# Patient Record
Sex: MALE | Race: WHITE | NOT HISPANIC OR LATINO | Employment: OTHER | URBAN - METROPOLITAN AREA
[De-identification: names, ages, dates, MRNs, and addresses within clinical notes are randomized per-mention and may not be internally consistent; named-entity substitution may affect disease eponyms.]

---

## 2017-01-31 ENCOUNTER — TELEPHONE (OUTPATIENT)
Dept: FAMILY MEDICINE | Facility: CLINIC | Age: 67
End: 2017-01-31

## 2017-01-31 NOTE — TELEPHONE ENCOUNTER
Insulin arrived from [employer]  for pt.  Box includes Novolog Mix 70/30 FlexPen #11 and Novofine 32G tip #100 x 4.  LM to call clinic/RN to inform of this med and arrange pickup.  Rosa

## 2017-03-14 DIAGNOSIS — E11.9 TYPE 2 DIABETES MELLITUS WITHOUT COMPLICATION (H): ICD-10-CM

## 2017-03-14 RX ORDER — GLIPIZIDE 10 MG/1
10 TABLET ORAL 2 TIMES DAILY
Qty: 60 TABLET | Refills: 2 | Status: SHIPPED | OUTPATIENT
Start: 2017-03-14 | End: 2017-04-27

## 2017-04-13 ENCOUNTER — TELEPHONE (OUTPATIENT)
Dept: FAMILY MEDICINE | Facility: CLINIC | Age: 67
End: 2017-04-13

## 2017-04-13 DIAGNOSIS — Z12.11 SPECIAL SCREENING FOR MALIGNANT NEOPLASMS, COLON: Primary | ICD-10-CM

## 2017-04-13 NOTE — TELEPHONE ENCOUNTER
Panel Management Review      Patient has the following on his problem list:     Depression / Dysthymia review  PHQ-9 SCORE 3/16/2016 7/11/2016 12/5/2016   Total Score - - -   Total Score 3 0 5      Patient is due for:  PHQ9    Diabetes    ASA: Passed    Last A1C  Lab Results   Component Value Date    A1C 6.2 12/05/2016    A1C 6.2 06/15/2016    A1C 6.5 03/07/2016    A1C 6.8 06/01/2015    A1C 9.4 02/23/2015     A1C tested: Passed    Last LDL:    Lab Results   Component Value Date    CHOL 91 06/15/2016     Lab Results   Component Value Date    HDL 35 06/15/2016     Lab Results   Component Value Date    LDL 3 06/15/2016     Lab Results   Component Value Date    TRIG 265 06/15/2016     Lab Results   Component Value Date    CHOLHDLRATIO 2.6 06/01/2015     Lab Results   Component Value Date    NHDL 56 06/15/2016       Is the patient on a Statin? YES             Is the patient on Aspirin? YES    Medications     HMG CoA Reductase Inhibitors    atorvastatin (LIPITOR) 40 MG tablet    Salicylates    ASPIRIN 81 MG OR TABS          Last three blood pressure readings:  BP Readings from Last 3 Encounters:   12/21/16 138/60   12/05/16 154/83   07/11/16 139/80       Date of last diabetes office visit: 12/5/16     Tobacco History:     History   Smoking Status     Never Smoker   Smokeless Tobacco     Never Used           Composite cancer screening  Chart review shows that this patient is due/due soon for the following Fecal Colorectal (FIT)  Summary:    Patient is due/failing the following:   A1C and FIT    Action needed:   Patient needs referral/order: fit    Type of outreach:    mailed fit kit to patient    Questions for provider review:    None                                                                                                                                    Louann Diop MA       Chart routed to Care Team .

## 2017-04-19 PROCEDURE — 82274 ASSAY TEST FOR BLOOD FECAL: CPT | Performed by: FAMILY MEDICINE

## 2017-04-20 ENCOUNTER — TELEPHONE (OUTPATIENT)
Dept: FAMILY MEDICINE | Facility: CLINIC | Age: 67
End: 2017-04-20

## 2017-04-20 NOTE — TELEPHONE ENCOUNTER
Patient cancelled appt for lab today  Reports he needs A1C  He will call back to make future lab appt    Tawnya CRAWFORD Rn

## 2017-04-20 NOTE — TELEPHONE ENCOUNTER
Clinic Action Needed:Yes, Please call patient   Reason for Call:Patient calling reporting he has scheduled a lab appointment for 9 a.m. Today for A1C.  Requesting to have orders put in this morning.  Routed to:Dr Anil Malave RN  [facility]

## 2017-04-21 DIAGNOSIS — Z12.11 SPECIAL SCREENING FOR MALIGNANT NEOPLASMS, COLON: ICD-10-CM

## 2017-04-22 LAB — HEMOCCULT STL QL IA: NEGATIVE

## 2017-04-27 ENCOUNTER — OFFICE VISIT (OUTPATIENT)
Dept: FAMILY MEDICINE | Facility: CLINIC | Age: 67
End: 2017-04-27
Payer: MEDICARE

## 2017-04-27 ENCOUNTER — TELEPHONE (OUTPATIENT)
Dept: FAMILY MEDICINE | Facility: CLINIC | Age: 67
End: 2017-04-27

## 2017-04-27 ENCOUNTER — ALLIED HEALTH/NURSE VISIT (OUTPATIENT)
Dept: FAMILY MEDICINE | Facility: CLINIC | Age: 67
End: 2017-04-27
Payer: MEDICARE

## 2017-04-27 VITALS
SYSTOLIC BLOOD PRESSURE: 132 MMHG | OXYGEN SATURATION: 96 % | BODY MASS INDEX: 32.07 KG/M2 | RESPIRATION RATE: 18 BRPM | DIASTOLIC BLOOD PRESSURE: 79 MMHG | HEART RATE: 75 BPM | WEIGHT: 224 LBS | HEIGHT: 70 IN

## 2017-04-27 VITALS
DIASTOLIC BLOOD PRESSURE: 79 MMHG | RESPIRATION RATE: 17 BRPM | HEART RATE: 77 BPM | SYSTOLIC BLOOD PRESSURE: 132 MMHG | OXYGEN SATURATION: 96 %

## 2017-04-27 DIAGNOSIS — E11.9 TYPE 2 DIABETES MELLITUS WITHOUT COMPLICATION (H): ICD-10-CM

## 2017-04-27 DIAGNOSIS — E03.9 HYPOTHYROIDISM: ICD-10-CM

## 2017-04-27 DIAGNOSIS — E11.9 TYPE 2 DIABETES MELLITUS WITHOUT COMPLICATION (H): Primary | ICD-10-CM

## 2017-04-27 DIAGNOSIS — E11.9 TYPE 2 DIABETES MELLITUS WITHOUT COMPLICATION, WITH LONG-TERM CURRENT USE OF INSULIN (H): ICD-10-CM

## 2017-04-27 DIAGNOSIS — Z79.4 TYPE 2 DIABETES MELLITUS WITHOUT COMPLICATION, WITH LONG-TERM CURRENT USE OF INSULIN (H): ICD-10-CM

## 2017-04-27 DIAGNOSIS — E11.42 DM TYPE 2 WITH DIABETIC PERIPHERAL NEUROPATHY (H): ICD-10-CM

## 2017-04-27 DIAGNOSIS — L70.0 OPEN COMEDONE: Primary | ICD-10-CM

## 2017-04-27 DIAGNOSIS — E78.5 HYPERLIPIDEMIA LDL GOAL <100: Primary | ICD-10-CM

## 2017-04-27 DIAGNOSIS — I10 ESSENTIAL HYPERTENSION WITH GOAL BLOOD PRESSURE LESS THAN 140/90: Chronic | ICD-10-CM

## 2017-04-27 LAB — HBA1C MFR BLD: 5.1 % (ref 4.3–6)

## 2017-04-27 PROCEDURE — 83036 HEMOGLOBIN GLYCOSYLATED A1C: CPT | Performed by: FAMILY MEDICINE

## 2017-04-27 PROCEDURE — 99207 ZZC NO CHARGE NURSE ONLY: CPT

## 2017-04-27 PROCEDURE — 36415 COLL VENOUS BLD VENIPUNCTURE: CPT | Performed by: FAMILY MEDICINE

## 2017-04-27 PROCEDURE — 99214 OFFICE O/P EST MOD 30 MIN: CPT | Performed by: FAMILY MEDICINE

## 2017-04-27 RX ORDER — GLIPIZIDE 10 MG/1
10 TABLET ORAL DAILY
Qty: 30 TABLET | Refills: 2 | Status: SHIPPED | OUTPATIENT
Start: 2017-04-27 | End: 2018-04-26

## 2017-04-27 ASSESSMENT — ANXIETY QUESTIONNAIRES
3. WORRYING TOO MUCH ABOUT DIFFERENT THINGS: NOT AT ALL
5. BEING SO RESTLESS THAT IT IS HARD TO SIT STILL: NOT AT ALL
6. BECOMING EASILY ANNOYED OR IRRITABLE: NOT AT ALL
7. FEELING AFRAID AS IF SOMETHING AWFUL MIGHT HAPPEN: NOT AT ALL
GAD7 TOTAL SCORE: 0
2. NOT BEING ABLE TO STOP OR CONTROL WORRYING: NOT AT ALL
1. FEELING NERVOUS, ANXIOUS, OR ON EDGE: NOT AT ALL

## 2017-04-27 ASSESSMENT — PATIENT HEALTH QUESTIONNAIRE - PHQ9: 5. POOR APPETITE OR OVEREATING: NOT AT ALL

## 2017-04-27 NOTE — TELEPHONE ENCOUNTER
A1c lab entered.   Pt called and also asked to make a nurse only visit to update, BP, PHQ-09.   Pt verbalized understanding and had no further questions at this time.   Encounter closed.   Shoshana MERCEDES RN

## 2017-04-27 NOTE — NURSING NOTE
"Chief Complaint   Patient presents with     Derm Problem     Patient has a bump on right side of cheek that has been present for the pass couple of weeks. Patient has not bothered it. Patient does have a white head on the top of bump.        Initial /79  Pulse 75  Resp 18  Ht 5' 9.5\" (1.765 m)  Wt 224 lb (101.6 kg)  SpO2 96%  BMI 32.6 kg/m2 Estimated body mass index is 32.6 kg/(m^2) as calculated from the following:    Height as of this encounter: 5' 9.5\" (1.765 m).    Weight as of this encounter: 224 lb (101.6 kg).  Medication Reconciliation: complete    "

## 2017-04-27 NOTE — MR AVS SNAPSHOT
After Visit Summary   4/27/2017    Kvng Velasquez    MRN: [id number]           Patient Information     Date Of Birth          1950        Visit Information        Provider Department      4/27/2017 9:45 AM FL CL RN [facility]        Today's Diagnoses     Hyperlipidemia LDL goal <100    -  1    Type 2 diabetes mellitus without complication (H)        Hypothyroidism        Essential hypertension with goal blood pressure less than 140/90           Follow-ups after your visit        Your next 10 appointments already scheduled     Apr 27, 2017 11:00 AM CDT   SHORT with Marlen Ma MD   [facility] ([facility])    [address]  [facility] [address]   [phone]              Future tests that were ordered for you today     Open Future Orders        Priority Expected Expires Ordered    Lipid panel reflex to direct LDL Routine  4/27/2018 4/27/2017    Albumin Random Urine Quantitative Routine  4/27/2018 4/27/2017    TSH with free T4 reflex Routine  4/27/2018 4/27/2017    Basic metabolic panel  (Ca, Cl, CO2, Creat, Gluc, K, Na, BUN) Routine  4/27/2018 4/27/2017            Who to contact     If you have questions or need follow up information about today's clinic visit or your schedule please contact [facility] directly at [phone].  Normal or non-critical lab and imaging results will be communicated to you by MyChart, letter or phone within 4 business days after the clinic has received the results. If you do not hear from us within 7 days, please contact the clinic through MyChart or phone. If you have a critical or abnormal lab result, we will notify you by phone as soon as possible.  Submit refill requests through [employer] or call your pharmacy and they will forward the refill request to us. Please allow 3 business days for your refill to be completed.          Additional Information About Your Visit       "  MyChart Information     [employer] lets you send messages to your doctor, view your test results, renew your prescriptions, schedule appointments and more. To sign up, go to [email or web address] . Click on \"Log in\" on the left side of the screen, which will take you to the Welcome page. Then click on \"Sign up Now\" on the right side of the page.     You will be asked to enter the access code listed below, as well as some personal information. Please follow the directions to create your username and password.     Your access code is: [email or web address]  Expires: 2017  9:53 AM     Your access code will  in 90 days. If you need help or a new code, please call your [place] clinic or [phone].        Care EveryWhere ID     This is your Care EveryWhere ID. This could be used by other organizations to access your [place] medical records  [id number]        Your Vitals Were     Pulse Respirations Pulse Oximetry             77 17 96%          Blood Pressure from Last 3 Encounters:   17 132/79   16 138/60   16 154/83    Weight from Last 3 Encounters:   16 222 lb (100.7 kg)   16 221 lb (100.2 kg)   16 226 lb (102.5 kg)               Primary Care Provider Office Phone # Fax #    Marlen Ma [phone]       [facility] [address] [address] [facility] [address] [address]        Thank you!     Thank you for choosing [facility]  for your care. Our goal is always to provide you with excellent care. Hearing back from our patients is one way we can continue to improve our services. Please take a few minutes to complete the written survey that you may receive in the mail after your visit with us. Thank you!             Your Updated Medication List - Protect others around you: Learn how to safely use, store and throw away your medicines at www.disposemymeds.org.          This list is accurate as of: 17  9:53 AM.  Always use your most recent med " list.                   Brand Name Dispense Instructions for use    amLODIPine 10 MG tablet    NORVASC    30 tablet    Take 1 tablet (10 mg) by mouth daily       aspirin 81 MG tablet      1 TABLET DAILY       atenolol 50 MG tablet    TENORMIN    135 tablet    Take one and a half tablets daily.       atorvastatin 40 MG tablet    LIPITOR    90 tablet    Take 1 tablet (40 mg) by mouth daily       B-12 1000 MCG Tbcr     100 tablet    Take 1,000 mcg by mouth daily       blood glucose monitoring meter device kit    no brand specified    1 kit    Use to test blood sugar one times daily or as directed.       blood glucose monitoring test strip    no brand specified    3 Month    1 strip by In Vitro route daily       busPIRone 15 MG tablet    BUSPAR    30 tablet    Take 1 tablet (15 mg) by mouth daily       esomeprazole 40 MG CR capsule    nexIUM    90 capsule    Take 1 capsule (40 mg) by mouth every morning (before breakfast) Take 30-60 minutes before eating.       fenofibrate 145 MG tablet     90 tablet    Take 1 tablet (145 mg) by mouth daily Brand name       gabapentin 300 MG capsule    NEURONTIN    30 capsule    Take 1 tablet (300 mg) every night       glipiZIDE 10 MG tablet    GLUCOTROL    60 tablet    Take 1 tablet (10 mg) by mouth 2 times daily       indomethacin 50 MG capsule    INDOCIN    60 capsule    Take  by mouth. 1 CAPSULE three times DAILY, reduce to as necessary as gout gets better       insulin aspart protamine-insulin aspart injection    NovoLOG MIX 70/30    9 vial    71 units before breakfast, 53 units before dinner       * insulin pen needle 31G X 6 MM    NOVOFINE 31    100 each    1 Device 2 times daily.       * NOVOFINE 32G X 6 MM   Generic drug:  insulin pen needle     100 each    Use twcie daily or as directed.       levothyroxine 112 MCG tablet    SYNTHROID/LEVOTHROID    30 tablet    Take 1 tablet (112 mcg) by mouth daily       lisinopril-hydrochlorothiazide 20-25 MG per tablet     PRINZIDE/ZESTORETIC    30 tablet    Take 1 tablet by mouth daily       naproxen 375 MG tablet    NAPROSYN    40 tablet    Take 1 tablet by mouth 3 times daily as needed.       nystatin-triamcinolone cream    MYCOLOG II    60 g    Apply  topically 2 times daily.       order for DME     1 each    Dispense one pair of diabetic shoes.       order for DME     2 each    Equipment being ordered: Diabetic Shoes       thin lancets    NO BRAND SPECIFIED    1 Box    Use to test blood sugar one time daily or as directed.       triamcinolone 0.1 % cream    KENALOG    30 g    Apply sparingly to affected area three times daily for 14 days.       * Notice:  This list has 2 medication(s) that are the same as other medications prescribed for you. Read the directions carefully, and ask your doctor or other care provider to review them with you.

## 2017-04-27 NOTE — TELEPHONE ENCOUNTER
Reason for Call:  Other Labs    Detailed comments: He needs an A1C put in.  He has an appt at 9:30 today.    Phone Number Patient can be reached at: Home number on file [phone] (home)    Best Time: any    Can we leave a detailed message on this number? YES    Call taken on 4/27/2017 at 8:46 AM by Ana Maria Diego

## 2017-04-27 NOTE — NURSING NOTE
Pt was here today for a lab draw.   Pt had questions in regards to BP, additional labs and derm problem on face.   Primary provider had an appt time open and appt was scheduled for pt to see provider in the clinic today at 11:00am   Writer helped pt with a list of questions to ask provider during appt time.   BP was taken and labs were scheduled  Encounter closed.   Shoshana MERCEDES RN      BP Readings from Last 3 Encounters:   04/27/17 132/79   12/21/16 138/60   12/05/16 154/83

## 2017-04-27 NOTE — PATIENT INSTRUCTIONS
Decrease glipizide to just 10 mg once a day (morning)    Thank you for choosing [facility].  You may be receiving a survey in the mail from [employer] regarding your visit today.  Please take a few minutes to complete and return the survey to let us know how we are doing.      Our Clinic hours are:  Mondays    7:20 am - 7 pm  Tues -  Fri  7:20 am - 5 pm    Clinic Phone: [phone]    The clinic lab opens at 7:30 am Mon - Fri and appointments are required.    [facility]  Ph. [phone]  Monday-Thursday 8 am - 7pm  Tues/Wed/Fri 8 am - 5:30 pm

## 2017-04-27 NOTE — PROGRESS NOTES
"  SUBJECTIVE:                                                    Kvng Velasquez is a 67 year old male who presents to clinic today for the following health issues:      Chief Complaint   Patient presents with     Derm Problem     Patient has a bump on right side of cheek that has been present for the pass couple of weeks. Patient has not bothered it. Patient does have a white head on the top of bump.      Lesion on right cheek that people have encouraged him to have looked it - present the past few weeks.     Diabetes Follow-up      Patient is checking blood sugars: rarely.       Diabetic concerns: other - possible vision changes, shaky in the morning.  Not checking blood sugars.  HgbA1c 5.1% today     Symptoms of hypoglycemia (low blood sugar): shaky, blurred vision     Paresthesias (numbness or burning in feet) or sores: Yes       Date of last diabetic eye exam: unsure       Problem list and histories reviewed & adjusted, as indicated.  Additional history: as documented      /79  Pulse 75  Resp 18  Ht 5' 9.5\" (1.765 m)  Wt 224 lb (101.6 kg)  SpO2 96%  BMI 32.6 kg/m2  EXAM: GENERAL APPEARANCE ADULT: Alert, no acute distress  SKIN: large open comedone on right cheek.    Comedone extractor used to remove a large amount of sebaceous material      Results for orders placed or performed in visit on 04/27/17   Hemoglobin A1c   Result Value Ref Range    Hemoglobin A1C 5.1 4.3 - 6.0 %     ASSESSMENT/PLAN:      ICD-10-CM    1. Open comedone L70.0    2. Type 2 diabetes mellitus without complication, with long-term current use of insulin (H) E11.9 glipiZIDE (GLUCOTROL) 10 MG tablet    Z79.4    3. DM type 2 with diabetic peripheral neuropathy (H) E11.42      We may need to further decrease his medications or change his insulin dosing.  Would first drop the glipizide and monitor.  He will keep track of blood sugar twice a day and he will see me in a month.     Patient Instructions       Decrease glipizide to just " 10 mg once a day (morning)    Thank you for choosing [facility].  You may be receiving a survey in the mail from [employer] regarding your visit today.  Please take a few minutes to complete and return the survey to let us know how we are doing.      Our Clinic hours are:  Mondays    7:20 am - 7 pm  Tues -  Fri  7:20 am - 5 pm    Clinic Phone: [phone]    The clinic lab opens at 7:30 am Mon - Fri and appointments are required.    [facility]  Ph. [phone]  Monday-Thursday 8 am - 7pm  Tues/Wed/Fri 8 am - 5:30 pm

## 2017-04-27 NOTE — MR AVS SNAPSHOT
After Visit Summary   4/27/2017    Kvng Velasquez    MRN: [id number]           Patient Information     Date Of Birth          1950        Visit Information        Provider Department      4/27/2017 11:00 AM Marlen Ma MD [facility]        Today's Diagnoses     Type 2 diabetes mellitus without complication, with long-term current use of insulin (H)          Care Instructions        Decrease glipizide to just 10 mg once a day (morning)    Thank you for choosing [facility]  You may be receiving a survey in the mail from [employer] regarding your visit today.  Please take a few minutes to complete and return the survey to let us know how we are doing.      Our Clinic hours are:  Mondays    7:20 am - 7 pm  Tues -  Fri  7:20 am - 5 pm    Clinic Phone: [phone]    The clinic lab opens at 7:30 am Mon - Fri and appointments are required.    [facility]  Ph. [phone]  Monday-Thursday 8 am - 7pm  Tues/Wed/Fri 8 am - 5:30 pm               Follow-ups after your visit        Future tests that were ordered for you today     Open Future Orders        Priority Expected Expires Ordered    Lipid panel reflex to direct LDL Routine  4/27/2018 4/27/2017    Albumin Random Urine Quantitative Routine  4/27/2018 4/27/2017    TSH with free T4 reflex Routine  4/27/2018 4/27/2017    Basic metabolic panel  (Ca, Cl, CO2, Creat, Gluc, K, Na, BUN) Routine  4/27/2018 4/27/2017            Who to contact     If you have questions or need follow up information about today's clinic visit or your schedule please contact [facility] directly at [phone].  Normal or non-critical lab and imaging results will be communicated to you by MyChart, letter or phone within 4 business days after the clinic has received the results. If you do not hear from us within 7 days, please contact the clinic through MyChart or phone. If you have a critical or abnormal lab  "result, we will notify you by phone as soon as possible.  Submit refill requests through [employer] or call your pharmacy and they will forward the refill request to us. Please allow 3 business days for your refill to be completed.          Additional Information About Your Visit        [employer] Information     [employer] lets you send messages to your doctor, view your test results, renew your prescriptions, schedule appointments and more. To sign up, go to [email or web address] . Click on \"Log in\" on the left side of the screen, which will take you to the Welcome page. Then click on \"Sign up Now\" on the right side of the page.     You will be asked to enter the access code listed below, as well as some personal information. Please follow the directions to create your username and password.     Your access code is: [email or web address]  Expires: 2017  9:53 AM     Your access code will  in 90 days. If you need help or a new code, please call your [place] clinic or [phone].        Care EveryWhere ID     This is your Care EveryWhere ID. This could be used by other organizations to access your [place] medical records  [id number]        Your Vitals Were     Pulse Respirations Height Pulse Oximetry BMI (Body Mass Index)       75 18 5' 9.5\" (1.765 m) 96% 32.6 kg/m2        Blood Pressure from Last 3 Encounters:   17 132/79   17 132/79   16 138/60    Weight from Last 3 Encounters:   17 224 lb (101.6 kg)   16 222 lb (100.7 kg)   16 221 lb (100.2 kg)              Today, you had the following     No orders found for display         Today's Medication Changes          These changes are accurate as of: 17 11:18 AM.  If you have any questions, ask your nurse or doctor.               These medicines have changed or have updated prescriptions.        Dose/Directions    glipiZIDE 10 MG tablet   Commonly known as:  GLUCOTROL   This may have changed:  when to take this   Used for:  Type " 2 diabetes mellitus without complication, with long-term current use of insulin (H)   Changed by:  Marlen Ma MD        Dose:  10 mg   Take 1 tablet (10 mg) by mouth daily   Quantity:  30 tablet   Refills:  2            Where to get your medicines      These [place] [facility] [place] [address]  [address] [address]     Phone:  [phone]     glipiZIDE 10 MG tablet                Primary Care Provider Office Phone # Fax #    Marlen Ma [phone]       [facility] [address]  [facility] [address]        Thank you!     Thank you for choosing [facility]  for your care. Our goal is always to provide you with excellent care. Hearing back from our patients is one way we can continue to improve our services. Please take a few minutes to complete the written survey that you may receive in the mail after your visit with us. Thank you!             Your Updated Medication List - Protect others around you: Learn how to safely use, store and throw away your medicines at www.disposemymeds.org.          This list is accurate as of: 4/27/17 11:18 AM.  Always use your most recent med list.                   Brand Name Dispense Instructions for use    amLODIPine 10 MG tablet    NORVASC    30 tablet    Take 1 tablet (10 mg) by mouth daily       aspirin 81 MG tablet      1 TABLET DAILY       atenolol 50 MG tablet    TENORMIN    135 tablet    Take one and a half tablets daily.       atorvastatin 40 MG tablet    LIPITOR    90 tablet    Take 1 tablet (40 mg) by mouth daily       B-12 1000 MCG Tbcr     100 tablet    Take 1,000 mcg by mouth daily       blood glucose monitoring meter device kit    no brand specified    1 kit    Use to test blood sugar one times daily or as directed.       blood glucose monitoring test strip    no brand specified    3 Month    1 strip by In Vitro route daily       busPIRone 15 MG  tablet    BUSPAR    30 tablet    Take 1 tablet (15 mg) by mouth daily       esomeprazole 40 MG CR capsule    nexIUM    90 capsule    Take 1 capsule (40 mg) by mouth every morning (before breakfast) Take 30-60 minutes before eating.       fenofibrate 145 MG tablet     90 tablet    Take 1 tablet (145 mg) by mouth daily Brand name       gabapentin 300 MG capsule    NEURONTIN    30 capsule    Take 1 tablet (300 mg) every night       glipiZIDE 10 MG tablet    GLUCOTROL    30 tablet    Take 1 tablet (10 mg) by mouth daily       indomethacin 50 MG capsule    INDOCIN    60 capsule    Take  by mouth. 1 CAPSULE three times DAILY, reduce to as necessary as gout gets better       insulin aspart protamine-insulin aspart injection    NovoLOG MIX 70/30    9 vial    71 units before breakfast, 53 units before dinner       * insulin pen needle 31G X 6 MM    NOVOFINE 31    100 each    1 Device 2 times daily.       * NOVOFINE 32G X 6 MM   Generic drug:  insulin pen needle     100 each    Use twcie daily or as directed.       levothyroxine 112 MCG tablet    SYNTHROID/LEVOTHROID    30 tablet    Take 1 tablet (112 mcg) by mouth daily       lisinopril-hydrochlorothiazide 20-25 MG per tablet    PRINZIDE/ZESTORETIC    30 tablet    Take 1 tablet by mouth daily       naproxen 375 MG tablet    NAPROSYN    40 tablet    Take 1 tablet by mouth 3 times daily as needed.       nystatin-triamcinolone cream    MYCOLOG II    60 g    Apply  topically 2 times daily.       order for DME     1 each    Dispense one pair of diabetic shoes.       order for DME     2 each    Equipment being ordered: Diabetic Shoes       thin lancets    NO BRAND SPECIFIED    1 Box    Use to test blood sugar one time daily or as directed.       triamcinolone 0.1 % cream    KENALOG    30 g    Apply sparingly to affected area three times daily for 14 days.       * Notice:  This list has 2 medication(s) that are the same as other medications prescribed for you. Read the directions  carefully, and ask your doctor or other care provider to review them with you.

## 2017-04-28 ASSESSMENT — ANXIETY QUESTIONNAIRES: GAD7 TOTAL SCORE: 0

## 2017-04-28 ASSESSMENT — PATIENT HEALTH QUESTIONNAIRE - PHQ9: SUM OF ALL RESPONSES TO PHQ QUESTIONS 1-9: 0

## 2017-05-31 ENCOUNTER — TELEPHONE (OUTPATIENT)
Dept: FAMILY MEDICINE | Facility: CLINIC | Age: 67
End: 2017-05-31

## 2017-05-31 NOTE — TELEPHONE ENCOUNTER
Patient returned our call, I told him his insulin is here, and to come and pick it up.  Samantha Jj  Clinic [facility]  [facility]

## 2017-05-31 NOTE — TELEPHONE ENCOUNTER
Left message for pt to call back to  insulin that came in this morning at 1015. Will put insulin in the clinic fridge until we hear back from pt.   Scott HECTOR

## 2017-06-01 DIAGNOSIS — I10 ESSENTIAL HYPERTENSION WITH GOAL BLOOD PRESSURE LESS THAN 140/90: Chronic | ICD-10-CM

## 2017-06-01 RX ORDER — AMLODIPINE BESYLATE 10 MG/1
TABLET ORAL
Qty: 90 TABLET | Refills: 1 | Status: SHIPPED | OUTPATIENT
Start: 2017-06-01 | End: 2017-11-29

## 2017-06-01 NOTE — TELEPHONE ENCOUNTER
Amlodipine      Last Written Prescription Date: 12/05/16  Last Fill Quantity: 30, # refills: 5    Last Office Visit with G, P or [facility] prescribing provider:  04/27/17   Future Office Visit:        BP Readings from Last 3 Encounters:   04/27/17 132/79   04/27/17 132/79   12/21/16 138/60

## 2017-06-05 DIAGNOSIS — I10 ESSENTIAL HYPERTENSION, BENIGN: ICD-10-CM

## 2017-06-05 DIAGNOSIS — I10 ESSENTIAL HYPERTENSION: ICD-10-CM

## 2017-06-05 RX ORDER — ATENOLOL 50 MG/1
TABLET ORAL
Qty: 135 TABLET | Refills: 1 | Status: SHIPPED | OUTPATIENT
Start: 2017-06-05 | End: 2017-11-29

## 2017-06-19 DIAGNOSIS — I10 ESSENTIAL HYPERTENSION WITH GOAL BLOOD PRESSURE LESS THAN 140/90: Chronic | ICD-10-CM

## 2017-06-19 DIAGNOSIS — E03.9 HYPOTHYROIDISM: ICD-10-CM

## 2017-06-19 DIAGNOSIS — E11.9 TYPE 2 DIABETES MELLITUS WITHOUT COMPLICATION (H): ICD-10-CM

## 2017-06-19 DIAGNOSIS — E78.5 HYPERLIPIDEMIA LDL GOAL <100: ICD-10-CM

## 2017-06-19 LAB
ANION GAP SERPL CALCULATED.3IONS-SCNC: 6 MMOL/L (ref 3–14)
BUN SERPL-MCNC: 11 MG/DL (ref 7–30)
CALCIUM SERPL-MCNC: 8.8 MG/DL (ref 8.5–10.1)
CHLORIDE SERPL-SCNC: 104 MMOL/L (ref 94–109)
CHOLEST SERPL-MCNC: 132 MG/DL
CO2 SERPL-SCNC: 28 MMOL/L (ref 20–32)
CREAT SERPL-MCNC: 0.8 MG/DL (ref 0.66–1.25)
CREAT UR-MCNC: 148 MG/DL
GFR SERPL CREATININE-BSD FRML MDRD: ABNORMAL ML/MIN/1.7M2
GLUCOSE SERPL-MCNC: 53 MG/DL (ref 70–99)
HDLC SERPL-MCNC: 35 MG/DL
LDLC SERPL CALC-MCNC: 34 MG/DL
MICROALBUMIN UR-MCNC: 7 MG/L
MICROALBUMIN/CREAT UR: 4.4 MG/G CR (ref 0–17)
NONHDLC SERPL-MCNC: 97 MG/DL
POTASSIUM SERPL-SCNC: 3.5 MMOL/L (ref 3.4–5.3)
SODIUM SERPL-SCNC: 138 MMOL/L (ref 133–144)
T4 FREE SERPL-MCNC: 0.9 NG/DL (ref 0.76–1.46)
TRIGL SERPL-MCNC: 316 MG/DL
TSH SERPL DL<=0.005 MIU/L-ACNC: 7.06 MU/L (ref 0.4–4)

## 2017-06-19 PROCEDURE — 84439 ASSAY OF FREE THYROXINE: CPT | Performed by: FAMILY MEDICINE

## 2017-06-19 PROCEDURE — 80061 LIPID PANEL: CPT | Performed by: FAMILY MEDICINE

## 2017-06-19 PROCEDURE — 82043 UR ALBUMIN QUANTITATIVE: CPT | Performed by: FAMILY MEDICINE

## 2017-06-19 PROCEDURE — 80048 BASIC METABOLIC PNL TOTAL CA: CPT | Performed by: FAMILY MEDICINE

## 2017-06-19 PROCEDURE — 36415 COLL VENOUS BLD VENIPUNCTURE: CPT | Performed by: FAMILY MEDICINE

## 2017-06-19 PROCEDURE — 84443 ASSAY THYROID STIM HORMONE: CPT | Performed by: FAMILY MEDICINE

## 2017-06-21 DIAGNOSIS — E03.9 HYPOTHYROIDISM, UNSPECIFIED TYPE: ICD-10-CM

## 2017-06-21 RX ORDER — LEVOTHYROXINE SODIUM 112 UG/1
112 TABLET ORAL DAILY
Qty: 90 TABLET | Refills: 3 | Status: SHIPPED | OUTPATIENT
Start: 2017-06-21 | End: 2018-04-26

## 2017-06-21 NOTE — PROGRESS NOTES
Please check with patient to see if he's been taking his levothyroxine correctly and daily.  If he has, we need to adjust the levothyroxine dose to 125 mcg daily and recheck in 2 months.  Please let me know so I can order the new dose if necessary.    Labs otherwise look really good.  Blood sugar was a little bit low when it was checked yesterday, is he having frequent low sugars?  I would like him to cut his glipizide further to 1/2 tablet (5 mg) once a day.    Marlen Ma M.D.

## 2017-07-12 ENCOUNTER — HOSPITAL ENCOUNTER (OUTPATIENT)
Dept: ULTRASOUND IMAGING | Facility: CLINIC | Age: 67
Discharge: HOME | End: 2017-07-12
Attending: FAMILY MEDICINE | Admitting: FAMILY MEDICINE
Payer: MEDICARE

## 2017-07-12 ENCOUNTER — OFFICE VISIT (OUTPATIENT)
Dept: FAMILY MEDICINE | Facility: CLINIC | Age: 67
End: 2017-07-12
Payer: MEDICARE

## 2017-07-12 VITALS
HEIGHT: 70 IN | WEIGHT: 223 LBS | BODY MASS INDEX: 31.92 KG/M2 | DIASTOLIC BLOOD PRESSURE: 61 MMHG | TEMPERATURE: 98.6 F | HEART RATE: 67 BPM | SYSTOLIC BLOOD PRESSURE: 136 MMHG

## 2017-07-12 DIAGNOSIS — M79.89 LEFT LEG SWELLING: Primary | ICD-10-CM

## 2017-07-12 DIAGNOSIS — M79.89 LEFT LEG SWELLING: ICD-10-CM

## 2017-07-12 PROCEDURE — 99213 OFFICE O/P EST LOW 20 MIN: CPT | Performed by: FAMILY MEDICINE

## 2017-07-12 PROCEDURE — 93971 EXTREMITY STUDY: CPT | Mod: LT

## 2017-07-12 NOTE — PATIENT INSTRUCTIONS
Thank you for choosing [facility].  You may be receiving a survey in the mail from Amina Garcia regarding your visit today.  Please take a few minutes to complete and return the survey to let us know how we are doing.      If you have questions or concerns, please contact us via Paymate or you can contact your care team at [phone].    Our Clinic hours are:  Monday 6:40 am  to 7:00 pm  Tuesday -Friday 6:40 am to 5:00 pm    The [place] outpatient lab hours are:  Monday - Friday 6:10 am to 4:45 pm  Saturdays 7:00 am to 11:00 am  Appointments are required, call [phone]    If you have clinical questions after hours or would like to schedule an appointment,  call the clinic at [phone].  Hematoma  A hematoma is a collection of blood trapped outside of a blood vessel. It is what we think of as a bruise or a contusion. It is usually seen under the skin as a black and blue spot on your arm or leg, or a bump on your head after an injury. It can be almost anywhere on or in your body. It can also occur in an internal organ where it can be more serious.  A hematoma is caused by an injury with damage to small blood vessels. This causes blood to leak into the tissues. Blood forms a pocket under the skin that swells and looks like a purplish patch.  Gradually the blood in the hematoma is absorbed back into the body. The swelling and pain of the hematoma will go away. This takes from 1 to 4 weeks, depending on the size of the hematoma. The skin over the hematoma may turn bluish then brown and yellow as the blood is dissolved and absorbed. Usually, this only takes a couple of weeks but can last months.  Home care    Limit motion of the joints near the hematoma. If the hematoma is large and painful, avoid sports and other vigorous physical activity until the swelling and pain goes away.    Apply an ice pack (ice cubes in a plastic bag, wrapped in a thin towel or a frozen bag of peas) over the injured area for  20 minutes every 1 to 2 hours the first day. Continue with ice packs 3 to 4 times a day for the next 2 days. Continue the use of ice packs for relief of pain and swelling as needed.    If you need anything for pain, you can take acetaminophen or ibuprofen, unless you were given a different pain medicine to use. Talk with your doctor before using these medicines if you have chronic liver or kidney disease, or ever had a stomach ulcer or gastrointestinal bleeding, or are taking blood thinner medicines.  Follow-up care  Follow up with your healthcare provider, or as advised. If X-rays or a CT scan were done, you will be notified if there is a change in the reading, especially if it affects treatment.  When to seek medical advice  Call your healthcare provider right away f any of the following occur:    Redness around the hematoma    Increase in pain or warmth in the hematoma    Increase in size of the hematoma    Fever of 100.4 F (38 C) or higher, or as directed by your healthcare provider    If the hematoma is on the arm or leg, watch for:    Increased swelling or pain in the extremity    Numbness or tingling or blue color of the hand or foot  Date Last Reviewed: 11/5/2015 2000-2017 [employer]. [address] [place] [address], [place] [address]. All rights reserved. This information is not intended as a substitute for professional medical care. Always follow your healthcare professional's instructions.

## 2017-07-12 NOTE — NURSING NOTE
"Chief Complaint   Patient presents with     Musculoskeletal Problem       Initial /61 (BP Location: Left arm, Cuff Size: Adult Regular)  Pulse 67  Temp 98.6  F (37  C) (Tympanic)  Ht 5' 9.75\" (1.772 m)  Wt 223 lb (101.2 kg)  BMI 32.23 kg/m2 Estimated body mass index is 32.23 kg/(m^2) as calculated from the following:    Height as of this encounter: 5' 9.75\" (1.772 m).    Weight as of this encounter: 223 lb (101.2 kg).  Medication Reconciliation: complete  "

## 2017-07-12 NOTE — MR AVS SNAPSHOT
After Visit Summary   7/12/2017    Kvng Velasquez    MRN: [id number]           Patient Information     Date Of Birth          1950        Visit Information        Provider Department      7/12/2017 8:40 AM Isrrael Kirkland MD [facility]        Today's Diagnoses     Left leg swelling    -  1      Care Instructions          Thank you for choosing [facility].  You may be receiving a survey in the mail from [email or web address] regarding your visit today.  Please take a few minutes to complete and return the survey to let us know how we are doing.      If you have questions or concerns, please contact us via Sossee or you can contact your care team at [phone].    Our Clinic hours are:  Monday 6:40 am  to 7:00 pm  Tuesday -Friday 6:40 am to 5:00 pm    The [place] outpatient lab hours are:  Monday - Friday 6:10 am to 4:45 pm  Saturdays 7:00 am to 11:00 am  Appointments are required, call [phone]    If you have clinical questions after hours or would like to schedule an appointment,  call the clinic at [phone].  Hematoma  A hematoma is a collection of blood trapped outside of a blood vessel. It is what we think of as a bruise or a contusion. It is usually seen under the skin as a black and blue spot on your arm or leg, or a bump on your head after an injury. It can be almost anywhere on or in your body. It can also occur in an internal organ where it can be more serious.  A hematoma is caused by an injury with damage to small blood vessels. This causes blood to leak into the tissues. Blood forms a pocket under the skin that swells and looks like a purplish patch.  Gradually the blood in the hematoma is absorbed back into the body. The swelling and pain of the hematoma will go away. This takes from 1 to 4 weeks, depending on the size of the hematoma. The skin over the hematoma may turn bluish then brown and yellow as the blood is dissolved and absorbed. Usually, this  only takes a couple of weeks but can last months.  Home care    Limit motion of the joints near the hematoma. If the hematoma is large and painful, avoid sports and other vigorous physical activity until the swelling and pain goes away.    Apply an ice pack (ice cubes in a plastic bag, wrapped in a thin towel or a frozen bag of peas) over the injured area for 20 minutes every 1 to 2 hours the first day. Continue with ice packs 3 to 4 times a day for the next 2 days. Continue the use of ice packs for relief of pain and swelling as needed.    If you need anything for pain, you can take acetaminophen or ibuprofen, unless you were given a different pain medicine to use. Talk with your doctor before using these medicines if you have chronic liver or kidney disease, or ever had a stomach ulcer or gastrointestinal bleeding, or are taking blood thinner medicines.  Follow-up care  Follow up with your healthcare provider, or as advised. If X-rays or a CT scan were done, you will be notified if there is a change in the reading, especially if it affects treatment.  When to seek medical advice  Call your healthcare provider right away f any of the following occur:    Redness around the hematoma    Increase in pain or warmth in the hematoma    Increase in size of the hematoma    Fever of 100.4 F (38 C) or higher, or as directed by your healthcare provider    If the hematoma is on the arm or leg, watch for:    Increased swelling or pain in the extremity    Numbness or tingling or blue color of the hand or foot  Date Last Reviewed: 11/5/2015 2000-2017 [employer]. [address] [place] [address], [place] [id number]. All rights reserved. This information is not intended as a substitute for professional medical care. Always follow your healthcare professional's instructions.                Follow-ups after your visit        Future tests that were ordered for you today     Open Future Orders        Priority Expected Expires  "Ordered    US Lower Extremity Venous Duplex Left STAT  2018            Who to contact     If you have questions or need follow up information about today's clinic visit or your schedule please contact [facility] directly at [phone].  Normal or non-critical lab and imaging results will be communicated to you by MyChart, letter or phone within 4 business days after the clinic has received the results. If you do not hear from us within 7 days, please contact the clinic through MyChart or phone. If you have a critical or abnormal lab result, we will notify you by phone as soon as possible.  Submit refill requests through Connectbeam or call your pharmacy and they will forward the refill request to us. Please allow 3 business days for your refill to be completed.          Additional Information About Your Visit        PayMinshart Information     Connectbeam lets you send messages to your doctor, view your test results, renew your prescriptions, schedule appointments and more. To sign up, go to [email or web address] . Click on \"Log in\" on the left side of the screen, which will take you to the Welcome page. Then click on \"Sign up Now\" on the right side of the page.     You will be asked to enter the access code listed below, as well as some personal information. Please follow the directions to create your username and password.     Your access code is: [email or web address]  Expires: 2017  9:53 AM     Your access code will  in 90 days. If you need help or a new code, please call your [facility] or [phone].        Care EveryWhere ID     This is your Care EveryWhere ID. This could be used by other organizations to access your [facility] medical records  [id number]        Your Vitals Were     Pulse Temperature Height BMI (Body Mass Index)          67 98.6  F (37  C) (Tympanic) 5' 9.75\" (1.772 m) 32.23 kg/m2         Blood Pressure from Last 3 Encounters:   17 136/61   17 132/79 "   04/27/17 132/79    Weight from Last 3 Encounters:   07/12/17 223 lb (101.2 kg)   04/27/17 224 lb (101.6 kg)   12/05/16 222 lb (100.7 kg)               Primary Care Provider Office Phone # Fax #    Marlen Ma [phone]       [facility] [address] [address] [facility] [address]        Equal Access to Services     LUIS ANTONIO MIJARES : Hadii aad ku hadasho Soomaali, waaxda luqadaha, qaybta kaalmada adeegyada, waxay idiin hayaan adeeg kharash la'trinityn ah. So [facility] [phone].    ATENCIÓN: Si gerardo escobar, tiene a matt disposición servicios gratuitos de asistencia lingüística. Llame al [phone].    We comply with applicable federal civil rights laws and [place] laws. We do not discriminate on the basis of race, color, national origin, age, disability sex, sexual orientation or gender identity.            Thank you!     Thank you for choosing [facility]  for your care. Our goal is always to provide you with excellent care. Hearing back from our patients is one way we can continue to improve our services. Please take a few minutes to complete the written survey that you may receive in the mail after your visit with us. Thank you!             Your Updated Medication List - Protect others around you: Learn how to safely use, store and throw away your medicines at www.disposemymeds.org.          This list is accurate as of: 7/12/17  9:56 AM.  Always use your most recent med list.                   Brand Name Dispense Instructions for use Diagnosis    amLODIPine 10 MG tablet    NORVASC    90 tablet    TAKE ONE TABLET BY MOUTH EVERY DAY    Essential hypertension with goal blood pressure less than 140/90       aspirin 81 MG tablet      1 TABLET DAILY        atenolol 50 MG tablet    TENORMIN    135 tablet    TAKE 1 AND 1/2 TABLETS BY MOUTH ONCE DAILY    Essential hypertension, Essential hypertension, benign       atorvastatin 40 MG tablet    LIPITOR    90 tablet    Take 1 tablet (40 mg) by  mouth daily    Hyperlipidemia LDL goal <100       B-12 1000 MCG Tbcr     100 tablet    Take 1,000 mcg by mouth daily    Vitamin B12 deficiency (non anemic)       blood glucose monitoring meter device kit    no brand specified    1 kit    Use to test blood sugar one times daily or as directed.    Type 2 diabetes, HbA1C goal < 8% (H)       blood glucose monitoring test strip    no brand specified    3 Month    1 strip by In Vitro route daily    Type II or unspecified type diabetes mellitus without mention of complication, not stated as uncontrolled, Type 2 diabetes, HbA1C goal < 8% (H)       busPIRone 15 MG tablet    BUSPAR    30 tablet    Take 1 tablet (15 mg) by mouth daily    Dysthymic disorder       esomeprazole 40 MG CR capsule    nexIUM    90 capsule    Take 1 capsule (40 mg) by mouth every morning (before breakfast) Take 30-60 minutes before eating.    Gastroesophageal reflux disease without esophagitis       fenofibrate 145 MG tablet     90 tablet    Take 1 tablet (145 mg) by mouth daily Brand name    Hyperlipidemia LDL goal <100       gabapentin 300 MG capsule    NEURONTIN    30 capsule    Take 1 tablet (300 mg) every night    Diabetes mellitus due to underlying condition with diabetic neuropathy, with long-term current use of insulin (H)       glipiZIDE 10 MG tablet    GLUCOTROL    30 tablet    Take 1 tablet (10 mg) by mouth daily    Type 2 diabetes mellitus without complication, with long-term current use of insulin (H)       indomethacin 50 MG capsule    INDOCIN    60 capsule    Take  by mouth. 1 CAPSULE three times DAILY, reduce to as necessary as gout gets better    Gouty arthropathy       insulin aspart protamine-insulin aspart injection    NovoLOG MIX 70/30    9 vial    71 units before breakfast, 53 units before dinner    Type 2 diabetes mellitus without complication (H)       * insulin pen needle 31G X 6 MM    NOVOFINE 31    100 each    1 Device 2 times daily.    Type II or unspecified type diabetes  mellitus without mention of complication, not stated as uncontrolled       * NOVOFINE 32G X 6 MM   Generic drug:  insulin pen needle     100 each    Use twcie daily or as directed.    Type II or unspecified type diabetes mellitus without mention of complication, not stated as uncontrolled       levothyroxine 112 MCG tablet    SYNTHROID/LEVOTHROID    90 tablet    Take 1 tablet (112 mcg) by mouth daily    Hypothyroidism, unspecified type       lisinopril-hydrochlorothiazide 20-25 MG per tablet    PRINZIDE/ZESTORETIC    30 tablet    Take 1 tablet by mouth daily    HTN, goal below 140/90       naproxen 375 MG tablet    NAPROSYN    40 tablet    Take 1 tablet by mouth 3 times daily as needed.    Neck strain       nystatin-triamcinolone cream    MYCOLOG II    60 g    Apply  topically 2 times daily.    Skin rash       order for DME     1 each    Dispense one pair of diabetic shoes.    Type II or unspecified type diabetes mellitus without mention of complication, not stated as uncontrolled       order for DME     2 each    Equipment being ordered: Diabetic Shoes    Type 2 diabetes, HbA1C goal < 8% (H)       thin lancets    NO BRAND SPECIFIED    1 Box    Use to test blood sugar one time daily or as directed.    Type 2 diabetes, HbA1C goal < 8% (H)       triamcinolone 0.1 % cream    KENALOG    30 g    Apply sparingly to affected area three times daily for 14 days.    Contact dermatitis due to poison ivy       * Notice:  This list has 2 medication(s) that are the same as other medications prescribed for you. Read the directions carefully, and ask your doctor or other care provider to review them with you.

## 2017-07-12 NOTE — PROGRESS NOTES
SUBJECTIVE:                                                    Kvng Velasquez is a 67 year old male who presents to clinic today for the following health issues:      Joint Pain/L leg pain     Onset: 3-4 days ago     Description: Patient has a pain L leg calf is red and warm to the touch   Location: L leg   Character: Sharp, Dull ache and Stabbing    Intensity: moderate    Progression of Symptoms: worse    Accompanying Signs & Symptoms:  Other symptoms: warmth and swelling    History:   Previous similar pain: no       Precipitating factors:   Trauma or overuse: no     Alleviating factors:  Improved by: icy hot     Therapies Tried and outcome: same         Patient is a 67 yr old male here for left leg swelling started spontaneously about four days ago. Patient reports that the swelling gradually continued with pain . Patient states that he has not had any recent travel. Patient also reports no recent surgeries. No chest pain or shortness of breath . Patient denies any history of DVT in the past .     Problem list and histories reviewed & adjusted, as indicated.  Additional history: as documented    Patient Active Problem List   Diagnosis     Hypothyroidism, unspecified hypothyroidism type     Dysthymic disorder     Esophageal reflux     Nonspecific elevation of levels of transaminase or lactic acid dehydrogenase (LDH)     Cholesteatoma of external ear     ALCOH USE     Gouty arthropathy     Local infection of skin and subcutaneous tissue     Other alopecia     GERD (gastroesophageal reflux disease)     Hyperlipidemia LDL goal <100     Type 2 diabetes mellitus without complication (H)     Health Care Home     Advanced directives, counseling/discussion     Hypothyroidism     Diabetes mellitus due to underlying condition with diabetic neuropathy (H)     Family history of prostate cancer in father     DM type 2 with diabetic peripheral neuropathy (H)     Essential hypertension with goal blood pressure less than 140/90      Past Surgical History:   Procedure Laterality Date     SURGICAL HISTORY OF -   9/13/1999    Left inguinal hernia repair     SURGICAL HISTORY OF -   1958    T&A     SURGICAL HISTORY OF -   12/10/1990    Cyst removal from one of his fingers     SURGICAL HISTORY OF -       Cholesteatoma removed from left ear     SURGICAL HISTORY OF -   3/14/1978    Vasectomy     SURGICAL HISTORY OF -   1982    Mastoidectomy       Social History   Substance Use Topics     Smoking status: Never Smoker     Smokeless tobacco: Never Used     Alcohol use Yes      Comment: occas     Family History   Problem Relation Age of Onset     C.A.D. Mother      Hypertension Mother      DIABETES Mother      Cardiovascular Mother      Lipids Mother      Depression Mother      Genitourinary Problems Mother      Lipids Father      Hypertension Father      Prostate Cancer Father      Thyroid Disease Father      C.A.D. Brother      Cardiovascular Brother      C.A.D. Brother      angioplasty     CANCER Brother          Current Outpatient Prescriptions   Medication Sig Dispense Refill     levothyroxine (SYNTHROID/LEVOTHROID) 112 MCG tablet Take 1 tablet (112 mcg) by mouth daily 90 tablet 3     atenolol (TENORMIN) 50 MG tablet TAKE 1 AND 1/2 TABLETS BY MOUTH ONCE DAILY 135 tablet 1     amLODIPine (NORVASC) 10 MG tablet TAKE ONE TABLET BY MOUTH EVERY DAY 90 tablet 1     glipiZIDE (GLUCOTROL) 10 MG tablet Take 1 tablet (10 mg) by mouth daily 30 tablet 2     gabapentin (NEURONTIN) 300 MG capsule Take 1 tablet (300 mg) every night 30 capsule 5     lisinopril-hydrochlorothiazide (PRINZIDE/ZESTORETIC) 20-25 MG per tablet Take 1 tablet by mouth daily 30 tablet 5     busPIRone (BUSPAR) 15 MG tablet Take 1 tablet (15 mg) by mouth daily 30 tablet 5     insulin aspart protamine-insulin aspart (NOVOLOG MIX 70/30) VIAL 100 UNITS/ML susp 71 units before breakfast, 53 units before dinner 9 vial 1     esomeprazole (NEXIUM) 40 MG capsule Take 1 capsule (40 mg) by mouth  every morning (before breakfast) Take 30-60 minutes before eating. 90 capsule 3     fenofibrate 145 MG tablet Take 1 tablet (145 mg) by mouth daily Brand name 90 tablet 3     atorvastatin (LIPITOR) 40 MG tablet Take 1 tablet (40 mg) by mouth daily 90 tablet 3     blood glucose test strip 1 strip by In Vitro route daily 3 Month 12     ASPIRIN 81 MG OR TABS 1 TABLET DAILY       Cyanocobalamin (B-12) 1000 MCG TBCR Take 1,000 mcg by mouth daily 100 tablet 1     indomethacin (INDOCIN) 50 MG capsule Take  by mouth. 1 CAPSULE three times DAILY, reduce to as necessary as gout gets better 60 capsule 3     nystatin-triamcinolone (MYCOLOG II) cream Apply  topically 2 times daily. 60 g 3     triamcinolone (KENALOG) 0.1 % cream Apply sparingly to affected area three times daily for 14 days. 30 g 0     ORDER FOR DME, SET TO LOCAL PRINT, Equipment being ordered: Diabetic Shoes 2 each 0     blood glucose meter (NO BRAND SPECIFIED) meter device kit Use to test blood sugar one times daily or as directed. 1 kit 1     thin (NO BRAND SPECIFIED) lancets Use to test blood sugar one time daily or as directed. 1 Box 3     insulin pen needle (NOVOFINE) 32G X 6 MM Use twcie daily or as directed. 100 each 3     ORDER FOR DME Dispense one pair of diabetic shoes. 1 each 0     naproxen (NAPROSYN) 375 MG tablet Take 1 tablet by mouth 3 times daily as needed. 40 tablet 3     Insulin Pen Needle (NOVOFINE 31) 31G X 6 MM MISC 1 Device 2 times daily. 100 each 11     No Known Allergies  BP Readings from Last 3 Encounters:   07/12/17 136/61   04/27/17 132/79   04/27/17 132/79    Wt Readings from Last 3 Encounters:   07/12/17 223 lb (101.2 kg)   04/27/17 224 lb (101.6 kg)   12/05/16 222 lb (100.7 kg)                  Labs reviewed in EPIC    Reviewed and updated as needed this visit by clinical staff  Tobacco  Allergies  Med Hx  Surg Hx  Fam Hx  Soc Hx      Reviewed and updated as needed this visit by Provider         ROS:  Constitutional, HEENT,  "cardiovascular, pulmonary, gi and gu systems are negative, except as otherwise noted.    OBJECTIVE:     /61 (BP Location: Left arm, Cuff Size: Adult Regular)  Pulse 67  Temp 98.6  F (37  C) (Tympanic)  Ht 5' 9.75\" (1.772 m)  Wt 223 lb (101.2 kg)  BMI 32.23 kg/m2  Body mass index is 32.23 kg/(m^2).  GENERAL: healthy, alert and no distress  NECK: no adenopathy, no asymmetry, masses, or scars and thyroid normal to palpation  RESP: lungs clear to auscultation - no rales, rhonchi or wheezes  CV: regular rate and rhythm, normal S1 S2, no S3 or S4, no murmur, click or rub, no peripheral edema and peripheral pulses strong  MS: LLE exam shows normal strength and muscle mass, ROM of all joints is normal and redness and warmth with calf tenderness    Diagnostic Test Results:  Pending ( venous ultrasound)     ASSESSMENT/PLAN:   1. Left leg swelling  No DVT, has a Baker's cyst and also has a hematoma in left leg. Patient was reassured. No need for antibiotics. Asked to ice the leg . This will eventually resolve on its own .   - US Lower Extremity Venous Duplex Left; Future    FUTURE APPOINTMENTS:       - Follow-up visit as needed    Isrrael Kirkland MD  [facility]  "

## 2017-07-16 DIAGNOSIS — M10.9 GOUTY ARTHROPATHY: ICD-10-CM

## 2017-07-17 RX ORDER — INDOMETHACIN 50 MG/1
CAPSULE ORAL
Qty: 60 CAPSULE | Refills: 3 | Status: SHIPPED | OUTPATIENT
Start: 2017-07-17 | End: 2018-10-12

## 2017-07-17 NOTE — TELEPHONE ENCOUNTER
Indomethacin       Last Written Prescription Date: 03/16/16  Last Fill Quantity: 60, # refills: 3  [place] [facility] or [facility] prescribing provider:  07/12/17        Uric Acid   Date Value Ref Range Status   02/25/2008 6.7 3.5 - 8.5 mg/dL Final   ]  Creatinine   Date Value Ref Range Status   06/19/2017 0.80 0.66 - 1.25 mg/dL Final   ]  Lab Results   Component Value Date    WBC 6.7 06/22/2016     Lab Results   Component Value Date    RBC 4.14 06/22/2016     Lab Results   Component Value Date    HGB 13.3 06/22/2016     Lab Results   Component Value Date    HCT 37.7 06/22/2016     No components found for: MCT  Lab Results   Component Value Date    MCV 91 06/22/2016     Lab Results   Component Value Date    MCH 32.1 06/22/2016     Lab Results   Component Value Date    MCHC 35.3 06/22/2016     Lab Results   Component Value Date    RDW 12.0 06/22/2016     Lab Results   Component Value Date     06/22/2016     Lab Results   Component Value Date    AST 70 06/15/2016     Lab Results   Component Value Date    ALT 53 06/15/2016

## 2017-07-17 NOTE — TELEPHONE ENCOUNTER
Routing refill request to provider for review/approval because:  Labs not current:  Uric acid, CBC, AST and Alt    Thank you  Margaret VALENCIA RN

## 2017-09-07 ENCOUNTER — TELEPHONE (OUTPATIENT)
Dept: FAMILY MEDICINE | Facility: CLINIC | Age: 67
End: 2017-09-07

## 2017-09-07 NOTE — TELEPHONE ENCOUNTER
Flexpens and needles received from [employer]/Patient Assistance [employer].  LM with this info for pt to call or stop in to  supplies.  KpavelRN

## 2017-09-08 ENCOUNTER — TELEPHONE (OUTPATIENT)
Dept: FAMILY MEDICINE | Facility: CLINIC | Age: 67
End: 2017-09-08

## 2017-09-08 ENCOUNTER — ALLIED HEALTH/NURSE VISIT (OUTPATIENT)
Dept: FAMILY MEDICINE | Facility: CLINIC | Age: 67
End: 2017-09-08
Payer: MEDICARE

## 2017-09-08 DIAGNOSIS — Z79.4 TYPE 2 DIABETES MELLITUS WITHOUT COMPLICATION, WITH LONG-TERM CURRENT USE OF INSULIN (H): Primary | ICD-10-CM

## 2017-09-08 DIAGNOSIS — E11.9 TYPE 2 DIABETES MELLITUS WITHOUT COMPLICATION, WITH LONG-TERM CURRENT USE OF INSULIN (H): Primary | ICD-10-CM

## 2017-09-08 PROCEDURE — 99207 ZZC NO CHARGE NURSE ONLY: CPT

## 2017-09-08 NOTE — MR AVS SNAPSHOT
"              After Visit Summary   9/8/2017    Kvng Velasquez    MRN: [id number]           Patient Information     Date Of Birth          1950        Visit Information        Provider Department      9/8/2017 9:00 AM VARUN CARR RN [facility]        Today's Diagnoses     Type 2 diabetes mellitus without complication, with long-term current use of insulin (H)    -  1       Follow-ups after your visit        Your next 10 appointments already scheduled     Sep 08, 2017  9:00 AM CDT   Nurse Only with [place] CL RN   [facility] ([facility]    [id number]  [facility] [address]   [phone]              Who to contact     If you have questions or need follow up information about today's clinic visit or your schedule please contact [facility] directly at [phone].  Normal or non-critical lab and imaging results will be communicated to you by MyChart, letter or phone within 4 business days after the clinic has received the results. If you do not hear from us within 7 days, please contact the clinic through [employer] or phone. If you have a critical or abnormal lab result, we will notify you by phone as soon as possible.  Submit refill requests through [employer] or call your pharmacy and they will forward the refill request to us. Please allow 3 business days for your refill to be completed.          Additional Information About Your Visit        MyChart Information     [employer] lets you send messages to your doctor, view your test results, renew your prescriptions, schedule appointments and more. To sign up, go to [email or web address] . Click on \"Log in\" on the left side of the screen, which will take you to the Welcome page. Then click on \"Sign up Now\" on the right side of the page.     You will be asked to enter the access code listed below, as well as some personal information. Please follow the directions to create your username and " password.     Your access code is: [email or web address]  Expires: 2017  7:50 AM     Your access code will  in 90 days. If you need help or a new code, please call your [place] clinic or [phone].        Care EveryWhere ID     This is your Care EveryWhere ID. This could be used by other organizations to access your [facility] medical records  [id number]         Blood Pressure from Last 3 Encounters:   17 136/61   17 132/79   17 132/79    Weight from Last 3 Encounters:   17 223 lb (101.2 kg)   17 224 lb (101.6 kg)   16 222 lb (100.7 kg)              Today, you had the following     No orders found for display       Primary Care Provider Office Phone # Fax #    Marlen Ma [phone] [address] [facility] [address]        Equal Access to Services     TAIS[facility] System[facility] : Hadii aad ku hadasho Soomaali, waaxda luqadaha, qaybta kaalmada adeegyada, waxay idiin hayaan adeeg kharash la'trinityn . So [facility] [phone].    ATENCIÓN: Si habla español, tiene a matt disposición servicios gratuitos de asistencia lingüística. Llame al [phone].    We comply with applicable federal civil rights laws and [place] laws. We do not discriminate on the basis of race, color, national origin, age, disability sex, sexual orientation or gender identity.            Thank you!     Thank you for choosing [facility]  for your care. Our goal is always to provide you with excellent care. Hearing back from our patients is one way we can continue to improve our services. Please take a few minutes to complete the written survey that you may receive in the mail after your visit with us. Thank you!             Your Updated Medication List - Protect others around you: Learn how to safely use, store and throw away your medicines at www.disposemymeds.org.          This list is accurate as of: 17  7:50 AM.  Always use your most recent med list.                    Brand Name Dispense Instructions for use Diagnosis    amLODIPine 10 MG tablet    NORVASC    90 tablet    TAKE ONE TABLET BY MOUTH EVERY DAY    Essential hypertension with goal blood pressure less than 140/90       aspirin 81 MG tablet      1 TABLET DAILY        atenolol 50 MG tablet    TENORMIN    135 tablet    TAKE 1 AND 1/2 TABLETS BY MOUTH ONCE DAILY    Essential hypertension, Essential hypertension, benign       atorvastatin 40 MG tablet    LIPITOR    90 tablet    Take 1 tablet (40 mg) by mouth daily    Hyperlipidemia LDL goal <100       B-12 1000 MCG Tbcr     100 tablet    Take 1,000 mcg by mouth daily    Vitamin B12 deficiency (non anemic)       blood glucose monitoring meter device kit    no brand specified    1 kit    Use to test blood sugar one times daily or as directed.    Type 2 diabetes, HbA1C goal < 8% (H)       blood glucose monitoring test strip    no brand specified    3 Month    1 strip by In Vitro route daily    Type II or unspecified type diabetes mellitus without mention of complication, not stated as uncontrolled, Type 2 diabetes, HbA1C goal < 8% (H)       busPIRone 15 MG tablet    BUSPAR    30 tablet    Take 1 tablet (15 mg) by mouth daily    Dysthymic disorder       esomeprazole 40 MG CR capsule    nexIUM    90 capsule    Take 1 capsule (40 mg) by mouth every morning (before breakfast) Take 30-60 minutes before eating.    Gastroesophageal reflux disease without esophagitis       fenofibrate 145 MG tablet     90 tablet    Take 1 tablet (145 mg) by mouth daily Brand name    Hyperlipidemia LDL goal <100       gabapentin 300 MG capsule    NEURONTIN    30 capsule    Take 1 tablet (300 mg) every night    Diabetes mellitus due to underlying condition with diabetic neuropathy, with long-term current use of insulin (H)       glipiZIDE 10 MG tablet    GLUCOTROL    30 tablet    Take 1 tablet (10 mg) by mouth daily    Type 2 diabetes mellitus without complication, with long-term current use of insulin  (H)       indomethacin 50 MG capsule    INDOCIN    60 capsule    TAKE ONE CAPSULE BY MOUTH THREE TIMES A DAY AND REDUCE TO AS NEEDED AS GOUT GETS BETTER    Gouty arthropathy       insulin aspart protamine-insulin aspart injection    NovoLOG MIX 70/30    9 vial    71 units before breakfast, 53 units before dinner    Type 2 diabetes mellitus without complication (H)       * insulin pen needle 31G X 6 MM    NOVOFINE 31    100 each    1 Device 2 times daily.    Type II or unspecified type diabetes mellitus without mention of complication, not stated as uncontrolled       * NOVOFINE 32G X 6 MM   Generic drug:  insulin pen needle     100 each    Use twcie daily or as directed.    Type II or unspecified type diabetes mellitus without mention of complication, not stated as uncontrolled       levothyroxine 112 MCG tablet    SYNTHROID/LEVOTHROID    90 tablet    Take 1 tablet (112 mcg) by mouth daily    Hypothyroidism, unspecified type       lisinopril-hydrochlorothiazide 20-25 MG per tablet    PRINZIDE/ZESTORETIC    30 tablet    Take 1 tablet by mouth daily    HTN, goal below 140/90       naproxen 375 MG tablet    NAPROSYN    40 tablet    Take 1 tablet by mouth 3 times daily as needed.    Neck strain       nystatin-triamcinolone cream    MYCOLOG II    60 g    Apply  topically 2 times daily.    Skin rash       order for DME     1 each    Dispense one pair of diabetic shoes.    Type II or unspecified type diabetes mellitus without mention of complication, not stated as uncontrolled       order for DME     2 each    Equipment being ordered: Diabetic Shoes    Type 2 diabetes, HbA1C goal < 8% (H)       thin lancets    NO BRAND SPECIFIED    1 Box    Use to test blood sugar one time daily or as directed.    Type 2 diabetes, HbA1C goal < 8% (H)       triamcinolone 0.1 % cream    KENALOG    30 g    Apply sparingly to affected area three times daily for 14 days.    Contact dermatitis due to poison ivy       * Notice:  This list has 2  medication(s) that are the same as other medications prescribed for you. Read the directions carefully, and ask your doctor or other care provider to review them with you.

## 2017-09-08 NOTE — TELEPHONE ENCOUNTER
MAUREEN Calderon has been approved to the [employer] assistance program for NovoLog 70/30 pens & pen needles through Aug 2018.  Kvng will receive this medication at no cost for the enrollment period.    A 120 day supply of NOVOLOG 70/30 pens & pen needles will be delivered to the [facility] within 7-10 business days.  Please contact Kvng to .    Kvng will contact my office for refills as we must work directly with the .  I will note EPIC as each refill is requested.    Thanks so much for your help!    Shreya Nelson  Prescription

## 2017-09-27 DIAGNOSIS — I10 HTN, GOAL BELOW 140/90: Chronic | ICD-10-CM

## 2017-09-27 RX ORDER — LISINOPRIL AND HYDROCHLOROTHIAZIDE 20; 25 MG/1; MG/1
TABLET ORAL
Qty: 90 TABLET | Refills: 0 | Status: SHIPPED | OUTPATIENT
Start: 2017-09-27 | End: 2017-12-30

## 2017-09-27 NOTE — TELEPHONE ENCOUNTER
Lisinopril-HCTZ      Last Written Prescription Date: 08/29/17  Last Fill Quantity: 30, # refills: 5  Last Office Visit with G, P or [facility] prescribing provider: 05/17/17       Potassium   Date Value Ref Range Status   06/19/2017 3.5 3.4 - 5.3 mmol/L Final     Creatinine   Date Value Ref Range Status   06/19/2017 0.80 0.66 - 1.25 mg/dL Final     BP Readings from Last 3 Encounters:   07/12/17 136/61   04/27/17 132/79   04/27/17 132/79

## 2017-11-29 DIAGNOSIS — I10 ESSENTIAL HYPERTENSION: ICD-10-CM

## 2017-11-29 DIAGNOSIS — I10 ESSENTIAL HYPERTENSION WITH GOAL BLOOD PRESSURE LESS THAN 140/90: Chronic | ICD-10-CM

## 2017-11-29 DIAGNOSIS — I10 ESSENTIAL HYPERTENSION, BENIGN: ICD-10-CM

## 2017-12-01 RX ORDER — AMLODIPINE BESYLATE 10 MG/1
TABLET ORAL
Qty: 90 TABLET | Refills: 1 | Status: SHIPPED | OUTPATIENT
Start: 2017-12-01 | End: 2018-04-26

## 2017-12-01 RX ORDER — ATENOLOL 50 MG/1
TABLET ORAL
Qty: 135 TABLET | Refills: 1 | Status: SHIPPED | OUTPATIENT
Start: 2017-12-01 | End: 2018-04-26

## 2017-12-30 DIAGNOSIS — I10 HTN, GOAL BELOW 140/90: Chronic | ICD-10-CM

## 2018-01-02 NOTE — TELEPHONE ENCOUNTER
Requested Prescriptions   Pending Prescriptions Disp Refills     lisinopril-hydrochlorothiazide (PRINZIDE/ZESTORETIC) 20-25 MG per tablet [Pharmacy Med Name: LISINOPRIL-HYDROCHLOROTH 20-25 TABS]  Last Written Prescription Date:  09/27/2017  Last Fill Quantity: 90,  # refills: 0   Last Office Visit with [facility], P or [facility] prescribing provider:  07/12/2017   Future Office Visit:      90 tablet 0     Sig: TAKE ONE TABLET BY MOUTH EVERY DAY    Diuretics (Including Combos) Protocol Passed    12/30/2017  4:40 PM       Passed - Blood pressure under 140/90    BP Readings from Last 3 Encounters:   07/12/17 136/61   04/27/17 132/79   04/27/17 132/79                Passed - Recent or future visit with authorizing provider's specialty    Patient had office visit in the last year or has a visit in the next 30 days with authorizing provider.  See chart review.              Passed - Patient is age 18 or older       Passed - Normal serum creatinine on file in past 12 months    Recent Labs   Lab Test  06/19/17   0720   CR  0.80             Passed - Normal serum potassium on file in past 12 months    Recent Labs   Lab Test  06/19/17   0720   POTASSIUM  3.5                   Passed - Normal serum sodium on file in past 12 months    Recent Labs   Lab Test  06/19/17   0720   NA  138              Jake UMAÑA (R)

## 2018-01-04 RX ORDER — LISINOPRIL AND HYDROCHLOROTHIAZIDE 20; 25 MG/1; MG/1
TABLET ORAL
Qty: 90 TABLET | Refills: 0 | Status: SHIPPED | OUTPATIENT
Start: 2018-01-04 | End: 2018-04-26

## 2018-01-04 NOTE — TELEPHONE ENCOUNTER
Routing refill request to provider for review/approval because:  Drug interaction warning    Tawnya CRAWFORD Rn

## 2018-01-05 ENCOUNTER — ANESTHESIA (OUTPATIENT)
Dept: EMERGENCY MEDICINE | Facility: CLINIC | Age: 68
DRG: 199 | End: 2018-01-05
Payer: MEDICARE

## 2018-01-05 ENCOUNTER — HOSPITAL ENCOUNTER (INPATIENT)
Facility: CLINIC | Age: 68
LOS: 4 days | Discharge: NURSING, REHAB OR LONG-TERM CARE | DRG: 199 | End: 2018-01-09
Attending: EMERGENCY MEDICINE | Admitting: FAMILY MEDICINE
Payer: MEDICARE

## 2018-01-05 ENCOUNTER — APPOINTMENT (OUTPATIENT)
Dept: GENERAL RADIOLOGY | Facility: CLINIC | Age: 68
DRG: 199 | End: 2018-01-05
Attending: EMERGENCY MEDICINE
Payer: MEDICARE

## 2018-01-05 ENCOUNTER — ANESTHESIA EVENT (OUTPATIENT)
Dept: EMERGENCY MEDICINE | Facility: CLINIC | Age: 68
DRG: 199 | End: 2018-01-05
Payer: MEDICARE

## 2018-01-05 ENCOUNTER — ALLIED HEALTH/NURSE VISIT (OUTPATIENT)
Dept: FAMILY MEDICINE | Facility: CLINIC | Age: 68
End: 2018-01-05
Payer: MEDICARE

## 2018-01-05 ENCOUNTER — APPOINTMENT (OUTPATIENT)
Dept: CT IMAGING | Facility: CLINIC | Age: 68
DRG: 199 | End: 2018-01-05
Attending: EMERGENCY MEDICINE
Payer: MEDICARE

## 2018-01-05 DIAGNOSIS — S22.41XA CLOSED FRACTURE OF MULTIPLE RIBS OF RIGHT SIDE, INITIAL ENCOUNTER: ICD-10-CM

## 2018-01-05 DIAGNOSIS — T79.7XXA SUBCUTANEOUS EMPHYSEMA, INITIAL ENCOUNTER (H): ICD-10-CM

## 2018-01-05 DIAGNOSIS — S27.0XXA TRAUMATIC PNEUMOTHORAX, INITIAL ENCOUNTER: ICD-10-CM

## 2018-01-05 DIAGNOSIS — R60.9 EDEMA, UNSPECIFIED TYPE: ICD-10-CM

## 2018-01-05 DIAGNOSIS — R74.02 ELEVATED LDH: ICD-10-CM

## 2018-01-05 DIAGNOSIS — E11.9 TYPE 2 DIABETES MELLITUS WITHOUT COMPLICATION, WITH LONG-TERM CURRENT USE OF INSULIN (H): Primary | ICD-10-CM

## 2018-01-05 DIAGNOSIS — T78.3XXA ANGIOEDEMA: Primary | ICD-10-CM
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
## 2018-01-05 DIAGNOSIS — E87.6 HYPOKALEMIA: ICD-10-CM

## 2018-01-05 DIAGNOSIS — Z79.4 TYPE 2 DIABETES MELLITUS WITHOUT COMPLICATION, WITH LONG-TERM CURRENT USE OF INSULIN (H): Primary | ICD-10-CM

## 2018-01-05 PROBLEM — J93.9 PNEUMOTHORAX: Status: ACTIVE | Noted: 2018-01-05

## 2018-01-05 LAB
ALBUMIN SERPL-MCNC: 3.8 G/DL (ref 3.4–5)
ALP SERPL-CCNC: 68 U/L (ref 40–150)
ALT SERPL W P-5'-P-CCNC: 31 U/L (ref 0–70)
ANION GAP SERPL CALCULATED.3IONS-SCNC: 19 MMOL/L (ref 3–14)
AST SERPL W P-5'-P-CCNC: 25 U/L (ref 0–45)
BASE DEFICIT BLDV-SCNC: 7.9 MMOL/L
BILIRUB SERPL-MCNC: 0.8 MG/DL (ref 0.2–1.3)
BUN SERPL-MCNC: 19 MG/DL (ref 7–30)
CALCIUM SERPL-MCNC: 8.3 MG/DL (ref 8.5–10.1)
CHLORIDE SERPL-SCNC: 94 MMOL/L (ref 94–109)
CO2 SERPL-SCNC: 18 MMOL/L (ref 20–32)
CREAT SERPL-MCNC: 0.7 MG/DL (ref 0.66–1.25)
GFR SERPL CREATININE-BSD FRML MDRD: >90 ML/MIN/1.7M2
GLUCOSE BLDC GLUCOMTR-MCNC: 244 MG/DL (ref 70–99)
GLUCOSE SERPL-MCNC: 315 MG/DL (ref 70–99)
HCO3 BLDV-SCNC: 17 MMOL/L (ref 21–28)
LACTATE BLD-SCNC: 5.8 MMOL/L (ref 0.7–2)
PCO2 BLDV: 33 MM HG (ref 40–50)
PH BLDV: 7.32 PH (ref 7.32–7.43)
PO2 BLDV: 55 MM HG (ref 25–47)
POTASSIUM SERPL-SCNC: 2.8 MMOL/L (ref 3.4–5.3)
POTASSIUM SERPL-SCNC: 3.8 MMOL/L (ref 3.4–5.3)
PROT SERPL-MCNC: 8.7 G/DL (ref 6.8–8.8)
RADIOLOGIST FLAGS: ABNORMAL
SODIUM SERPL-SCNC: 131 MMOL/L (ref 133–144)
TROPONIN I SERPL-MCNC: <0.015 UG/L (ref 0–0.04)

## 2018-01-05 PROCEDURE — 37000011 ZZH ANESTHESIA WARD SERVICE: Performed by: NURSE ANESTHETIST, CERTIFIED REGISTERED

## 2018-01-05 PROCEDURE — 25000128 H RX IP 250 OP 636

## 2018-01-05 PROCEDURE — 93005 ELECTROCARDIOGRAM TRACING: CPT

## 2018-01-05 PROCEDURE — 99285 EMERGENCY DEPT VISIT HI MDM: CPT | Mod: 25

## 2018-01-05 PROCEDURE — 99207 ZZC NO CHARGE NURSE ONLY: CPT

## 2018-01-05 PROCEDURE — 84132 ASSAY OF SERUM POTASSIUM: CPT | Performed by: FAMILY MEDICINE

## 2018-01-05 PROCEDURE — 25000128 H RX IP 250 OP 636: Performed by: NURSE ANESTHETIST, CERTIFIED REGISTERED

## 2018-01-05 PROCEDURE — 96376 TX/PRO/DX INJ SAME DRUG ADON: CPT

## 2018-01-05 PROCEDURE — 85025 COMPLETE CBC W/AUTO DIFF WBC: CPT | Performed by: EMERGENCY MEDICINE

## 2018-01-05 PROCEDURE — 93005 ELECTROCARDIOGRAM TRACING: CPT | Mod: 76

## 2018-01-05 PROCEDURE — 20000003 ZZH R&B ICU

## 2018-01-05 PROCEDURE — 83605 ASSAY OF LACTIC ACID: CPT | Performed by: EMERGENCY MEDICINE

## 2018-01-05 PROCEDURE — 40000986 XR CHEST PORT 1 VW

## 2018-01-05 PROCEDURE — 93010 ELECTROCARDIOGRAM REPORT: CPT | Mod: 59 | Performed by: EMERGENCY MEDICINE

## 2018-01-05 PROCEDURE — 96365 THER/PROPH/DIAG IV INF INIT: CPT

## 2018-01-05 PROCEDURE — 87081 CULTURE SCREEN ONLY: CPT | Performed by: FAMILY MEDICINE

## 2018-01-05 PROCEDURE — 32551 INSERTION OF CHEST TUBE: CPT

## 2018-01-05 PROCEDURE — 99207 ZZC CDG-CHARGE REQUIRED MANUAL ENTRY: CPT | Performed by: FAMILY MEDICINE

## 2018-01-05 PROCEDURE — 93010 ELECTROCARDIOGRAM REPORT: CPT | Mod: 76 | Performed by: EMERGENCY MEDICINE

## 2018-01-05 PROCEDURE — 99291 CRITICAL CARE FIRST HOUR: CPT | Mod: 25 | Performed by: EMERGENCY MEDICINE

## 2018-01-05 PROCEDURE — 96375 TX/PRO/DX INJ NEW DRUG ADDON: CPT

## 2018-01-05 PROCEDURE — 71045 X-RAY EXAM CHEST 1 VIEW: CPT

## 2018-01-05 PROCEDURE — 0W9900Z DRAINAGE OF RIGHT PLEURAL CAVITY WITH DRAINAGE DEVICE, OPEN APPROACH: ICD-10-PCS | Performed by: EMERGENCY MEDICINE

## 2018-01-05 PROCEDURE — 32551 INSERTION OF CHEST TUBE: CPT | Mod: Z6 | Performed by: EMERGENCY MEDICINE

## 2018-01-05 PROCEDURE — 71250 CT THORAX DX C-: CPT

## 2018-01-05 PROCEDURE — 80053 COMPREHEN METABOLIC PANEL: CPT | Performed by: EMERGENCY MEDICINE

## 2018-01-05 PROCEDURE — 87077 CULTURE AEROBIC IDENTIFY: CPT | Performed by: FAMILY MEDICINE

## 2018-01-05 PROCEDURE — 84484 ASSAY OF TROPONIN QUANT: CPT | Performed by: EMERGENCY MEDICINE

## 2018-01-05 PROCEDURE — 87186 SC STD MICRODIL/AGAR DIL: CPT | Performed by: FAMILY MEDICINE

## 2018-01-05 PROCEDURE — 99223 1ST HOSP IP/OBS HIGH 75: CPT | Mod: AI | Performed by: FAMILY MEDICINE

## 2018-01-05 PROCEDURE — 82803 BLOOD GASES ANY COMBINATION: CPT | Performed by: EMERGENCY MEDICINE

## 2018-01-05 PROCEDURE — 00000146 ZZHCL STATISTIC GLUCOSE BY METER IP

## 2018-01-05 PROCEDURE — 25000128 H RX IP 250 OP 636: Performed by: EMERGENCY MEDICINE

## 2018-01-05 PROCEDURE — 40000671 ZZH STATISTIC ANESTHESIA CASE

## 2018-01-05 RX ORDER — HYDROMORPHONE HYDROCHLORIDE 1 MG/ML
0.5 INJECTION, SOLUTION INTRAMUSCULAR; INTRAVENOUS; SUBCUTANEOUS ONCE
Status: COMPLETED | OUTPATIENT
Start: 2018-01-05 | End: 2018-01-05

## 2018-01-05 RX ORDER — CEFTRIAXONE SODIUM 1 G/50ML
1 INJECTION, SOLUTION INTRAVENOUS ONCE
Status: COMPLETED | OUTPATIENT
Start: 2018-01-05 | End: 2018-01-05

## 2018-01-05 RX ORDER — HYDROMORPHONE HYDROCHLORIDE 1 MG/ML
.3-.5 INJECTION, SOLUTION INTRAMUSCULAR; INTRAVENOUS; SUBCUTANEOUS
Status: DISCONTINUED | OUTPATIENT
Start: 2018-01-05 | End: 2018-01-09 | Stop reason: HOSPADM

## 2018-01-05 RX ORDER — PROPOFOL 10 MG/ML
INJECTION, EMULSION INTRAVENOUS PRN
Status: DISCONTINUED | OUTPATIENT
Start: 2018-01-05 | End: 2018-01-05

## 2018-01-05 RX ORDER — FENTANYL CITRATE 50 UG/ML
INJECTION, SOLUTION INTRAMUSCULAR; INTRAVENOUS
Status: COMPLETED
Start: 2018-01-05 | End: 2018-01-05

## 2018-01-05 RX ORDER — SODIUM CHLORIDE, SODIUM LACTATE, POTASSIUM CHLORIDE, CALCIUM CHLORIDE 600; 310; 30; 20 MG/100ML; MG/100ML; MG/100ML; MG/100ML
INJECTION, SOLUTION INTRAVENOUS CONTINUOUS
Status: DISCONTINUED | OUTPATIENT
Start: 2018-01-05 | End: 2018-01-06

## 2018-01-05 RX ORDER — FENTANYL CITRATE 50 UG/ML
50 INJECTION, SOLUTION INTRAMUSCULAR; INTRAVENOUS ONCE
Status: COMPLETED | OUTPATIENT
Start: 2018-01-05 | End: 2018-01-05

## 2018-01-05 RX ORDER — POTASSIUM CL/LIDO/0.9 % NACL 10MEQ/0.1L
10 INTRAVENOUS SOLUTION, PIGGYBACK (ML) INTRAVENOUS ONCE
Status: COMPLETED | OUTPATIENT
Start: 2018-01-05 | End: 2018-01-05

## 2018-01-05 RX ORDER — NALOXONE HYDROCHLORIDE 0.4 MG/ML
.1-.4 INJECTION, SOLUTION INTRAMUSCULAR; INTRAVENOUS; SUBCUTANEOUS
Status: DISCONTINUED | OUTPATIENT
Start: 2018-01-05 | End: 2018-01-06

## 2018-01-05 RX ADMIN — PROPOFOL 100 MG: 10 INJECTION, EMULSION INTRAVENOUS at 18:40

## 2018-01-05 RX ADMIN — SODIUM CHLORIDE, POTASSIUM CHLORIDE, SODIUM LACTATE AND CALCIUM CHLORIDE 1000 ML: 600; 310; 30; 20 INJECTION, SOLUTION INTRAVENOUS at 18:26

## 2018-01-05 RX ADMIN — FENTANYL CITRATE 50 MCG: 50 INJECTION, SOLUTION INTRAMUSCULAR; INTRAVENOUS at 19:52

## 2018-01-05 RX ADMIN — PROPOFOL 50 MG: 10 INJECTION, EMULSION INTRAVENOUS at 18:53

## 2018-01-05 RX ADMIN — SODIUM CHLORIDE, POTASSIUM CHLORIDE, SODIUM LACTATE AND CALCIUM CHLORIDE: 600; 310; 30; 20 INJECTION, SOLUTION INTRAVENOUS at 21:58

## 2018-01-05 RX ADMIN — Medication 10 MEQ: at 19:19

## 2018-01-05 RX ADMIN — CEFTRIAXONE SODIUM 1 G: 1 INJECTION, SOLUTION INTRAVENOUS at 19:18

## 2018-01-05 RX ADMIN — PROPOFOL 100 MG: 10 INJECTION, EMULSION INTRAVENOUS at 18:47

## 2018-01-05 RX ADMIN — FENTANYL CITRATE 50 MCG: 50 INJECTION, SOLUTION INTRAMUSCULAR; INTRAVENOUS at 19:05

## 2018-01-05 RX ADMIN — PROPOFOL 100 MG: 10 INJECTION, EMULSION INTRAVENOUS at 18:44

## 2018-01-05 RX ADMIN — Medication 10 MEQ: at 22:49

## 2018-01-05 RX ADMIN — Medication 0.5 MG: at 21:59

## 2018-01-05 ASSESSMENT — ACTIVITIES OF DAILY LIVING (ADL)
TRANSFERRING: 2 - ASSISTIVE PERSON
AMBULATION: 3 - ASSISTIVE EQUIPMENT AND PERSON
COMMUNICATION: 0 - UNDERSTANDS/COMMUNICATES WITHOUT DIFFICULTY
SWALLOWING: 0 - SWALLOWS FOODS/LIQUIDS WITHOUT DIFFICULTY
EATING: 0 - INDEPENDENT
TOILETING: 2 - ASSISTIVE PERSON
DRESS: 2 - ASSISTIVE PERSON
BATHING: 2 - ASSISTIVE PERSON

## 2018-01-05 NOTE — IP AVS SNAPSHOT
` `     [facility] INTENSIVE CARE: [phone]                 INTERAGENCY TRANSFER FORM - NOTES (H&P, Discharge Summary, Consults, Procedures, Therapies)   2018                    Hospital Admission Date: 2018  KVNG BHATT   : 1950  Sex: Male        Patient PCP Information     Provider PCP Type    Marlen Ma MD General         History & Physicals      H&P by Ewelina Espinal MD at 2018 10:49 PM     Author:  Ewelina Espinal MD Service:  Hospitalist Author Type:  Physician    Filed:  2018 12:53 AM Date of Service:  2018 10:49 PM Creation Time:  2018 10:49 PM    Status:  Addendum :  Ewelina Espinal MD (Physician)         ADMISSION HISTORY AND PHYSICAL  Patient Name: Kvng Bhatt  Address: [address] [place] [id number]  Age:67 year old  Sex: male  Admission Date/Time: 2018  4:41 PM  Admitting provider: Ewelina Espinal MD  Hospital Attending Physician: Ewelina Espinal*   Primary Care Provider: Marlen Ma[OO1.1]  He lives alone but has family members close by.[OO1.2]    CHIEF COMPLAINT:[OO1.1] Chest pain and facial swelling[OO1.3]    HPI:[OO1.1]   Patient with a PMHx as outlined below.  He reports that he feel on ice yesterday afternoon onto the right side of his chest/body. Immediate pain over the region where he landed but he was able to get up and did not loose consciousness. Progressively worsening pain overnight that kept him awake. Denies cough. This afternoon, he however noticed a change in his voice and his face became progressively swollen. No associated lip swelling or troubles breathing. He went to his clinic and was though to have angioedema and given Epinephrine and antihistamine and sent to the ED. Patient reports that he has never had this happen to him before, no history of angioedema, no new medication or food.[OO1.3]      REVIEW OF SYSTEMS  No fevers, chills, weight  loss, weight gain  No headaches, acute change in vision or hearing, or URI symptoms  No palpitations, dyspnea on exertion, feet swelling, orthopnea or PND  No cough, wheezing    No nausea, vomiting, constipation, diarrhea or abdominal pain  No urinary pain, change in color, frequency[OO1.1] though he reports chronic nocturia and incomplete voiding over the years.[OO1.3]  No[OO1.1] other[OO1.3] musculoskeletal complaints of muscle pain or joint swelling  No new rashes[OO1.1], he has a rash on his left leg that will flare up and become very itchy from time to time[OO1.3]  No alterations in thinking, weakness, migraine headache or seizures[OO1.1]. He has some numbness in his toes and has been told this is likely related to his diabetes.[OO1.3]  No significant change in mood, anxiety level          PAST MEDICAL HISTORY  Past Medical History:   Diagnosis Date     Acidosis     lactic acidosis from metformin     Backache, unspecified      Cholesteatoma of external ear     l ear     Dysthymic disorder     Depression w/anxiety     Esophageal reflux      Hemorrhage of rectum and anus      Nonspecific elevation of levels of transaminase or lactic acid dehydrogenase (LDH)      Other and unspecified hyperlipidemia      Type II or unspecified type diabetes mellitus without mention of complication, not stated as uncontrolled      Unspecified essential hypertension      Unspecified hypothyroidism           PAST SURGICAL HISTORY  Past Surgical History:   Procedure Laterality Date     SURGICAL HISTORY OF -   9/13/1999    Left inguinal hernia repair     SURGICAL HISTORY OF -   1958    T&A     SURGICAL HISTORY OF -   12/10/1990    Cyst removal from one of his fingers     SURGICAL HISTORY OF -       Cholesteatoma removed from left ear     SURGICAL HISTORY OF -   3/14/1978    Vasectomy     SURGICAL HISTORY OF -   1982    Mastoidectomy          MEDICATIONS  No current outpatient prescriptions on file.          ALLERGIES  Review of  "patient's allergies indicates no known allergies.        FAMILY HISTORY  Family History   Problem Relation Age of Onset     C.A.D. Mother      Hypertension Mother      DIABETES Mother      Cardiovascular Mother      Lipids Mother      Depression Mother      Genitourinary Problems Mother      Lipids Father      Hypertension Father      Prostate Cancer Father      Thyroid Disease Father      C.A.D. Brother      Cardiovascular Brother      C.A.D. Brother      angioplasty     CANCER Brother           SOCIAL HISTORY  Social History     Social History     Marital status: Single     Spouse name: N/A     Number of children: N/A     Years of education: N/A     Occupational History     Not on file.     Social History Main Topics     Smoking status: Never Smoker     Smokeless tobacco: Never Used     Alcohol use Yes      Comment: occas     Drug use: No     Sexual activity: No     Other Topics Concern     Parent/Sibling W/ Cabg, Mi Or Angioplasty Before 65f 55m? No     Social History Narrative          PHYSICAL EXAM[OO1.1]  Vitals:    01/05/18 2030 01/05/18 2045 01/05/18 2100 01/05/18 2200   BP: 155/64 162/70  146/77   Pulse:    100   Resp: 21 22 24 25   Temp:    98.6  F (37  C)   TempSrc:    Oral   SpO2: 91% 91% 90%    Weight:    102.5 kg (225 lb 15.5 oz)   Height:    1.803 m (5' 11\")[OO1.4]     General - Awake and alert, not in any obvious distress. Afebrile, not pale, well hydrated.  Head -[OO1.1] A[OO1.3]traumatic[OO1.1], significant subcutaneous swelling with palpable crepitus especially over eyelids which are almost swollen shut[OO1.3].  Eyes -[OO1.1] Swollen eyelids as noted about[OO1.3]  Mouth -[OO1.1] O[OO1.3]ropharynx is clear without exudates.[OO1.1] No lip or tongue swelling.[OO1.3]  Neck -[OO1.1] N[OO1.3]o cervical adenopathy, thyromegally, JVD or carotid bruits noted.[OO1.1] Crepitus felt in the subcutaneous tissue of the neck[OO1.3]  Lungs -[OO1.1] Chest tube in place over the right axillary region.[OO1.3] Clear " to auscultation bilaterally, no wheezes, rales or rhonchi.  CV - Regular rate and rhythm, no murmurs, rubs or gallops.  Abdomen -[OO1.1] Obese though moving with respiration[OO1.3],[OO1.1] no tenderness,[OO1.3] no masses, no hepatosplenomegaly.   Upper Extremities - No edema or deformities.   Lower Extremities - No edema.   Skin -[OO1.1] Subcutaneous crepitus extending from head into upper chest, billateral arm and right forearm[OO1.3].[OO1.1] Erythematous patch over medial aspect of the left leg near the ankle (eczema) otherwise, n[OO1.3]o rashes or erythema.  Neurologic - Cranial nerves 2-12 intact. Muscle tone, bulk and strength within normal limits throughout.  Psych - Judgment and mental status are clear, patient has reasonable insight. Mood is stable.        LABORATORY[OO1.1]  Results for orders placed or performed during the hospital encounter of 01/05/18   XR Chest Port 1 View    Narrative    CHEST ONE VIEW PORTABLE  1/5/2018 5:06 PM     COMPARISON: Two view chest x-ray 5/21/2013    HISTORY: Short of air.    FINDINGS: There is new massive subcutaneous emphysema over both sides  of the chest (right worse than left) raising the question of  penetrating soft tissue injury. There is also questionable gas beneath  the left hemidiaphragm or within the inferior aspect of the  mediastinum on the left.    The cardiac silhouette, pulmonary vasculature, lungs and pleural  spaces are within normal limits.      Impression    IMPRESSION: Extensive subcutaneous gas throughout the thorax raising  the question of penetrating soft tissue injury. Questionable gas  beneath the left hemidiaphragm or within the left inferior  mediastinum. Clinical correlation recommended. Clear lungs.    WENDI HERNANDEZ MD   Chest CT w/o contrast   Result Value Ref Range    Radiologist flags Pneumothorax, pneumomediastinum, and subcutaneous (AA)     Narrative    CT CHEST WITHOUT CONTRAST  1/5/2018 5:28 PM    HISTORY: Subcutaneous emphysema on  chest x-ray, fall yesterday with  right anterior inferior chest wall tenderness, evaluate for rib  fractures and pneumothorax.     COMPARISON: A chest radiograph earlier today at 4:58 PM.    TECHNIQUE: Routine transverse CT imaging of the chest was performed  without contrast. Radiation dose for this scan was reduced using  automated exposure control, adjustment of the mA and/or kV according  to patient size, or iterative reconstruction technique.    FINDINGS: The heart size is normal. No enlarged lymph node or other  abnormal mediastinal mass is seen. The thoracic aorta and pulmonary  arteries are unremarkable, allowing for the absence of contrast. There  is atelectasis in both lung bases. The lungs are otherwise clear.  There is a small right pneumothorax. There is prominent  pneumomediastinum and extensive subcutaneous emphysema throughout the  chest bilaterally extending into the visualized neck and right upper  extremity. No pleural effusion is identified. There are mildly  displaced fractures of the right 6th, 7th, 8th, 9th, and 10th ribs.  There are degenerative changes in the spine. No other fracture or  osseous abnormality is noted. Within the visualized upper abdomen,  there are a few gallstones with no evidence of cholecystitis. There is  also a cystic region in the anterior aspect of the right kidney not  entirely included on this study measuring up to 6 cm consistent with a  cyst.      Impression    IMPRESSION:   1. Mildly displaced fractures of the right 6th through 10th ribs.  2. Small right pneumothorax.  3. Extensive pneumomediastinum and subcutaneous emphysema of the chest  wall.    [Critical Result: Pneumothorax, pneumomediastinum, and subcutaneous  emphysema with rib fractures.]    Finding was identified on 1/5/2018 5:47 PM.     Dr. Warner in the emergency department was contacted by me on  1/5/2018 5:49 PM and verbalized understanding of the critical result.    CHHAYA GEE MD   XR Chest  Port 1 View    Narrative    PORTABLE CHEST ONE VIEW 1/5/2018 7:18 PM     COMPARISON: Frontal chest x-ray 1/5/2018    HISTORY: Chest tube placement confirmation.      Impression    IMPRESSION: There has been interval placement of a large caliber right  chest tube. No right pneumothorax identified. Persistent  pneumomediastinum again noted. Extensive subcutaneous venous gas over  the chest bilaterally again noted. The lungs are clear. Heart size  remains normal.    WENDI HERNANDEZ MD   CBC with platelets, differential   Result Value Ref Range    WBC 20.5 (H) 4.0 - 11.0 10e9/L    RBC Count 4.27 (L) 4.4 - 5.9 10e12/L    Hemoglobin 13.9 13.3 - 17.7 g/dL    Hematocrit 40.0 40.0 - 53.0 %    MCV 94 78 - 100 fl    MCH 32.6 26.5 - 33.0 pg    MCHC 34.8 31.5 - 36.5 g/dL    RDW 11.5 10.0 - 15.0 %    Platelet Count 405 150 - 450 10e9/L    Diff Method Manual Differential     % Neutrophils 67.0 %    % Lymphocytes 25.0 %    % Monocytes 6.0 %    % Eosinophils 0.0 %    % Basophils 1.0 %    % Metamyelocytes 1.0 %    Absolute Neutrophil 13.7 (H) 1.6 - 8.3 10e9/L    Absolute Lymphocytes 5.1 0.8 - 5.3 10e9/L    Absolute Monocytes 1.2 0.0 - 1.3 10e9/L    Absolute Eosinophils 0.0 0.0 - 0.7 10e9/L    Absolute Basophils 0.2 0.0 - 0.2 10e9/L    Absolute Metamyelocytes 0.2 (H) 0 10e9/L   Comprehensive metabolic panel   Result Value Ref Range    Sodium 131 (L) 133 - 144 mmol/L    Potassium 2.8 (L) 3.4 - 5.3 mmol/L    Chloride 94 94 - 109 mmol/L    Carbon Dioxide 18 (L) 20 - 32 mmol/L    Anion Gap 19 (H) 3 - 14 mmol/L    Glucose 315 (H) 70 - 99 mg/dL    Urea Nitrogen 19 7 - 30 mg/dL    Creatinine 0.70 0.66 - 1.25 mg/dL    GFR Estimate >90 >60 mL/min/1.7m2    GFR Estimate If Black >90 >60 mL/min/1.7m2    Calcium 8.3 (L) 8.5 - 10.1 mg/dL    Bilirubin Total 0.8 0.2 - 1.3 mg/dL    Albumin 3.8 3.4 - 5.0 g/dL    Protein Total 8.7 6.8 - 8.8 g/dL    Alkaline Phosphatase 68 40 - 150 U/L    ALT 31 0 - 70 U/L    AST 25 0 - 45 U/L   Blood gas venous   Result  Value Ref Range    Ph Venous 7.32 7.32 - 7.43 pH    PCO2 Venous 33 (L) 40 - 50 mm Hg    PO2 Venous 55 (H) 25 - 47 mm Hg    Bicarbonate Venous 17 (L) 21 - 28 mmol/L    Base Deficit Venous 7.9 mmol/L   Lactic acid whole blood   Result Value Ref Range    Lactic Acid 5.8 (HH) 0.7 - 2.0 mmol/L   Troponin I   Result Value Ref Range    Troponin I ES <0.015 0.000 - 0.045 ug/L   Potassium   Result Value Ref Range    Potassium 3.8 3.4 - 5.3 mmol/L   Glucose by meter   Result Value Ref Range    Glucose 244 (H) 70 - 99 mg/dL[OO1.5]       EKG:[OO1.1] Non specific ST depression, troponin came back normal.[OO1.3]        ASSESSMENT/PLAN[OO1.1]  Principal Problem:    Pneumothorax/Fall/Right Rib fracture/Subcutaneous Emphysema:  Patient who developed significant swelling found to be mostly subcutaneous after a fall. Noted to have rib fracture which explains this. No respiratory compromise at this point. Chest tube already placed in ED. Elevated lactic acid on admission, likely related to tissue injury/hypoperfusion.  -Admit to ICU for close monitoring.  -Continue to monitor chest tube  -Incentive spirometry  -Good pain management.  -Repeat chest xray in am.  -Surgery already consulted and will see in am  -Monitor Lactic acid.[OO1.3]  -Will likely benefit from PT once he starts feeling better.[OO1.2]      Active Problems:      Elevated WBC? Cause: With associated neutrophilia but patient does not appear septic or febrile and no source of infection identified at this point. Will repeat WBC in am and monitor clinically as a potential lung source can develop especially given recent injury.      Hyperlipidemia LDL goal <100: Continue PTA Lipitor and Fenofibrate.      Type 2 diabetes mellitus without complication (H)/  Diabetes mellitus due to underlying condition with diabetic neuropathy (H): blood sugar elevated, likely multifactorial- stress, he has not eaten much all day, pain...  -Continue PTA Novolog  -Add SSI for proper  coverage.  -Hold Glimepiride this admission  -Continue PTA gabapentin.      Acquired hypothyroidism: Continue PTA Synthroid.      Dysthymic disorder; Continue PTA Buspar.      Esophageal reflux: Continue PTA PPI as needed       Essential hypertension with goal blood pressure less than 140/90: BP stable this admission: Continue PTA medications. Initial potassium low though repeat within normal limits without replacement. ? Initial error, repeat in am and to also monitor slightly low sodium.      Urinary retention: Unsure of exact etiology, no features to suggest urethral injury with recent fall and he was passing urine prior to this afternoon. Straight cath used, to evaluate ongoing needs overnight overnight.[OO1.3]    Prophylaxis -[OO1.1] Sequential  Code- Full[OO1.3]  Disposition -[OO1.1] Pending extubation and patient being clinically stable with good pain control.[OO1.3]    Ewelina Espinal  Attestation:   I have reviewed today's vital signs, notes, medications, labs and imaging.   Amount of time spent on this H&P note: 45 minutes.   Ewelina Espinal MD[OO1.1]               Revision History        User Key Date/Time User Provider Type Action    > OO1.2 1/6/2018 12:53 AM Ewelina Espinal MD Physician Addend     OO1.5 1/6/2018 12:46 AM Ewelina Espinal MD Physician Sign     OO1.3 1/6/2018 12:16 AM Ewelina Espinal MD Physician      OO1.4 1/5/2018 10:56 PM Ewelina Espinal MD Physician      OO1.1 1/5/2018 10:49 PM Ewelina Espianl MD Physician                   Discharge Summaries     No notes of this type exist for this encounter.         Consult Notes      Consults by Minerva White LICSW at 1/8/2018  1:51 PM     Author:  Minerva White LICSW Service:  (none) Author Type:      Filed:  1/8/2018  1:51 PM Date of Service:  1/8/2018  1:51 PM Creation Time:  1/8/2018  1:49 PM    Status:  Signed :  Minerva White LICSW (Social  Worker)     Consult Orders:    1. Care Transition RN/SW IP Consult [[id number]] ordered by Vicente Black MD at 01/07/18 2474                CARE TRANSITION SOCIAL WORK INITIAL ASSESSMENT:      Met with: Patient.    DATA  Principal Problem:    Pneumothorax  Active Problems:    Acquired hypothyroidism    Dysthymic disorder    Esophageal reflux    Hyperlipidemia LDL goal <100    Type 2 diabetes mellitus without complication (H)    Diabetes mellitus due to underlying condition with diabetic neuropathy (H)    Family history of prostate cancer in father    DM type 2 with diabetic peripheral neuropathy (H)    Essential hypertension with goal blood pressure less than 140/90    Urinary retention    Fall    Closed fracture of multiple ribs of right side    Subcutaneous emphysema (H)    Elevated white blood cell count       Primary Care Clinic Name:  ([facility])  Primary Care MD Name:  (Anil)  Contact information and PCP information verified: Yes      ASSESSMENT  Cognitive Status: awake, alert and oriented.       Resources List: Transitional Care, Home Care     Lives With: alone  Living Arrangements: apartment     Description of Support System: Supportive, Involved   Who is your support system?: Sibling(s)   Support Assessment: Adequate family and caregiver support, Adequate social supports   Insurance Concerns: No Insurance issues identified        This writer met with pt introduced self and role. Discussed discharge planning and medicare guidelines in regards to home care and SNF benefits. Pt reports that he lives at home alone. Pt states that he is interested in TCU on dc. Patient was provided with Medicare certified nursing home list. Pts choices are as follows [facility] (Phone: [phone] Fax: [phone]), [facility] (Main: [phone] Admissions: [phone] Fax: [phone]) and [facility] (Phone: [phone]) Fax: ([phone]).  Pt has been declined admission at  [facility] as they have no beds. Pt has been accepted at [facility] (Main: [phone] Admissions: [phone] Fax: [phone]). PAS will need to be completed prior to dc. Pt will transfer via van as he is on oxygen.          PLAN    TCU    Discharge Planner   Discharge Plans in progress: TCU  Barriers to discharge plan: medical stability  Follow up plan: CTS to follow       Entered by: Minerva White 01/08/2018 1:49 PM             Minerva White MSW, JOSIE, [phone][AK1.1]       Revision History        User Key Date/Time User Provider Type Action    > AK1.1 1/8/2018  1:51 PM Minerva White LICSW  Sign                     Progress Notes - Physician (Notes from 01/06/18 through 01/09/18)      ED Notes signed by Suzette Sales-Provider at 1/9/2018 10:42 AM      Author:  Scan, Non-Provider Service:  (none) Author Type:  (none)    Filed:  1/9/2018 10:42 AM Date of Service:  1/8/2018 10:40 PM Creation Time:  1/9/2018 10:42 AM    Status:  Signed :  Scan, Non-Provider     Scan on 1/9/2018 10:42 AM by Ella SalesProvider : [facility] PREHOSPITAL CARE REPORT 1          Revision History        User Key Date/Time User Provider Type Action    > [N/A] 1/9/2018 10:42 AM Mónica Non-Provider (none) Sign            Progress Notes by Minerva White LICSW at 1/9/2018  8:56 AM     Author:  Minerva White LICSW Service:  (none) Author Type:      Filed:  1/9/2018  8:57 AM Date of Service:  1/9/2018  8:56 AM Creation Time:  1/9/2018  8:56 AM    Status:  Signed :  Minerva White LICSW ()         Reason for Follow up: DC Planning    Anticipated discharge needs: Pt has been accepted at [facility] (Main: [phone] Admissions: [phone] Fax: [phone]) and will most likely be ready for dc today. Pt will need transport d/t O2.     PAS-RR    Per DHS regulation, CTS team completed and submitted PAS-RR to [place] [employer] on [employer]ging Direct Connect via the [facility]  [employer] [facility]. CTS team advised SNF and they are aware a PAS-RR has been submitted.     CTS team reviewed with pt or health care agent that they may be contacted for a follow up appointment within 10 days of hospital discharge if SNF stay is <30 days. Contact information for Senior [employer] was also provided.     Pt or health care agent verbalized understanding.     PAS-RR # [id number]      Next steps: TCU when stable    Minerva White MSW, LICSW, [facility] [phone]  Discharge Planner   Discharge Plans in progress: TCU  Barriers to discharge plan: medical stability  Follow up plan: CTS to follow       Entered by: Minerva White 01/09/2018 8:56 AM[AK1.1]            Revision History        User Key Date/Time User Provider Type Action    > AK1.1 1/9/2018  8:57 AM Minerva White LICSW  Sign            Progress Notes by Regino Contreras RN at 1/9/2018  5:00 AM     Author:  Regino Contreras RN Service:  Medical ICU Author Type:  Registered Nurse    Filed:  1/9/2018  5:01 AM Date of Service:  1/9/2018  5:00 AM Creation Time:  1/9/2018  5:00 AM    Status:  Signed :  Regino Contreras, RN (Registered Nurse)         Patient remains on 2lpm O2 with sat's mid 90's. Patient requiring pain medication every 4 hours for side pain related to rib fractures.[SS1.1]     Revision History        User Key Date/Time User Provider Type Action    > SS1.1 1/9/2018  5:01 AM Regino Contreras, RN Registered Nurse Sign            Progress Notes by Vicente Black MD at 1/8/2018  5:20 PM     Author:  Vicente Black MD Service:  (none) Author Type:  Physician    Filed:  1/8/2018  5:23 PM Date of Service:  1/8/2018  5:20 PM Creation Time:  1/8/2018  5:20 PM    Status:  Signed :  Vicente Black MD (Physician)         SUBJECTIVE:   Facial swelling better but still axillary swelling  Still SOB  Weak, hard to move without pain.       ROS:4 point ROS including Respiratory, CV, GI and , other than that noted in the HPI,  " is negative     OBJECTIVE:   /65  Pulse 75  Temp 98.5  F (36.9  C) (Oral)  Resp 18  Ht 1.803 m (5' 11\")  Wt 105 kg (231 lb 7.7 oz)  SpO2 92%  BMI 32.29 kg/m2    GENERAL APPEARANCE:  slightly  bloated, symmetric face with less crepitus face, neck, right arm and right chest wall      RESP:clear      CV: regular rate and rhythm,  No  murmur , edema: none       Abdomen: soft, nontender, no liver or spleen enlargement, no masses, BSs normal   Skin: no cyanosis, pallor, or jaundice    CMP    Recent Labs  Lab 01/08/18  0530 01/07/18  0500 01/06/18  0805 01/05/18  2230 01/05/18  1700   * 132* 129*  --  131*   POTASSIUM 4.0 4.2 3.8 3.8 2.8*   CHLORIDE 99 99 95  --  94   CO2 26 25 25  --  18*   ANIONGAP 6 8 9  --  19*   * 182* 221*  --  315*   BUN 18 16 16  --  19   CR 0.62* 0.61* 0.64*  --  0.70   GFRESTIMATED >90 >90 >90  --  >90   GFRESTBLACK >90 >90 >90  --  >90   TITI 8.6 8.0* 8.4*  --  8.3*   PROTTOTAL 7.4 7.4  --   --  8.7   ALBUMIN 2.7* 2.8*  --   --  3.8   BILITOTAL 0.8 1.0  --   --  0.8   ALKPHOS 54 52  --   --  68   AST 28 32  --   --  25   ALT 18 22  --   --  31     CBC    Recent Labs  Lab 01/08/18  0530 01/07/18  0500 01/06/18  0805 01/05/18  1700   WBC 13.3* 13.5* 14.6* 20.5*   RBC 3.94* 3.79* 4.06* 4.27*   HGB 12.9* 12.3* 13.2* 13.9   HCT 37.8* 36.1* 38.1* 40.0   MCV 96 95 94 94   MCH 32.7 32.5 32.5 32.6   MCHC 34.1 34.1 34.6 34.8   RDW 11.3 11.4 11.4 11.5    269 295 405     INRNo lab results found in last 7 days.  Arterial BloodGas  No lab results found in last 7 days.   Venous Blood Gas    Recent Labs  Lab 01/08/18  0530 01/07/18  0500 01/05/18  1700   PHV 7.38 7.40 7.32   PCO2V 53* 44 33*   PO2V 18* 37 55*   HCO3V 31* 27 17*   LEE 4.6 2.0  --        Medications     gabapentin  300 mg Oral At Bedtime     amLODIPine  10 mg Oral Daily     aspirin  81 mg Oral Daily     atenolol  50 mg Oral Daily     atorvastatin  40 mg Oral Daily     busPIRone  15 mg Oral Daily     " cyanocolbalamin  1,000 mcg Oral Daily     insulin aspart prot & aspart  71 Units Subcutaneous QAM AC     levothyroxine  112 mcg Oral QAM AC     insulin aspart  1-7 Units Subcutaneous TID AC     insulin aspart  1-5 Units Subcutaneous At Bedtime     lisinopril  20 mg Oral Daily    And     hydrochlorothiazide  25 mg Oral Daily     fenofibrate  160 mg Oral Daily     ramelteon  8 mg Oral At Bedtime     influenza Vac Split High-Dose  0.5 mL Intramuscular Prior to discharge       Intake/Output Summary (Last 24 hours) at 01/06/18 1019  Last data filed at 01/06/18 0630   Gross per 24 hour   Intake          1213.33 ml   Output             1100 ml   Net           113.33 ml         ASSESSMENT: PLAN:   Pneumothorax/Fall/Right Rib fracture 6-10/Subcutaneous Emphysema:  Patient who developed significant swelling found to be mostly subcutaneous after a fall. Noted to have rib fracture which explains this. No respiratory compromise at this point. Chest tube already placed in ED. Elevated lactic acid on admission, likely related to tissue injury/hypoperfusion/ SIRS.   - Lactic acid now normal   -Will likely benefit from PT once he starts feeling better.  - check forced VC. Currently reaches 2 L.  ( If this is below 1500 and still needing >3 L O2 consider transfer to tertiary care. )   -chest tube out and pneumo only 0.7 cm apical.  Appears resolving  -still some hypoxia from the splinting.             SIRS:     Elevated WBC, lactic.  This is from trauma itself.  Lactic better.  Follow        Hyperlipidemia LDL goal <100: Continue PTA Lipitor and Fenofibrate.       Type 2 diabetes mellitus  with diabetic neuropathy (H)  -: blood sugar elevated, likely multifactorial- stress, he has not eaten much all day, pain...  -Continue PTA Novolog  -Add SSI for proper coverage.  -Hold Glimepiride this admission  -Continue PTA gabapentin.       Acquired hypothyroidism: Continue PTA Synthroid.       Dysthymic disorder; Continue PTA Buspar.        Esophageal reflux: Continue PTA PPI as needed        Essential hypertension   : BP stable this admission: Continue PTA medications. Initial potassium low though repeat within normal limits without replacement. ? Initial error, repeat in am and to also monitor slightly low sodium.       Urinary retention: Unsure of exact etiology, no features to suggest urethral injury with recent fall and he was passing urine prior to this afternoon. Straight cath used, to evaluate ongoing needs overnight overnight.     Prophylaxis - Sequential  Code- Full  Disposition - to floor.  May need TCU , in AM.     [FRANCISCO J.1]     Revision History        User Key Date/Time User Provider Type Action    > FRANCISCO J.1 1/8/2018  5:23 PM Vicente Black MD Physician Sign            Progress Notes by Noemi Valerio OT at 1/8/2018 11:47 AM     Author:  Noemi Valerio OT Service:  (none) Author Type:  Occupational Therapist    Filed:  1/8/2018 11:47 AM Date of Service:  1/8/2018 11:47 AM Creation Time:  1/8/2018 11:47 AM    Status:  Signed :  Noemi Valerio OT (Occupational Therapist)          01/08/18 1051   Quick Adds   Type of Visit Initial Occupational Therapy Evaluation   Living Environment   Lives With alone   Living Arrangements apartment   Home Accessibility tub/shower is not walk in   Number of Stairs to Enter Home 0   Number of Stairs Within Home 0   Transportation Available car   Self-Care   Usual Activity Tolerance good   Current Activity Tolerance fair   Regular Exercise no   Equipment Currently Used at Home grab bar   Functional Level Prior   Ambulation 0-->independent   Transferring 0-->independent   Toileting 0-->independent   Bathing 1-->assistive equipment  (grab bars)   Dressing 0-->independent   Eating 0-->independent   Communication 0-->understands/communicates without difficulty   Swallowing 0-->swallows foods/liquids without difficulty   Cognition 0 - no cognition issues reported   Fall history within last six months  "yes   Number of times patient has fallen within last six months 1  (reason for admit- slipped on ice. )   Which of the above functional risks had a recent onset or change? ambulation;transferring;toileting;bathing;dressing;fall history   Prior Functional Level Comment Does not wear O2 at baseline.    General Information   Onset of Illness/Injury or Date of Surgery - Date 01/05/18   Referring Physician Vicente Black MD   Patient/Family Goals Statement to decrease pain, increase independence.    Additional Occupational Profile Info/Pertinent History of Current Problem He reports that he feel on ice yesterday afternoon onto the right side of his chest/body. Immediate pain over the region where he landed but he was able to get up and did not loose consciousness. Progressively worsening pain overnight that kept him awake. fall resulting in R rib fx.    Cognitive Status Examination   Orientation orientation to person, place and time   Level of Consciousness alert   Able to Follow Commands WNL/WFL   Pain Assessment   Patient Currently in Pain Yes, see Vital Sign flowsheet  (rates \"7-8/10\")   Range of Motion (ROM)   ROM Comment B UE ROM: WNL   Strength   Strength Comments B UE strength: NT d/t increased pain in R rib area.    Hand Strength   Hand Strength Comments B  strength: WFL for ADLs.   Coordination   Upper Extremity Coordination No deficits were identified   Transfer Skill: Sit to Stand   Level of [place]: Sit/Stand contact guard   Physical Assist/Nonphysical Assist: Sit/Stand 1 person assist   Transfer Skill: Sit to Stand full weight-bearing   Assistive Device for Transfer: Sit/Stand standard walker   Transfer Skill: Toilet Transfer   Level of [place]: Toilet contact guard   Physical Assist/Nonphysical Assist: Toilet 1 person assist   Weight-Bearing Restrictions: Toilet full weight-bearing   Assistive Device standard walker   Upper Body Dressing   Level of [place]: Dress Upper Body stand-by " "assist   Physical Assist/Nonphysical Assist: Dress Upper Body 1 person assist   Lower Body Dressing   Level of [place]: Dress Lower Body moderate assist (50% patients effort)   Physical Assist/Nonphysical Assist: Dress Lower Body 1 person assist   Grooming   Level of [place]: Grooming stand-by assist  (while standing. )   Physical Assist/Nonphysical Assist: Grooming 1 person assist   Eating/Self Feeding   Level of [place]: Eating independent   Instrumental Activities of Daily Living (IADL)   Previous Responsibilities meal prep;housekeeping;laundry;shopping;medication management;finances;driving   Activities of Daily Living Analysis   Impairments Contributing to Impaired Activities of Daily Living balance impaired;pain;strength decreased   General Therapy Interventions   Planned Therapy Interventions ADL retraining;strengthening;progressive activity/exercise   Clinical Impression   Criteria for Skilled Therapeutic Interventions Met yes, treatment indicated   OT Diagnosis decreased independence with ADLs/IADLs and funcitonal mobility   Influenced by the following impairments increased pain and decreased strength.    Assessment of Occupational Performance 5 or more Performance Deficits   Identified Performance Deficits LB dressing, toileting, showering, home management, meal management.    Clinical Decision Making (Complexity) Low complexity   Therapy Frequency daily   Predicted Duration of Therapy Intervention (days/wks) 2-3 days   Anticipated Equipment Needs at Discharge (TBD at TCU)   Anticipated Discharge Disposition Transitional Care Facility   Risks and Benefits of Treatment have been explained. Yes   Patient, Family & other staff in agreement with plan of care Yes   [facility] AM-PAC  \"6 Clicks\" Daily Activity Inpatient Short Form   1. Putting on and taking off regular lower body clothing? 2 - A Lot   2. Bathing (including washing, rinsing, drying)? 2 - A Lot   3. Toileting, which includes " using toilet, bedpan or urinal? 3 - A Little   4. Putting on and taking off regular upper body clothing? 3 - A Little   5. Taking care of personal grooming such as brushing teeth? 4 - None   6. Eating meals? 4 - None   Daily Activity Raw Score (Score out of 24.Lower scores equate to lower levels of function) 18   Total Evaluation Time   Total Evaluation Time (Minutes) 6[LC1.1]        Revision History        User Key Date/Time User Provider Type Action    > LC1.1 1/8/2018 11:47 AM Noemi Valerio OT Occupational Therapist Sign            Progress Notes by Marlen Hutchins, PT at 1/8/2018 10:23 AM     Author:  Marlen Hutchins PT Service:  (none) Author Type:  Physical Therapist    Filed:  1/8/2018 10:23 AM Date of Service:  1/8/2018 10:23 AM Creation Time:  1/8/2018 10:23 AM    Status:  Signed :  Marlen Hutchins PT (Physical Therapist)         Physical Therapy Evaluation     01/08/18 0900   Quick Adds   Type of Visit Initial PT Evaluation       Present no   Living Environment   Lives With alone   Living Arrangements apartment   Home Accessibility no concerns   Number of Stairs to Enter Home 0   Number of Stairs Within Home 0   Transportation Available car   Living Environment Comment no concerns; pt lives in a 1-level apartment   Self-Care   Usual Activity Tolerance good   Current Activity Tolerance fair   Regular Exercise no   Equipment Currently Used at Home none   Functional Level Prior   Ambulation 0-->independent   Transferring 0-->independent   Toileting 0-->independent   Bathing 0-->independent   Dressing 0-->independent   Eating 0-->independent   Communication 0-->understands/communicates without difficulty   Swallowing 0-->swallows foods/liquids without difficulty   Cognition 0 - no cognition issues reported   Fall history within last six months yes   Number of times patient has fallen within last six months 1   Which of the above functional risks had a recent onset or  change? ambulation;transferring   Prior Functional Level Comment Fully independent at baseline, no AD, no supplemental O2; drives and no outside services.   General Information   Onset of Illness/Injury or Date of Surgery - Date 01/07/18   Referring Physician Dr. Sofia MD   Patient/Family Goals Statement Hopefully to get stronger, less pain and back home   Pertinent History of Current Problem (include personal factors and/or comorbidities that impact the POC) 67 year old male who has a history of hypertension, type 2 diabetes mellitus, and hypothyroidism who presents to the ED for evaluation of facial swelling. Patient presents to the ED via EMS from [facility].  Patient presented to clinic secondary to fall and chest pain, developed progressive swelling in route.  patient fell on the ice yesterday and landed on his right side.  R-sided chest tube placed which has since been removed on 1/7/18.   Precautions/Limitations oxygen therapy device and L/min   General Info Comments Patient very pleasant and agreeable to work with therapy. C/o R-sided rib and L-knee pain   Cognitive Status Examination   Orientation orientation to person, place and time   Level of Consciousness alert   Follows Commands and Answers Questions 100% of the time   Personal Safety and Judgment intact   Memory intact   Pain Assessment   Patient Currently in Pain Yes, see Vital Sign flowsheet  (7-8/10 R-sided rib pain; L-knee pain that is new, unrated)   Integumentary/Edema   Integumentary/Edema Comments facial; improved per chart review and pt report   Posture    Posture Not impaired   Range of Motion (ROM)   ROM Quick Adds No deficits were identified   ROM Comment B hip flex, knee flex/ext, DF/PF WFL   Strength   Strength Comments BLE hip flex, knee flex/ext, DF/PF at least 3/5; no MMT due to R-sided rib pain and L-knee pain; gen weakness also limited by pain   Bed Mobility   Bed Mobility Comments not formally assessed, per pt report needed  "assist x1 this morning from nursing staff   Transfer Skills   Transfer Comments STS up to FWW at [facility] for safetyu   Gait   Gait Comments Pt ambulated 15 feet in room using FWW at [facility], slow but steady   Balance   Balance no deficits were identified   Balance Comments good standing balance using FWW with BUEs   Sensory Examination   Sensory Perception other (describe)   Sensory Perception Comments baseline B feet neuropathies    Coordination   Coordination no deficits were identified   Muscle Tone   Muscle Tone no deficits were identified   Modality Interventions   Planned Modality Interventions Comments ice for L-knee   General Therapy Interventions   Planned Therapy Interventions balance training;bed mobility training;gait training;strengthening;transfer training;progressive activity/exercise   Clinical Impression   Criteria for Skilled Therapeutic Intervention yes, treatment indicated   PT Diagnosis weakness; decreased independence with functional mobility   Influenced by the following impairments decreased activity tolerance; pain; new supplemental O2 needs   Functional limitations due to impairments bed mobility, transfers, ambulation    Clinical Presentation Stable/Uncomplicated   Clinical Presentation Rationale clinical judgement   Clinical Decision Making (Complexity) Low complexity   Therapy Frequency` daily   Predicted Duration of Therapy Intervention (days/wks) 3 days   Anticipated Equipment Needs at Discharge (TBD at next level of care; possibly FWW)   Anticipated Discharge Disposition Transitional Care Facility   Risk & Benefits of therapy have been explained Yes   Patient, Family & other staff in agreement with plan of care Yes   [facility] [employer]-PAC TM \"6 Clicks\"   2016, Trustees of [facility], under license to [employer].  All rights reserved.   6 Clicks Short Forms Basic Mobility Inpatient Short Form   [facility] AM-PAC  \"6 Clicks\" V.2 Basic Mobility Inpatient Short Form   1. " Turning from your back to your side while in a flat bed without using bedrails? 2 - A Lot   2. Moving from lying on your back to sitting on the side of a flat bed without using bedrails? 2 - A Lot   3. Moving to and from a bed to a chair (including a wheelchair)? 3 - A Little   4. Standing up from a chair using your arms (e.g., wheelchair, or bedside chair)? 3 - A Little   5. To walk in hospital room? 3 - A Little   6. Climbing 3-5 steps with a railing? 2 - A Lot   Basic Mobility Raw Score (Score out of 24.Lower scores equate to lower levels of function) 15   Total Evaluation Time   Total Evaluation Time (Minutes) 10[AC1.1]        Revision History        User Key Date/Time User Provider Type Action    > AC1.1 1/8/2018 10:23 AM Marlen Hutchins, PT Physical Therapist Sign            Progress Notes by Mihir Crandall MD at 1/7/2018 12:58 PM     Author:  Mihir Crandall MD Service:  Surgery Author Type:  Physician    Filed:  1/7/2018  1:01 PM Date of Service:  1/7/2018 12:58 PM Creation Time:  1/7/2018 12:58 PM    Status:  Signed :  Mihir Crandall MD (Physician)         The CT scan showed that the pneumothorax has gotten smaller, but not completely resolved.  The subcutaneous air and pneumomediastinum did not change much.    The chest tube does not have an air leak, or fluctuate with his breathing.  I don't think that having the chest tube is helpful clinically and is causing him pain.    Will D/C chest tube today.    Recheck CXR in the morning.    Osmin Crandall MD, FACS[BR1.1]       Revision History        User Key Date/Time User Provider Type Action    > BR1.1 1/7/2018  1:01 PM Mihir Crandall MD Physician Sign            Progress Notes by Vicente Black MD at 1/7/2018  8:53 AM     Author:  Vicente Black MD Service:  (none) Author Type:  Physician    Filed:  1/7/2018  8:56 AM Date of Service:  1/7/2018  8:53 AM Creation Time:  1/7/2018  8:53 AM    Status:  Signed :   "Vicente Black MD (Physician)         SUBJECTIVE:   Still a lot of pain when he moves and takes deep breath.    Poor appetite      ROS:4 point ROS including Respiratory, CV, GI and , other than that noted in the HPI,  is negative     OBJECTIVE:   /72 (BP Location: Right arm)  Pulse 100  Temp 98.1  F (36.7  C) (Oral)  Resp 11  Ht 1.803 m (5' 11\")  Wt 104.6 kg (230 lb 9.6 oz)  SpO2 91%  BMI 32.16 kg/m2    GENERAL APPEARANCE:  Face looks much better, may be close to baseline      RESP:clear , few rales right base      CV: regular rate and rhythm,  No  murmur , edema: none       Abdomen: soft, nontender, no liver or spleen enlargement, no masses, BSs normal   Skin: no cyanosis, pallor, or jaundice    CMP    Recent Labs  Lab 01/07/18  0500 01/06/18  0805 01/05/18  2230 01/05/18  1700   * 129*  --  131*   POTASSIUM 4.2 3.8 3.8 2.8*   CHLORIDE 99 95  --  94   CO2 25 25  --  18*   ANIONGAP 8 9  --  19*   * 221*  --  315*   BUN 16 16  --  19   CR 0.61* 0.64*  --  0.70   GFRESTIMATED >90 >90  --  >90   GFRESTBLACK >90 >90  --  >90   TITI 8.0* 8.4*  --  8.3*   PROTTOTAL 7.4  --   --  8.7   ALBUMIN 2.8*  --   --  3.8   BILITOTAL 1.0  --   --  0.8   ALKPHOS 52  --   --  68   AST 32  --   --  25   ALT 22  --   --  31     CBC    Recent Labs  Lab 01/07/18  0500 01/06/18  0805 01/05/18  1700   WBC 13.5* 14.6* 20.5*   RBC 3.79* 4.06* 4.27*   HGB 12.3* 13.2* 13.9   HCT 36.1* 38.1* 40.0   MCV 95 94 94   MCH 32.5 32.5 32.6   MCHC 34.1 34.6 34.8   RDW 11.4 11.4 11.5    295 405     INRNo lab results found in last 7 days.  Arterial BloodGas  No lab results found in last 7 days.   Venous Blood Gas    Recent Labs  Lab 01/07/18  0500 01/05/18  1700   PHV 7.40 7.32   PCO2V 44 33*   PO2V 37 55*   HCO3V 27 17*   LEE 2.0  --        Medications     gabapentin  300 mg Oral At Bedtime     amLODIPine  10 mg Oral Daily     aspirin  81 mg Oral Daily     atenolol  50 mg Oral Daily     atorvastatin  40 mg Oral Daily "     busPIRone  15 mg Oral Daily     cyanocolbalamin  1,000 mcg Oral Daily     insulin aspart prot & aspart  71 Units Subcutaneous QAM AC     levothyroxine  112 mcg Oral QAM AC     insulin aspart  1-7 Units Subcutaneous TID AC     insulin aspart  1-5 Units Subcutaneous At Bedtime     lisinopril  20 mg Oral Daily    And     hydrochlorothiazide  25 mg Oral Daily     fenofibrate  160 mg Oral Daily     ramelteon  8 mg Oral At Bedtime     influenza Vac Split High-Dose  0.5 mL Intramuscular Prior to discharge[MD1.1]         Intake/Output Summary (Last 24 hours) at 01/07/18 0854  Last data filed at 01/07/18 0600   Gross per 24 hour   Intake             3710 ml   Output             1925 ml   Net             1785 ml[MD1.2]        ASSESSMENT: PLAN:   Pneumothorax/Fall/Right Rib fracture 6-10/Subcutaneous Emphysema:  Patient who developed significant swelling found to be mostly subcutaneous after a fall. Noted to have rib fracture which explains this. No respiratory compromise at this point. Chest tube already placed in ED. Elevated lactic acid on admission, likely related to tissue injury/hypoperfusion/ SIRS.  -Admit to ICU for close monitoring.  -Continue to monitor chest tube  -Incentive spirometry  -Good pain management.  -Repeat chest xray in amshows resolution of pneumo.    -Surgery following   - Lactic acid now normal   -Will likely benefit from PT once he starts feeling better.  - check forced VC. Currently reaches 2 L.  ( If this is below 1500 and still needing >3 L O2 consider transfer to tertiary care. )   -[MD1.1]1/7/2018[MD1.3] still on 3 L O2 but CO2 OK.  CT today per surgery            SIRS:     Elevated WBC, lactic.  This is from trauma itself.  Lactic better.  Follow        Hyperlipidemia LDL goal <100: Continue PTA Lipitor and Fenofibrate.  -resolving        Type 2 diabetes mellitus  with diabetic neuropathy (H)  -: blood sugar elevated, likely multifactorial- stress, he has not eaten much all day,  "pain...  -Continue PTA Novolog  -Add SSI for proper coverage.  -Hold Glimepiride this admission  -Continue PTA gabapentin.       Acquired hypothyroidism: Continue PTA Synthroid.       Dysthymic disorder; Continue PTA Buspar.       Esophageal reflux: Continue PTA PPI as needed        Essential hypertension   : BP stable this admission: Continue PTA medications.       Urinary retention: Unsure of exact etiology, no features to suggest urethral injury with recent fall and he was passing urine prior to this afternoon. Straight cath used, to evaluate ongoing needs overnight overnight.  -resolved      Prophylaxis - Sequential  Code- Full  Disposition - needs ICU for significant trauma, 5 rib Fxs, SIRS, Subcu emphysema.  Hypoxia    [MD1.1]     Revision History        User Key Date/Time User Provider Type Action    > MD1.3 1/7/2018  8:56 AM Vicente Black MD Physician Sign     MD1.2 1/7/2018  8:54 AM Vicente Black MD Physician      MD1.1 1/7/2018  8:53 AM Vicente Black MD Physician             Progress Notes by Vicente Black MD at 1/6/2018 10:18 AM     Author:  Vicente Black MD Service:  (none) Author Type:  Physician    Filed:  1/6/2018  1:25 PM Date of Service:  1/6/2018 10:18 AM Creation Time:  1/6/2018 10:18 AM    Status:  Addendum :  Vicente Black MD (Physician)         SUBJECTIVE:   Per nurses the facial swelling is better,   Some pain right chest but better than prior to admission,   Doesn't want to move.   No SOB but on 4.5 L.      ROS:4 point ROS including Respiratory, CV, GI and , other than that noted in the HPI,  is negative     OBJECTIVE:[MD1.1]   /82  Pulse 100  Temp 98.3  F (36.8  C) (Oral)  Resp 21  Ht 1.803 m (5' 11\")  Wt 102.5 kg (225 lb 15.5 oz)  SpO2 92%  BMI 31.52 kg/m2[MD1.2]    GENERAL APPEARANCE:  Very bloated, symmetric face with crepitus face, neck, right arm and right chest wall      RESP:clear      CV: regular rate and rhythm,  No  murmur , edema: " none       Abdomen: soft, nontender, no liver or spleen enlargement, no masses, BSs normal   Skin: no cyanosis, pallor, or jaundice    CMP[MD1.1]  Recent Labs  Lab 01/06/18  0805 01/05/18  2230 01/05/18  1700   *  --  131*   POTASSIUM 3.8 3.8 2.8*   CHLORIDE 95  --  94   CO2 25  --  18*   ANIONGAP 9  --  19*   *  --  315*   BUN 16  --  19   CR 0.64*  --  0.70   GFRESTIMATED >90  --  >90   GFRESTBLACK >90  --  >90   TITI 8.4*  --  8.3*   PROTTOTAL  --   --  8.7   ALBUMIN  --   --  3.8   BILITOTAL  --   --  0.8   ALKPHOS  --   --  68   AST  --   --  25   ALT  --   --  31[MD1.2]     CBC[MD1.1]  Recent Labs  Lab 01/06/18  0805 01/05/18  1700   WBC 14.6* 20.5*   RBC 4.06* 4.27*   HGB 13.2* 13.9   HCT 38.1* 40.0   MCV 94 94   MCH 32.5 32.6   MCHC 34.6 34.8   RDW 11.4 11.5    405[MD1.2]     INR[MD1.1]No lab results found in last 7 days.[MD1.2]  Arterial BloodGas[MD1.1]  No lab results found in last 7 days.[MD1.2]   Venous Blood Gas[MD1.1]    Recent Labs  Lab 01/05/18  1700   PHV 7.32   PCO2V 33*   PO2V 55*   HCO3V 17*[MD1.2]       Medications[MD1.1]     gabapentin  300 mg Oral At Bedtime     amLODIPine  10 mg Oral Daily     aspirin  81 mg Oral Daily     atenolol  50 mg Oral Daily     atorvastatin  40 mg Oral Daily     busPIRone  15 mg Oral Daily     cyanocolbalamin  1,000 mcg Oral Daily     insulin aspart prot & aspart  71 Units Subcutaneous QAM AC     levothyroxine  112 mcg Oral QAM AC     insulin aspart  1-7 Units Subcutaneous TID AC     insulin aspart  1-5 Units Subcutaneous At Bedtime     lisinopril  20 mg Oral Daily    And     hydrochlorothiazide  25 mg Oral Daily     fenofibrate  160 mg Oral Daily[MD1.3]       Intake/Output Summary (Last 24 hours) at 01/06/18 1019  Last data filed at 01/06/18 0630   Gross per 24 hour   Intake          1213.33 ml   Output             1100 ml   Net           113.33 ml         ASSESSMENT: PLAN:   Pneumothorax/Fall/Right Rib fracture[MD1.1] 6-10[MD1.4]/Subcutaneous  Emphysema:  Patient who developed significant swelling found to be mostly subcutaneous after a fall. Noted to have rib fracture which explains this. No respiratory compromise at this point. Chest tube already placed in ED. Elevated lactic acid on admission, likely related to tissue injury/hypoperfusion/ SIRS.  -Admit to ICU for close monitoring.  -Continue to monitor chest tube  -Incentive spirometry  -Good pain management.  -Repeat chest xray in amshows resolution of pneumo.    -Surgery following   - Lactic acid now normal   -Will likely benefit from PT once he starts feeling better.  - check forced VC.[MD1.1] Currently reaches 2 L.  ([MD1.4] If this is below 1500 and still needing >3 L O2 consider transfer to tertiary care.[MD1.1] )[MD1.4]            SIRS:     Elevated WBC, lactic.  This is from trauma itself.  Lactic better.  Follow        Hyperlipidemia LDL goal <100: Continue PTA Lipitor and Fenofibrate.       Type 2 diabetes mellitus  with diabetic neuropathy (H)  -: blood sugar elevated, likely multifactorial- stress, he has not eaten much all day, pain...  -Continue PTA Novolog  -Add SSI for proper coverage.  -Hold Glimepiride this admission  -Continue PTA gabapentin.       Acquired hypothyroidism: Continue PTA Synthroid.       Dysthymic disorder; Continue PTA Buspar.       Esophageal reflux: Continue PTA PPI as needed        Essential hypertension   : BP stable this admission: Continue PTA medications. Initial potassium low though repeat within normal limits without replacement. ? Initial error, repeat in am and to also monitor slightly low sodium.       Urinary retention: Unsure of exact etiology, no features to suggest urethral injury with recent fall and he was passing urine prior to this afternoon. Straight cath used, to evaluate ongoing needs overnight overnight.     Prophylaxis - Sequential  Code- Full  Disposition - needs ICU for significant trauma, 5 rib Fxs, SIRS, Subcu emphysema.  Hypoxia     [MD1.1]     Revision History        User Key Date/Time User Provider Type Action    > MD1.4 1/6/2018  1:25 PM Vicente Black MD Physician Addend     MD1.3 1/6/2018 10:25 AM Vicente Black MD Physician Sign     MD1.2 1/6/2018 10:19 AM Vicente Black MD Physician      MD1.1 1/6/2018 10:18 AM Vicente Black MD Physician             Progress Notes by Jaymie Mallory RN at 1/6/2018  1:18 PM     Author:  Prosch, Jaymie, RN Service:  Nursing Author Type:  Registered Nurse    Filed:  1/6/2018  1:21 PM Date of Service:  1/6/2018  1:18 PM Creation Time:  1/6/2018  1:18 PM    Status:  Signed :  Jaymie Mallory RN (Registered Nurse)         Patient sitting upright in chair conversing with two friends. VSS. Respirations even and unlabored. No s/sx of distress. IS at bedside, encouraged to keep using. C/O increasing aching, sore pain in right chest/at chest tube site. PRN norco given, will cont to monitor.[KP1.1]     Revision History        User Key Date/Time User Provider Type Action    > KP1.1 1/6/2018  1:21 PM Jaymie Mallory RN Registered Nurse Sign            Progress Notes by Carlos Perez RT at 1/6/2018 10:41 AM     Author:  Carlos Perez RT Service:  (none) Author Type:  Respiratory Therapist    Filed:  1/6/2018 10:43 AM Date of Service:  1/6/2018 10:41 AM Creation Time:  1/6/2018 10:41 AM    Status:  Signed :  Carlos Perez RT (Respiratory Therapist)         FVC done able to achieve 2 Liters  with maneuver .[TO1.1]     Revision History        User Key Date/Time User Provider Type Action    > TO1.1 1/6/2018 10:43 AM Carlos Perez RT Respiratory Therapist Sign            Progress Notes by Mihir Crandall MD at 1/6/2018 10:08 AM     Author:  Mihir Crandall MD Service:  Surgery Author Type:  Physician    Filed:  1/6/2018 10:13 AM Date of Service:  1/6/2018 10:08 AM Creation Time:  1/6/2018 10:08 AM    Status:  Signed :  Mihir Crandall MD (Physician)         Pt with fall  and developed subcutaneous air of his face and trunk.  A CT scan done showed multiple rib fractures of the right side, but only a small pneumothorax.  He had a lot of mediastinal and subcutaneous air.    A chest tube was placed in the ER on the right side.  A post CXR showed the chest tube to be in good position.    There is no fluctuation of fluid in the chest tube apparatus with his breathing, and there is no air leaks.    The patient does not feel any better from the standpoint of facial swelling after the chest tube has been in for 12 hours.    Plan chest tube for at least another 24 hours.  Will repeat CT scan before removal of chest tube.    Osmin Crandall MD, FACS[BR1.1]       Revision History        User Key Date/Time User Provider Type Action    > BR1.1 1/6/2018 10:13 AM Mihir Crandall MD Physician Sign                  Procedure Notes     No notes of this type exist for this encounter.         Progress Notes - Therapies (Notes from 01/06/18 through 01/09/18)      Progress Notes by Noemi Valerio OT at 1/8/2018 11:47 AM     Author:  Noemi Valerio OT Service:  (none) Author Type:  Occupational Therapist    Filed:  1/8/2018 11:47 AM Date of Service:  1/8/2018 11:47 AM Creation Time:  1/8/2018 11:47 AM    Status:  Signed :  Noemi Valerio OT (Occupational Therapist)          01/08/18 1051   Quick Adds   Type of Visit Initial Occupational Therapy Evaluation   Living Environment   Lives With alone   Living Arrangements apartment   Home Accessibility tub/shower is not walk in   Number of Stairs to Enter Home 0   Number of Stairs Within Home 0   Transportation Available car   Self-Care   Usual Activity Tolerance good   Current Activity Tolerance fair   Regular Exercise no   Equipment Currently Used at Home grab bar   Functional Level Prior   Ambulation 0-->independent   Transferring 0-->independent   Toileting 0-->independent   Bathing 1-->assistive equipment  (grab bars)   Dressing  "0-->independent   Eating 0-->independent   Communication 0-->understands/communicates without difficulty   Swallowing 0-->swallows foods/liquids without difficulty   Cognition 0 - no cognition issues reported   Fall history within last six months yes   Number of times patient has fallen within last six months 1  (reason for admit- slipped on ice. )   Which of the above functional risks had a recent onset or change? ambulation;transferring;toileting;bathing;dressing;fall history   Prior Functional Level Comment Does not wear O2 at baseline.    General Information   Onset of Illness/Injury or Date of Surgery - Date 01/05/18   Referring Physician Vicente Black MD   Patient/Family Goals Statement to decrease pain, increase independence.    Additional Occupational Profile Info/Pertinent History of Current Problem He reports that he feel on ice yesterday afternoon onto the right side of his chest/body. Immediate pain over the region where he landed but he was able to get up and did not loose consciousness. Progressively worsening pain overnight that kept him awake. fall resulting in R rib fx.    Cognitive Status Examination   Orientation orientation to person, place and time   Level of Consciousness alert   Able to Follow Commands WNL/WFL   Pain Assessment   Patient Currently in Pain Yes, see Vital Sign flowsheet  (rates \"7-8/10\")   Range of Motion (ROM)   ROM Comment B UE ROM: WNL   Strength   Strength Comments B UE strength: NT d/t increased pain in R rib area.    Hand Strength   Hand Strength Comments B  strength: WFL for ADLs.   Coordination   Upper Extremity Coordination No deficits were identified   Transfer Skill: Sit to Stand   Level of [place]: Sit/Stand contact guard   Physical Assist/Nonphysical Assist: Sit/Stand 1 person assist   Transfer Skill: Sit to Stand full weight-bearing   Assistive Device for Transfer: Sit/Stand standard walker   Transfer Skill: Toilet Transfer   Level of [place]: " Toilet contact guard   Physical Assist/Nonphysical Assist: Toilet 1 person assist   Weight-Bearing Restrictions: Toilet full weight-bearing   Assistive Device standard walker   Upper Body Dressing   Level of [place]: Dress Upper Body stand-by assist   Physical Assist/Nonphysical Assist: Dress Upper Body 1 person assist   Lower Body Dressing   Level of [place]: Dress Lower Body moderate assist (50% patients effort)   Physical Assist/Nonphysical Assist: Dress Lower Body 1 person assist   Grooming   Level of [place]: Grooming stand-by assist  (while standing. )   Physical Assist/Nonphysical Assist: Grooming 1 person assist   Eating/Self Feeding   Level of [place]: Eating independent   Instrumental Activities of Daily Living (IADL)   Previous Responsibilities meal prep;housekeeping;laundry;shopping;medication management;finances;driving   Activities of Daily Living Analysis   Impairments Contributing to Impaired Activities of Daily Living balance impaired;pain;strength decreased   General Therapy Interventions   Planned Therapy Interventions ADL retraining;strengthening;progressive activity/exercise   Clinical Impression   Criteria for Skilled Therapeutic Interventions Met yes, treatment indicated   OT Diagnosis decreased independence with ADLs/IADLs and funcitonal mobility   Influenced by the following impairments increased pain and decreased strength.    Assessment of Occupational Performance 5 or more Performance Deficits   Identified Performance Deficits LB dressing, toileting, showering, home management, meal management.    Clinical Decision Making (Complexity) Low complexity   Therapy Frequency daily   Predicted Duration of Therapy Intervention (days/wks) 2-3 days   Anticipated Equipment Needs at Discharge (TBD at TCU)   Anticipated Discharge Disposition Transitional Care Facility   Risks and Benefits of Treatment have been explained. Yes   Patient, Family & other staff in agreement with plan  "of care Yes   [facility] AM-PAC  \"6 Clicks\" Daily Activity Inpatient Short Form   1. Putting on and taking off regular lower body clothing? 2 - A Lot   2. Bathing (including washing, rinsing, drying)? 2 - A Lot   3. Toileting, which includes using toilet, bedpan or urinal? 3 - A Little   4. Putting on and taking off regular upper body clothing? 3 - A Little   5. Taking care of personal grooming such as brushing teeth? 4 - None   6. Eating meals? 4 - None   Daily Activity Raw Score (Score out of 24.Lower scores equate to lower levels of function) 18   Total Evaluation Time   Total Evaluation Time (Minutes) 6[LC1.1]        Revision History        User Key Date/Time User Provider Type Action    > LC1.1 1/8/2018 11:47 AM Noemi Valerio OT Occupational Therapist Sign            Progress Notes by Marlen Hutchins PT at 1/8/2018 10:23 AM     Author:  Marlen Hutchins PT Service:  (none) Author Type:  Physical Therapist    Filed:  1/8/2018 10:23 AM Date of Service:  1/8/2018 10:23 AM Creation Time:  1/8/2018 10:23 AM    Status:  Signed :  Marlen Hutchins, PT (Physical Therapist)         Physical Therapy Evaluation     01/08/18 0900   Quick Adds   Type of Visit Initial PT Evaluation       Present no   Living Environment   Lives With alone   Living Arrangements apartment   Home Accessibility no concerns   Number of Stairs to Enter Home 0   Number of Stairs Within Home 0   Transportation Available car   Living Environment Comment no concerns; pt lives in a 1-level apartment   Self-Care   Usual Activity Tolerance good   Current Activity Tolerance fair   Regular Exercise no   Equipment Currently Used at Home none   Functional Level Prior   Ambulation 0-->independent   Transferring 0-->independent   Toileting 0-->independent   Bathing 0-->independent   Dressing 0-->independent   Eating 0-->independent   Communication 0-->understands/communicates without difficulty   Swallowing 0-->swallows " foods/liquids without difficulty   Cognition 0 - no cognition issues reported   Fall history within last six months yes   Number of times patient has fallen within last six months 1   Which of the above functional risks had a recent onset or change? ambulation;transferring   Prior Functional Level Comment Fully independent at baseline, no AD, no supplemental O2; drives and no outside services.   General Information   Onset of Illness/Injury or Date of Surgery - Date 01/07/18   Referring Physician Dr. Sofia MD   Patient/Family Goals Statement Hopefully to get stronger, less pain and back home   Pertinent History of Current Problem (include personal factors and/or comorbidities that impact the POC) 67 year old male who has a history of hypertension, type 2 diabetes mellitus, and hypothyroidism who presents to the ED for evaluation of facial swelling. Patient presents to the ED via EMS from [facility].  Patient presented to clinic secondary to fall and chest pain, developed progressive swelling in route.  patient fell on the ice yesterday and landed on his right side.  R-sided chest tube placed which has since been removed on 1/7/18.   Precautions/Limitations oxygen therapy device and L/min   General Info Comments Patient very pleasant and agreeable to work with therapy. C/o R-sided rib and L-knee pain   Cognitive Status Examination   Orientation orientation to person, place and time   Level of Consciousness alert   Follows Commands and Answers Questions 100% of the time   Personal Safety and Judgment intact   Memory intact   Pain Assessment   Patient Currently in Pain Yes, see Vital Sign flowsheet  (7-8/10 R-sided rib pain; L-knee pain that is new, unrated)   Integumentary/Edema   Integumentary/Edema Comments facial; improved per chart review and pt report   Posture    Posture Not impaired   Range of Motion (ROM)   ROM Quick Adds No deficits were identified   ROM Comment B hip flex, knee flex/ext, DF/PF WFL  "  Strength   Strength Comments BLE hip flex, knee flex/ext, DF/PF at least 3/5; no MMT due to R-sided rib pain and L-knee pain; gen weakness also limited by pain   Bed Mobility   Bed Mobility Comments not formally assessed, per pt report needed assist x1 this morning from nursing staff   Transfer Skills   Transfer Comments STS up to FWW at [facility] for safetyu   Gait   Gait Comments Pt ambulated 15 feet in room using FWW at [facility], slow but steady   Balance   Balance no deficits were identified   Balance Comments good standing balance using FWW with BUEs   Sensory Examination   Sensory Perception other (describe)   Sensory Perception Comments baseline B feet neuropathies    Coordination   Coordination no deficits were identified   Muscle Tone   Muscle Tone no deficits were identified   Modality Interventions   Planned Modality Interventions Comments ice for L-knee   General Therapy Interventions   Planned Therapy Interventions balance training;bed mobility training;gait training;strengthening;transfer training;progressive activity/exercise   Clinical Impression   Criteria for Skilled Therapeutic Intervention yes, treatment indicated   PT Diagnosis weakness; decreased independence with functional mobility   Influenced by the following impairments decreased activity tolerance; pain; new supplemental O2 needs   Functional limitations due to impairments bed mobility, transfers, ambulation    Clinical Presentation Stable/Uncomplicated   Clinical Presentation Rationale clinical judgement   Clinical Decision Making (Complexity) Low complexity   Therapy Frequency` daily   Predicted Duration of Therapy Intervention (days/wks) 3 days   Anticipated Equipment Needs at Discharge (TBD at next level of care; possibly FWW)   Anticipated Discharge Disposition Transitional Care Facility   Risk & Benefits of therapy have been explained Yes   Patient, Family & other staff in agreement with plan of care Yes   [facility] AM-PAC TM \"6 " "Clicks\"   2016, Trustees of [facility], under license to [employer].  All rights reserved.   6 Clicks Short Forms Basic Mobility Inpatient Short Form   [facility] AM-PAC  \"6 Clicks\" V.2 Basic Mobility Inpatient Short Form   1. Turning from your back to your side while in a flat bed without using bedrails? 2 - A Lot   2. Moving from lying on your back to sitting on the side of a flat bed without using bedrails? 2 - A Lot   3. Moving to and from a bed to a chair (including a wheelchair)? 3 - A Little   4. Standing up from a chair using your arms (e.g., wheelchair, or bedside chair)? 3 - A Little   5. To walk in hospital room? 3 - A Little   6. Climbing 3-5 steps with a railing? 2 - A Lot   Basic Mobility Raw Score (Score out of 24.Lower scores equate to lower levels of function) 15   Total Evaluation Time   Total Evaluation Time (Minutes) 10[AC1.1]        Revision History        User Key Date/Time User Provider Type Action    > AC1.1 1/8/2018 10:23 AM Marlen Hutchins PT Physical Therapist Sign            Progress Notes by Carlos Perez RT at 1/6/2018 10:41 AM     Author:  Carlos Perez RT Service:  (none) Author Type:  Respiratory Therapist    Filed:  1/6/2018 10:43 AM Date of Service:  1/6/2018 10:41 AM Creation Time:  1/6/2018 10:41 AM    Status:  Signed :  Carlos Perez RT (Respiratory Therapist)         FVC done able to achieve 2 Liters  with maneuver .[TO1.1]     Revision History        User Key Date/Time User Provider Type Action    > TO1.1 1/6/2018 10:43 AM Carlos Perez RT Respiratory Therapist Sign            "

## 2018-01-05 NOTE — IP AVS SNAPSHOT
"          [facility] INTENSIVE CARE: [phone]                                              INTERAGENCY TRANSFER FORM - LAB / IMAGING / EKG / EMG RESULTS   2018                    Hospital Admission Date: 2018  ENRRIQUE BHATT   : 1950  Sex: Male        Attending Provider: Vicente Black MD     Allergies:  No Known Allergies    Infection:  None   Service:  GENERAL MEDI    Ht:  1.803 m (5' 11\")   Wt:  105 kg (231 lb 7.7 oz)   Admission Wt:  102.5 kg (225 lb 15.5 oz)    BMI:  32.29 kg/m 2   BSA:  2.29 m 2            Patient PCP Information     Provider PCP Type    Marlen Ma MD General         Lab Results - 3 Days      Blood gas venous [[id number]]  Resulted: 18 1101, Result status: Final result    Ordering provider: Vicente Black MD  18 0836 Resulting lab: [facility]    Specimen Information    Type Source Collected On   Blood  18 1045          Components       Value Reference Range Flag Lab   Ph Venous 7.43 7.32 - 7.43 pH  59   PCO2 Venous 42 40 - 50 mm Hg  59   PO2 Venous 37 25 - 47 mm Hg  59   Bicarbonate Venous 28 21 - 28 mmol/L  59   Base Excess Venous 3.6 mmol/L  59   Comment:  Abnormal Result, Ref range: -7.7 to 1.9            CBC with platelets [[id number]]  Resulted: 18 1057, Result status: In process    Ordering provider: Vicente Black MD  18 0836 Resulting lab: [facility]    Specimen Information    Type Source Collected On   Blood  18 1045            Comprehensive metabolic panel [[id number]]  Resulted: 18 1057, Result status: In process    Ordering provider: Vicente Black MD  18 0836 Resulting lab: [facility]    Specimen Information    Type Source Collected On   Blood  18 1045            Glucose by meter [[id number]] (Abnormal)  Resulted: 18 0905, Result status: Final result    Ordering provider: Ewelina Espinal MD  18 0829 Resulting lab: POINT OF CARE TEST, GLUCOSE    Specimen " Information    Type Source Collected On     01/09/18 0858          Components       Value Reference Range Flag Lab   Glucose 229 70 - 99 mg/dL H 170            Methicillin resistant staph aureus cult [[id number]] (Abnormal)  Resulted: 01/08/18 2137, Result status: Final result    Ordering provider: Ewelina Espinal MD  01/06/18 0000 Resulting lab: [facility]    Specimen Information    Type Source Collected On   Nasal  01/05/18 2330          Components       Value Reference Range Flag Lab   Specimen Description Nasal      Culture Micro --  A 75   Result:         Heavy growth  Staphylococcus aureus NOT MRSA  This isolate is presumed to be clindamycin resistant based on detection of inducible   clindamycin resistance. Erythromycin and clindamycin are resistant, therefore, they are   not recommended for use.              Glucose by meter [[id number]] (Abnormal)  Resulted: 01/08/18 2120, Result status: Final result    Ordering provider: Ewelina Espinal MD  01/08/18 2110 Resulting lab: POINT OF CARE TEST, GLUCOSE    Specimen Information    Type Source Collected On     01/08/18 2110          Components       Value Reference Range Flag Lab   Glucose 120 70 - 99 mg/dL H 170            Glucose by meter [[id number]] (Abnormal)  Resulted: 01/08/18 1816, Result status: Final result    Ordering provider: Ewelina Espinal MD  01/08/18 1813 Resulting lab: POINT OF CARE TEST, GLUCOSE    Specimen Information    Type Source Collected On     01/08/18 1813          Components       Value Reference Range Flag Lab   Glucose 154 70 - 99 mg/dL H 170            Glucose by meter [[id number]]  Resulted: 01/08/18 1721, Result status: Final result    Ordering provider: Ewelina Espinal MD  01/08/18 1715 Resulting lab: POINT OF CARE TEST, GLUCOSE    Specimen Information    Type Source Collected On     01/08/18 1715          Components       Value Reference Range Flag Lab    Glucose 78 70 - 99 mg/dL  170            Glucose by meter [[id number]]  Resulted: 01/08/18 1646, Result status: Final result    Ordering provider: Ewelina Espinal MD  01/08/18 1642 Resulting lab: POINT OF CARE TEST, GLUCOSE    Specimen Information    Type Source Collected On     01/08/18 1642          Components       Value Reference Range Flag Lab   Glucose 78 70 - 99 mg/dL  170            Glucose by meter [[id number]] (Abnormal)  Resulted: 01/08/18 1130, Result status: Final result    Ordering provider: Ewelina Espinal MD  01/08/18 1120 Resulting lab: POINT OF CARE TEST, GLUCOSE    Specimen Information    Type Source Collected On     01/08/18 1120          Components       Value Reference Range Flag Lab   Glucose 242 70 - 99 mg/dL H 170            Comprehensive metabolic panel [[id number]] (Abnormal)  Resulted: 01/08/18 0700, Result status: Final result    Ordering provider: Vicente Black MD  01/08/18 0000 Resulting lab: [facility]    Specimen Information    Type Source Collected On   Blood  01/08/18 0530          Components       Value Reference Range Flag Lab   Sodium 131 133 - 144 mmol/L L 59   Potassium 4.0 3.4 - 5.3 mmol/L  59   Comment:  Specimen slightly hemolyzed, potassium may be falsely elevated   Chloride 99 94 - 109 mmol/L  59   Carbon Dioxide 26 20 - 32 mmol/L  59   Anion Gap 6 3 - 14 mmol/L  59   Glucose 132 70 - 99 mg/dL H 59   Urea Nitrogen 18 7 - 30 mg/dL  59   Creatinine 0.62 0.66 - 1.25 mg/dL L 59   GFR Estimate >90 >60 mL/min/1.7m2  59   Comment:  Non  GFR Calc   GFR Estimate If Black >90 >60 mL/min/1.7m2  59   Comment:  African American GFR Calc   Calcium 8.6 8.5 - 10.1 mg/dL  59   Bilirubin Total 0.8 0.2 - 1.3 mg/dL  59   Albumin 2.7 3.4 - 5.0 g/dL L 59   Protein Total 7.4 6.8 - 8.8 g/dL  59   Alkaline Phosphatase 54 40 - 150 U/L  59   ALT 18 0 - 70 U/L  59   AST 28 0 - 45 U/L  59   Comment:         Specimen is hemolyzed which can  falsely elevate AST. Analysis of a   non-hemolyzed specimen may result in a lower value.              Blood gas venous [[id number]] (Abnormal)  Resulted: 01/08/18 0639, Result status: Final result    Ordering provider: Vicente Black MD  01/08/18 0000 Resulting lab: [facility]    Specimen Information    Type Source Collected On   Blood  01/08/18 0530          Components       Value Reference Range Flag Lab   Ph Venous 7.38 7.32 - 7.43 pH  59   PCO2 Venous 53 40 - 50 mm Hg H 59   PO2 Venous 18 25 - 47 mm Hg L 59   Bicarbonate Venous 31 21 - 28 mmol/L H 59   Base Excess Venous 4.6 mmol/L  59   Comment:  Abnormal Result, Ref range: -7.7 to 1.9            CBC with platelets [[id number]] (Abnormal)  Resulted: 01/08/18 0638, Result status: Final result    Ordering provider: Vicente Black MD  01/08/18 0000 Resulting lab: [facility]    Specimen Information    Type Source Collected On   Blood  01/08/18 0530          Components       Value Reference Range Flag Lab   WBC 13.3 4.0 - 11.0 10e9/L H 59   RBC Count 3.94 4.4 - 5.9 10e12/L L 59   Hemoglobin 12.9 13.3 - 17.7 g/dL L 59   Hematocrit 37.8 40.0 - 53.0 % L 59   MCV 96 78 - 100 fl  59   MCH 32.7 26.5 - 33.0 pg  59   MCHC 34.1 31.5 - 36.5 g/dL  59   RDW 11.3 10.0 - 15.0 %  59   Platelet Count 282 150 - 450 10e9/L  59            Glucose by meter [[id number]] (Abnormal)  Resulted: 01/07/18 2311, Result status: Final result    Ordering provider: Ewelina Espinal MD  01/07/18 2232 Resulting lab: POINT OF CARE TEST, GLUCOSE    Specimen Information    Type Source Collected On     01/07/18 2232          Components       Value Reference Range Flag Lab   Glucose 188 70 - 99 mg/dL H 170            Glucose by meter [[id number]] (Abnormal)  Resulted: 01/07/18 1701, Result status: Final result    Ordering provider: Ewelina Espinal MD  01/07/18 1653 Resulting lab: POINT OF CARE TEST, GLUCOSE    Specimen Information    Type  Source Collected On     01/07/18 1653          Components       Value Reference Range Flag Lab   Glucose 187 70 - 99 mg/dL H 170            Glucose by meter [[id number]] (Abnormal)  Resulted: 01/07/18 1156, Result status: Final result    Ordering provider: Ewelina Espinal MD  01/07/18 1140 Resulting lab: POINT OF CARE TEST, GLUCOSE    Specimen Information    Type Source Collected On     01/07/18 1140          Components       Value Reference Range Flag Lab   Glucose 223 70 - 99 mg/dL H 170            Comprehensive metabolic panel [[id number]] (Abnormal)  Resulted: 01/07/18 0554, Result status: Final result    Ordering provider: Vicente Black MD  01/07/18 0000 Resulting lab: [facility]    Specimen Information    Type Source Collected On   Blood  01/07/18 0500          Components       Value Reference Range Flag Lab   Sodium 132 133 - 144 mmol/L L 59   Potassium 4.2 3.4 - 5.3 mmol/L  59   Chloride 99 94 - 109 mmol/L  59   Carbon Dioxide 25 20 - 32 mmol/L  59   Anion Gap 8 3 - 14 mmol/L  59   Glucose 182 70 - 99 mg/dL H 59   Urea Nitrogen 16 7 - 30 mg/dL  59   Creatinine 0.61 0.66 - 1.25 mg/dL L 59   GFR Estimate >90 >60 mL/min/1.7m2  59   Comment:  Non  GFR Calc   GFR Estimate If Black >90 >60 mL/min/1.7m2  59   Comment:  African American GFR Calc   Calcium 8.0 8.5 - 10.1 mg/dL L 59   Bilirubin Total 1.0 0.2 - 1.3 mg/dL  59   Albumin 2.8 3.4 - 5.0 g/dL L 59   Protein Total 7.4 6.8 - 8.8 g/dL  59   Alkaline Phosphatase 52 40 - 150 U/L  59   ALT 22 0 - 70 U/L  59   AST 32 0 - 45 U/L  59            Blood gas venous [[id number]]  Resulted: 01/07/18 0546, Result status: Final result    Ordering provider: Vicente Black MD  01/07/18 0000 Resulting lab: [facility]    Specimen Information    Type Source Collected On   Blood  01/07/18 0500          Components       Value Reference Range Flag Lab   Ph Venous 7.40 7.32 - 7.43 pH  59   PCO2 Venous 44 40 - 50  mm Hg  59   PO2 Venous 37 25 - 47 mm Hg  59   Bicarbonate Venous 27 21 - 28 mmol/L  59   Base Excess Venous 2.0 mmol/L  59   Comment:  Abnormal Result, Ref range: -7.7 to 1.9            CBC with platelets [[id number]] (Abnormal)  Resulted: 01/07/18 0535, Result status: Final result    Ordering provider: Vicente Black MD  01/07/18 0000 Resulting lab: [facility]    Specimen Information    Type Source Collected On   Blood  01/07/18 0500          Components       Value Reference Range Flag Lab   WBC 13.5 4.0 - 11.0 10e9/L H 59   RBC Count 3.79 4.4 - 5.9 10e12/L L 59   Hemoglobin 12.3 13.3 - 17.7 g/dL L 59   Hematocrit 36.1 40.0 - 53.0 % L 59   MCV 95 78 - 100 fl  59   MCH 32.5 26.5 - 33.0 pg  59   MCHC 34.1 31.5 - 36.5 g/dL  59   RDW 11.4 10.0 - 15.0 %  59   Platelet Count 269 150 - 450 10e9/L  59            Glucose by meter [[id number]] (Abnormal)  Resulted: 01/06/18 2111, Result status: Final result    Ordering provider: Ewelina Espinal MD  01/06/18 2100 Resulting lab: POINT OF CARE TEST, GLUCOSE    Specimen Information    Type Source Collected On     01/06/18 2100          Components       Value Reference Range Flag Lab   Glucose 224 70 - 99 mg/dL H 170            Lactic acid level STAT [[id number]]  Resulted: 01/06/18 1804, Result status: Final result    Ordering provider: Vicente Black MD  01/06/18 1734 Resulting lab: [facility]    Specimen Information    Type Source Collected On   Blood  01/06/18 1750          Components       Value Reference Range Flag Lab   Lactic Acid 1.5 0.7 - 2.0 mmol/L  59            Glucose by meter [[id number]] (Abnormal)  Resulted: 01/06/18 1705, Result status: Final result    Ordering provider: Ewelina Espinal MD  01/06/18 1656 Resulting lab: POINT OF CARE TEST, GLUCOSE    Specimen Information    Type Source Collected On     01/06/18 1656          Components       Value Reference Range Flag Lab   Glucose 126 70 - 99 mg/dL  H 170            Glucose by meter [[id number]] (Abnormal)  Resulted: 01/06/18 1151, Result status: Final result    Ordering provider: Ewelina Espinal MD  01/06/18 1133 Resulting lab: POINT OF CARE TEST, GLUCOSE    Specimen Information    Type Source Collected On     01/06/18 1133          Components       Value Reference Range Flag Lab   Glucose 172 70 - 99 mg/dL H 170            Basic metabolic panel [[id number]] (Abnormal)  Resulted: 01/06/18 0853, Result status: Final result    Ordering provider: Ewelina Espinal MD  01/06/18 0635 Resulting lab: [facility]    Specimen Information    Type Source Collected On   Blood  01/06/18 0805          Components       Value Reference Range Flag Lab   Sodium 129 133 - 144 mmol/L L 59   Potassium 3.8 3.4 - 5.3 mmol/L  59   Chloride 95 94 - 109 mmol/L  59   Carbon Dioxide 25 20 - 32 mmol/L  59   Anion Gap 9 3 - 14 mmol/L  59   Glucose 221 70 - 99 mg/dL H 59   Urea Nitrogen 16 7 - 30 mg/dL  59   Creatinine 0.64 0.66 - 1.25 mg/dL L 59   GFR Estimate >90 >60 mL/min/1.7m2  59   Comment:  Non  GFR Calc   GFR Estimate If Black >90 >60 mL/min/1.7m2  59   Comment:  African American GFR Calc   Calcium 8.4 8.5 - 10.1 mg/dL L 59            CBC with platelets differential [[id number]] (Abnormal)  Resulted: 01/06/18 0836, Result status: Final result    Ordering provider: Ewelina Espinal MD  01/06/18 0602 Resulting lab: [facility]    Specimen Information    Type Source Collected On   Blood  01/06/18 0805          Components       Value Reference Range Flag Lab   WBC 14.6 4.0 - 11.0 10e9/L H 59   RBC Count 4.06 4.4 - 5.9 10e12/L L 59   Hemoglobin 13.2 13.3 - 17.7 g/dL L 59   Hematocrit 38.1 40.0 - 53.0 % L 59   MCV 94 78 - 100 fl  59   MCH 32.5 26.5 - 33.0 pg  59   MCHC 34.6 31.5 - 36.5 g/dL  59   RDW 11.4 10.0 - 15.0 %  59   Platelet Count 295 150 - 450 10e9/L  59   Diff Method Automated Method   59   %  Neutrophils 83.2 %  59   % Lymphocytes 9.5 %  59   % Monocytes 6.6 %  59   % Eosinophils 0.4 %  59   % Basophils 0.1 %  59   % Immature Granulocytes 0.2 %  59   Absolute Neutrophil 12.1 1.6 - 8.3 10e9/L H 59   Absolute Lymphocytes 1.4 0.8 - 5.3 10e9/L  59   Absolute Monocytes 1.0 0.0 - 1.3 10e9/L  59   Absolute Eosinophils 0.1 0.0 - 0.7 10e9/L  59   Absolute Basophils 0.0 0.0 - 0.2 10e9/L  59   Abs Immature Granulocytes 0.0 0 - 0.4 10e9/L  59            Lactic acid whole blood [[id number]]  Resulted: 01/06/18 0835, Result status: Final result    Ordering provider: Ewelina Espinal MD  01/06/18 0635 Resulting lab: [facility]    Specimen Information    Type Source Collected On   Blood  01/06/18 0805          Components       Value Reference Range Flag Lab   Lactic Acid 1.9 0.7 - 2.0 mmol/L  59            Glucose by meter [[id number]] (Abnormal)  Resulted: 01/06/18 0736, Result status: Final result    Ordering provider: Ewelina Espinal MD  01/06/18 0732 Resulting lab: POINT OF CARE TEST, GLUCOSE    Specimen Information    Type Source Collected On     01/06/18 0732          Components       Value Reference Range Flag Lab   Glucose 233 70 - 99 mg/dL H 170   Comment:  /RN Notified            Hemoglobin A1c [[id number]] (Abnormal)  Resulted: 01/06/18 0545, Result status: Final result    Ordering provider: Ewelina Espinal MD  01/06/18 0014 Resulting lab: [facility]    Specimen Information    Type Source Collected On   Blood  01/06/18 0450          Components       Value Reference Range Flag Lab   Hemoglobin A1C 6.1 4.3 - 6.0 % H 59            Lactic acid whole blood [[id number]] (Abnormal)  Resulted: 01/06/18 0101, Result status: Final result    Ordering provider: Darrin Jade MD  01/05/18 2221 Resulting lab: [facility]    Specimen Information    Type Source Collected On   Blood  01/06/18 0045          Components       Value Reference  Range Flag Lab   Lactic Acid 3.0 0.7 - 2.0 mmol/L H 59   Comment:         Significant value called to and read back by  MANN GUZMAN RN 0100 01/06/2018 JTW              Glucose by meter [[id number]] (Abnormal)  Resulted: 01/06/18 0040, Result status: Final result    Ordering provider: Ewelina Espinal MD  01/06/18 0034 Resulting lab: POINT OF CARE TEST, GLUCOSE    Specimen Information    Type Source Collected On     01/06/18 0034          Components       Value Reference Range Flag Lab   Glucose 255 70 - 99 mg/dL H 170            Potassium [[id number]]  Resulted: 01/05/18 2305, Result status: Final result    Ordering provider: Ewelina Espinal MD  01/05/18 2211 Resulting lab: [facility]    Specimen Information    Type Source Collected On   Blood  01/05/18 2230          Components       Value Reference Range Flag Lab   Potassium 3.8 3.4 - 5.3 mmol/L  59            Testing Performed By     Lab - Abbreviation Name Director Address Valid Date Range    45 - [facility] Unknown Unknown 01/28/02 0000 - Present    59 - Unknown [facility] Unknown [address] [address] 12/31/14 1006 - Present    75 - Unknown [facility] Unknown [address] [address] 01/15/15 1019 - Present    170 - Unknown POINT OF CARE TEST, GLUCOSE Unknown Unknown 10/31/11 1114 - Present            Unresulted Labs     None         Imaging Results - 3 Days      XR Chest Port 1 View [[id number]]  Resulted: 01/08/18 0912, Result status: Final result    Ordering provider: Mihir Crandall MD  01/07/18 1301 Resulted by: Suresh Claros MD    Performed: 01/08/18 0825 - 01/08/18 0849 Resulting lab: RADIOLOGY RESULTS    Narrative:       XR CHEST PORT 1 VW 1/8/2018 8:49 AM    HISTORY: Follow-up of pneumatosis.    COMPARISON: 1/5/2018.      Impression:       IMPRESSION: A single view the chest shows interval removal a right  sided chest tube.   There is a small right apical pneumothorax that  measures 0.7 cm of lucency. Subcutaneous emphysema shows a modest  increase in the right axillary and right lateral chest wall area.  There is a new small amount of subcutaneous emphysema in the left  axilla and along the left lateral chest wall. Subcutaneous emphysema  elsewhere involving the chest wall and the base of the neck is more  similar to the prior study. A pneumomediastinum is again suggested.  Atelectasis is seen at the left lung base. Several mildly displaced  right-sided rib fractures are present, better seen compared to the  prior study.    JAY INGRAM MD      CT Chest w/o Contrast [[id number]]  Resulted: 01/07/18 1141, Result status: Final result    Ordering provider: Mihir Crandall MD  01/07/18 1040 Resulted by: Paul Vasquez MD    Performed: 01/07/18 1111 - 01/07/18 1131 Resulting lab: RADIOLOGY RESULTS    Narrative:       Exam: CT CHEST W/O CONTRAST  1/7/2018 11:31 AM    History: Pneumatosis.    Comparison: 1/5/2018    Technique: Volumetric acquisition with reconstruction in the axial and  coronal planes through the chest without contrast. Radiation dose for  this scan was reduced using automated exposure control, adjustment of  the mA and/or kV according to patient size, or iterative  reconstruction technique.    Contrast: None    Findings:  Chest: Extensive subcutaneous emphysema overlying the chest wall  anteriorly and laterally on the right as well as extending over the  right flank. There is pneumomediastinum noted as well is a small  right-sided pneumothorax. The pneumothorax component is decreased  since 1/5/2018 following chest tube placement. However, the  pneumomediastinum and subcutaneous emphysema is unchanged since that  time.    Thyroid gland is unremarkable. No axillary, mediastinal, hilar or  supraclavicular lymphadenopathy by size criteria. Aortic root and  pulmonary artery are of normal size. Normal branching  pattern of the  great vessels. Heart is not enlarged.    Upper abdomen: Evaluation of the upper abdomen is limited by lack of  coverage. Scattered foci of free air are seen in the abdomen, this  appears to be new since 1/5/2018.    Bones:  Multiple right-sided rib fractures are again seen.      Impression:       Impression:   1. Small residual right pneumothorax following chest tube placement.  2. Small foci of free air in the upper abdomen are most likely related  to air tracking from the pneumomediastinum into the abdomen.   3. Extensive subcutaneous emphysema and pneumomediastinum again seen,  not significantly changed.  4. Multiple right-sided rib fractures again seen.    SOFÍA VELASCO MD      Testing Performed By     Lab - Abbreviation Name Director Address Valid Date Range    104 - [facility] RADIOLOGY RESULTS Unknown Unknown 02/16/05 1553 - Present            Encounter-Level Documents:     There are no encounter-level documents.      Order-Level Documents:     There are no order-level documents.

## 2018-01-05 NOTE — ED NOTES
Patient arrives via ambulance from clinic, patient drove to clinic himself, has swollen eyes is on a duo neb and has sub Q air bilaterally arms, chest, face, neck. Patient had a fall yesterday. Patient denies pain. Airway cart, chest tube cart at the bedside. MD at the bedside

## 2018-01-05 NOTE — IP AVS SNAPSHOT
` ` Patient Information     Patient Name Sex     Kvng Velasquez ([id number]) Male 1950       Room Bed    1008 [id number]      Patient Demographics     Address Phone    [address] [address] [address] [phone] (Home)      Patient Ethnicity & Race     Ethnic Group Patient Race    American White      Emergency Contact(s)     Name Relation Home Work Mobile    Peg Velasquez Sister [phone]      JAQUELIN COVINGTON Sister [phone]      Ced Velasquez Brother [phone]        Documents on File        Status Date Received Description       Documents for the Patient    Privacy Notice - [place] Received 12/06/10     Face Sheet Received () 09     Insurance Card  () 05     Face Sheet  () 05     Insurance Card  () 06 NEW MEDICA CARD    Consent Form  06     Other  06 MEDICARE FORM    Face Sheet  () 08     Insurance Card  () 11/15/07     Consent Form  11/15/07     Face Sheet Received () 08     Insurance Card  () 09  care    External Medication Information Consent Accepted () 09     Face Sheet Received () 03/08/10     Patient ID Received 16 [place]     Consent for Services - Hospital/Clinic Received () 12/06/10     External Medication Information Consent Accepted () 11     Consent for Services - Hospital/Clinic Received () 11     CMS IM for Patient Signature Received 18     Consent for Services - Hospital/Clinic Received but Refused Research () 12     Consent for Services - Hospital/Clinic  ()      External Medication Information Consent Accepted () 12     Advance Directives and Living Will Received 09     Consent for EHR Access  13 Copied from existing Consent for services - C/HOD collected on 2012    HIM ANA Authorization  () 13 phong khalil    [facility] Specified  Other       Consent for Services - Hospital/Clinic Received () 13     HIM ANA Authorization  13 STATE [facility]    External Medication Information Consent Patient Refused 14     Consent for Services - Hospital/Clinic Received () 14     HIM ANA Authorization  () 14 phong khalil    HIM ANA Authorization  () 14 phong khalil    HIM ANA Authorization  14 phong khalil - revision    Insurance Card Received () 10/27/14     Insurance Card Received () 10/27/14     Insurance Card  ()      Consent for Services - Hospital/Clinic Received () 06/25/15     HIM ANA Authorization  () 11/20/15 [employer]    HIM ANA Authorization  () 16 [employer]    Consent for Services/Privacy Notice - Hospital/Clinic       Consent for Services/Privacy Notice - Hospital/Clinic Received () 16     Insurance Card Received 17 Medicare A&B    Insurance Card Received () 16 7 [facility]    Business/Insurance/Care Coordination/Health Form - Patient  09/15/16 [facility] - HEALTH HISTORY UPDATE 2016    Consent for Services/Privacy Notice - Hospital/Clinic Received 17     Business/Insurance/Care Coordination/Health Form - Patient  17 [facility]- INSULIN PACKING LIST 17    Insurance Card Received 17 [address] [place] [address] Everywhere Prospective Auth Received 18        Documents for the Encounter    CMS IM for Patient Signature Received 18     Consent for Services - Informed  18 INFORMED CONSENT FOR SURGERY OR DESIGNATED PROCEDURE (SHORT VERSION)    EMS/Ambulance Record  18 [facility] - PREHOSPITAL CARE REPORT    ECG   ECG Report    ECG   ECG Report      Admission Information     Attending Provider Admitting Provider Admission Type Admission Date/Time    Vicente Black MD Olowoyo, Oluwakemi Tinuke, MD Emergency  01/05/18  1641    Discharge Date Hospital Service Auth/Cert Status Service Area     General Medicine [facility]    Unit Room/Bed Admission Status       [place] [facility] 1003/1003-01 Admission (Confirmed)       Admission     Complaint    Pneumothorax, Pneumomediastinum (H)      Hospital Account     Name Acct ID Class Status Primary Coverage    Kvng Velasquez [id number] Inpatient Open MEDICARE - MEDICARE            Guarantor Account (for Hospital Account #[id number])     Name Relation to Pt Service Area Active? Acct Type    Kvng Velasquez  FCS Yes Personal/Family    Address Phone          [address] [address] [address] [phone](H)              Coverage Information (for Hospital Account #[id number])     1. MEDICARE/MEDICARE     F/O Payor/Plan Precert #    MEDICARE/MEDICARE     Subscriber Subscriber #    Kvng Velasquez [id number]    Address Phone    ATTN CLAIMS  [address]  [place] [address] [phone]          2. COMMERCIAL[employer] CARE     F/O Payor/Plan Precert #    COMMERCIAL[employer] C[employer]     Subscriber Subscriber #    Kvng Velasquez [id number]    Address Phone    HAND [place] [address]

## 2018-01-05 NOTE — NURSING NOTE
Patient drives himself into the clinic for a fall he took last night.  The first thing I notice is the swelling in the face and both eyes.  Voice is muffled. Patient states he took an ibuprofen for the pain.  Sats are 88% on RA.  /79 with a .  Dr. Veras is here.  Instructed to stick patient with a epi pen.  Left arm administered epi pen from crash cart.  Sats are 92% on room air.  Patient states he feels something in his throat and it  Hurts to breath.  Bp 149/79  Sats 88 % EMS here and start IV and administer another epi IV.  Report is given to the ED and patient's sats are 91% while leaving the clinic.  Natalee GODINEZ RN

## 2018-01-05 NOTE — IP AVS SNAPSHOT
"    [facility] INTENSIVE CARE: [phone]                                              INTERAGENCY TRANSFER FORM - PHYSICIAN ORDERS   2018                    Hospital Admission Date: 2018  ENRRIQUE BHATT   : 1950  Sex: Male        Attending Provider: Vicente Black MD     Allergies:  No Known Allergies    Infection:  None   Service:  GENERAL [facility]    Ht:  1.803 m (5' 11\")   Wt:  105 kg (231 lb 7.7 oz)   Admission Wt:  102.5 kg (225 lb 15.5 oz)    BMI:  32.29 kg/m 2   BSA:  2.29 m 2            Patient PCP Information     Provider PCP Type    Marlen Ma MD General      ED Clinical Impression     Diagnosis Description Comment Added By Time Added    Fall, initial encounter [W19.XXXA] Fall, initial encounter [W19.XXXA]  Yoshi Warner MD 2018  9:11 PM    Closed fracture of multiple ribs of right side, initial encounter [S22.41XA] Closed fracture of multiple ribs of right side, initial encounter [S22.41XA]  Yoshi Warner MD 2018  9:11 PM    Traumatic pneumothorax, initial encounter [S27.0XXA] Traumatic pneumothorax, initial encounter [S27.0XXA]  Yoshi Warner MD 2018  9:12 PM    Subcutaneous emphysema, initial encounter (H) [T79.7XXA] Subcutaneous emphysema, initial encounter (H) [T79.7XXA]  Yoshi Warner MD 2018  9:12 PM    Elevated LDH [R74.0] Elevated LDH [R74.0]  Yoshi Warner MD 2018  9:12 PM    Hypokalemia [E87.6] Hypokalemia [E87.6]  Yoshi Warner MD 2018  9:12 PM      Hospital Problems as of 2018              Priority Class Noted POA    Acquired hypothyroidism Medium  2005 Yes    Dysthymic disorder Medium  2005 Yes    Esophageal reflux Medium  2005 Yes    Hyperlipidemia LDL goal <100 High  10/31/2010 Yes    Type 2 diabetes mellitus without complication (H) High  2012 Yes    Diabetes mellitus due to underlying condition with diabetic neuropathy (H) Medium  3/16/2016 Yes    Family history of prostate cancer in " father Medium  3/16/2016 Yes    DM type 2 with diabetic peripheral neuropathy (H) Medium  3/16/2016 Yes    Essential hypertension with goal blood pressure less than 140/90 Medium  7/11/2016 Yes    * (Principal)Pneumothorax Medium  1/5/2018 Yes    Urinary retention Medium  1/6/2018 Unknown    Fall Medium  1/6/2018 Unknown    Closed fracture of multiple ribs of right side Medium  1/6/2018 Unknown    Subcutaneous emphysema (H) Medium  1/6/2018 Unknown    Elevated white blood cell count Medium  1/6/2018 Unknown      Non-Hospital Problems as of 1/9/2018              Priority Class Noted    Nonspecific elevation of levels of transaminase or lactic acid dehydrogenase (LDH) Medium  4/12/2005    Cholesteatoma of external ear Medium  4/12/2005    ALCOH USE Medium  12/7/2005    Gouty arthropathy Medium  4/3/2006    Local infection of skin and subcutaneous tissue Medium  6/1/2006    Other alopecia Medium  9/26/2006    GERD (gastroesophageal reflux disease) Medium  9/8/2009    Health Care Home Medium  11/15/2012    Advanced directives, counseling/discussion Medium  5/9/2014    Hypothyroidism Medium  6/18/2014    Pneumomediastinum (H) Medium  1/6/2018      Code Status History     Date Active Date Inactive Code Status Order ID Comments User Context    1/6/2018 12:14 AM 1/6/2018  1:28 PM Full Code [id number]  Ewelina Espinal MD Inpatient         Medication Review      START taking        Dose / Directions Comments    acetaminophen 500 MG tablet   Commonly known as:  TYLENOL   Notes to Patient:  To relieve rib pain        Dose:  1000 mg   Take 2 tablets (1,000 mg) by mouth 3 times daily   Quantity:  100 tablet   Refills:  0        furosemide 40 MG tablet   Commonly known as:  LASIX   Used for:  Edema, unspecified type        Dose:  40 mg   Take 1 tablet (40 mg) by mouth daily   Quantity:  60 tablet   Refills:  1        insulin aspart 100 UNIT/ML injection   Commonly known as:  NovoLOG PEN        Dose:  1-7 Units   Inject  1-7 Units Subcutaneous 3 times daily (before meals)  - 189 give 3 unit.    - 239 give 4units.    - 289 give 5 units.    - 339 give 7 units.   - 399 give 9 units.   -449 give 10 units  BG greater than or equal to 450 give 7 units.   Notify provider if glucose greater than or equal to 350 mg/dL after administration of correction dose.   Refills:  0        oxyCODONE IR 5 MG tablet   Commonly known as:  ROXICODONE        Dose:  5-10 mg   Take 1-2 tablets (5-10 mg) by mouth every 4 hours as needed for pain maximum 12 tablet(s) per day   Quantity:  40 tablet   Refills:  0        polyethylene glycol Packet   Commonly known as:  MIRALAX/GLYCOLAX        Dose:  17 g   Take 17 g by mouth daily   Quantity:  7 packet   Refills:  0        senna-docusate 8.6-50 MG per tablet   Commonly known as:  SENOKOT-S;PERICOLACE        Dose:  1 tablet   Take 1 tablet by mouth daily   Quantity:  100 tablet   Refills:  0          CONTINUE these medications which may have CHANGED, or have new prescriptions. If we are uncertain of the size of tablets/capsules you have at home, strength may be listed as something that might have changed.        Dose / Directions Comments    esomeprazole 40 MG CR capsule   Commonly known as:  nexIUM   This may have changed:    - when to take this  - reasons to take this  - additional instructions   Used for:  Gastroesophageal reflux disease without esophagitis        Dose:  40 mg   Take 1 capsule (40 mg) by mouth every morning (before breakfast) Take 30-60 minutes before eating.   Quantity:  90 capsule   Refills:  3        nystatin-triamcinolone cream   Commonly known as:  MYCOLOG II   This may have changed:    - how to take this  - when to take this  - reasons to take this  - additional instructions   Used for:  Skin rash        Apply  topically 2 times daily.   Quantity:  60 g   Refills:  3        triamcinolone 0.1 % cream   Commonly known as:  KENALOG   This may have changed:     - how to take this  - when to take this  - reasons to take this  - additional instructions   Used for:  Contact dermatitis due to poison ivy        Apply sparingly to affected area three times daily for 14 days.   Quantity:  30 g   Refills:  0          CONTINUE these medications which have NOT CHANGED        Dose / Directions Comments    amLODIPine 10 MG tablet   Commonly known as:  NORVASC   Used for:  Essential hypertension with goal blood pressure less than 140/90   Notes to Patient:  For hypertension        TAKE ONE TABLET BY MOUTH EVERY DAY   Quantity:  90 tablet   Refills:  1        aspirin 81 MG tablet   Notes to Patient:  For heart health        1 TABLET DAILY   Refills:  0        atenolol 50 MG tablet   Commonly known as:  TENORMIN   Used for:  Essential hypertension, Essential hypertension, benign   Notes to Patient:  For hypertension        TAKE 1 AND 1/2 TABLETS BY MOUTH ONCE DAILY   Quantity:  135 tablet   Refills:  1        atorvastatin 40 MG tablet   Commonly known as:  LIPITOR   Used for:  Hyperlipidemia LDL goal <100   Notes to Patient:  To help lower cholesterol.        Dose:  40 mg   Take 1 tablet (40 mg) by mouth daily   Quantity:  90 tablet   Refills:  3        B-12 1000 MCG Tbcr   Used for:  Vitamin B12 deficiency (non anemic)   Notes to Patient:  vitamin        Dose:  1000 mcg   Take 1,000 mcg by mouth daily   Quantity:  100 tablet   Refills:  1        blood glucose monitoring meter device kit   Commonly known as:  no brand specified        Use to test blood sugar one times daily or as directed.   Quantity:  1 kit   Refills:  1    Please use kit that insurance will cover for pt.       blood glucose monitoring test strip   Commonly known as:  no brand specified   Used for:  Type II or unspecified type diabetes mellitus without mention of complication, not stated as uncontrolled        Dose:  1 strip   1 strip by In Vitro route daily   Quantity:  3 Month   Refills:  12    Please use strips to  match new glucometer ordered.       busPIRone 15 MG tablet   Commonly known as:  BUSPAR   Used for:  Dysthymic disorder        Dose:  15 mg   Take 1 tablet (15 mg) by mouth daily   Quantity:  30 tablet   Refills:  5        fenofibrate 145 MG tablet   Used for:  Hyperlipidemia LDL goal <100        Dose:  145 mg   Take 1 tablet (145 mg) by mouth daily Brand name   Quantity:  90 tablet   Refills:  3        gabapentin 300 MG capsule   Commonly known as:  NEURONTIN   Used for:  Diabetes mellitus due to underlying condition with diabetic neuropathy, with long-term current use of insulin (H)        Take 1 tablet (300 mg) every night   Quantity:  30 capsule   Refills:  5        glipiZIDE 10 MG tablet   Commonly known as:  GLUCOTROL        Dose:  10 mg   Take 1 tablet (10 mg) by mouth daily   Quantity:  30 tablet   Refills:  2    Profile Rx: patient will contact pharmacy when needed       indomethacin 50 MG capsule   Commonly known as:  INDOCIN   Used for:  Gouty arthropathy        TAKE ONE CAPSULE BY MOUTH THREE TIMES A DAY AND REDUCE TO AS NEEDED AS GOUT GETS BETTER   Quantity:  60 capsule   Refills:  3        insulin aspart protamine-insulin aspart injection   Commonly known as:  NovoLOG MIX 70/30        71 units before breakfast, 53 units before dinner   Quantity:  9 vial   Refills:  1    Works with phong Nelson on insulin       * insulin pen needle 31G X 6 MM   Commonly known as:  NOVOFINE 31   Used for:  Type II or unspecified type diabetes mellitus without mention of complication, not stated as uncontrolled        Dose:  1 Device   1 Device 2 times daily.   Quantity:  100 each   Refills:  11        * NOVOFINE 32G X 6 MM   Used for:  Type II or unspecified type diabetes mellitus without mention of complication, not stated as uncontrolled   Generic drug:  insulin pen needle        Use twcie daily or as directed.   Quantity:  100 each   Refills:  3        levothyroxine 112 MCG tablet   Commonly known as:   SYNTHROID/LEVOTHROID   Used for:  Hypothyroidism, unspecified type        Dose:  112 mcg   Take 1 tablet (112 mcg) by mouth daily   Quantity:  90 tablet   Refills:  3        lisinopril-hydrochlorothiazide 20-25 MG per tablet   Commonly known as:  PRINZIDE/ZESTORETIC   Used for:  HTN, goal below 140/90        TAKE ONE TABLET BY MOUTH EVERY DAY   Quantity:  90 tablet   Refills:  0        order for DME        Equipment being ordered: Diabetic Shoes   Quantity:  2 each   Refills:  0        thin lancets   Commonly known as:  NO BRAND SPECIFIED        Use to test blood sugar one time daily or as directed.   Quantity:  1 Box   Refills:  3    Please use for which kit insurance will cover.       * Notice:  This list has 2 medication(s) that are the same as other medications prescribed for you. Read the directions carefully, and ask your doctor or other care provider to review them with you.      STOP taking     naproxen 375 MG tablet   Commonly known as:  NAPROSYN                     Further instructions from your care team       Would recheck basic chem in the next week on lasix  Would use O2 and wean as tolerated  Would consider a mealtime insulin if needing SS regularly when not in hospital.   Would recheck with provider in the next week  Would recheck CXR in the next week.      After Care     Activity - Up with assistive device           Advance Diet as Tolerated       Follow this diet upon discharge: Orders Placed This Encounter      Moderate Consistent CHO Diet       General info for SNF       Length of Stay Estimate: Short Term Care: Estimated # of Days <30  Condition at Discharge: Improving  Level of care:skilled   Rehabilitation Potential: Good  Admission H&P remains valid and up-to-date: Yes  Recent Chemotherapy: N/A  Use Nursing Home Standing Orders: Yes       Glucose monitor nursing POCT       Before meals and at bedtime       Mantoux instructions       Give two-step Mantoux (PPD) Per Facility Policy Yes              Procedures     Oxygen - Nasal cannula       1-2 Lpm by nasal cannula to keep O2 sats 92% or greater.             Referrals     Occupational Therapy Adult Consult       Evaluate and treat as clinically indicated.    Reason:  Weakness, rib fractures, sub cu emphysema       Physical Therapy Adult Consult       Evaluate and treat as clinically indicated.    Reason:  Weakness, rib fractures             Statement of Approval     Ordered          01/09/18 1057  I have reviewed and agree with all the recommendations and orders detailed in this document.  EFFECTIVE NOW     Approved and electronically signed by:  Vicente Black MD

## 2018-01-05 NOTE — ED NOTES
Bed: ED04  Expected date:   Expected time:   Means of arrival:   Comments:  Ambulance Kvng Velasquez-jose angel from [facility]

## 2018-01-05 NOTE — IP AVS SNAPSHOT
MRN:[id number]                      After Visit Summary   1/5/2018    Kvng Velasquez    MRN: [id number]           Thank you!     Thank you for choosing [facility] for your care. Our goal is always to provide you with excellent care. Hearing back from our patients is one way we can continue to improve our services. Please take a few minutes to complete the written survey that you may receive in the mail after you visit with us. Thank you!        Patient Information     Date Of Birth          1950        Designated Caregiver       Most Recent Value    Caregiver    Will someone help with your care after discharge? yes    Name of designated caregiver Peg Velasquez    Phone number of caregiver [phone]    Caregiver address [address] [address]      About your hospital stay     You were admitted on:  January 5, 2018 You last received care in the:  [facility]    You were discharged on:  January 9, 2018       Who to Call     For medical emergencies, please call 911.  For non-urgent questions about your medical care, please call your primary care provider or clinic, [phone]          Attending Provider     Provider Specialty    Yoshi Warner MD Emergency Medicine    Ewelina Espinal MD [facility]    [facility]       Primary Care Provider Office Phone # Fax #    Marlen Ma [phone]      After Care Instructions     Activity - Up with assistive device           Advance Diet as Tolerated       Follow this diet upon discharge: Orders Placed This Encounter      Moderate Consistent CHO Diet            General info for SNF       Length of Stay Estimate: Short Term Care: Estimated # of Days <30  Condition at Discharge: Improving  Level of care:skilled   Rehabilitation Potential: Good  Admission H&P remains valid and up-to-date: Yes  Recent Chemotherapy: N/A  Use Nursing Home Standing  "Orders: Yes            Glucose monitor nursing POCT       Before meals and at bedtime            Mantoux instructions       Give two-step Mantoux (PPD) Per Facility Policy Yes            Oxygen - Nasal cannula       1-2 Lpm by nasal cannula to keep O2 sats 92% or greater.                  Additional Services     Occupational Therapy Adult Consult       Evaluate and treat as clinically indicated.    Reason:  Weakness, rib fractures, sub cu emphysema            Physical Therapy Adult Consult       Evaluate and treat as clinically indicated.    Reason:  Weakness, rib fractures                  Further instructions from your care team       Would recheck basic chem in the next week on lasix  Would use oxygen 1-2 liters as needed and wean as tolerated  Would consider a mealtime insulin if needing SS regularly when not in hospital.   Would recheck with provider in the next week  Would recheck CXR in the next week.    Keep dressing on Right chest tube site till 1/11/18.    Pending Results     No orders found from 1/3/2018 to 1/6/2018.            Statement of Approval     Ordered          01/09/18 1057  I have reviewed and agree with all the recommendations and orders detailed in this document.  EFFECTIVE NOW     Approved and electronically signed by:  Vicente Black MD             Admission Information     Date & Time Provider Department Dept. Phone    1/5/2018 Vicente Black MD [facility] [phone]      Your Vitals Were     Blood Pressure Pulse Temperature Respirations Height Weight    128/68 78 98.4  F (36.9  C) (Oral) 18 1.803 m (5' 11\") 105 kg (231 lb 7.7 oz)    Pulse Oximetry BMI (Body Mass Index)                92% 32.29 kg/m2          MyChart Information     [facility] lets you send messages to your doctor, view your test results, renew your prescriptions, schedule appointments and more. To sign up, go to [email or web address] . Click on \"Log in\" on the left side of the screen, which " "will take you to the Welcome page. Then click on \"Sign up Now\" on the right side of the page.     You will be asked to enter the access code listed below, as well as some personal information. Please follow the directions to create your username and password.     Your access code is: [email or web address]  Expires: 2018  4:32 PM     Your access code will  in 90 days. If you need help or a new code, please call your [facility] clinic or [phone].        Care EveryWhere ID     This is your Care EveryWhere ID. This could be used by other organizations to access your [facility] medical records  [id number]        Equal Access to Services     LUIS ANTONIO MIJARES : Linda Delaney, shanti germain, surinder jarvis, soham morales. So [facility] [phone].    ATENCIÓN: Si habla español, tiene a matt disposición servicios gratuitos de asistencia lingüística. Llame al [phone].    We comply with applicable federal civil rights laws and [place] laws. We do not discriminate on the basis of race, color, national origin, age, disability, sex, sexual orientation, or gender identity.               Review of your medicines      START taking        Dose / Directions    acetaminophen 500 MG tablet   Commonly known as:  TYLENOL   Notes to Patient:  To relieve rib pain        Dose:  1000 mg   Take 2 tablets (1,000 mg) by mouth 3 times daily   Quantity:  100 tablet   Refills:  0       furosemide 40 MG tablet   Commonly known as:  LASIX   Used for:  Edema, unspecified type   Notes to Patient:  To take fluid off        Dose:  40 mg   Take 1 tablet (40 mg) by mouth daily   Quantity:  60 tablet   Refills:  1       insulin aspart 100 UNIT/ML injection   Commonly known as:  NovoLOG PEN   Notes to Patient:  For diabetes        Dose:  1-7 Units   Inject 1-7 Units Subcutaneous 3 times daily (before meals)  - 189 give 3 unit.    - 239 give 4units.    - 289 give 5 units.    - " 339 give 7 units.   - 399 give 9 units.   -449 give 10 units  BG greater than or equal to 450 give 7 units.   Notify provider if glucose greater than or equal to 350 mg/dL after administration of correction dose.   Refills:  0       oxyCODONE IR 5 MG tablet   Commonly known as:  ROXICODONE        Dose:  5-10 mg   Take 1-2 tablets (5-10 mg) by mouth every 4 hours as needed for pain maximum 12 tablet(s) per day   Quantity:  40 tablet   Refills:  0       polyethylene glycol Packet   Commonly known as:  MIRALAX/GLYCOLAX   Notes to Patient:  To prevent constipation        Dose:  17 g   Take 17 g by mouth daily   Quantity:  7 packet   Refills:  0       senna-docusate 8.6-50 MG per tablet   Commonly known as:  SENOKOT-S;PERICOLACE   Notes to Patient:  To prevent constipation        Dose:  1 tablet   Take 1 tablet by mouth daily   Quantity:  100 tablet   Refills:  0         CONTINUE these medicines which may have CHANGED, or have new prescriptions. If we are uncertain of the size of tablets/capsules you have at home, strength may be listed as something that might have changed.        Dose / Directions    esomeprazole 40 MG CR capsule   Commonly known as:  nexIUM   This may have changed:    - when to take this  - reasons to take this  - additional instructions   Used for:  Gastroesophageal reflux disease without esophagitis   Notes to Patient:  For esophageal reflux        Dose:  40 mg   Take 1 capsule (40 mg) by mouth every morning (before breakfast) Take 30-60 minutes before eating.   Quantity:  90 capsule   Refills:  3       nystatin-triamcinolone cream   Commonly known as:  MYCOLOG II   This may have changed:    - how to take this  - when to take this  - reasons to take this  - additional instructions   Used for:  Skin rash   Notes to Patient:  Antifungal for rash        Apply  topically 2 times daily.   Quantity:  60 g   Refills:  3       triamcinolone 0.1 % cream   Commonly known as:  KENALOG   This may  have changed:    - how to take this  - when to take this  - reasons to take this  - additional instructions   Used for:  Contact dermatitis due to poison ivy        Apply sparingly to affected area three times daily for 14 days.   Quantity:  30 g   Refills:  0         CONTINUE these medicines which have NOT CHANGED        Dose / Directions    amLODIPine 10 MG tablet   Commonly known as:  NORVASC   Used for:  Essential hypertension with goal blood pressure less than 140/90   Notes to Patient:  For hypertension        TAKE ONE TABLET BY MOUTH EVERY DAY   Quantity:  90 tablet   Refills:  1       aspirin 81 MG tablet   Notes to Patient:  For heart health        1 TABLET DAILY   Refills:  0       atenolol 50 MG tablet   Commonly known as:  TENORMIN   Used for:  Essential hypertension, Essential hypertension, benign   Notes to Patient:  For hypertension        TAKE 1 AND 1/2 TABLETS BY MOUTH ONCE DAILY   Quantity:  135 tablet   Refills:  1       atorvastatin 40 MG tablet   Commonly known as:  LIPITOR   Used for:  Hyperlipidemia LDL goal <100   Notes to Patient:  To help lower cholesterol.        Dose:  40 mg   Take 1 tablet (40 mg) by mouth daily   Quantity:  90 tablet   Refills:  3       B-12 1000 MCG Tbcr   Used for:  Vitamin B12 deficiency (non anemic)   Notes to Patient:  vitamin        Dose:  1000 mcg   Take 1,000 mcg by mouth daily   Quantity:  100 tablet   Refills:  1       blood glucose monitoring meter device kit   Commonly known as:  no brand specified   Notes to Patient:  For diabetes        Use to test blood sugar one times daily or as directed.   Quantity:  1 kit   Refills:  1       blood glucose monitoring test strip   Commonly known as:  no brand specified   Used for:  Type II or unspecified type diabetes mellitus without mention of complication, not stated as uncontrolled        Dose:  1 strip   1 strip by In Vitro route daily   Quantity:  3 Month   Refills:  12       busPIRone 15 MG tablet   Commonly  known as:  BUSPAR   Used for:  Dysthymic disorder   Notes to Patient:  For diabetes        Dose:  15 mg   Take 1 tablet (15 mg) by mouth daily   Quantity:  30 tablet   Refills:  5       fenofibrate 145 MG tablet   Used for:  Hyperlipidemia LDL goal <100   Notes to Patient:  To help lower cholesterol        Dose:  145 mg   Take 1 tablet (145 mg) by mouth daily Brand name   Quantity:  90 tablet   Refills:  3       gabapentin 300 MG capsule   Commonly known as:  NEURONTIN   Used for:  Diabetes mellitus due to underlying condition with diabetic neuropathy, with long-term current use of insulin (H)   Notes to Patient:  To help with neuropathic pain        Take 1 tablet (300 mg) every night   Quantity:  30 capsule   Refills:  5       glipiZIDE 10 MG tablet   Commonly known as:  GLUCOTROL   Notes to Patient:  For diabetes        Dose:  10 mg   Take 1 tablet (10 mg) by mouth daily   Quantity:  30 tablet   Refills:  2       indomethacin 50 MG capsule   Commonly known as:  INDOCIN   Used for:  Gouty arthropathy   Notes to Patient:  For gout pain        TAKE ONE CAPSULE BY MOUTH THREE TIMES A DAY AND REDUCE TO AS NEEDED AS GOUT GETS BETTER   Quantity:  60 capsule   Refills:  3       insulin aspart protamine-insulin aspart injection   Commonly known as:  NovoLOG MIX 70/30   Notes to Patient:  For diabetes        71 units before breakfast, 53 units before dinner   Quantity:  9 vial   Refills:  1       * insulin pen needle 31G X 6 MM   Commonly known as:  NOVOFINE 31   Used for:  Type II or unspecified type diabetes mellitus without mention of complication, not stated as uncontrolled        Dose:  1 Device   1 Device 2 times daily.   Quantity:  100 each   Refills:  11       * NOVOFINE 32G X 6 MM   Used for:  Type II or unspecified type diabetes mellitus without mention of complication, not stated as uncontrolled   Generic drug:  insulin pen needle        Use twcie daily or as directed.   Quantity:  100 each   Refills:  3        levothyroxine 112 MCG tablet   Commonly known as:  SYNTHROID/LEVOTHROID   Used for:  Hypothyroidism, unspecified type   Notes to Patient:  For thyroid        Dose:  112 mcg   Take 1 tablet (112 mcg) by mouth daily   Quantity:  90 tablet   Refills:  3       lisinopril-hydrochlorothiazide 20-25 MG per tablet   Commonly known as:  PRINZIDE/ZESTORETIC   Used for:  HTN, goal below 140/90   Notes to Patient:  For blood pressure/ diuresis        TAKE ONE TABLET BY MOUTH EVERY DAY   Quantity:  90 tablet   Refills:  0       order for DME        Equipment being ordered: Diabetic Shoes   Quantity:  2 each   Refills:  0       thin lancets   Commonly known as:  NO BRAND SPECIFIED        Use to test blood sugar one time daily or as directed.   Quantity:  1 Box   Refills:  3       * Notice:  This list has 2 medication(s) that are the same as other medications prescribed for you. Read the directions carefully, and ask your doctor or other care provider to review them with you.      STOP taking     naproxen 375 MG tablet   Commonly known as:  NAPROSYN                Where to get your medicines      Some of these will need a paper prescription and others can be bought over the counter. Ask your nurse if you have questions.     Bring a paper prescription for each of these medications     oxyCODONE IR 5 MG tablet       You don't need a prescription for these medications     acetaminophen 500 MG tablet    furosemide 40 MG tablet    insulin aspart 100 UNIT/ML injection    polyethylene glycol Packet    senna-docusate 8.6-50 MG per tablet                Protect others around you: Learn how to safely use, store and throw away your medicines at www.disposemymeds.org.             Medication List: This is a list of all your medications and when to take them. Check marks below indicate your daily home schedule. Keep this list as a reference.      Medications           Morning Afternoon Evening Bedtime As Needed    acetaminophen 500 MG tablet    Commonly known as:  TYLENOL   Take 2 tablets (1,000 mg) by mouth 3 times daily   Notes to Patient:  To relieve rib pain                                         amLODIPine 10 MG tablet   Commonly known as:  NORVASC   TAKE ONE TABLET BY MOUTH EVERY DAY   Last time this was given:  10 mg on 1/9/2018  7:50 AM   Notes to Patient:  For hypertension                                   aspirin 81 MG tablet   1 TABLET DAILY   Notes to Patient:  For heart health                                   atenolol 50 MG tablet   Commonly known as:  TENORMIN   TAKE 1 AND 1/2 TABLETS BY MOUTH ONCE DAILY   Last time this was given:  50 mg on 1/9/2018  7:50 AM   Notes to Patient:  For hypertension                                   atorvastatin 40 MG tablet   Commonly known as:  LIPITOR   Take 1 tablet (40 mg) by mouth daily   Last time this was given:  40 mg on 1/9/2018  7:50 AM   Notes to Patient:  To help lower cholesterol.                                   B-12 1000 MCG Tbcr   Take 1,000 mcg by mouth daily   Notes to Patient:  vitamin                                   blood glucose monitoring meter device kit   Commonly known as:  no brand specified   Use to test blood sugar one times daily or as directed.   Notes to Patient:  For diabetes                                   blood glucose monitoring test strip   Commonly known as:  no brand specified   1 strip by In Vitro route daily                                   busPIRone 15 MG tablet   Commonly known as:  BUSPAR   Take 1 tablet (15 mg) by mouth daily   Last time this was given:  15 mg on 1/9/2018  7:52 AM   Notes to Patient:  For diabetes                                esomeprazole 40 MG CR capsule   Commonly known as:  nexIUM   Take 1 capsule (40 mg) by mouth every morning (before breakfast) Take 30-60 minutes before eating.   Notes to Patient:  For esophageal reflux                                   fenofibrate 145 MG tablet   Take 1 tablet (145 mg) by mouth daily Brand name    Last time this was given:  160 mg on 1/9/2018  7:52 AM   Notes to Patient:  To help lower cholesterol                                   furosemide 40 MG tablet   Commonly known as:  LASIX   Take 1 tablet (40 mg) by mouth daily   Notes to Patient:  To take fluid off                                   gabapentin 300 MG capsule   Commonly known as:  NEURONTIN   Take 1 tablet (300 mg) every night   Last time this was given:  300 mg on 1/8/2018  9:11 PM   Notes to Patient:  To help with neuropathic pain                                   glipiZIDE 10 MG tablet   Commonly known as:  GLUCOTROL   Take 1 tablet (10 mg) by mouth daily   Notes to Patient:  For diabetes                                   indomethacin 50 MG capsule   Commonly known as:  INDOCIN   TAKE ONE CAPSULE BY MOUTH THREE TIMES A DAY AND REDUCE TO AS NEEDED AS GOUT GETS BETTER   Notes to Patient:  For gout pain                                         insulin aspart 100 UNIT/ML injection   Commonly known as:  NovoLOG PEN   Inject 1-7 Units Subcutaneous 3 times daily (before meals)  - 189 give 3 unit.    - 239 give 4units.    - 289 give 5 units.    - 339 give 7 units.   - 399 give 9 units.   -449 give 10 units  BG greater than or equal to 450 give 7 units.   Notify provider if glucose greater than or equal to 350 mg/dL after administration of correction dose.   Last time this was given:  3 Units on 1/9/2018 11:41 AM   Notes to Patient:  For diabetes                                         insulin aspart protamine-insulin aspart injection   Commonly known as:  NovoLOG MIX 70/30   71 units before breakfast, 53 units before dinner   Notes to Patient:  For diabetes                                      * insulin pen needle 31G X 6 MM   Commonly known as:  NOVOFINE 31   1 Device 2 times daily.                                   * NOVOFINE 32G X 6 MM   Use twcie daily or as directed.   Generic drug:  insulin pen needle                                    levothyroxine 112 MCG tablet   Commonly known as:  SYNTHROID/LEVOTHROID   Take 1 tablet (112 mcg) by mouth daily   Last time this was given:  112 mcg on 1/9/2018  7:47 AM   Notes to Patient:  For thyroid                                   lisinopril-hydrochlorothiazide 20-25 MG per tablet   Commonly known as:  PRINZIDE/ZESTORETIC   TAKE ONE TABLET BY MOUTH EVERY DAY   Notes to Patient:  For blood pressure/ diuresis                                   nystatin-triamcinolone cream   Commonly known as:  MYCOLOG II   Apply  topically 2 times daily.   Notes to Patient:  Antifungal for rash                                      order for DME   Equipment being ordered: Diabetic Shoes                                   oxyCODONE IR 5 MG tablet   Commonly known as:  ROXICODONE   Take 1-2 tablets (5-10 mg) by mouth every 4 hours as needed for pain maximum 12 tablet(s) per day                                polyethylene glycol Packet   Commonly known as:  MIRALAX/GLYCOLAX   Take 17 g by mouth daily   Last time this was given:  17 g on 1/9/2018  8:52 AM   Notes to Patient:  To prevent constipation                                   senna-docusate 8.6-50 MG per tablet   Commonly known as:  SENOKOT-S;PERICOLACE   Take 1 tablet by mouth daily   Last time this was given:  2 tablets on 1/9/2018  8:54 AM   Notes to Patient:  To prevent constipation                                   thin lancets   Commonly known as:  NO BRAND SPECIFIED   Use to test blood sugar one time daily or as directed.                                   triamcinolone 0.1 % cream   Commonly known as:  KENALOG   Apply sparingly to affected area three times daily for 14 days.                                         * Notice:  This list has 2 medication(s) that are the same as other medications prescribed for you. Read the directions carefully, and ask your doctor or other care provider to review them with you.

## 2018-01-05 NOTE — IP AVS SNAPSHOT
"` `           [facility] INTENSIVE CARE: [phone]                                              INTERAGENCY TRANSFER FORM - NURSING   2018                    Hospital Admission Date: 2018  ENRRIQUE BHATT   : 1950  Sex: Male        Attending Provider: Vicente Black MD     Allergies:  No Known Allergies    Infection:  None   Service:  GENERAL MEDI    Ht:  1.803 m (5' 11\")   Wt:  105 kg (231 lb 7.7 oz)   Admission Wt:  102.5 kg (225 lb 15.5 oz)    BMI:  32.29 kg/m 2   BSA:  2.29 m 2            Patient PCP Information     Provider PCP Type    Marlen Ma MD General      Current Code Status     Date Active Code Status Order ID Comments User Context       2018  1:28 PM Full Code [id number]  Vicente Black MD Inpatient       Code Status History     Date Active Date Inactive Code Status Order ID Comments User Context    2018 12:14 AM 2018  1:28 PM Full Code [id number]  Ewelina Espinal MD Inpatient      Advance Directives        Does patient have a scanned Advance Directive/ACP document in EPIC?           No        Hospital Problems as of 2018              Priority Class Noted POA    Acquired hypothyroidism Medium  2005 Yes    Dysthymic disorder Medium  2005 Yes    Esophageal reflux Medium  2005 Yes    Hyperlipidemia LDL goal <100 High  10/31/2010 Yes    Type 2 diabetes mellitus without complication (H) High  2012 Yes    Diabetes mellitus due to underlying condition with diabetic neuropathy (H) Medium  3/16/2016 Yes    Family history of prostate cancer in father Medium  3/16/2016 Yes    DM type 2 with diabetic peripheral neuropathy (H) Medium  3/16/2016 Yes    Essential hypertension with goal blood pressure less than 140/90 Medium  2016 Yes    * (Principal)Pneumothorax Medium  2018 Yes    Urinary retention Medium  2018 Unknown    Fall Medium  2018 Unknown    Closed fracture of multiple ribs of right side Medium  2018 " Unknown    Subcutaneous emphysema (H) Medium  1/6/2018 Unknown    Elevated white blood cell count Medium  1/6/2018 Unknown      Non-Hospital Problems as of 1/9/2018              Priority Class Noted    Nonspecific elevation of levels of transaminase or lactic acid dehydrogenase (LDH) Medium  4/12/2005    Cholesteatoma of external ear Medium  4/12/2005    ALCOH USE Medium  12/7/2005    Gouty arthropathy Medium  4/3/2006    Local infection of skin and subcutaneous tissue Medium  6/1/2006    Other alopecia Medium  9/26/2006    GERD (gastroesophageal reflux disease) Medium  9/8/2009    Health Care Home Medium  11/15/2012    Advanced directives, counseling/discussion Medium  5/9/2014    Hypothyroidism Medium  6/18/2014    Pneumomediastinum (H) Medium  1/6/2018      Immunizations     Name Date      Influenza (IIV3) PF 10/24/06     Influenza (IIV3) PF 10/07/04     Influenza (IIV3) PF 10/08/03     Influenza (IIV3) PF 10/23/02     Influenza Intranasal Vaccine 11/14/11     Pneumo Conj 13-V (2010&after) 07/11/16     Pneumococcal 23 valent 03/04/08     TD (ADULT, 7+) 04/12/05     TD (ADULT, 7+) 05/09/91     TDAP Vaccine (Boostrix) 07/11/16     Zoster vaccine, live 05/12/09          END      ASSESSMENT     Discharge Profile Flowsheet     EXPECTED DISCHARGE     Last Bowel Movement  01/05/18 01/08/18 1519    Expected Discharge Date  01/09/18 01/08/18 1349   GI Signs/Symptoms  constipation 01/08/18 1519    DISCHARGE NEEDS ASSESSMENT     Passing flatus  yes 01/08/18 1519    Equipment Currently Used at Home  grab bar 01/08/18 1104   COMMUNICATION ASSESSMENT      Transportation Available  car 01/08/18 1104   Patient's communication style  spoken language (English or Bilingual) 01/05/18 2059    FUNCTIONAL LEVEL CURRENT     FINAL RESOURCES      Ambulation  3 - assistive equipment and person 01/09/18 0810   Resources List  Transitional Care;Home Care 01/08/18 1349    Transferring  3 - assistive equipment and person 01/09/18 0810   SKIN    "   Toileting  2 - assistive person 01/09/18 0810   Inspection of bony prominences  Full 01/08/18 1221    Bathing  2 - assistive person 01/09/18 0810   Skin WDL  ex 01/09/18 0810    Dressing  2 - assistive person 01/09/18 0810   Skin Integrity  bruise(s);rash(es);subcutaneous emphysema (specify) 01/09/18 0810    Eating  0 - independent 01/09/18 0810   Skin Color/Characteristics  bruised (ecchymotic) 01/09/18 0810    Communication  0 - understands/communicates without difficulty 01/09/18 0810   Skin Temperature  cool 01/09/18 0810    Swallowing  0 - swallows foods/liquids without difficulty 01/09/18 0810   Skin Moisture  dry 01/09/18 0810    GASTROINTESTINAL (ADULT,PEDIATRIC,OB)     Skin Elasticity  quick return to original state 01/08/18 1519    GI WDL  ex 01/09/18 0810   SAFETY      Abdominal Appearance  rounded;distended 01/09/18 0810   Safety WDL  WDL 01/09/18 0810    All Quadrants Bowel Sounds  tinkling sound 01/09/18 0810   All Alarms  none present 01/09/18 0810                 Assessment WDL (Within Defined Limits) Definitions           Safety WDL     Effective: 09/28/15    Row Information: <b>WDL Definition:</b> Bed in low position, wheels locked; call light in reach; upper side rails up x 2; ID band on<br> <font color=\"gray\"><i>Item=AS safety wdl>>List=AS safety wdl>>Version=F14</i></font>      Skin WDL     Effective: 09/28/15    Row Information: <b>WDL Definition:</b> Warm; dry; intact; elastic; without discoloration; pressure points without redness<br> <font color=\"gray\"><i>Item=AS skin wdl>>List=AS skin wdl>>Version=F14</i></font>      Vitals     Vital Signs Flowsheet     VITAL SIGNS     Height  1.803 m (5' 11\") 01/05/18 2222    Temp  98.3  F (36.8  C) 01/09/18 0746   Weight  105 kg (231 lb 7.7 oz) 01/08/18 0637    Temp src  Oral 01/09/18 0746   Weight Method  Bed scale 01/08/18 0637    Resp  16 01/09/18 0746   BSA (Calculated - sq m)  2.27 01/05/18 2222    Pulse  78 01/09/18 0205   BMI (Calculated)  31.58 " 01/05/18 2222    Heart Rate  85 01/09/18 0746   POSITIONING      Pulse/Heart Rate Source  Monitor 01/05/18 1651   Body Position  independently positioning 01/09/18 0810    BP  133/67 01/09/18 0746   Head of Bed (HOB)  HOB at 30-45 degrees 01/08/18 0132    BP Location  Left arm 01/08/18 1435   Chair  Upright in chair 01/09/18 0810    OXYGEN THERAPY     Positioning/Transfer Devices  pillows 01/08/18 1216    SpO2  93 % 01/09/18 0746   DAILY CARE      O2 Device  Nasal cannula 01/09/18 0748   Activity Management  ambulated in hoffman 01/08/18 1721    Oxygen Delivery  1 LPM 01/09/18 0748   Activity Assistance Provided  assistance, 1 person 01/09/18 0810    PAIN/COMFORT     Assistive Device Utilized  walker 01/09/18 0810    Patient Currently in Pain  yes 01/09/18 1058   ECG      Preferred Pain Scale  CAPA (Clinically Aligned Pain Assessment) ([facility] Adults Only) 01/06/18 1441   ECG Rhythm  Sinus rhythm 01/08/18 1231    0-10 Pain Scale  7 01/09/18 0749   Ectopy  PVC 01/08/18 1231    Pain Location  Rib cage 01/06/18 2032   AL Interval  0.17 01/08/18 1231    Pain Orientation  Right 01/06/18 2032   QRS Interval  0.11 01/08/18 1231    Pain Intervention(s)  Medication (See eMAR) 01/09/18 1058   QT Interval  0.30 01/08/18 1231    Response to Interventions  Decrease in pain 01/09/18 1058   Lead Monitored  Lead II 01/08/18 1231    CLINICALLY ALIGNED PAIN ASSESSMENT (CAPA) ([facility] ADULTS ONLY)     Ectopy Frequency  Occasional 01/08/18 1231    Comfort  tolerable with discomfort 01/09/18 1058   RESPIRATORY MONITORING      Change in Pain  getting better 01/09/18 1058   Respiratory Monitoring (EtCO2)  0 mmHg 01/05/18 2100    Pain Control  partially effective 01/09/18 1058   BALDEMAR COMA SCALE      Functioning  can do most things, but pain gets in the way of some 01/09/18 1058   Best Eye Response  4-->(E4) spontaneous 01/09/18 0810    Sleep  normal sleep 01/09/18 1058   Best Motor Response  6-->(M6)  obeys commands 01/09/18 0810    ANALGESIA SIDE EFFECTS MONITORING     Best Verbal Response  5-->(V5) oriented 01/09/18 0810    Side Effects Monitoring: Respiratory Quality  R 01/08/18 1950   [place] Coma Scale Score  15 01/09/18 0810    Side Effects Monitoring: Respiratory Depth  N 01/08/18 1950   POINT OF CARE TESTING      Side Effects Monitoring: Sedation Level  1 01/08/18 1950   Puncture Site  fingertip 01/08/18 1707    HEIGHT AND WEIGHT     Bedside Glucose (mg/dl )   78 mg/dl 01/08/18 1707            Patient Lines/Drains/Airways Status    Active LINES/DRAINS/AIRWAYS     Name: Placement date: Placement time: Site: Days: Last dressing change:    Peripheral IV 01/05/18 Left Hand 01/05/18   1650   Hand   3     Wound 01/05/18 Right Chest Surgical Chest tube site 01/05/18      Chest   4     Rash 01/05/18 2200 Left lower calf 01/05/18   2200    3             Patient Lines/Drains/Airways Status    Active PICC/CVC     None            Intake/Output Detail Report     Date Intake       Output   Net    Shift P.O. I.V. Other IV Piggyback Total Urine Chest Tube Total       Noc 01/07/18 2300 - 01/08/18 0659 400 -- -- -- 400 350 -- 350 50    Day 01/08/18 0700 - 01/08/18 1459 325 -- -- -- 325 550 -- 550 -225    Suzanna 01/08/18 1500 - 01/08/18 2259 -- -- -- -- -- -- -- -- 0    Noc 01/08/18 2300 - 01/09/18 0659 -- -- -- -- -- -- -- -- 0    Day 01/09/18 0700 - 01/09/18 1459 -- -- -- -- -- -- -- -- 0      Last Void/BM       Most Recent Value    Urine Occurrence 1 at 01/06/2018 1955    Stool Occurrence       Case Management/Discharge Planning     Case Management/Discharge Planning Flowsheet     REFERRAL INFORMATION     EXPECTED DISCHARGE      Did the Initial Social Work Assessment result in a Social Work Case?  Yes 01/08/18 1348   Expected Discharge Date  01/09/18 01/08/18 1349    Admission Type  inpatient 01/08/18 1348   DISCHARGE PLANNING      Arrived From  home or self-care 01/08/18 1348   Transportation Available  car 01/08/18  1104    Primary [facility] Name  -- ([facility]) 01/08/18 1348   FINAL RESOURCES      Primary Care MD Name  -- (Anil) 01/08/18 1348   Equipment Currently Used at Home  grab bar 01/08/18 1104    LIVING ENVIRONMENT     Resources List  Transitional Care;Home Care 01/08/18 1349    Lives With  alone 01/08/18 1348   ABUSE RISK SCREEN      Living Arrangements  apartment 01/08/18 1348   QUESTION TO PATIENT:  Has a member of your family or a partner(now or in the past) intimidated, hurt, manipulated, or controlled you in any way?  no 01/05/18 1651    Provides Primary Care For  no one 01/08/18 1348   QUESTION TO PATIENT: Do you feel safe going back to the place where you are living?  yes 01/05/18 1651    ASSESSMENT OF FAMILY/SOCIAL SUPPORT     OBSERVATION: Is there reason to believe there has been maltreatment of a vulnerable adult (ie. Physical/Sexual/Emotional abuse, self neglect, lack of adequate food, shelter, medical care, or financial exploitation)?  no 01/05/18 2104    Who is your support system?  Sibling(s) 01/08/18 1349   (R) MENTAL HEALTH SUICIDE RISK      Description of Support System  Supportive;Involved 01/08/18 1349   Are you depressed or being treated for depression?  No 01/05/18 2103    Support Assessment  Adequate family and caregiver support;Adequate social supports 01/08/18 1349   HOMICIDE RISK      COPING/STRESS     Feels Like Hurting Others  no 01/05/18 2059    Major Change/Loss/Stressor  none 01/05/18 2104

## 2018-01-05 NOTE — ED PROVIDER NOTES
History     Chief Complaint   Patient presents with     Facial Swelling     angioedema coming by ambulance from  [facility] and in route by EMS      Duoneb  Benadryl      airway  intact at this time       HPI  Kvng Velasquez is a 67 year old male who has a history of hypertension, type 2 diabetes mellitus, and hypothyroidism who presents to the ED for evaluation of facial swelling. Patient presents to the ED via EMS from [facility].  Patient presented to clinic secondary to fall and chest pain, developed progressive swelling in route.  patient fell on the ice yesterday and landed on his right side. Since this time patient has had pain on the right flank into the right abdomen. Upon arrival at clinic today, patient had visual facial swelling and muffled voice. Sats were 88% on RA.  /79 with a . He report that he had taken ibuprofen for pain. Patient had one dose of epinephrine at clinic. EMS was called, and upon arrival pain had wheezing. EMS administered one more dose epinephrine and 50 mg benadryl. He was given a duo-nebulizer treatment, and he was on albuterol nebulizer upon arrival to the ED. EMS did not report hives or redness. Here in the ED, patient complains of chest soreness and right sided flank pain.  Denies striking his head with the fall, any pain in his neck, back, abdomen, or extremities.    Patient currently takes lisinopril which he has been on for 4-5 years. He takes subcutaneous insulin injections into the abdomen. Patient takes baby aspirin daily. He is not on blood thinners. Patient has not history of personal angioedema or familial angioedema.  Problem List:    Patient Active Problem List    Diagnosis Date Noted     Type 2 diabetes mellitus without complication (H) 05/17/2012     Priority: High     Hyperlipidemia LDL goal <100 10/31/2010     Priority: High     Pneumothorax 01/05/2018     Priority: Medium     Essential hypertension with goal blood pressure  less than 140/90 07/11/2016     Priority: Medium     Diabetes mellitus due to underlying condition with diabetic neuropathy (H) 03/16/2016     Priority: Medium     Family history of prostate cancer in father 03/16/2016     Priority: Medium     DM type 2 with diabetic peripheral neuropathy (H) 03/16/2016     Priority: Medium     Hypothyroidism 06/18/2014     Priority: Medium     Advanced directives, counseling/discussion 05/09/2014     Priority: Medium     Patient does not have an Advance/Health Care Directive (HCD), declines information/referral.    Louann Diop  May 9, 2014         Health Care Home 11/15/2012     Priority: Medium     Laxmi Paz RN-PHN  [facility]   [phone]    DX V65.8 REPLACED WITH [id number] [facility]ALTH CARE HOME (04/08/2013)       GERD (gastroesophageal reflux disease) 09/08/2009     Priority: Medium     Other alopecia 09/26/2006     Priority: Medium     Local infection of skin and subcutaneous tissue 06/01/2006     Priority: Medium     left second toe 06  B27 is neg  Also has had achiles tendonitis  ?spondyloarthropathy  Problem list name updated by automated process. Provider to review       Gouty arthropathy 04/03/2006     Priority: Medium     Problem list name updated by automated process. Provider to review and confirm  Im[employer] Update utility       ALCOH USE 12/07/2005     Priority: Medium     some significant alcohol use  Suggested AA in 09       Hypothyroidism, unspecified hypothyroidism type 04/12/2005     Priority: Medium     Problem list name updated by automated process. Provider to review       Dysthymic disorder 04/12/2005     Priority: Medium     Depression w/anxiety       Esophageal reflux 04/12/2005     Priority: Medium     Nonspecific elevation of levels of transaminase or lactic acid dehydrogenase (LDH) 04/12/2005     Priority: Medium     Fatty liver, etoh       Cholesteatoma of external ear 04/12/2005     Priority: Medium     left ear          Past  Medical History:    Past Medical History:   Diagnosis Date     Acidosis      Backache, unspecified      Cholesteatoma of external ear      Dysthymic disorder      Esophageal reflux      Hemorrhage of rectum and anus      Nonspecific elevation of levels of transaminase or lactic acid dehydrogenase (LDH)      Other and unspecified hyperlipidemia      Type II or unspecified type diabetes mellitus without mention of complication, not stated as uncontrolled      Unspecified essential hypertension      Unspecified hypothyroidism        Past Surgical History:    Past Surgical History:   Procedure Laterality Date     SURGICAL HISTORY OF -   9/13/1999    Left inguinal hernia repair     SURGICAL HISTORY OF -   1958    T&A     SURGICAL HISTORY OF -   12/10/1990    Cyst removal from one of his fingers     SURGICAL HISTORY OF -       Cholesteatoma removed from left ear     SURGICAL HISTORY OF -   3/14/1978    Vasectomy     SURGICAL HISTORY OF -   1982    Mastoidectomy       Family History:    Family History   Problem Relation Age of Onset     C.A.D. Mother      Hypertension Mother      DIABETES Mother      Cardiovascular Mother      Lipids Mother      Depression Mother      Genitourinary Problems Mother      Lipids Father      Hypertension Father      Prostate Cancer Father      Thyroid Disease Father      C.A.D. Brother      Cardiovascular Brother      C.A.D. Brother      angioplasty     CANCER Brother        Social History:  Marital Status:  Single [1]  Social History   Substance Use Topics     Smoking status: Never Smoker     Smokeless tobacco: Never Used     Alcohol use Yes      Comment: occas        Medications:      No current outpatient prescriptions on file.      Review of Systems  All other systems are reviewed and are negative.    Physical Exam   BP: 154/65  Heart Rate: 138  Temp: 99.3  F (37.4  C)  Resp: 24  SpO2: 96 %      Physical Exam  Nontoxic-appearing no respiratory distress alert and oriented x3.  GCS 15 on  arrival and discharge  Head atraumatic normocephalic  Moderate/severe swelling over the face, anterior neck, with associated crepitus diffusely, crepitus extends into anterior chest bilaterally as well as right arm down into the right hand.  PERRL, EOMI, facial sensation intact to light touch, facial muscle tone intact and symmetrical, hearing grossly intact,swallowing without difficulty, voice baseline, SCM  strength intact, tongue protrudes midline.  TM's unremarkable, EACs clear, oropharynx moist without lesions edema or erythema, palatal elevation symmetric,   Moderate right mid lateral chest wall tenderness, diffuse crepitus throughout the chest  Ecchymosis right mid abdomen without associated tenderness  Neck supple full active painless range of motion, moderate submental swelling and crepitus without associated tenderness or induration  Lungs diminished breath sounds, poor air movement on the right  Heart regular tachycardic no murmur S3 or rub  Abdomen soft nontender bowel sounds positive no masses or HSM  Strength and sensation intact throughout the extremities, skin clear from rash or lesion.  Spine nontender      ED Course     ED Course     Procedures             EKG: Time 1653, symptoms shortness of air, sinus tachycardia rate 134, septal lateral ST depression, axis intervals otherwise within normal limits, no prior for comparison, read by Dr. Yoshi Warner  EKG: Time 2106, symptoms chest pain, sinus tachycardia rate 107, axis intervals within normal limits, mild septal lateral ST depression much more subtle than on initial ECG, read by Dr. Yoshi Warner  Results for orders placed or performed during the hospital encounter of 01/05/18   XR Chest Port 1 View    Narrative    CHEST ONE VIEW PORTABLE  1/5/2018 5:06 PM     COMPARISON: Two view chest x-ray 5/21/2013    HISTORY: Short of air.    FINDINGS: There is new massive subcutaneous emphysema over both sides  of the chest (right worse than left) raising  the question of  penetrating soft tissue injury. There is also questionable gas beneath  the left hemidiaphragm or within the inferior aspect of the  mediastinum on the left.    The cardiac silhouette, pulmonary vasculature, lungs and pleural  spaces are within normal limits.      Impression    IMPRESSION: Extensive subcutaneous gas throughout the thorax raising  the question of penetrating soft tissue injury. Questionable gas  beneath the left hemidiaphragm or within the left inferior  mediastinum. Clinical correlation recommended. Clear lungs.    WENDI HERNANDEZ MD   Chest CT w/o contrast   Result Value Ref Range    Radiologist flags Pneumothorax, pneumomediastinum, and subcutaneous (AA)     Narrative    CT CHEST WITHOUT CONTRAST  1/5/2018 5:28 PM    HISTORY: Subcutaneous emphysema on chest x-ray, fall yesterday with  right anterior inferior chest wall tenderness, evaluate for rib  fractures and pneumothorax.     COMPARISON: A chest radiograph earlier today at 4:58 PM.    TECHNIQUE: Routine transverse CT imaging of the chest was performed  without contrast. Radiation dose for this scan was reduced using  automated exposure control, adjustment of the mA and/or kV according  to patient size, or iterative reconstruction technique.    FINDINGS: The heart size is normal. No enlarged lymph node or other  abnormal mediastinal mass is seen. The thoracic aorta and pulmonary  arteries are unremarkable, allowing for the absence of contrast. There  is atelectasis in both lung bases. The lungs are otherwise clear.  There is a small right pneumothorax. There is prominent  pneumomediastinum and extensive subcutaneous emphysema throughout the  chest bilaterally extending into the visualized neck and right upper  extremity. No pleural effusion is identified. There are mildly  displaced fractures of the right 6th, 7th, 8th, 9th, and 10th ribs.  There are degenerative changes in the spine. No other fracture or  osseous abnormality is  noted. Within the visualized upper abdomen,  there are a few gallstones with no evidence of cholecystitis. There is  also a cystic region in the anterior aspect of the right kidney not  entirely included on this study measuring up to 6 cm consistent with a  cyst.      Impression    IMPRESSION:   1. Mildly displaced fractures of the right 6th through 10th ribs.  2. Small right pneumothorax.  3. Extensive pneumomediastinum and subcutaneous emphysema of the chest  wall.    [Critical Result: Pneumothorax, pneumomediastinum, and subcutaneous  emphysema with rib fractures.]    Finding was identified on 1/5/2018 5:47 PM.     Dr. Warner in the emergency department was contacted by me on  1/5/2018 5:49 PM and verbalized understanding of the critical result.    CHHAYA GEE MD   XR Chest Port 1 View    Narrative    PORTABLE CHEST ONE VIEW 1/5/2018 7:18 PM     COMPARISON: Frontal chest x-ray 1/5/2018    HISTORY: Chest tube placement confirmation.      Impression    IMPRESSION: There has been interval placement of a large caliber right  chest tube. No right pneumothorax identified. Persistent  pneumomediastinum again noted. Extensive subcutaneous venous gas over  the chest bilaterally again noted. The lungs are clear. Heart size  remains normal.    WENDI HERNANEDZ MD   CBC with platelets, differential   Result Value Ref Range    WBC 20.5 (H) 4.0 - 11.0 10e9/L    RBC Count 4.27 (L) 4.4 - 5.9 10e12/L    Hemoglobin 13.9 13.3 - 17.7 g/dL    Hematocrit 40.0 40.0 - 53.0 %    MCV 94 78 - 100 fl    MCH 32.6 26.5 - 33.0 pg    MCHC 34.8 31.5 - 36.5 g/dL    RDW 11.5 10.0 - 15.0 %    Platelet Count 405 150 - 450 10e9/L    Diff Method Manual Differential     % Neutrophils 67.0 %    % Lymphocytes 25.0 %    % Monocytes 6.0 %    % Eosinophils 0.0 %    % Basophils 1.0 %    % Metamyelocytes 1.0 %    Absolute Neutrophil 13.7 (H) 1.6 - 8.3 10e9/L    Absolute Lymphocytes 5.1 0.8 - 5.3 10e9/L    Absolute Monocytes 1.2 0.0 - 1.3 10e9/L     Absolute Eosinophils 0.0 0.0 - 0.7 10e9/L    Absolute Basophils 0.2 0.0 - 0.2 10e9/L    Absolute Metamyelocytes 0.2 (H) 0 10e9/L   Comprehensive metabolic panel   Result Value Ref Range    Sodium 131 (L) 133 - 144 mmol/L    Potassium 2.8 (L) 3.4 - 5.3 mmol/L    Chloride 94 94 - 109 mmol/L    Carbon Dioxide 18 (L) 20 - 32 mmol/L    Anion Gap 19 (H) 3 - 14 mmol/L    Glucose 315 (H) 70 - 99 mg/dL    Urea Nitrogen 19 7 - 30 mg/dL    Creatinine 0.70 0.66 - 1.25 mg/dL    GFR Estimate >90 >60 mL/min/1.7m2    GFR Estimate If Black >90 >60 mL/min/1.7m2    Calcium 8.3 (L) 8.5 - 10.1 mg/dL    Bilirubin Total 0.8 0.2 - 1.3 mg/dL    Albumin 3.8 3.4 - 5.0 g/dL    Protein Total 8.7 6.8 - 8.8 g/dL    Alkaline Phosphatase 68 40 - 150 U/L    ALT 31 0 - 70 U/L    AST 25 0 - 45 U/L   Blood gas venous   Result Value Ref Range    Ph Venous 7.32 7.32 - 7.43 pH    PCO2 Venous 33 (L) 40 - 50 mm Hg    PO2 Venous 55 (H) 25 - 47 mm Hg    Bicarbonate Venous 17 (L) 21 - 28 mmol/L    Base Deficit Venous 7.9 mmol/L   Lactic acid whole blood   Result Value Ref Range    Lactic Acid 5.8 (HH) 0.7 - 2.0 mmol/L   Troponin I   Result Value Ref Range    Troponin I ES <0.015 0.000 - 0.045 ug/L         Critical Care time:  was 30 minutes for this patient excluding procedures.       Trauma Summary Disposition     Patient is trauma admission:  Standard Emergency Evaluation    Spine  Backboard removal time: Backboard not applied   C-collar and immobilization: not indicated, cleared.  CSpine Clearance: by Nexus Criteria  Full Primary and Secondary survey with appropriate immobilization of spine completed in exam section.     Neuro  GCS at arrival:  Motor 6=Obeys commands   Verbal 5=Oriented   Eye Opening 4=Spontaneous   Total: 15     GCS at disposition: unchanged    ED Procedures completed  Chest tube placement    Admitting Consultants:  Reviewed with Dr. Espinal who accepts patient for admission  Surgery consult with Dr Crandall who agrees with plan for  admission here, consult placed in [facility], she will see patient in a.m.    Consult order placed into Epic:   Yes  Imaging reviewed by consultant:  No           Lactate is greater than 2 due to Recent fall, right rib fractures, 2 doses epinephrine prior to arrival, no preceding illness or fever, at this time there is no sign of severe sepsis or septic shock.         Labs Ordered and Resulted from Time of ED Arrival Up to the Time of Departure from the ED   CBC WITH PLATELETS DIFFERENTIAL - Abnormal; Notable for the following:        Result Value    WBC 20.5 (*)     RBC Count 4.27 (*)     Absolute Neutrophil 13.7 (*)     Absolute Metamyelocytes 0.2 (*)     All other components within normal limits   COMPREHENSIVE METABOLIC PANEL - Abnormal; Notable for the following:     Sodium 131 (*)     Potassium 2.8 (*)     Carbon Dioxide 18 (*)     Anion Gap 19 (*)     Glucose 315 (*)     Calcium 8.3 (*)     All other components within normal limits   BLOOD GAS VENOUS - Abnormal; Notable for the following:     PCO2 Venous 33 (*)     PO2 Venous 55 (*)     Bicarbonate Venous 17 (*)     All other components within normal limits   LACTIC ACID WHOLE BLOOD - Abnormal; Notable for the following:     Lactic Acid 5.8 (*)     All other components within normal limits   LACTIC ACID WHOLE BLOOD     Results for orders placed or performed during the hospital encounter of 01/05/18 (from the past 24 hour(s))   CBC with platelets, differential   Result Value Ref Range    WBC 20.5 (H) 4.0 - 11.0 10e9/L    RBC Count 4.27 (L) 4.4 - 5.9 10e12/L    Hemoglobin 13.9 13.3 - 17.7 g/dL    Hematocrit 40.0 40.0 - 53.0 %    MCV 94 78 - 100 fl    MCH 32.6 26.5 - 33.0 pg    MCHC 34.8 31.5 - 36.5 g/dL    RDW 11.5 10.0 - 15.0 %    Platelet Count 405 150 - 450 10e9/L    Diff Method Manual Differential     % Neutrophils 67.0 %    % Lymphocytes 25.0 %    % Monocytes 6.0 %    % Eosinophils 0.0 %    % Basophils 1.0 %    % Metamyelocytes 1.0 %    Absolute Neutrophil 13.7  (H) 1.6 - 8.3 10e9/L    Absolute Lymphocytes 5.1 0.8 - 5.3 10e9/L    Absolute Monocytes 1.2 0.0 - 1.3 10e9/L    Absolute Eosinophils 0.0 0.0 - 0.7 10e9/L    Absolute Basophils 0.2 0.0 - 0.2 10e9/L    Absolute Metamyelocytes 0.2 (H) 0 10e9/L   Comprehensive metabolic panel   Result Value Ref Range    Sodium 131 (L) 133 - 144 mmol/L    Potassium 2.8 (L) 3.4 - 5.3 mmol/L    Chloride 94 94 - 109 mmol/L    Carbon Dioxide 18 (L) 20 - 32 mmol/L    Anion Gap 19 (H) 3 - 14 mmol/L    Glucose 315 (H) 70 - 99 mg/dL    Urea Nitrogen 19 7 - 30 mg/dL    Creatinine 0.70 0.66 - 1.25 mg/dL    GFR Estimate >90 >60 mL/min/1.7m2    GFR Estimate If Black >90 >60 mL/min/1.7m2    Calcium 8.3 (L) 8.5 - 10.1 mg/dL    Bilirubin Total 0.8 0.2 - 1.3 mg/dL    Albumin 3.8 3.4 - 5.0 g/dL    Protein Total 8.7 6.8 - 8.8 g/dL    Alkaline Phosphatase 68 40 - 150 U/L    ALT 31 0 - 70 U/L    AST 25 0 - 45 U/L   Blood gas venous   Result Value Ref Range    Ph Venous 7.32 7.32 - 7.43 pH    PCO2 Venous 33 (L) 40 - 50 mm Hg    PO2 Venous 55 (H) 25 - 47 mm Hg    Bicarbonate Venous 17 (L) 21 - 28 mmol/L    Base Deficit Venous 7.9 mmol/L   XR Chest Port 1 View    Narrative    CHEST ONE VIEW PORTABLE  1/5/2018 5:06 PM     COMPARISON: Two view chest x-ray 5/21/2013    HISTORY: Short of air.    FINDINGS: There is new massive subcutaneous emphysema over both sides  of the chest (right worse than left) raising the question of  penetrating soft tissue injury. There is also questionable gas beneath  the left hemidiaphragm or within the inferior aspect of the  mediastinum on the left.    The cardiac silhouette, pulmonary vasculature, lungs and pleural  spaces are within normal limits.      Impression    IMPRESSION: Extensive subcutaneous gas throughout the thorax raising  the question of penetrating soft tissue injury. Questionable gas  beneath the left hemidiaphragm or within the left inferior  mediastinum. Clinical correlation recommended. Clear lungs.    WENDI  MD DAVID   Chest CT w/o contrast   Result Value Ref Range    Radiologist flags Pneumothorax, pneumomediastinum, and subcutaneous (AA)     Narrative    CT CHEST WITHOUT CONTRAST  1/5/2018 5:28 PM    HISTORY: Subcutaneous emphysema on chest x-ray, fall yesterday with  right anterior inferior chest wall tenderness, evaluate for rib  fractures and pneumothorax.     COMPARISON: A chest radiograph earlier today at 4:58 PM.    TECHNIQUE: Routine transverse CT imaging of the chest was performed  without contrast. Radiation dose for this scan was reduced using  automated exposure control, adjustment of the mA and/or kV according  to patient size, or iterative reconstruction technique.    FINDINGS: The heart size is normal. No enlarged lymph node or other  abnormal mediastinal mass is seen. The thoracic aorta and pulmonary  arteries are unremarkable, allowing for the absence of contrast. There  is atelectasis in both lung bases. The lungs are otherwise clear.  There is a small right pneumothorax. There is prominent  pneumomediastinum and extensive subcutaneous emphysema throughout the  chest bilaterally extending into the visualized neck and right upper  extremity. No pleural effusion is identified. There are mildly  displaced fractures of the right 6th, 7th, 8th, 9th, and 10th ribs.  There are degenerative changes in the spine. No other fracture or  osseous abnormality is noted. Within the visualized upper abdomen,  there are a few gallstones with no evidence of cholecystitis. There is  also a cystic region in the anterior aspect of the right kidney not  entirely included on this study measuring up to 6 cm consistent with a  cyst.      Impression    IMPRESSION:   1. Mildly displaced fractures of the right 6th through 10th ribs.  2. Small right pneumothorax.  3. Extensive pneumomediastinum and subcutaneous emphysema of the chest  wall.    [Critical Result: Pneumothorax, pneumomediastinum, and subcutaneous  emphysema with rib  fractures.]    Finding was identified on 1/5/2018 5:47 PM.     Dr. Warner in the emergency department was contacted by me on  1/5/2018 5:49 PM and verbalized understanding of the critical result.    CHHAYA GEE MD   XR Chest Port 1 View    Narrative    PORTABLE CHEST ONE VIEW 1/5/2018 7:18 PM     COMPARISON: Frontal chest x-ray 1/5/2018    HISTORY: Chest tube placement confirmation.      Impression    IMPRESSION: There has been interval placement of a large caliber right  chest tube. No right pneumothorax identified. Persistent  pneumomediastinum again noted. Extensive subcutaneous venous gas over  the chest bilaterally again noted. The lungs are clear. Heart size  remains normal.    WENDI HERNANDEZ MD   Lactic acid whole blood   Result Value Ref Range    Lactic Acid 5.8 (HH) 0.7 - 2.0 mmol/L   Troponin I   Result Value Ref Range    Troponin I ES <0.015 0.000 - 0.045 ug/L       Medications   naloxone (NARCAN) injection 0.1-0.4 mg (not administered)   lactated ringers infusion ( Intravenous New Bag 1/5/18 2158)   HYDROmorphone (PF) (DILAUDID) injection 0.3-0.5 mg (not administered)   potassium chloride 10 mEq in 100 mL intermittent infusion with 10 mg lidocaine (not administered)   lactated ringers BOLUS 1,000 mL (1,000 mLs Intravenous New Bag 1/5/18 1826)   potassium chloride 10 mEq in 100 mL intermittent infusion with 10 mg lidocaine (10 mEq Intravenous New Bag 1/5/18 1919)   cefTRIAXone in d5w (ROCEPHIN) intermittent infusion 1 g (0 g Intravenous Stopped 1/5/18 1940)   fentaNYL (PF) (SUBLIMAZE) 100 MCG/2ML injection (50 mcg  Given 1/5/18 1905)   fentaNYL (PF) (SUBLIMAZE) injection 50 mcg (50 mcg Intravenous Given 1/5/18 1952)   HYDROmorphone (PF) (DILAUDID) injection 0.5 mg (0.5 mg Intravenous Given 1/5/18 2159)       4:45 PM Patient assessed.    Procedure: Right chest tube  Indication: Rib fractures with pneumothorax and gross subcutaneous emphysema  Risk benefits reviewed with patient and family, patient  consented in written form, all questions answered.  Sedation provided by anesthesia, agent propofol, cardiac monitor and oxygen saturation monitor maintained during procedure, transient desaturations responded to nasal airways.  Timeout was called for placement of right chest tube for right pneumothorax  Chest was marked and prepped and draped in usual fashion, soft tissue anesthetized with 12 cc 0.5% bupivacaine  2 cm skin line incision right chest along anterior axillary line at 4/5 rib space  I have bluntly probed over the top of the fifth rib and entered the chest cavity  Finger was placed in the chest, and chest wall was swept, is able to palpate lung inferiorly, none superiorly  #28 chest tube was passed without difficulty, stitched in place with 0 silk, and tube was anchored with 0 silk as well as commercial tube anchor.  [facility], no obvious air leak.  Chest x-ray post tube placement shows appropriate position, no pneumothorax evident.  Patient recovered from sedation without acute complication.      Assessments & Plan (with Medical Decision Making)  67-year-old male presents after fall yesterday, initially treated with epinephrine doses ×2, DuoNeb and albuterol neb and 50 mg of Benadryl.  On evaluation here he is found to have diffuse subcutaneous emphysema as described above.  Chest x-ray difficult to read secondary to subcutaneous emphysema, question apical pneumothorax, CT scan as above shows rib fractures 6 through 10 on the right side minimally displaced, associated small pneumothorax and diffuse subcutaneous emphysema.  #28 chest tube placed on the right side without acute complication, repeat chest x-ray shows no evidence for pneumothorax.  Also found to have elevated lactate on initial evaluation, no evidence for sepsis, believe it is elevated secondary to fall, rib fractures and epinephrine dosing.  Coming down on repeat after 1 L LR.  Incidental hypokalemia treated with 2 doses  10 mEq potassium IV.  Patient is reviewed with  who accepts him for admission, also reviewed with  general surgery, consult ordered, she will see him first thing in the morning.  He will be admitted here to ICU.  Working diagnoses, plan results of studies reviewed with patient and family who expressed understanding and agreement.     I have reviewed the nursing notes.    I have reviewed the findings, diagnosis, plan and need for follow up with the patient.    Critical Care Addendum    My initial assessment, based on my review of prehospital provider report, review of nursing observations, review of vital signs, focused history, physical exam, review of cardiac rhythm monitor and 12 lead ECG analysis, established that Kvng Velasquez has and Subcutaneous emphysema, which requires immediate intervention, and therefore He is critically ill.     After the initial assessment, the care team initiated IV fluid administration and performed Right chest tube to provide stabilization care. Due to the critical nature of this patient, I reassessed nursing observations, vital signs, physical exam, review of cardiac rhythm monitor and 12 lead ECG analysis multiple times prior to He disposition.     Time also spent performing documentation, discussion with family to obtain medical information for decision making, reviewing test results, discussion with consultants and coordination of care.     Critical care time (excluding teaching time and procedures): 30 minutes.       Current Discharge Medication List          Final diagnoses:   Fall, initial encounter   Closed fracture of multiple ribs of right side, initial encounter   Traumatic pneumothorax, initial encounter   Subcutaneous emphysema, initial encounter (H)   Elevated LDH   Hypokalemia     This document serves as a record of the services and decisions personally performed and made by No att. providers found. It was created on HIS/HER behalf by   Muriel  Evon, a trained medical scribe. The creation of this document is based the provider's statements to the medical scribe.  Muriel Barnard 4:45 PM 1/5/2018    Provider:   The information in this document, created by the medical scribe for me, accurately reflects the services I personally performed and the decisions made by me. I have reviewed and approved this document for accuracy prior to leaving the patient care area.  No att. providers found 4:45 PM 1/5/2018 1/5/2018   [facility] EMERGENCY DEPARTMENT     Yoshi Warner MD  01/05/18 2222

## 2018-01-05 NOTE — LETTER
Transition Communication Hand-off for Care Transitions to Next Level of Care Provider    Name: Kvng Velasquez  MRN #: [id number]  Primary Care Provider: Marlen Ma  Primary Care MD Name:  (Anil)  Primary Clinic: [address] [place] [id number]  Primary Care Clinic Name:  ([facility])  Reason for Hospitalization:  Hypokalemia [E87.6]  Elevated LDH [R74.0]  Traumatic pneumothorax, initial encounter [S27.0XXA]  Fall, initial encounter [W19.XXXA]  Subcutaneous emphysema, initial encounter (H) [T79.7XXA]  Closed fracture of multiple ribs of right side, initial encounter [S22.41XA]  Admit Date/Time: 1/5/2018  4:41 PM  Discharge Date: 1/9/18  Payor Source: Payor: MEDICARE / Plan: MEDICARE / Product Type: Medicare /     Readmission Assessment Measure (GURJIT) Risk Score/category: low         Reason for Communication Hand-off Referral: Fragility    Discharge Plan:TCU       Concern for non-adherence with plan of care:   Y/N no  Discharge Needs Assessment:  Needs       Most Recent Value    Equipment Currently Used at Home grab bar    Transportation Available car          Already enrolled in Tele-monitoring program and name of program:  no  Follow-up specialty is recommended: No    Follow-up plan:  No future appointments.    Any outstanding tests or procedures:    Procedures     Future Labs/Procedures    Oxygen - Nasal cannula     Comments:    1-2 Lpm by nasal cannula to keep O2 sats 92% or greater.          Referrals     Future Labs/Procedures    Occupational Therapy Adult Consult     Comments:    Evaluate and treat as clinically indicated.    Reason:  Weakness, rib fractures, sub cu emphysema    Physical Therapy Adult Consult     Comments:    Evaluate and treat as clinically indicated.    Reason:  Weakness, rib fractures            Key Recommendations:  Pt is discharging to [facility] (Main: [phone] Admissions: [phone] Fax: [phone]) TCU today. Pt lives at home independently and his goal is to  return there following TCU.     Minerva White MSW, LICSW, [facility] [phone]      AVS/Discharge Summary is the source of truth; this is a helpful guide for improved communication of patient story

## 2018-01-05 NOTE — MR AVS SNAPSHOT
"              After Visit Summary   2018    Kvng Velasquez    MRN: [id number]           Patient Information     Date Of Birth          1950        Visit Information        Provider Department      2018 3:45 PM [place] CL RN [facility]        Today's Diagnoses     Angioedema    -  1       Follow-ups after your visit        Who to contact     If you have questions or need follow up information about today's clinic visit or your schedule please contact [facility] directly at [phone].  Normal or non-critical lab and imaging results will be communicated to you by [email or web address], letter or phone within 4 business days after the clinic has received the results. If you do not hear from us within 7 days, please contact the clinic through [email or web address] or phone. If you have a critical or abnormal lab result, we will notify you by phone as soon as possible.  Submit refill requests through [employer] or call your pharmacy and they will forward the refill request to us. Please allow 3 business days for your refill to be completed.          Additional Information About Your Visit        MyChart Information     [employer] lets you send messages to your doctor, view your test results, renew your prescriptions, schedule appointments and more. To sign up, go to [email or web address] . Click on \"Log in\" on the left side of the screen, which will take you to the Welcome page. Then click on \"Sign up Now\" on the right side of the page.     You will be asked to enter the access code listed below, as well as some personal information. Please follow the directions to create your username and password.     Your access code is: [email or web address]  Expires: 2018  4:32 PM     Your access code will  in 90 days. If you need help or a new code, please call your [facility] or [phone].        Care EveryWhere ID     This is your Care EveryWhere ID. This could be used by other organizations to access " your [place] medical records  [id number]         Blood Pressure from Last 3 Encounters:   07/12/17 136/61   04/27/17 132/79   04/27/17 132/79    Weight from Last 3 Encounters:   07/12/17 223 lb (101.2 kg)   04/27/17 224 lb (101.6 kg)   12/05/16 222 lb (100.7 kg)              Today, you had the following     No orders found for display       Primary Care Provider Office Phone # Fax #    Marlen Ma [phone] [address] [address] [facility] [address]        Equal Access to Services     [facility]: Hadii aad ku hadasho Soomaali, waaxda luqadaha, qaybta kaalmada adeegyada, soham morenon aderichard yeh . So [facility] [phone].    ATENCIÓN: Si habla español, tiene a matt disposición servicios gratuitos de asistencia lingüística. [facility] [phone].    We comply with applicable federal civil rights laws and [place] laws. We do not discriminate on the basis of race, color, national origin, age, disability, sex, sexual orientation, or gender identity.            Thank you!     Thank you for choosing [facility]  for your care. Our goal is always to provide you with excellent care. Hearing back from our patients is one way we can continue to improve our services. Please take a few minutes to complete the written survey that you may receive in the mail after your visit with us. Thank you!             Your Updated Medication List - Protect others around you: Learn how to safely use, store and throw away your medicines at www.disposemymeds.org.          This list is accurate as of: 1/5/18  4:32 PM.  Always use your most recent med list.                   Brand Name Dispense Instructions for use Diagnosis    amLODIPine 10 MG tablet    NORVASC    90 tablet    TAKE ONE TABLET BY MOUTH EVERY DAY    Essential hypertension with goal blood pressure less than 140/90       aspirin 81 MG tablet      1 TABLET DAILY        atenolol 50 MG tablet    TENORMIN    135 tablet     TAKE 1 AND 1/2 TABLETS BY MOUTH ONCE DAILY    Essential hypertension, Essential hypertension, benign       atorvastatin 40 MG tablet    LIPITOR    90 tablet    Take 1 tablet (40 mg) by mouth daily    Hyperlipidemia LDL goal <100       B-12 1000 MCG Tbcr     100 tablet    Take 1,000 mcg by mouth daily    Vitamin B12 deficiency (non anemic)       blood glucose monitoring meter device kit    no brand specified    1 kit    Use to test blood sugar one times daily or as directed.    Type 2 diabetes, HbA1C goal < 8% (H)       blood glucose monitoring test strip    no brand specified    3 Month    1 strip by In Vitro route daily    Type II or unspecified type diabetes mellitus without mention of complication, not stated as uncontrolled, Type 2 diabetes, HbA1C goal < 8% (H)       busPIRone 15 MG tablet    BUSPAR    30 tablet    Take 1 tablet (15 mg) by mouth daily    Dysthymic disorder       esomeprazole 40 MG CR capsule    nexIUM    90 capsule    Take 1 capsule (40 mg) by mouth every morning (before breakfast) Take 30-60 minutes before eating.    Gastroesophageal reflux disease without esophagitis       fenofibrate 145 MG tablet     90 tablet    Take 1 tablet (145 mg) by mouth daily Brand name    Hyperlipidemia LDL goal <100       gabapentin 300 MG capsule    NEURONTIN    30 capsule    Take 1 tablet (300 mg) every night    Diabetes mellitus due to underlying condition with diabetic neuropathy, with long-term current use of insulin (H)       glipiZIDE 10 MG tablet    GLUCOTROL    30 tablet    Take 1 tablet (10 mg) by mouth daily    Type 2 diabetes mellitus without complication, with long-term current use of insulin (H)       indomethacin 50 MG capsule    INDOCIN    60 capsule    TAKE ONE CAPSULE BY MOUTH THREE TIMES A DAY AND REDUCE TO AS NEEDED AS GOUT GETS BETTER    Gouty arthropathy       insulin aspart protamine-insulin aspart injection    NovoLOG MIX 70/30    9 vial    71 units before breakfast, 53 units before dinner     Type 2 diabetes mellitus without complication (H)       * insulin pen needle 31G X 6 MM    NOVOFINE 31    100 each    1 Device 2 times daily.    Type II or unspecified type diabetes mellitus without mention of complication, not stated as uncontrolled       * NOVOFINE 32G X 6 MM   Generic drug:  insulin pen needle     100 each    Use twcie daily or as directed.    Type II or unspecified type diabetes mellitus without mention of complication, not stated as uncontrolled       levothyroxine 112 MCG tablet    SYNTHROID/LEVOTHROID    90 tablet    Take 1 tablet (112 mcg) by mouth daily    Hypothyroidism, unspecified type       lisinopril-hydrochlorothiazide 20-25 MG per tablet    PRINZIDE/ZESTORETIC    90 tablet    TAKE ONE TABLET BY MOUTH EVERY DAY    HTN, goal below 140/90       naproxen 375 MG tablet    NAPROSYN    40 tablet    Take 1 tablet by mouth 3 times daily as needed.    Neck strain       nystatin-triamcinolone cream    MYCOLOG II    60 g    Apply  topically 2 times daily.    Skin rash       order for DME     1 each    Dispense one pair of diabetic shoes.    Type II or unspecified type diabetes mellitus without mention of complication, not stated as uncontrolled       order for DME     2 each    Equipment being ordered: Diabetic Shoes    Type 2 diabetes, HbA1C goal < 8% (H)       thin lancets    NO BRAND SPECIFIED    1 Box    Use to test blood sugar one time daily or as directed.    Type 2 diabetes, HbA1C goal < 8% (H)       triamcinolone 0.1 % cream    KENALOG    30 g    Apply sparingly to affected area three times daily for 14 days.    Contact dermatitis due to poison ivy       * Notice:  This list has 2 medication(s) that are the same as other medications prescribed for you. Read the directions carefully, and ask your doctor or other care provider to review them with you.
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
## 2018-01-06 PROBLEM — S22.41XA CLOSED FRACTURE OF MULTIPLE RIBS OF RIGHT SIDE: Status: ACTIVE | Noted: 2018-01-06

## 2018-01-06 PROBLEM — T79.7XXA SUBCUTANEOUS EMPHYSEMA (H): Status: ACTIVE | Noted: 2018-01-06

## 2018-01-06 PROBLEM — D72.829 ELEVATED WHITE BLOOD CELL COUNT: Status: ACTIVE | Noted: 2018-01-06

## 2018-01-06 PROBLEM — R33.9 URINARY RETENTION: Status: ACTIVE | Noted: 2018-01-06

## 2018-01-06 PROBLEM — J98.2 PNEUMOMEDIASTINUM (H): Status: ACTIVE | Noted: 2018-01-06

## 2018-01-06 LAB
ANION GAP SERPL CALCULATED.3IONS-SCNC: 9 MMOL/L (ref 3–14)
BASOPHILS # BLD AUTO: 0 10E9/L (ref 0–0.2)
BASOPHILS NFR BLD AUTO: 0.1 %
BUN SERPL-MCNC: 16 MG/DL (ref 7–30)
CALCIUM SERPL-MCNC: 8.4 MG/DL (ref 8.5–10.1)
CHLORIDE SERPL-SCNC: 95 MMOL/L (ref 94–109)
CO2 SERPL-SCNC: 25 MMOL/L (ref 20–32)
CREAT SERPL-MCNC: 0.64 MG/DL (ref 0.66–1.25)
DIFFERENTIAL METHOD BLD: ABNORMAL
EOSINOPHIL # BLD AUTO: 0.1 10E9/L (ref 0–0.7)
EOSINOPHIL NFR BLD AUTO: 0.4 %
ERYTHROCYTE [DISTWIDTH] IN BLOOD BY AUTOMATED COUNT: 11.4 % (ref 10–15)
GFR SERPL CREATININE-BSD FRML MDRD: >90 ML/MIN/1.7M2
GLUCOSE BLDC GLUCOMTR-MCNC: 126 MG/DL (ref 70–99)
GLUCOSE BLDC GLUCOMTR-MCNC: 172 MG/DL (ref 70–99)
GLUCOSE BLDC GLUCOMTR-MCNC: 224 MG/DL (ref 70–99)
GLUCOSE BLDC GLUCOMTR-MCNC: 233 MG/DL (ref 70–99)
GLUCOSE BLDC GLUCOMTR-MCNC: 255 MG/DL (ref 70–99)
GLUCOSE SERPL-MCNC: 221 MG/DL (ref 70–99)
HBA1C MFR BLD: 6.1 % (ref 4.3–6)
HCT VFR BLD AUTO: 38.1 % (ref 40–53)
HGB BLD-MCNC: 13.2 G/DL (ref 13.3–17.7)
IMM GRANULOCYTES # BLD: 0 10E9/L (ref 0–0.4)
IMM GRANULOCYTES NFR BLD: 0.2 %
LACTATE BLD-SCNC: 1.5 MMOL/L (ref 0.7–2)
LACTATE BLD-SCNC: 1.9 MMOL/L (ref 0.7–2)
LACTATE BLD-SCNC: 3 MMOL/L (ref 0.7–2)
LYMPHOCYTES # BLD AUTO: 1.4 10E9/L (ref 0.8–5.3)
LYMPHOCYTES NFR BLD AUTO: 9.5 %
MCH RBC QN AUTO: 32.5 PG (ref 26.5–33)
MCHC RBC AUTO-ENTMCNC: 34.6 G/DL (ref 31.5–36.5)
MCV RBC AUTO: 94 FL (ref 78–100)
MONOCYTES # BLD AUTO: 1 10E9/L (ref 0–1.3)
MONOCYTES NFR BLD AUTO: 6.6 %
NEUTROPHILS # BLD AUTO: 12.1 10E9/L (ref 1.6–8.3)
NEUTROPHILS NFR BLD AUTO: 83.2 %
PLATELET # BLD AUTO: 295 10E9/L (ref 150–450)
POTASSIUM SERPL-SCNC: 3.8 MMOL/L (ref 3.4–5.3)
RBC # BLD AUTO: 4.06 10E12/L (ref 4.4–5.9)
SODIUM SERPL-SCNC: 129 MMOL/L (ref 133–144)
WBC # BLD AUTO: 14.6 10E9/L (ref 4–11)

## 2018-01-06 PROCEDURE — A9270 NON-COVERED ITEM OR SERVICE: HCPCS | Mod: GY | Performed by: FAMILY MEDICINE

## 2018-01-06 PROCEDURE — 83605 ASSAY OF LACTIC ACID: CPT | Performed by: ANESTHESIOLOGY

## 2018-01-06 PROCEDURE — 25000132 ZZH RX MED GY IP 250 OP 250 PS 637: Mod: GY | Performed by: FAMILY MEDICINE

## 2018-01-06 PROCEDURE — 00000146 ZZHCL STATISTIC GLUCOSE BY METER IP

## 2018-01-06 PROCEDURE — 40000809 ZZH STATISTIC NO DOCUMENTATION TO SUPPORT CHARGE

## 2018-01-06 PROCEDURE — 83036 HEMOGLOBIN GLYCOSYLATED A1C: CPT | Performed by: FAMILY MEDICINE

## 2018-01-06 PROCEDURE — 36415 COLL VENOUS BLD VENIPUNCTURE: CPT | Performed by: FAMILY MEDICINE

## 2018-01-06 PROCEDURE — 25000128 H RX IP 250 OP 636: Performed by: FAMILY MEDICINE

## 2018-01-06 PROCEDURE — 99233 SBSQ HOSP IP/OBS HIGH 50: CPT | Performed by: FAMILY MEDICINE

## 2018-01-06 PROCEDURE — 25000132 ZZH RX MED GY IP 250 OP 250 PS 637: Mod: GY | Performed by: SURGERY

## 2018-01-06 PROCEDURE — 83605 ASSAY OF LACTIC ACID: CPT | Performed by: FAMILY MEDICINE

## 2018-01-06 PROCEDURE — 20000003 ZZH R&B ICU

## 2018-01-06 PROCEDURE — 85025 COMPLETE CBC W/AUTO DIFF WBC: CPT | Performed by: FAMILY MEDICINE

## 2018-01-06 PROCEDURE — 80048 BASIC METABOLIC PNL TOTAL CA: CPT | Performed by: FAMILY MEDICINE

## 2018-01-06 PROCEDURE — 36415 COLL VENOUS BLD VENIPUNCTURE: CPT | Performed by: ANESTHESIOLOGY

## 2018-01-06 PROCEDURE — 94150 VITAL CAPACITY TEST: CPT

## 2018-01-06 PROCEDURE — 25000128 H RX IP 250 OP 636: Performed by: EMERGENCY MEDICINE

## 2018-01-06 PROCEDURE — A9270 NON-COVERED ITEM OR SERVICE: HCPCS | Mod: GY | Performed by: SURGERY

## 2018-01-06 PROCEDURE — 25000131 ZZH RX MED GY IP 250 OP 636 PS 637: Mod: GY | Performed by: FAMILY MEDICINE

## 2018-01-06 RX ORDER — BISACODYL 5 MG
15 TABLET, DELAYED RELEASE (ENTERIC COATED) ORAL DAILY PRN
Status: DISCONTINUED | OUTPATIENT
Start: 2018-01-06 | End: 2018-01-09 | Stop reason: HOSPADM

## 2018-01-06 RX ORDER — AMOXICILLIN 250 MG
1 CAPSULE ORAL 2 TIMES DAILY PRN
Status: DISCONTINUED | OUTPATIENT
Start: 2018-01-06 | End: 2018-01-09 | Stop reason: HOSPADM

## 2018-01-06 RX ORDER — POLYETHYLENE GLYCOL 3350 17 G/17G
17 POWDER, FOR SOLUTION ORAL DAILY PRN
Status: DISCONTINUED | OUTPATIENT
Start: 2018-01-06 | End: 2018-01-09 | Stop reason: HOSPADM

## 2018-01-06 RX ORDER — NALOXONE HYDROCHLORIDE 0.4 MG/ML
.1-.4 INJECTION, SOLUTION INTRAMUSCULAR; INTRAVENOUS; SUBCUTANEOUS
Status: DISCONTINUED | OUTPATIENT
Start: 2018-01-06 | End: 2018-01-07

## 2018-01-06 RX ORDER — SODIUM CHLORIDE 9 MG/ML
INJECTION, SOLUTION INTRAVENOUS CONTINUOUS
Status: DISCONTINUED | OUTPATIENT
Start: 2018-01-06 | End: 2018-01-07

## 2018-01-06 RX ORDER — BUSPIRONE HYDROCHLORIDE 15 MG/1
15 TABLET ORAL DAILY
Status: DISCONTINUED | OUTPATIENT
Start: 2018-01-06 | End: 2018-01-09 | Stop reason: HOSPADM

## 2018-01-06 RX ORDER — HYDROCHLOROTHIAZIDE 25 MG/1
25 TABLET ORAL DAILY
Status: DISCONTINUED | OUTPATIENT
Start: 2018-01-06 | End: 2018-01-09 | Stop reason: HOSPADM

## 2018-01-06 RX ORDER — DEXTROSE MONOHYDRATE 25 G/50ML
25-50 INJECTION, SOLUTION INTRAVENOUS
Status: DISCONTINUED | OUTPATIENT
Start: 2018-01-06 | End: 2018-01-09 | Stop reason: HOSPADM

## 2018-01-06 RX ORDER — LEVOTHYROXINE SODIUM 112 UG/1
112 TABLET ORAL
Status: DISCONTINUED | OUTPATIENT
Start: 2018-01-06 | End: 2018-01-09 | Stop reason: HOSPADM

## 2018-01-06 RX ORDER — LISINOPRIL AND HYDROCHLOROTHIAZIDE 20; 25 MG/1; MG/1
1 TABLET ORAL DAILY
Status: DISCONTINUED | OUTPATIENT
Start: 2018-01-06 | End: 2018-01-06 | Stop reason: CLARIF

## 2018-01-06 RX ORDER — BISACODYL 5 MG
10 TABLET, DELAYED RELEASE (ENTERIC COATED) ORAL DAILY PRN
Status: DISCONTINUED | OUTPATIENT
Start: 2018-01-06 | End: 2018-01-09 | Stop reason: HOSPADM

## 2018-01-06 RX ORDER — NALOXONE HYDROCHLORIDE 0.4 MG/ML
.1-.4 INJECTION, SOLUTION INTRAMUSCULAR; INTRAVENOUS; SUBCUTANEOUS
Status: DISCONTINUED | OUTPATIENT
Start: 2018-01-06 | End: 2018-01-09 | Stop reason: HOSPADM

## 2018-01-06 RX ORDER — AMOXICILLIN 250 MG
2 CAPSULE ORAL 2 TIMES DAILY PRN
Status: DISCONTINUED | OUTPATIENT
Start: 2018-01-06 | End: 2018-01-09 | Stop reason: HOSPADM

## 2018-01-06 RX ORDER — ATORVASTATIN CALCIUM 20 MG/1
40 TABLET, FILM COATED ORAL DAILY
Status: DISCONTINUED | OUTPATIENT
Start: 2018-01-06 | End: 2018-01-09 | Stop reason: HOSPADM

## 2018-01-06 RX ORDER — ONDANSETRON 2 MG/ML
4 INJECTION INTRAMUSCULAR; INTRAVENOUS EVERY 6 HOURS PRN
Status: DISCONTINUED | OUTPATIENT
Start: 2018-01-06 | End: 2018-01-09 | Stop reason: HOSPADM

## 2018-01-06 RX ORDER — ONDANSETRON 4 MG/1
4 TABLET, ORALLY DISINTEGRATING ORAL EVERY 6 HOURS PRN
Status: DISCONTINUED | OUTPATIENT
Start: 2018-01-06 | End: 2018-01-09 | Stop reason: HOSPADM

## 2018-01-06 RX ORDER — HYDROCODONE BITARTRATE AND ACETAMINOPHEN 5; 325 MG/1; MG/1
1-2 TABLET ORAL EVERY 4 HOURS PRN
Status: DISCONTINUED | OUTPATIENT
Start: 2018-01-06 | End: 2018-01-09 | Stop reason: HOSPADM

## 2018-01-06 RX ORDER — NICOTINE POLACRILEX 4 MG
15-30 LOZENGE BUCCAL
Status: DISCONTINUED | OUTPATIENT
Start: 2018-01-06 | End: 2018-01-09 | Stop reason: HOSPADM

## 2018-01-06 RX ORDER — NAPROXEN 250 MG/1
250 TABLET ORAL 3 TIMES DAILY PRN
Status: DISCONTINUED | OUTPATIENT
Start: 2018-01-06 | End: 2018-01-09 | Stop reason: HOSPADM

## 2018-01-06 RX ORDER — GABAPENTIN 300 MG/1
300 CAPSULE ORAL AT BEDTIME
Status: DISCONTINUED | OUTPATIENT
Start: 2018-01-06 | End: 2018-01-09 | Stop reason: HOSPADM

## 2018-01-06 RX ORDER — AMLODIPINE BESYLATE 10 MG/1
10 TABLET ORAL DAILY
Status: DISCONTINUED | OUTPATIENT
Start: 2018-01-06 | End: 2018-01-09 | Stop reason: HOSPADM

## 2018-01-06 RX ORDER — LISINOPRIL 20 MG/1
20 TABLET ORAL DAILY
Status: DISCONTINUED | OUTPATIENT
Start: 2018-01-06 | End: 2018-01-09 | Stop reason: HOSPADM

## 2018-01-06 RX ORDER — MAGNESIUM CARB/ALUMINUM HYDROX 105-160MG
148 TABLET,CHEWABLE ORAL
Status: DISCONTINUED | OUTPATIENT
Start: 2018-01-06 | End: 2018-01-09 | Stop reason: HOSPADM

## 2018-01-06 RX ORDER — ATENOLOL 50 MG/1
50 TABLET ORAL DAILY
Status: DISCONTINUED | OUTPATIENT
Start: 2018-01-06 | End: 2018-01-09 | Stop reason: HOSPADM

## 2018-01-06 RX ORDER — UREA 10 %
1000 LOTION (ML) TOPICAL DAILY
Status: DISCONTINUED | OUTPATIENT
Start: 2018-01-06 | End: 2018-01-09 | Stop reason: HOSPADM

## 2018-01-06 RX ORDER — RAMELTEON 8 MG/1
8 TABLET ORAL AT BEDTIME
Status: DISCONTINUED | OUTPATIENT
Start: 2018-01-06 | End: 2018-01-09 | Stop reason: HOSPADM

## 2018-01-06 RX ORDER — NALOXONE HYDROCHLORIDE 0.4 MG/ML
.1-.4 INJECTION, SOLUTION INTRAMUSCULAR; INTRAVENOUS; SUBCUTANEOUS
Status: DISCONTINUED | OUTPATIENT
Start: 2018-01-06 | End: 2018-01-06

## 2018-01-06 RX ORDER — FENOFIBRATE 160 MG/1
160 TABLET ORAL DAILY
Status: DISCONTINUED | OUTPATIENT
Start: 2018-01-06 | End: 2018-01-09 | Stop reason: HOSPADM

## 2018-01-06 RX ORDER — BISACODYL 5 MG
5 TABLET, DELAYED RELEASE (ENTERIC COATED) ORAL DAILY PRN
Status: DISCONTINUED | OUTPATIENT
Start: 2018-01-06 | End: 2018-01-09 | Stop reason: HOSPADM

## 2018-01-06 RX ORDER — ASPIRIN 81 MG/1
81 TABLET, CHEWABLE ORAL DAILY
Status: DISCONTINUED | OUTPATIENT
Start: 2018-01-06 | End: 2018-01-09 | Stop reason: HOSPADM

## 2018-01-06 RX ADMIN — BUSPIRONE HYDROCHLORIDE 15 MG: 15 TABLET ORAL at 08:37

## 2018-01-06 RX ADMIN — Medication 0.5 MG: at 00:27

## 2018-01-06 RX ADMIN — Medication 0.5 MG: at 02:33

## 2018-01-06 RX ADMIN — ATENOLOL 50 MG: 50 TABLET ORAL at 08:37

## 2018-01-06 RX ADMIN — INSULIN ASPART 71 UNITS: 100 INJECTION, SUSPENSION SUBCUTANEOUS at 08:37

## 2018-01-06 RX ADMIN — GABAPENTIN 300 MG: 300 CAPSULE ORAL at 00:23

## 2018-01-06 RX ADMIN — INSULIN ASPART 1 UNITS: 100 INJECTION, SOLUTION INTRAVENOUS; SUBCUTANEOUS at 11:38

## 2018-01-06 RX ADMIN — INSULIN ASPART 2 UNITS: 100 INJECTION, SOLUTION INTRAVENOUS; SUBCUTANEOUS at 08:39

## 2018-01-06 RX ADMIN — LISINOPRIL 20 MG: 20 TABLET ORAL at 08:37

## 2018-01-06 RX ADMIN — SODIUM CHLORIDE: 9 INJECTION, SOLUTION INTRAVENOUS at 20:44

## 2018-01-06 RX ADMIN — FENOFIBRATE 160 MG: 160 TABLET ORAL at 08:37

## 2018-01-06 RX ADMIN — ASPIRIN 81 MG 81 MG: 81 TABLET ORAL at 08:37

## 2018-01-06 RX ADMIN — LEVOTHYROXINE SODIUM 112 MCG: 112 TABLET ORAL at 06:31

## 2018-01-06 RX ADMIN — SODIUM CHLORIDE: 9 INJECTION, SOLUTION INTRAVENOUS at 12:50

## 2018-01-06 RX ADMIN — AMLODIPINE BESYLATE 10 MG: 10 TABLET ORAL at 08:37

## 2018-01-06 RX ADMIN — Medication 0.5 MG: at 05:29

## 2018-01-06 RX ADMIN — HYDROCODONE BITARTRATE AND ACETAMINOPHEN 2 TABLET: 5; 325 TABLET ORAL at 20:03

## 2018-01-06 RX ADMIN — ATORVASTATIN CALCIUM 40 MG: 20 TABLET, FILM COATED ORAL at 08:37

## 2018-01-06 RX ADMIN — HYDROCODONE BITARTRATE AND ACETAMINOPHEN 2 TABLET: 5; 325 TABLET ORAL at 09:16

## 2018-01-06 RX ADMIN — Medication 1000 MCG: at 08:37

## 2018-01-06 RX ADMIN — RAMELTEON 8 MG: 8 TABLET, FILM COATED ORAL at 21:04

## 2018-01-06 RX ADMIN — Medication 0.5 MG: at 08:20

## 2018-01-06 RX ADMIN — SODIUM CHLORIDE: 9 INJECTION, SOLUTION INTRAVENOUS at 00:30

## 2018-01-06 RX ADMIN — HYDROCHLOROTHIAZIDE 25 MG: 25 TABLET ORAL at 08:37

## 2018-01-06 RX ADMIN — HYDROCODONE BITARTRATE AND ACETAMINOPHEN 2 TABLET: 5; 325 TABLET ORAL at 13:07

## 2018-01-06 RX ADMIN — GABAPENTIN 300 MG: 300 CAPSULE ORAL at 21:05

## 2018-01-06 NOTE — H&P
ADMISSION HISTORY AND PHYSICAL  Patient Name: Kvng Velasquez  Address: [address] [place] [id number]  Age:67 year old  Sex: male  Admission Date/Time: 1/5/2018  4:41 PM  Admitting provider: Ewelina Espinal MD  Hospital Attending Physician: Ewelina Espinal*   Primary Care Provider: Marlen Ma  He lives alone but has family members close by.    CHIEF COMPLAINT: Chest pain and facial swelling    HPI:   Patient with a PMHx as outlined below.  He reports that he feel on ice yesterday afternoon onto the right side of his chest/body. Immediate pain over the region where he landed but he was able to get up and did not loose consciousness. Progressively worsening pain overnight that kept him awake. Denies cough. This afternoon, he however noticed a change in his voice and his face became progressively swollen. No associated lip swelling or troubles breathing. He went to his clinic and was though to have angioedema and given Epinephrine and antihistamine and sent to the ED. Patient reports that he has never had this happen to him before, no history of angioedema, no new medication or food.      REVIEW OF SYSTEMS  No fevers, chills, weight loss, weight gain  No headaches, acute change in vision or hearing, or URI symptoms  No palpitations, dyspnea on exertion, feet swelling, orthopnea or PND  No cough, wheezing    No nausea, vomiting, constipation, diarrhea or abdominal pain  No urinary pain, change in color, frequency though he reports chronic nocturia and incomplete voiding over the years.  No other musculoskeletal complaints of muscle pain or joint swelling  No new rashes, he has a rash on his left leg that will flare up and become very itchy from time to time  No alterations in thinking, weakness, migraine headache or seizures. He has some numbness in his toes and has been told this is likely related to his diabetes.  No significant change in mood, anxiety level          PAST  MEDICAL HISTORY  Past Medical History:   Diagnosis Date     Acidosis     lactic acidosis from metformin     Backache, unspecified      Cholesteatoma of external ear     l ear     Dysthymic disorder     Depression w/anxiety     Esophageal reflux      Hemorrhage of rectum and anus      Nonspecific elevation of levels of transaminase or lactic acid dehydrogenase (LDH)      Other and unspecified hyperlipidemia      Type II or unspecified type diabetes mellitus without mention of complication, not stated as uncontrolled      Unspecified essential hypertension      Unspecified hypothyroidism           PAST SURGICAL HISTORY  Past Surgical History:   Procedure Laterality Date     SURGICAL HISTORY OF -   9/13/1999    Left inguinal hernia repair     SURGICAL HISTORY OF -   1958    T&A     SURGICAL HISTORY OF -   12/10/1990    Cyst removal from one of his fingers     SURGICAL HISTORY OF -       Cholesteatoma removed from left ear     SURGICAL HISTORY OF -   3/14/1978    Vasectomy     SURGICAL HISTORY OF -   1982    Mastoidectomy          MEDICATIONS  No current outpatient prescriptions on file.          ALLERGIES  Review of patient's allergies indicates no known allergies.        FAMILY HISTORY  Family History   Problem Relation Age of Onset     C.A.D. Mother      Hypertension Mother      DIABETES Mother      Cardiovascular Mother      Lipids Mother      Depression Mother      Genitourinary Problems Mother      Lipids Father      Hypertension Father      Prostate Cancer Father      Thyroid Disease Father      C.A.D. Brother      Cardiovascular Brother      C.A.D. Brother      angioplasty     CANCER Brother           SOCIAL HISTORY  Social History     Social History     Marital status: Single     Spouse name: N/A     Number of children: N/A     Years of education: N/A     Occupational History     Not on file.     Social History Main Topics     Smoking status: Never Smoker     Smokeless tobacco: Never Used     Alcohol use Yes     "  Comment: occas     Drug use: No     Sexual activity: No     Other Topics Concern     Parent/Sibling W/ Cabg, Mi Or Angioplasty Before 65f 55m? No     Social History Narrative          PHYSICAL EXAM  Vitals:    01/05/18 2030 01/05/18 2045 01/05/18 2100 01/05/18 2200   BP: 155/64 162/70  146/77   Pulse:    100   Resp: 21 22 24 25   Temp:    98.6  F (37  C)   TempSrc:    Oral   SpO2: 91% 91% 90%    Weight:    102.5 kg (225 lb 15.5 oz)   Height:    1.803 m (5' 11\")     General - Awake and alert, not in any obvious distress. Afebrile, not pale, well hydrated.  Head - Atraumatic, significant subcutaneous swelling with palpable crepitus especially over eyelids which are almost swollen shut.  Eyes - Swollen eyelids as noted about  Mouth - Oropharynx is clear without exudates. No lip or tongue swelling.  Neck - No cervical adenopathy, thyromegally, JVD or carotid bruits noted. Crepitus felt in the subcutaneous tissue of the neck  Lungs - Chest tube in place over the right axillary region. Clear to auscultation bilaterally, no wheezes, rales or rhonchi.  CV - Regular rate and rhythm, no murmurs, rubs or gallops.  Abdomen - Obese though moving with respiration, no tenderness, no masses, no hepatosplenomegaly.   Upper Extremities - No edema or deformities.   Lower Extremities - No edema.   Skin - Subcutaneous crepitus extending from head into upper chest, billateral arm and right forearm. Erythematous patch over medial aspect of the left leg near the ankle (eczema) otherwise, no rashes or erythema.  Neurologic - Cranial nerves 2-12 intact. Muscle tone, bulk and strength within normal limits throughout.  Psych - Judgment and mental status are clear, patient has reasonable insight. Mood is stable.        LABORATORY  Results for orders placed or performed during the hospital encounter of 01/05/18   XR Chest Port 1 View    Narrative    CHEST ONE VIEW PORTABLE  1/5/2018 5:06 PM     COMPARISON: Two view chest x-ray " 5/21/2013    HISTORY: Short of air.    FINDINGS: There is new massive subcutaneous emphysema over both sides  of the chest (right worse than left) raising the question of  penetrating soft tissue injury. There is also questionable gas beneath  the left hemidiaphragm or within the inferior aspect of the  mediastinum on the left.    The cardiac silhouette, pulmonary vasculature, lungs and pleural  spaces are within normal limits.      Impression    IMPRESSION: Extensive subcutaneous gas throughout the thorax raising  the question of penetrating soft tissue injury. Questionable gas  beneath the left hemidiaphragm or within the left inferior  mediastinum. Clinical correlation recommended. Clear lungs.    WENDI HERNANDEZ MD   Chest CT w/o contrast   Result Value Ref Range    Radiologist flags Pneumothorax, pneumomediastinum, and subcutaneous (AA)     Narrative    CT CHEST WITHOUT CONTRAST  1/5/2018 5:28 PM    HISTORY: Subcutaneous emphysema on chest x-ray, fall yesterday with  right anterior inferior chest wall tenderness, evaluate for rib  fractures and pneumothorax.     COMPARISON: A chest radiograph earlier today at 4:58 PM.    TECHNIQUE: Routine transverse CT imaging of the chest was performed  without contrast. Radiation dose for this scan was reduced using  automated exposure control, adjustment of the mA and/or kV according  to patient size, or iterative reconstruction technique.    FINDINGS: The heart size is normal. No enlarged lymph node or other  abnormal mediastinal mass is seen. The thoracic aorta and pulmonary  arteries are unremarkable, allowing for the absence of contrast. There  is atelectasis in both lung bases. The lungs are otherwise clear.  There is a small right pneumothorax. There is prominent  pneumomediastinum and extensive subcutaneous emphysema throughout the  chest bilaterally extending into the visualized neck and right upper  extremity. No pleural effusion is identified. There are  mildly  displaced fractures of the right 6th, 7th, 8th, 9th, and 10th ribs.  There are degenerative changes in the spine. No other fracture or  osseous abnormality is noted. Within the visualized upper abdomen,  there are a few gallstones with no evidence of cholecystitis. There is  also a cystic region in the anterior aspect of the right kidney not  entirely included on this study measuring up to 6 cm consistent with a  cyst.      Impression    IMPRESSION:   1. Mildly displaced fractures of the right 6th through 10th ribs.  2. Small right pneumothorax.  3. Extensive pneumomediastinum and subcutaneous emphysema of the chest  wall.    [Critical Result: Pneumothorax, pneumomediastinum, and subcutaneous  emphysema with rib fractures.]    Finding was identified on 1/5/2018 5:47 PM.     Dr. Warner in the emergency department was contacted by me on  1/5/2018 5:49 PM and verbalized understanding of the critical result.    CHHAYA GEE MD   XR Chest Port 1 View    Narrative    PORTABLE CHEST ONE VIEW 1/5/2018 7:18 PM     COMPARISON: Frontal chest x-ray 1/5/2018    HISTORY: Chest tube placement confirmation.      Impression    IMPRESSION: There has been interval placement of a large caliber right  chest tube. No right pneumothorax identified. Persistent  pneumomediastinum again noted. Extensive subcutaneous venous gas over  the chest bilaterally again noted. The lungs are clear. Heart size  remains normal.    WENDI HERNANDEZ MD   CBC with platelets, differential   Result Value Ref Range    WBC 20.5 (H) 4.0 - 11.0 10e9/L    RBC Count 4.27 (L) 4.4 - 5.9 10e12/L    Hemoglobin 13.9 13.3 - 17.7 g/dL    Hematocrit 40.0 40.0 - 53.0 %    MCV 94 78 - 100 fl    MCH 32.6 26.5 - 33.0 pg    MCHC 34.8 31.5 - 36.5 g/dL    RDW 11.5 10.0 - 15.0 %    Platelet Count 405 150 - 450 10e9/L    Diff Method Manual Differential     % Neutrophils 67.0 %    % Lymphocytes 25.0 %    % Monocytes 6.0 %    % Eosinophils 0.0 %    % Basophils 1.0 %     % Metamyelocytes 1.0 %    Absolute Neutrophil 13.7 (H) 1.6 - 8.3 10e9/L    Absolute Lymphocytes 5.1 0.8 - 5.3 10e9/L    Absolute Monocytes 1.2 0.0 - 1.3 10e9/L    Absolute Eosinophils 0.0 0.0 - 0.7 10e9/L    Absolute Basophils 0.2 0.0 - 0.2 10e9/L    Absolute Metamyelocytes 0.2 (H) 0 10e9/L   Comprehensive metabolic panel   Result Value Ref Range    Sodium 131 (L) 133 - 144 mmol/L    Potassium 2.8 (L) 3.4 - 5.3 mmol/L    Chloride 94 94 - 109 mmol/L    Carbon Dioxide 18 (L) 20 - 32 mmol/L    Anion Gap 19 (H) 3 - 14 mmol/L    Glucose 315 (H) 70 - 99 mg/dL    Urea Nitrogen 19 7 - 30 mg/dL    Creatinine 0.70 0.66 - 1.25 mg/dL    GFR Estimate >90 >60 mL/min/1.7m2    GFR Estimate If Black >90 >60 mL/min/1.7m2    Calcium 8.3 (L) 8.5 - 10.1 mg/dL    Bilirubin Total 0.8 0.2 - 1.3 mg/dL    Albumin 3.8 3.4 - 5.0 g/dL    Protein Total 8.7 6.8 - 8.8 g/dL    Alkaline Phosphatase 68 40 - 150 U/L    ALT 31 0 - 70 U/L    AST 25 0 - 45 U/L   Blood gas venous   Result Value Ref Range    Ph Venous 7.32 7.32 - 7.43 pH    PCO2 Venous 33 (L) 40 - 50 mm Hg    PO2 Venous 55 (H) 25 - 47 mm Hg    Bicarbonate Venous 17 (L) 21 - 28 mmol/L    Base Deficit Venous 7.9 mmol/L   Lactic acid whole blood   Result Value Ref Range    Lactic Acid 5.8 (HH) 0.7 - 2.0 mmol/L   Troponin I   Result Value Ref Range    Troponin I ES <0.015 0.000 - 0.045 ug/L   Potassium   Result Value Ref Range    Potassium 3.8 3.4 - 5.3 mmol/L   Glucose by meter   Result Value Ref Range    Glucose 244 (H) 70 - 99 mg/dL       EKG: Non specific ST depression, troponin came back normal.        ASSESSMENT/PLAN  Principal Problem:    Pneumothorax/Fall/Right Rib fracture/Subcutaneous Emphysema:  Patient who developed significant swelling found to be mostly subcutaneous after a fall. Noted to have rib fracture which explains this. No respiratory compromise at this point. Chest tube already placed in ED. Elevated lactic acid on admission, likely related to tissue  injury/hypoperfusion.  -Admit to ICU for close monitoring.  -Continue to monitor chest tube  -Incentive spirometry  -Good pain management.  -Repeat chest xray in am.  -Surgery already consulted and will see in am  -Monitor Lactic acid.  -Will likely benefit from PT once he starts feeling better.      Active Problems:      Elevated WBC? Cause: With associated neutrophilia but patient does not appear septic or febrile and no source of infection identified at this point. Will repeat WBC in am and monitor clinically as a potential lung source can develop especially given recent injury.      Hyperlipidemia LDL goal <100: Continue PTA Lipitor and Fenofibrate.      Type 2 diabetes mellitus without complication (H)/  Diabetes mellitus due to underlying condition with diabetic neuropathy (H): blood sugar elevated, likely multifactorial- stress, he has not eaten much all day, pain...  -Continue PTA Novolog  -Add SSI for proper coverage.  -Hold Glimepiride this admission  -Continue PTA gabapentin.      Acquired hypothyroidism: Continue PTA Synthroid.      Dysthymic disorder; Continue PTA Buspar.      Esophageal reflux: Continue PTA PPI as needed       Essential hypertension with goal blood pressure less than 140/90: BP stable this admission: Continue PTA medications. Initial potassium low though repeat within normal limits without replacement. ? Initial error, repeat in am and to also monitor slightly low sodium.      Urinary retention: Unsure of exact etiology, no features to suggest urethral injury with recent fall and he was passing urine prior to this afternoon. Straight cath used, to evaluate ongoing needs overnight overnight.    Prophylaxis - Sequential  Code- Full  Disposition - Pending extubation and patient being clinically stable with good pain control.    Ewelina Espinal  Attestation:   I have reviewed today's vital signs, notes, medications, labs and imaging.   Amount of time spent on this H&P note: 45  minutes.   Ewelina Espinal MD

## 2018-01-06 NOTE — PROGRESS NOTES
"SUBJECTIVE:   Per nurses the facial swelling is better,   Some pain right chest but better than prior to admission,   Doesn't want to move.   No SOB but on 4.5 L.      ROS:4 point ROS including Respiratory, CV, GI and , other than that noted in the HPI,  is negative     OBJECTIVE:   /82  Pulse 100  Temp 98.3  F (36.8  C) (Oral)  Resp 21  Ht 1.803 m (5' 11\")  Wt 102.5 kg (225 lb 15.5 oz)  SpO2 92%  BMI 31.52 kg/m2    GENERAL APPEARANCE:  Very bloated, symmetric face with crepitus face, neck, right arm and right chest wall      RESP:clear      CV: regular rate and rhythm,  No  murmur , edema: none       Abdomen: soft, nontender, no liver or spleen enlargement, no masses, BSs normal   Skin: no cyanosis, pallor, or jaundice    CMP  Recent Labs  Lab 01/06/18  0805 01/05/18  2230 01/05/18  1700   *  --  131*   POTASSIUM 3.8 3.8 2.8*   CHLORIDE 95  --  94   CO2 25  --  18*   ANIONGAP 9  --  19*   *  --  315*   BUN 16  --  19   CR 0.64*  --  0.70   GFRESTIMATED >90  --  >90   GFRESTBLACK >90  --  >90   TITI 8.4*  --  8.3*   PROTTOTAL  --   --  8.7   ALBUMIN  --   --  3.8   BILITOTAL  --   --  0.8   ALKPHOS  --   --  68   AST  --   --  25   ALT  --   --  31     CBC  Recent Labs  Lab 01/06/18  0805 01/05/18  1700   WBC 14.6* 20.5*   RBC 4.06* 4.27*   HGB 13.2* 13.9   HCT 38.1* 40.0   MCV 94 94   MCH 32.5 32.6   MCHC 34.6 34.8   RDW 11.4 11.5    405     INRNo lab results found in last 7 days.  Arterial BloodGas  No lab results found in last 7 days.   Venous Blood Gas    Recent Labs  Lab 01/05/18  1700   PHV 7.32   PCO2V 33*   PO2V 55*   HCO3V 17*       Medications     gabapentin  300 mg Oral At Bedtime     amLODIPine  10 mg Oral Daily     aspirin  81 mg Oral Daily     atenolol  50 mg Oral Daily     atorvastatin  40 mg Oral Daily     busPIRone  15 mg Oral Daily     cyanocolbalamin  1,000 mcg Oral Daily     insulin aspart prot & aspart  71 Units Subcutaneous [facility]     levothyroxine  112 mcg " Oral QAM AC     insulin aspart  1-7 Units Subcutaneous TID AC     insulin aspart  1-5 Units Subcutaneous At Bedtime     lisinopril  20 mg Oral Daily    And     hydrochlorothiazide  25 mg Oral Daily     fenofibrate  160 mg Oral Daily       Intake/Output Summary (Last 24 hours) at 01/06/18 1019  Last data filed at 01/06/18 0630   Gross per 24 hour   Intake          1213.33 ml   Output             1100 ml   Net           113.33 ml         ASSESSMENT: PLAN:   Pneumothorax/Fall/Right Rib fracture 6-10/Subcutaneous Emphysema:  Patient who developed significant swelling found to be mostly subcutaneous after a fall. Noted to have rib fracture which explains this. No respiratory compromise at this point. Chest tube already placed in ED. Elevated lactic acid on admission, likely related to tissue injury/hypoperfusion/ SIRS.  -Admit to ICU for close monitoring.  -Continue to monitor chest tube  -Incentive spirometry  -Good pain management.  -Repeat chest xray in amshows resolution of pneumo.    -Surgery following   - Lactic acid now normal   -Will likely benefit from PT once he starts feeling better.  - check forced VC. Currently reaches 2 L.  ( If this is below 1500 and still needing >3 L O2 consider transfer to tertiary care. )            SIRS:     Elevated WBC, lactic.  This is from trauma itself.  Lactic better.  Follow        Hyperlipidemia LDL goal <100: Continue PTA Lipitor and Fenofibrate.       Type 2 diabetes mellitus  with diabetic neuropathy (H)  -: blood sugar elevated, likely multifactorial- stress, he has not eaten much all day, pain...  -Continue PTA Novolog  -Add SSI for proper coverage.  -Hold Glimepiride this admission  -Continue PTA gabapentin.       Acquired hypothyroidism: Continue PTA Synthroid.       Dysthymic disorder; Continue PTA Buspar.       Esophageal reflux: Continue PTA PPI as needed        Essential hypertension   : BP stable this admission: Continue PTA medications. Initial potassium low  though repeat within normal limits without replacement. ? Initial error, repeat in am and to also monitor slightly low sodium.       Urinary retention: Unsure of exact etiology, no features to suggest urethral injury with recent fall and he was passing urine prior to this afternoon. Straight cath used, to evaluate ongoing needs overnight overnight.     Prophylaxis - Sequential  Code- Full  Disposition - needs ICU for significant trauma, 5 rib Fxs, SIRS, Subcu emphysema.  Hypoxia

## 2018-01-06 NOTE — ED NOTES
CRNA here, consent obtained by MD. Family understanding of procedure and pause for the cause done. Procedure start time at 1847 with propofol. Antonio RN, Ema RN, MD Warner, and Leigh Ann, NP at bedside with GLENYS Larsen. Emergency equipment ready, cardiac monitor in place showing ST.  Oxymask at 15L started.

## 2018-01-06 NOTE — PROGRESS NOTES
Patient sitting upright in chair conversing with two friends. VSS. Respirations even and unlabored. No s/sx of distress. IS at bedside, encouraged to keep using. C/O increasing aching, sore pain in right chest/at chest tube site. PRN norco given, will cont to monitor.

## 2018-01-06 NOTE — ANESTHESIA CARE TRANSFER NOTE
Patient: Kvng Velasquez    * No procedures listed *    Diagnosis: * No pre-op diagnosis entered *  Diagnosis Additional Information: No value filed.    Anesthesia Type:   MAC     Note:  Airway :Face Mask  Patient transferred to:Emergency Department  Handoff Report: Identifed the Patient, Identified the Reponsible Provider, Reviewed the pertinent medical history, Discussed the surgical course, Reviewed Intra-OP anesthesia mangement and issues during anesthesia, Set expectations for post-procedure period and Allowed opportunity for questions and acknowledgement of understanding      Vitals: (Last set prior to Anesthesia Care Transfer)    CRNA VITALS  1/5/2018 1830 - 1/5/2018 1921      1/5/2018             SpO2: 99 %    EKG: NSR                Electronically Signed By: Zulma Ruiz CRNA, APRN CRNA  January 5, 2018  7:21 PM

## 2018-01-06 NOTE — ANESTHESIA PREPROCEDURE EVALUATION
Anesthesia Evaluation     . Pt has had prior anesthetic.            ROS/MED HX    ENT/Pulmonary:       Neurologic:       Cardiovascular:     (+) Dyslipidemia, hypertension----. : . . . :. .       METS/Exercise Tolerance:     Hematologic:         Musculoskeletal:         GI/Hepatic:     (+) GERD Other GI/Hepatic ETOH      Renal/Genitourinary:         Endo:     (+) type II DM Diabetic complications: neuropathy, thyroid problem hypothyroidism, .      Psychiatric:         Infectious Disease:         Malignancy:         Other: Comment: Dysthymic disorder                                   Anesthesia Plan      History & Physical Review  History and physical reviewed and following examination; no interval change.    ASA Status:  3 .    NPO Status:  > 4 hours    Plan for MAC with Propofol and Intravenous induction. Reason for MAC:  Deep or markedly invasive procedure (G8)  PONV prophylaxis:  Ondansetron (or other 5HT-3) and Dexamethasone or Solumedrol  ED note reviewed      Postoperative Care  Postoperative pain management:  IV analgesics, Oral pain medications and Multi-modal analgesia.      Consents  Anesthetic plan, risks, benefits and alternatives discussed with:  Patient..                          .

## 2018-01-06 NOTE — ANESTHESIA POSTPROCEDURE EVALUATION
Patient: Kvng Velasquez    * No procedures listed *    Diagnosis:* No pre-op diagnosis entered *  Diagnosis Additional Information: No value filed.    Anesthesia Type:  MAC    Note:  Anesthesia Post Evaluation    Patient location during evaluation: Bedside and ED  Patient participation: Able to fully participate in evaluation  Level of consciousness: awake and alert  Pain management: adequate  Airway patency: patent  Cardiovascular status: acceptable  Respiratory status: acceptable  Hydration status: acceptable  PONV: none     Anesthetic complications: None          Last vitals:  Vitals:    01/05/18 1701 01/05/18 1715 01/05/18 1730   BP:  154/81    Resp:  24 18   Temp: 37.4  C (99.3  F)     SpO2:  91% 92%         Electronically Signed By: Zulma Ruiz CRNA, APRN CRNA  January 5, 2018  7:20 PM

## 2018-01-06 NOTE — PLAN OF CARE
Problem: Chest Tube Drainage Device (Adult)  Goal: Signs and Symptoms of Listed Potential Problems Will be Absent, Minimized or Managed (Chest Tube Drainage Device)  Signs and symptoms of listed potential problems will be absent, minimized or managed by discharge/transition of care (reference Chest Tube Drainage Device (Adult) CPG).   Chest tube intact.  Dressing intact.  Small amount dried red drainage on dressing.  On 4 liters oxygen per nasal cannula.  States breathing is ok.  Oxygen saturation 92%.

## 2018-01-06 NOTE — PLAN OF CARE
Problem: Patient Care Overview  Goal: Plan of Care/Patient Progress Review  Lactic acid 3.0.  Reported to .  Will get lactic acid level at 0600.

## 2018-01-06 NOTE — PROGRESS NOTES
"[facility] [facility] ADMISSION NOTE    Patient admitted to room ICU 2 at approximately 2145 via cart from emergency room. Patient was accompanied by nurse.     Verbal SBAR report received from Shelby LOOMIS RN prior to patient arrival.     Patient ambulated to bed with stand-by assist. Patient alert and oriented X 4. Pain is controlled with current analgesics.  Admission vital signs: Blood pressure 146/77, pulse 100, temperature 98.6  F (37  C), temperature source Oral, resp. rate 25, height 1.803 m (5' 11\"), weight 102.5 kg (225 lb 15.5 oz), SpO2 90 %. Patient was oriented to plan of care, call light, bed controls, tv, telephone, bathroom and visiting hours.     The following safety risks were identified during admission: fall. Yellow risk band applied: YES.     Ashia Simpson RN    "

## 2018-01-06 NOTE — PLAN OF CARE
Problem: Breathing Pattern Ineffective (Adult)  Goal: Effective Oxygenation/Ventilation  Patient will demonstrate the desired outcomes by discharge/transition of care.   On 4 liters oxygen per nasal cannula.  Oxygen saturation 92%.  States his breathing is ok.  Chest tube intact. Connected to suction.  No air leak.

## 2018-01-06 NOTE — PLAN OF CARE
"Problem: Patient Care Overview  Goal: Plan of Care/Patient Progress Review  Outcome: Improving  Patient has been sitting up in chair most of day patient stating \"it's easier to breathe sitting up\", patient does stand to void in urinal, patient stating the the oral pain medications are managing his \"rib and chest tube pain\",        "

## 2018-01-06 NOTE — PROGRESS NOTES
Pt with fall and developed subcutaneous air of his face and trunk.  A CT scan done showed multiple rib fractures of the right side, but only a small pneumothorax.  He had a lot of mediastinal and subcutaneous air.    A chest tube was placed in the ER on the right side.  A post CXR showed the chest tube to be in good position.    There is no fluctuation of fluid in the chest tube apparatus with his breathing, and there is no air leaks.    The patient does not feel any better from the standpoint of facial swelling after the chest tube has been in for 12 hours.    Plan chest tube for at least another 24 hours.  Will repeat CT scan before removal of chest tube.    Osmin Crandall MD, FACS     Paroxysmal atrial fibrillation

## 2018-01-06 NOTE — PLAN OF CARE
Problem: Patient Care Overview  Goal: Plan of Care/Patient Progress Review  At 2200 stood to void and then tried to void 3 more times in the next hour.  Unable to void.  Bladder scan showed 1000 ml fluid in bladder at 2330.  Straight catheterized at that time without difficulty.  1100 ml clear yellow urine obtained.

## 2018-01-06 NOTE — ANESTHESIA POSTPROCEDURE EVALUATION
Patient: Kvng Velasquez    * No procedures listed *    Diagnosis:* No pre-op diagnosis entered *  Diagnosis Additional Information: No value filed.    Anesthesia Type:  MAC    Note:  Anesthesia Post Evaluation    Patient location during evaluation: Bedside  Patient participation: Able to fully participate in evaluation  Level of consciousness: awake and alert  Pain management: adequate  Airway patency: patent  Cardiovascular status: acceptable  Respiratory status: acceptable  Hydration status: acceptable  PONV: none     Anesthetic complications: None          Last vitals:  Vitals:    01/05/18 1701 01/05/18 1715 01/05/18 1730   BP:  154/81    Resp:  24 18   Temp: 37.4  C (99.3  F)     SpO2:  91% 92%         Electronically Signed By: Zulma Ruiz CRNA, APRN CRNA  January 5, 2018  7:21 PM

## 2018-01-06 NOTE — PROGRESS NOTES
68 yo male with traumatic PTX.  Concern for elevated lactate.  Given mechanism of injury, would repeat lactate and if unchanged consider further imaging

## 2018-01-06 NOTE — ED NOTES
Using sterile technique, MD placed right sided chest tube to water seal. Good flucation during breaths with minimal air leak observed. Total propofol administered was 350 mg by Crna. Pt was quite active prior to tube placement requiring nasal airways placed X2 to help bring up oxygen sats, and secure airway. Pt reaching and trying to sit up at times. Tolerated procedure well, denies pain at present.

## 2018-01-07 ENCOUNTER — APPOINTMENT (OUTPATIENT)
Dept: CT IMAGING | Facility: CLINIC | Age: 68
DRG: 199 | End: 2018-01-07
Attending: SURGERY
Payer: MEDICARE

## 2018-01-07 LAB
ALBUMIN SERPL-MCNC: 2.8 G/DL (ref 3.4–5)
ALP SERPL-CCNC: 52 U/L (ref 40–150)
ALT SERPL W P-5'-P-CCNC: 22 U/L (ref 0–70)
ANION GAP SERPL CALCULATED.3IONS-SCNC: 8 MMOL/L (ref 3–14)
AST SERPL W P-5'-P-CCNC: 32 U/L (ref 0–45)
BASE EXCESS BLDV CALC-SCNC: 2 MMOL/L
BILIRUB SERPL-MCNC: 1 MG/DL (ref 0.2–1.3)
BUN SERPL-MCNC: 16 MG/DL (ref 7–30)
CALCIUM SERPL-MCNC: 8 MG/DL (ref 8.5–10.1)
CHLORIDE SERPL-SCNC: 99 MMOL/L (ref 94–109)
CO2 SERPL-SCNC: 25 MMOL/L (ref 20–32)
CREAT SERPL-MCNC: 0.61 MG/DL (ref 0.66–1.25)
ERYTHROCYTE [DISTWIDTH] IN BLOOD BY AUTOMATED COUNT: 11.4 % (ref 10–15)
GFR SERPL CREATININE-BSD FRML MDRD: >90 ML/MIN/1.7M2
GLUCOSE BLDC GLUCOMTR-MCNC: 187 MG/DL (ref 70–99)
GLUCOSE BLDC GLUCOMTR-MCNC: 188 MG/DL (ref 70–99)
GLUCOSE BLDC GLUCOMTR-MCNC: 223 MG/DL (ref 70–99)
GLUCOSE SERPL-MCNC: 182 MG/DL (ref 70–99)
HCO3 BLDV-SCNC: 27 MMOL/L (ref 21–28)
HCT VFR BLD AUTO: 36.1 % (ref 40–53)
HGB BLD-MCNC: 12.3 G/DL (ref 13.3–17.7)
MCH RBC QN AUTO: 32.5 PG (ref 26.5–33)
MCHC RBC AUTO-ENTMCNC: 34.1 G/DL (ref 31.5–36.5)
MCV RBC AUTO: 95 FL (ref 78–100)
PCO2 BLDV: 44 MM HG (ref 40–50)
PH BLDV: 7.4 PH (ref 7.32–7.43)
PLATELET # BLD AUTO: 269 10E9/L (ref 150–450)
PO2 BLDV: 37 MM HG (ref 25–47)
POTASSIUM SERPL-SCNC: 4.2 MMOL/L (ref 3.4–5.3)
PROT SERPL-MCNC: 7.4 G/DL (ref 6.8–8.8)
RBC # BLD AUTO: 3.79 10E12/L (ref 4.4–5.9)
SODIUM SERPL-SCNC: 132 MMOL/L (ref 133–144)
WBC # BLD AUTO: 13.5 10E9/L (ref 4–11)

## 2018-01-07 PROCEDURE — 99233 SBSQ HOSP IP/OBS HIGH 50: CPT | Performed by: FAMILY MEDICINE

## 2018-01-07 PROCEDURE — 00000146 ZZHCL STATISTIC GLUCOSE BY METER IP

## 2018-01-07 PROCEDURE — 82803 BLOOD GASES ANY COMBINATION: CPT | Performed by: FAMILY MEDICINE

## 2018-01-07 PROCEDURE — 25000128 H RX IP 250 OP 636: Performed by: EMERGENCY MEDICINE

## 2018-01-07 PROCEDURE — 20000003 ZZH R&B ICU

## 2018-01-07 PROCEDURE — 25000132 ZZH RX MED GY IP 250 OP 250 PS 637: Mod: GY | Performed by: FAMILY MEDICINE

## 2018-01-07 PROCEDURE — A9270 NON-COVERED ITEM OR SERVICE: HCPCS | Mod: GY | Performed by: FAMILY MEDICINE

## 2018-01-07 PROCEDURE — 25000128 H RX IP 250 OP 636: Performed by: FAMILY MEDICINE

## 2018-01-07 PROCEDURE — 71250 CT THORAX DX C-: CPT

## 2018-01-07 PROCEDURE — 36415 COLL VENOUS BLD VENIPUNCTURE: CPT | Performed by: FAMILY MEDICINE

## 2018-01-07 PROCEDURE — 25000132 ZZH RX MED GY IP 250 OP 250 PS 637: Mod: GY | Performed by: SURGERY

## 2018-01-07 PROCEDURE — A9270 NON-COVERED ITEM OR SERVICE: HCPCS | Mod: GY | Performed by: SURGERY

## 2018-01-07 PROCEDURE — 85027 COMPLETE CBC AUTOMATED: CPT | Performed by: FAMILY MEDICINE

## 2018-01-07 PROCEDURE — 80053 COMPREHEN METABOLIC PANEL: CPT | Performed by: FAMILY MEDICINE

## 2018-01-07 RX ADMIN — HYDROCODONE BITARTRATE AND ACETAMINOPHEN 1 TABLET: 5; 325 TABLET ORAL at 13:02

## 2018-01-07 RX ADMIN — ASPIRIN 81 MG 81 MG: 81 TABLET ORAL at 08:59

## 2018-01-07 RX ADMIN — Medication 1000 MCG: at 09:00

## 2018-01-07 RX ADMIN — HYDROCODONE BITARTRATE AND ACETAMINOPHEN 2 TABLET: 5; 325 TABLET ORAL at 05:15

## 2018-01-07 RX ADMIN — Medication 0.5 MG: at 00:52

## 2018-01-07 RX ADMIN — HYDROCODONE BITARTRATE AND ACETAMINOPHEN 2 TABLET: 5; 325 TABLET ORAL at 22:34

## 2018-01-07 RX ADMIN — FENOFIBRATE 160 MG: 160 TABLET ORAL at 09:00

## 2018-01-07 RX ADMIN — HYDROCODONE BITARTRATE AND ACETAMINOPHEN 1 TABLET: 5; 325 TABLET ORAL at 14:41

## 2018-01-07 RX ADMIN — HYDROCODONE BITARTRATE AND ACETAMINOPHEN 2 TABLET: 5; 325 TABLET ORAL at 18:14

## 2018-01-07 RX ADMIN — AMLODIPINE BESYLATE 10 MG: 10 TABLET ORAL at 08:59

## 2018-01-07 RX ADMIN — RAMELTEON 8 MG: 8 TABLET, FILM COATED ORAL at 22:34

## 2018-01-07 RX ADMIN — HYDROCHLOROTHIAZIDE 25 MG: 25 TABLET ORAL at 09:00

## 2018-01-07 RX ADMIN — GABAPENTIN 300 MG: 300 CAPSULE ORAL at 22:34

## 2018-01-07 RX ADMIN — INSULIN ASPART 1 UNITS: 100 INJECTION, SOLUTION INTRAVENOUS; SUBCUTANEOUS at 17:17

## 2018-01-07 RX ADMIN — HYDROCODONE BITARTRATE AND ACETAMINOPHEN 2 TABLET: 5; 325 TABLET ORAL at 00:35

## 2018-01-07 RX ADMIN — Medication 0.5 MG: at 20:45

## 2018-01-07 RX ADMIN — INSULIN ASPART 71 UNITS: 100 INJECTION, SUSPENSION SUBCUTANEOUS at 08:59

## 2018-01-07 RX ADMIN — ATORVASTATIN CALCIUM 40 MG: 20 TABLET, FILM COATED ORAL at 09:00

## 2018-01-07 RX ADMIN — LISINOPRIL 20 MG: 20 TABLET ORAL at 08:59

## 2018-01-07 RX ADMIN — ATENOLOL 50 MG: 50 TABLET ORAL at 09:00

## 2018-01-07 RX ADMIN — INSULIN ASPART 2 UNITS: 100 INJECTION, SOLUTION INTRAVENOUS; SUBCUTANEOUS at 11:45

## 2018-01-07 RX ADMIN — HYDROCODONE BITARTRATE AND ACETAMINOPHEN 2 TABLET: 5; 325 TABLET ORAL at 08:59

## 2018-01-07 RX ADMIN — INSULIN ASPART 1 UNITS: 100 INJECTION, SOLUTION INTRAVENOUS; SUBCUTANEOUS at 08:59

## 2018-01-07 RX ADMIN — LEVOTHYROXINE SODIUM 112 MCG: 112 TABLET ORAL at 06:57

## 2018-01-07 RX ADMIN — BUSPIRONE HYDROCHLORIDE 15 MG: 15 TABLET ORAL at 09:00

## 2018-01-07 RX ADMIN — SODIUM CHLORIDE: 9 INJECTION, SOLUTION INTRAVENOUS at 05:10

## 2018-01-07 NOTE — PLAN OF CARE
Problem: Chest Tube Drainage Device (Adult)  Goal: Signs and Symptoms of Listed Potential Problems Will be Absent, Minimized or Managed (Chest Tube Drainage Device)  Signs and symptoms of listed potential problems will be absent, minimized or managed by discharge/transition of care (reference Chest Tube Drainage Device (Adult) CPG).   Outcome: Completed Date Met: 01/07/18  1300-Chest tube discontinued by Dr. Crandall, sterile Vaseline/gauze dressing applied.

## 2018-01-07 NOTE — PROGRESS NOTES
"SUBJECTIVE:   Still a lot of pain when he moves and takes deep breath.    Poor appetite      ROS:4 point ROS including Respiratory, CV, GI and , other than that noted in the HPI,  is negative     OBJECTIVE:   /72 (BP Location: Right arm)  Pulse 100  Temp 98.1  F (36.7  C) (Oral)  Resp 11  Ht 1.803 m (5' 11\")  Wt 104.6 kg (230 lb 9.6 oz)  SpO2 91%  BMI 32.16 kg/m2    GENERAL APPEARANCE:  Face looks much better, may be close to baseline      RESP:clear , few rales right base      CV: regular rate and rhythm,  No  murmur , edema: none       Abdomen: soft, nontender, no liver or spleen enlargement, no masses, BSs normal   Skin: no cyanosis, pallor, or jaundice    CMP    Recent Labs  Lab 01/07/18  0500 01/06/18  0805 01/05/18  2230 01/05/18  1700   * 129*  --  131*   POTASSIUM 4.2 3.8 3.8 2.8*   CHLORIDE 99 95  --  94   CO2 25 25  --  18*   ANIONGAP 8 9  --  19*   * 221*  --  315*   BUN 16 16  --  19   CR 0.61* 0.64*  --  0.70   GFRESTIMATED >90 >90  --  >90   GFRESTBLACK >90 >90  --  >90   TITI 8.0* 8.4*  --  8.3*   PROTTOTAL 7.4  --   --  8.7   ALBUMIN 2.8*  --   --  3.8   BILITOTAL 1.0  --   --  0.8   ALKPHOS 52  --   --  68   AST 32  --   --  25   ALT 22  --   --  31     CBC    Recent Labs  Lab 01/07/18  0500 01/06/18  0805 01/05/18  1700   WBC 13.5* 14.6* 20.5*   RBC 3.79* 4.06* 4.27*   HGB 12.3* 13.2* 13.9   HCT 36.1* 38.1* 40.0   MCV 95 94 94   MCH 32.5 32.5 32.6   MCHC 34.1 34.6 34.8   RDW 11.4 11.4 11.5    295 405     INRNo lab results found in last 7 days.  Arterial BloodGas  No lab results found in last 7 days.   Venous Blood Gas    Recent Labs  Lab 01/07/18  0500 01/05/18  1700   PHV 7.40 7.32   PCO2V 44 33*   PO2V 37 55*   HCO3V 27 17*   LEE 2.0  --        Medications     gabapentin  300 mg Oral At Bedtime     amLODIPine  10 mg Oral Daily     aspirin  81 mg Oral Daily     atenolol  50 mg Oral Daily     atorvastatin  40 mg Oral Daily     busPIRone  15 mg Oral Daily     " cyanocolbalamin  1,000 mcg Oral Daily     insulin aspart prot & aspart  71 Units Subcutaneous QAM AC     levothyroxine  112 mcg Oral QAM AC     insulin aspart  1-7 Units Subcutaneous TID AC     insulin aspart  1-5 Units Subcutaneous At Bedtime     lisinopril  20 mg Oral Daily    And     hydrochlorothiazide  25 mg Oral Daily     fenofibrate  160 mg Oral Daily     ramelteon  8 mg Oral At Bedtime     influenza Vac Split High-Dose  0.5 mL Intramuscular Prior to discharge         Intake/Output Summary (Last 24 hours) at 01/07/18 0854  Last data filed at 01/07/18 0600   Gross per 24 hour   Intake             3710 ml   Output             1925 ml   Net             1785 ml        ASSESSMENT: PLAN:   Pneumothorax/Fall/Right Rib fracture 6-10/Subcutaneous Emphysema:  Patient who developed significant swelling found to be mostly subcutaneous after a fall. Noted to have rib fracture which explains this. No respiratory compromise at this point. Chest tube already placed in ED. Elevated lactic acid on admission, likely related to tissue injury/hypoperfusion/ SIRS.  -Admit to ICU for close monitoring.  -Continue to monitor chest tube  -Incentive spirometry  -Good pain management.  -Repeat chest xray in amshows resolution of pneumo.    -Surgery following   - Lactic acid now normal   -Will likely benefit from PT once he starts feeling better.  - check forced VC. Currently reaches 2 L.  ( If this is below 1500 and still needing >3 L O2 consider transfer to tertiary care. )   -1/7/2018 still on 3 L O2 but CO2 OK.  CT today per surgery            SIRS:     Elevated WBC, lactic.  This is from trauma itself.  Lactic better.  Follow        Hyperlipidemia LDL goal <100: Continue PTA Lipitor and Fenofibrate.  -resolving        Type 2 diabetes mellitus  with diabetic neuropathy (H)  -: blood sugar elevated, likely multifactorial- stress, he has not eaten much all day, pain...  -Continue PTA Novolog  -Add SSI for proper coverage.  -Hold  Glimepiride this admission  -Continue PTA gabapentin.       Acquired hypothyroidism: Continue PTA Synthroid.       Dysthymic disorder; Continue PTA Buspar.       Esophageal reflux: Continue PTA PPI as needed        Essential hypertension   : BP stable this admission: Continue PTA medications.       Urinary retention: Unsure of exact etiology, no features to suggest urethral injury with recent fall and he was passing urine prior to this afternoon. Straight cath used, to evaluate ongoing needs overnight overnight.  -resolved      Prophylaxis - Sequential  Code- Full  Disposition - needs ICU for significant trauma, 5 rib Fxs, SIRS, Subcu emphysema.  Hypoxia

## 2018-01-07 NOTE — PLAN OF CARE
"Problem: Patient Care Overview  Goal: Plan of Care/Patient Progress Review  Outcome: Improving  Patient stating \"1 pain pill didn't help the pain, requesting another one\",  When patient was given 2 pain pills this morning he fell asleep with 20 min and slept for 2.5 hours.   After patient received the 2nd pan pill, patient again fell asleep.        "

## 2018-01-07 NOTE — PROGRESS NOTES
The CT scan showed that the pneumothorax has gotten smaller, but not completely resolved.  The subcutaneous air and pneumomediastinum did not change much.    The chest tube does not have an air leak, or fluctuate with his breathing.  I don't think that having the chest tube is helpful clinically and is causing him pain.    Will D/C chest tube today.    Recheck CXR in the morning.    Osmin Crandall MD, FACS

## 2018-01-07 NOTE — PLAN OF CARE
"Problem: Patient Care Overview  Goal: Plan of Care/Patient Progress Review  Outcome: Improving  Pt's pain is being controlled with Norco, he had IV Dilaudid once after getting up to void and then repositioning he felt his pain was pretty severe.  Pt get's up with the assist of one and a walker, he did say \"it felt like his ribs were shifting or moving causing a lot of pain.\"  This writer showed pt how to use a pillow when changing positions, or coughing, etc to help \"splint his ribs.\"  Pt verbalized an understanding.  The crepitus continues to be from his right arm across his chest and down his left arm, when listening to his lungs through his back you can hear the crepitus throughout the right side. Will continue to monitor close for changes.      "

## 2018-01-08 ENCOUNTER — APPOINTMENT (OUTPATIENT)
Dept: GENERAL RADIOLOGY | Facility: CLINIC | Age: 68
DRG: 199 | End: 2018-01-08
Attending: SURGERY
Payer: MEDICARE

## 2018-01-08 ENCOUNTER — APPOINTMENT (OUTPATIENT)
Dept: OCCUPATIONAL THERAPY | Facility: CLINIC | Age: 68
DRG: 199 | End: 2018-01-08
Payer: MEDICARE

## 2018-01-08 ENCOUNTER — APPOINTMENT (OUTPATIENT)
Dept: PHYSICAL THERAPY | Facility: CLINIC | Age: 68
DRG: 199 | End: 2018-01-08
Payer: MEDICARE

## 2018-01-08 LAB
ALBUMIN SERPL-MCNC: 2.7 G/DL (ref 3.4–5)
ALP SERPL-CCNC: 54 U/L (ref 40–150)
ALT SERPL W P-5'-P-CCNC: 18 U/L (ref 0–70)
ANION GAP SERPL CALCULATED.3IONS-SCNC: 6 MMOL/L (ref 3–14)
AST SERPL W P-5'-P-CCNC: 28 U/L (ref 0–45)
BACTERIA SPEC CULT: ABNORMAL
BASE EXCESS BLDV CALC-SCNC: 4.6 MMOL/L
BILIRUB SERPL-MCNC: 0.8 MG/DL (ref 0.2–1.3)
BUN SERPL-MCNC: 18 MG/DL (ref 7–30)
CALCIUM SERPL-MCNC: 8.6 MG/DL (ref 8.5–10.1)
CHLORIDE SERPL-SCNC: 99 MMOL/L (ref 94–109)
CO2 SERPL-SCNC: 26 MMOL/L (ref 20–32)
CREAT SERPL-MCNC: 0.62 MG/DL (ref 0.66–1.25)
ERYTHROCYTE [DISTWIDTH] IN BLOOD BY AUTOMATED COUNT: 11.3 % (ref 10–15)
GFR SERPL CREATININE-BSD FRML MDRD: >90 ML/MIN/1.7M2
GLUCOSE BLDC GLUCOMTR-MCNC: 120 MG/DL (ref 70–99)
GLUCOSE BLDC GLUCOMTR-MCNC: 154 MG/DL (ref 70–99)
GLUCOSE BLDC GLUCOMTR-MCNC: 242 MG/DL (ref 70–99)
GLUCOSE BLDC GLUCOMTR-MCNC: 78 MG/DL (ref 70–99)
GLUCOSE BLDC GLUCOMTR-MCNC: 78 MG/DL (ref 70–99)
GLUCOSE SERPL-MCNC: 132 MG/DL (ref 70–99)
HCO3 BLDV-SCNC: 31 MMOL/L (ref 21–28)
HCT VFR BLD AUTO: 37.8 % (ref 40–53)
HGB BLD-MCNC: 12.9 G/DL (ref 13.3–17.7)
MCH RBC QN AUTO: 32.7 PG (ref 26.5–33)
MCHC RBC AUTO-ENTMCNC: 34.1 G/DL (ref 31.5–36.5)
MCV RBC AUTO: 96 FL (ref 78–100)
PCO2 BLDV: 53 MM HG (ref 40–50)
PH BLDV: 7.38 PH (ref 7.32–7.43)
PLATELET # BLD AUTO: 282 10E9/L (ref 150–450)
PO2 BLDV: 18 MM HG (ref 25–47)
POTASSIUM SERPL-SCNC: 4 MMOL/L (ref 3.4–5.3)
PROT SERPL-MCNC: 7.4 G/DL (ref 6.8–8.8)
RBC # BLD AUTO: 3.94 10E12/L (ref 4.4–5.9)
SODIUM SERPL-SCNC: 131 MMOL/L (ref 133–144)
SPECIMEN SOURCE: ABNORMAL
WBC # BLD AUTO: 13.3 10E9/L (ref 4–11)

## 2018-01-08 PROCEDURE — 97116 GAIT TRAINING THERAPY: CPT | Mod: GP | Performed by: PHYSICAL THERAPIST

## 2018-01-08 PROCEDURE — 85027 COMPLETE CBC AUTOMATED: CPT | Performed by: FAMILY MEDICINE

## 2018-01-08 PROCEDURE — 97161 PT EVAL LOW COMPLEX 20 MIN: CPT | Mod: GP | Performed by: PHYSICAL THERAPIST

## 2018-01-08 PROCEDURE — A9270 NON-COVERED ITEM OR SERVICE: HCPCS | Mod: GY | Performed by: FAMILY MEDICINE

## 2018-01-08 PROCEDURE — 71045 X-RAY EXAM CHEST 1 VIEW: CPT

## 2018-01-08 PROCEDURE — 82803 BLOOD GASES ANY COMBINATION: CPT | Performed by: FAMILY MEDICINE

## 2018-01-08 PROCEDURE — 25000132 ZZH RX MED GY IP 250 OP 250 PS 637: Mod: GY | Performed by: FAMILY MEDICINE

## 2018-01-08 PROCEDURE — 80053 COMPREHEN METABOLIC PANEL: CPT | Performed by: FAMILY MEDICINE

## 2018-01-08 PROCEDURE — 00000146 ZZHCL STATISTIC GLUCOSE BY METER IP

## 2018-01-08 PROCEDURE — 25000128 H RX IP 250 OP 636: Performed by: FAMILY MEDICINE

## 2018-01-08 PROCEDURE — 40000193 ZZH STATISTIC PT WARD VISIT: Performed by: PHYSICAL THERAPIST

## 2018-01-08 PROCEDURE — 99233 SBSQ HOSP IP/OBS HIGH 50: CPT | Performed by: FAMILY MEDICINE

## 2018-01-08 PROCEDURE — 12000011 ZZH R&B MS OVERFLOW

## 2018-01-08 PROCEDURE — 25000132 ZZH RX MED GY IP 250 OP 250 PS 637: Mod: GY | Performed by: SURGERY

## 2018-01-08 PROCEDURE — 97535 SELF CARE MNGMENT TRAINING: CPT | Mod: GO

## 2018-01-08 PROCEDURE — 97530 THERAPEUTIC ACTIVITIES: CPT | Mod: GP | Performed by: PHYSICAL THERAPIST

## 2018-01-08 PROCEDURE — 97165 OT EVAL LOW COMPLEX 30 MIN: CPT | Mod: GO

## 2018-01-08 PROCEDURE — A9270 NON-COVERED ITEM OR SERVICE: HCPCS | Mod: GY | Performed by: SURGERY

## 2018-01-08 PROCEDURE — 36415 COLL VENOUS BLD VENIPUNCTURE: CPT | Performed by: FAMILY MEDICINE

## 2018-01-08 PROCEDURE — 40000133 ZZH STATISTIC OT WARD VISIT

## 2018-01-08 RX ADMIN — GABAPENTIN 300 MG: 300 CAPSULE ORAL at 21:11

## 2018-01-08 RX ADMIN — FENOFIBRATE 160 MG: 160 TABLET ORAL at 10:53

## 2018-01-08 RX ADMIN — LEVOTHYROXINE SODIUM 112 MCG: 112 TABLET ORAL at 06:58

## 2018-01-08 RX ADMIN — LISINOPRIL 20 MG: 20 TABLET ORAL at 10:50

## 2018-01-08 RX ADMIN — BUSPIRONE HYDROCHLORIDE 15 MG: 15 TABLET ORAL at 10:52

## 2018-01-08 RX ADMIN — ATENOLOL 50 MG: 50 TABLET ORAL at 10:50

## 2018-01-08 RX ADMIN — RAMELTEON 8 MG: 8 TABLET, FILM COATED ORAL at 21:11

## 2018-01-08 RX ADMIN — POLYETHYLENE GLYCOL 3350 17 G: 17 POWDER, FOR SOLUTION ORAL at 14:36

## 2018-01-08 RX ADMIN — HYDROCHLOROTHIAZIDE 25 MG: 25 TABLET ORAL at 10:50

## 2018-01-08 RX ADMIN — HYDROCODONE BITARTRATE AND ACETAMINOPHEN 2 TABLET: 5; 325 TABLET ORAL at 18:16

## 2018-01-08 RX ADMIN — HYDROCODONE BITARTRATE AND ACETAMINOPHEN 2 TABLET: 5; 325 TABLET ORAL at 02:06

## 2018-01-08 RX ADMIN — HYDROCODONE BITARTRATE AND ACETAMINOPHEN 2 TABLET: 5; 325 TABLET ORAL at 22:04

## 2018-01-08 RX ADMIN — INSULIN ASPART 3 UNITS: 100 INJECTION, SOLUTION INTRAVENOUS; SUBCUTANEOUS at 11:21

## 2018-01-08 RX ADMIN — INSULIN ASPART 71 UNITS: 100 INJECTION, SUSPENSION SUBCUTANEOUS at 10:55

## 2018-01-08 RX ADMIN — HYDROCODONE BITARTRATE AND ACETAMINOPHEN 2 TABLET: 5; 325 TABLET ORAL at 06:58

## 2018-01-08 RX ADMIN — ATORVASTATIN CALCIUM 40 MG: 20 TABLET, FILM COATED ORAL at 10:49

## 2018-01-08 RX ADMIN — AMLODIPINE BESYLATE 10 MG: 10 TABLET ORAL at 10:50

## 2018-01-08 RX ADMIN — Medication 1000 MCG: at 10:53

## 2018-01-08 RX ADMIN — ONDANSETRON 4 MG: 2 INJECTION INTRAMUSCULAR; INTRAVENOUS at 08:17

## 2018-01-08 RX ADMIN — ASPIRIN 81 MG 81 MG: 81 TABLET ORAL at 10:50

## 2018-01-08 RX ADMIN — SENNOSIDES AND DOCUSATE SODIUM 2 TABLET: 8.6; 5 TABLET ORAL at 14:36

## 2018-01-08 RX ADMIN — HYDROCODONE BITARTRATE AND ACETAMINOPHEN 2 TABLET: 5; 325 TABLET ORAL at 13:45

## 2018-01-08 ASSESSMENT — ACTIVITIES OF DAILY LIVING (ADL): PREVIOUS_RESPONSIBILITIES: MEAL PREP;HOUSEKEEPING;LAUNDRY;SHOPPING;MEDICATION MANAGEMENT;FINANCES;DRIVING

## 2018-01-08 NOTE — CONSULTS
CARE TRANSITION SOCIAL WORK INITIAL ASSESSMENT:      Met with: Patient.    DATA  Principal Problem:    Pneumothorax  Active Problems:    Acquired hypothyroidism    Dysthymic disorder    Esophageal reflux    Hyperlipidemia LDL goal <100    Type 2 diabetes mellitus without complication (H)    Diabetes mellitus due to underlying condition with diabetic neuropathy (H)    Family history of prostate cancer in father    DM type 2 with diabetic peripheral neuropathy (H)    Essential hypertension with goal blood pressure less than 140/90    Urinary retention    Fall    Closed fracture of multiple ribs of right side    Subcutaneous emphysema (H)    Elevated white blood cell count       Primary Care Clinic Name:  ([facility])  Primary Care MD Name:  (Anil)  Contact information and PCP information verified: Yes      ASSESSMENT  Cognitive Status: awake, alert and oriented.       Resources List: Transitional Care, Home Care     Lives With: alone  Living Arrangements: apartment     Description of Support System: Supportive, Involved   Who is your support system?: Sibling(s)   Support Assessment: Adequate family and caregiver support, Adequate social supports   Insurance Concerns: No Insurance issues identified        This writer met with pt introduced self and role. Discussed discharge planning and medicare guidelines in regards to home care and SNF benefits. Pt reports that he lives at home alone. Pt states that he is interested in TCU on [place]. Patient was provided with Medicare certified nursing home list. Pts choices are as follows [facility] (Phone: [phone] Fax: [phone]), [facility] (Main: [phone] Admissions: [phone] Fax: [phone]) and [facility] (Phone: [phone]) Fax: ([phone]).  Pt has been declined admission at [facility] as they have no beds. Pt has been accepted at [facility] (Main: [phone] Admissions: [phone] Fax: [phone]). PAS will  need to be completed prior to dc. Pt will transfer via van as he is on oxygen.          PLAN    TCU    Discharge Planner   Discharge Plans in progress: TCU  Barriers to discharge plan: medical stability  Follow up plan: CTS to follow       Entered by: Minerva White 01/08/2018 1:49 PM             Minerva JOHNSTON, [facility], [facility] [phone]

## 2018-01-08 NOTE — PLAN OF CARE
Problem: Patient Care Overview  Goal: Plan of Care/Patient Progress Review  Discharge Planner OT   Patient plan for discharge: TCU    Current status: CGA-SBA for standing grooming/hygiene. Educated pt on AE for LB dressing, able to don/doff socks with SBA and 1 VC using reacher and sock aid.     Barriers to return to prior living situation: increased pain, assist level, generalized weakness    Recommendations for discharge: TCU     Rationale for recommendations: To increase independence with ADLs and functional mobility        Entered by: Noemi Valerio 01/08/2018 11:54 AM

## 2018-01-08 NOTE — PLAN OF CARE
"Problem: Patient Care Overview  Goal: Plan of Care/Patient Progress Review  Outcome: Improving  Pt is getting good pain control with 2 Norco tabs as needed, pt needs a lot of encouragement, he thinks he should be \"pain free after receiving pain med's.\"  Pt's  Breathing has improved, lung sounds are still diminished t/o and pt is on 2L of oxygen when he sleeps and 1L while awake.  There is no change in the crepitus, it is still in his right and left arms across his chest and back/neck, the swelling in his face has improved tremendously.  Will continue to monitor close for changes.      "

## 2018-01-08 NOTE — PROGRESS NOTES
01/08/18 1051   Quick Adds   Type of Visit Initial Occupational Therapy Evaluation   Living Environment   Lives With alone   Living Arrangements apartment   Home Accessibility tub/shower is not walk in   Number of Stairs to Enter Home 0   Number of Stairs Within Home 0   Transportation Available car   Self-Care   Usual Activity Tolerance good   Current Activity Tolerance fair   Regular Exercise no   Equipment Currently Used at Home grab bar   Functional Level Prior   Ambulation 0-->independent   Transferring 0-->independent   Toileting 0-->independent   Bathing 1-->assistive equipment  (grab bars)   Dressing 0-->independent   Eating 0-->independent   Communication 0-->understands/communicates without difficulty   Swallowing 0-->swallows foods/liquids without difficulty   Cognition 0 - no cognition issues reported   Fall history within last six months yes   Number of times patient has fallen within last six months 1  (reason for admit- slipped on ice. )   Which of the above functional risks had a recent onset or change? ambulation;transferring;toileting;bathing;dressing;fall history   Prior Functional Level Comment Does not wear O2 at baseline.    General Information   Onset of Illness/Injury or Date of Surgery - Date 01/05/18   Referring Physician Vicente Black MD   Patient/Family Goals Statement to decrease pain, increase independence.    Additional Occupational Profile Info/Pertinent History of Current Problem He reports that he feel on ice yesterday afternoon onto the right side of his chest/body. Immediate pain over the region where he landed but he was able to get up and did not loose consciousness. Progressively worsening pain overnight that kept him awake. fall resulting in R rib fx.    Cognitive Status Examination   Orientation orientation to person, place and time   Level of Consciousness alert   Able to Follow Commands WNL/WFL   Pain Assessment   Patient Currently in Pain Yes, see Vital Sign  "flowsheet  (rates \"7-8/10\")   Range of Motion (ROM)   ROM Comment B UE ROM: WNL   Strength   Strength Comments B UE strength: NT d/t increased pain in R rib area.    Hand Strength   Hand Strength Comments B  strength: WFL for ADLs.   Coordination   Upper Extremity Coordination No deficits were identified   Transfer Skill: Sit to Stand   Level of [place]: Sit/Stand contact guard   Physical Assist/Nonphysical Assist: Sit/Stand 1 person assist   Transfer Skill: Sit to Stand full weight-bearing   Assistive Device for Transfer: Sit/Stand standard walker   Transfer Skill: Toilet Transfer   Level of [place]: Toilet contact guard   Physical Assist/Nonphysical Assist: Toilet 1 person assist   Weight-Bearing Restrictions: Toilet full weight-bearing   Assistive Device standard walker   Upper Body Dressing   Level of [place]: Dress Upper Body stand-by assist   Physical Assist/Nonphysical Assist: Dress Upper Body 1 person assist   Lower Body Dressing   Level of [place]: Dress Lower Body moderate assist (50% patients effort)   Physical Assist/Nonphysical Assist: Dress Lower Body 1 person assist   Grooming   Level of [place]: Grooming stand-by assist  (while standing. )   Physical Assist/Nonphysical Assist: Grooming 1 person assist   Eating/Self Feeding   Level of [place]: Eating independent   Instrumental Activities of Daily Living (IADL)   Previous Responsibilities meal prep;housekeeping;laundry;shopping;medication management;finances;driving   Activities of Daily Living Analysis   Impairments Contributing to Impaired Activities of Daily Living balance impaired;pain;strength decreased   General Therapy Interventions   Planned Therapy Interventions ADL retraining;strengthening;progressive activity/exercise   Clinical Impression   Criteria for Skilled Therapeutic Interventions Met yes, treatment indicated   OT Diagnosis decreased independence with ADLs/IADLs and funcitonal mobility   Influenced by " "the following impairments increased pain and decreased strength.    Assessment of Occupational Performance 5 or more Performance Deficits   Identified Performance Deficits LB dressing, toileting, showering, home management, meal management.    Clinical Decision Making (Complexity) Low complexity   Therapy Frequency daily   Predicted Duration of Therapy Intervention (days/wks) 2-3 days   Anticipated Equipment Needs at Discharge (TBD at TCU)   Anticipated Discharge Disposition Transitional Care Facility   Risks and Benefits of Treatment have been explained. Yes   Patient, Family & other staff in agreement with plan of care Yes   [facility] AM-PAC  \"6 Clicks\" Daily Activity Inpatient Short Form   1. Putting on and taking off regular lower body clothing? 2 - A Lot   2. Bathing (including washing, rinsing, drying)? 2 - A Lot   3. Toileting, which includes using toilet, bedpan or urinal? 3 - A Little   4. Putting on and taking off regular upper body clothing? 3 - A Little   5. Taking care of personal grooming such as brushing teeth? 4 - None   6. Eating meals? 4 - None   Daily Activity Raw Score (Score out of 24.Lower scores equate to lower levels of function) 18   Total Evaluation Time   Total Evaluation Time (Minutes) 6     "

## 2018-01-08 NOTE — PROGRESS NOTES
Physical Therapy Evaluation     01/08/18 0900   Quick Adds   Type of Visit Initial PT Evaluation       Present no   Living Environment   Lives With alone   Living Arrangements apartment   Home Accessibility no concerns   Number of Stairs to Enter Home 0   Number of Stairs Within Home 0   Transportation Available car   Living Environment Comment no concerns; pt lives in a 1-level apartment   Self-Care   Usual Activity Tolerance good   Current Activity Tolerance fair   Regular Exercise no   Equipment Currently Used at Home none   Functional Level Prior   Ambulation 0-->independent   Transferring 0-->independent   Toileting 0-->independent   Bathing 0-->independent   Dressing 0-->independent   Eating 0-->independent   Communication 0-->understands/communicates without difficulty   Swallowing 0-->swallows foods/liquids without difficulty   Cognition 0 - no cognition issues reported   Fall history within last six months yes   Number of times patient has fallen within last six months 1   Which of the above functional risks had a recent onset or change? ambulation;transferring   Prior Functional Level Comment Fully independent at baseline, no AD, no supplemental O2; drives and no outside services.   General Information   Onset of Illness/Injury or Date of Surgery - Date 01/07/18   Referring Physician Dr. Sofia MD   Patient/Family Goals Statement Hopefully to get stronger, less pain and back home   Pertinent History of Current Problem (include personal factors and/or comorbidities that impact the POC) 67 year old male who has a history of hypertension, type 2 diabetes mellitus, and hypothyroidism who presents to the ED for evaluation of facial swelling. Patient presents to the ED via EMS from [facility].  Patient presented to clinic secondary to fall and chest pain, developed progressive swelling in route.  patient fell on the ice yesterday and landed on his right side.  R-sided chest tube placed  which has since been removed on 1/7/18.   Precautions/Limitations oxygen therapy device and L/min   General Info Comments Patient very pleasant and agreeable to work with therapy. C/o R-sided rib and L-knee pain   Cognitive Status Examination   Orientation orientation to person, place and time   Level of Consciousness alert   Follows Commands and Answers Questions 100% of the time   Personal Safety and Judgment intact   Memory intact   Pain Assessment   Patient Currently in Pain Yes, see Vital Sign flowsheet  (7-8/10 R-sided rib pain; L-knee pain that is new, unrated)   Integumentary/Edema   Integumentary/Edema Comments facial; improved per chart review and pt report   Posture    Posture Not impaired   Range of Motion (ROM)   ROM Quick Adds No deficits were identified   ROM Comment B hip flex, knee flex/ext, DF/PF WFL   Strength   Strength Comments BLE hip flex, knee flex/ext, DF/PF at least 3/5; no MMT due to R-sided rib pain and L-knee pain; gen weakness also limited by pain   Bed Mobility   Bed Mobility Comments not formally assessed, per pt report needed assist x1 this morning from nursing staff   Transfer Skills   Transfer Comments STS up to FWW at [facility] for safetyu   Gait   Gait Comments Pt ambulated 15 feet in room using FWW at [facility], slow but steady   Balance   Balance no deficits were identified   Balance Comments good standing balance using FWW with BUEs   Sensory Examination   Sensory Perception other (describe)   Sensory Perception Comments baseline B feet neuropathies    Coordination   Coordination no deficits were identified   Muscle Tone   Muscle Tone no deficits were identified   Modality Interventions   Planned Modality Interventions Comments ice for L-knee   General Therapy Interventions   Planned Therapy Interventions balance training;bed mobility training;gait training;strengthening;transfer training;progressive activity/exercise   Clinical Impression   Criteria for Skilled Therapeutic Intervention  "yes, treatment indicated   PT Diagnosis weakness; decreased independence with functional mobility   Influenced by the following impairments decreased activity tolerance; pain; new supplemental O2 needs   Functional limitations due to impairments bed mobility, transfers, ambulation    Clinical Presentation Stable/Uncomplicated   Clinical Presentation Rationale clinical judgement   Clinical Decision Making (Complexity) Low complexity   Therapy Frequency` daily   Predicted Duration of Therapy Intervention (days/wks) 3 days   Anticipated Equipment Needs at Discharge (TBD at next level of care; possibly FWW)   Anticipated Discharge Disposition Transitional Care Facility   Risk & Benefits of therapy have been explained Yes   Patient, Family & other staff in agreement with plan of care Yes   [facility] TM \"6 Clicks\"   2016, Trustees of [facility], under license to [employer].  All rights reserved.   6 Clicks Short Forms Basic Mobility Inpatient Short Form   [facility]  \"6 Clicks\" V.2 Basic Mobility Inpatient Short Form   1. Turning from your back to your side while in a flat bed without using bedrails? 2 - A Lot   2. Moving from lying on your back to sitting on the side of a flat bed without using bedrails? 2 - A Lot   3. Moving to and from a bed to a chair (including a wheelchair)? 3 - A Little   4. Standing up from a chair using your arms (e.g., wheelchair, or bedside chair)? 3 - A Little   5. To walk in hospital room? 3 - A Little   6. Climbing 3-5 steps with a railing? 2 - A Lot   Basic Mobility Raw Score (Score out of 24.Lower scores equate to lower levels of function) 15   Total Evaluation Time   Total Evaluation Time (Minutes) 10     "

## 2018-01-08 NOTE — PLAN OF CARE
"Problem: Patient Care Overview  Goal: Plan of Care/Patient Progress Review  Discharge Planner PT   Patient plan for discharge: pt reports being unsure, reports \"not feeling ready to go home in this condition\"  Current functional status: assistx1 for bed mobility; CGA for STS, room transfers and very short-distance ambulation (~20 feet) using FWW for safety; on 2LPM during session today with SpO2 = 88% after walking 20 feet  Barriers to return to prior living situation: lives alone, assistx1 currently needed, pain, new O2 needs  Recommendations for discharge: short-stay TCU  Rationale for recommendations: pt currently below PLOF and lives alone, limited by pain, limited activity tolerance       Entered by: Marlen Hutchins 01/08/2018 10:31 AM           "

## 2018-01-08 NOTE — PLAN OF CARE
Problem: Breathing Pattern Ineffective (Adult)  Goal: Effective Oxygenation/Ventilation  Patient will demonstrate the desired outcomes by discharge/transition of care.   Outcome: Improving  Patient overall doing well.  Has been up in the chair intermittently throughout the day.  Remains on 2L nasal cannula with O2 sats 94-96%.  Patient with moderate right sided pleural discomfort, receiving norco q4-6hrs with good efficacy.  Patient has not had BM since 1/5/18.  Abdomen appears distended and firm.  Hypoactive bowel sounds, mild nausea and passing flatus.  PRN senna given along with prn Miralax.  Appetite has been fair.  Patient up with assist of 1 and rolling walker.  Patient had shower, shaved and brushed his teeth.  Family was here this afternoon but have since gone home.  Patient voiding in urinal clear, lazaro urine.  Right sided chest tube site redressed with petroleum jelly and new pressure dressing.  Will continue to monitor and assess patient closely.

## 2018-01-08 NOTE — PROGRESS NOTES
"SUBJECTIVE:   Facial swelling better but still axillary swelling  Still SOB  Weak, hard to move without pain.       ROS:4 point ROS including Respiratory, CV, GI and , other than that noted in the HPI,  is negative     OBJECTIVE:   /65  Pulse 75  Temp 98.5  F (36.9  C) (Oral)  Resp 18  Ht 1.803 m (5' 11\")  Wt 105 kg (231 lb 7.7 oz)  SpO2 92%  BMI 32.29 kg/m2    GENERAL APPEARANCE:  slightly  bloated, symmetric face with less crepitus face, neck, right arm and right chest wall      RESP:clear      CV: regular rate and rhythm,  No  murmur , edema: none       Abdomen: soft, nontender, no liver or spleen enlargement, no masses, BSs normal   Skin: no cyanosis, pallor, or jaundice    CMP    Recent Labs  Lab 01/08/18  0530 01/07/18  0500 01/06/18  0805 01/05/18  2230 01/05/18  1700   * 132* 129*  --  131*   POTASSIUM 4.0 4.2 3.8 3.8 2.8*   CHLORIDE 99 99 95  --  94   CO2 26 25 25  --  18*   ANIONGAP 6 8 9  --  19*   * 182* 221*  --  315*   BUN 18 16 16  --  19   CR 0.62* 0.61* 0.64*  --  0.70   GFRESTIMATED >90 >90 >90  --  >90   GFRESTBLACK >90 >90 >90  --  >90   TITI 8.6 8.0* 8.4*  --  8.3*   PROTTOTAL 7.4 7.4  --   --  8.7   ALBUMIN 2.7* 2.8*  --   --  3.8   BILITOTAL 0.8 1.0  --   --  0.8   ALKPHOS 54 52  --   --  68   AST 28 32  --   --  25   ALT 18 22  --   --  31     CBC    Recent Labs  Lab 01/08/18  0530 01/07/18  0500 01/06/18  0805 01/05/18  1700   WBC 13.3* 13.5* 14.6* 20.5*   RBC 3.94* 3.79* 4.06* 4.27*   HGB 12.9* 12.3* 13.2* 13.9   HCT 37.8* 36.1* 38.1* 40.0   MCV 96 95 94 94   MCH 32.7 32.5 32.5 32.6   MCHC 34.1 34.1 34.6 34.8   RDW 11.3 11.4 11.4 11.5    269 295 405     INRNo lab results found in last 7 days.  Arterial BloodGas  No lab results found in last 7 days.   Venous Blood Gas    Recent Labs  Lab 01/08/18  0530 01/07/18  0500 01/05/18  1700   PHV 7.38 7.40 7.32   PCO2V 53* 44 33*   PO2V 18* 37 55*   HCO3V 31* 27 17*   LEE 4.6 2.0  --        Medications     gabapentin "  300 mg Oral At Bedtime     amLODIPine  10 mg Oral Daily     aspirin  81 mg Oral Daily     atenolol  50 mg Oral Daily     atorvastatin  40 mg Oral Daily     busPIRone  15 mg Oral Daily     cyanocolbalamin  1,000 mcg Oral Daily     insulin aspart prot & aspart  71 Units Subcutaneous QAM AC     levothyroxine  112 mcg Oral QAM AC     insulin aspart  1-7 Units Subcutaneous TID AC     insulin aspart  1-5 Units Subcutaneous At Bedtime     lisinopril  20 mg Oral Daily    And     hydrochlorothiazide  25 mg Oral Daily     fenofibrate  160 mg Oral Daily     ramelteon  8 mg Oral At Bedtime     influenza Vac Split High-Dose  0.5 mL Intramuscular Prior to discharge       Intake/Output Summary (Last 24 hours) at 01/06/18 1019  Last data filed at 01/06/18 0630   Gross per 24 hour   Intake          1213.33 ml   Output             1100 ml   Net           113.33 ml         ASSESSMENT: PLAN:   Pneumothorax/Fall/Right Rib fracture 6-10/Subcutaneous Emphysema:  Patient who developed significant swelling found to be mostly subcutaneous after a fall. Noted to have rib fracture which explains this. No respiratory compromise at this point. Chest tube already placed in ED. Elevated lactic acid on admission, likely related to tissue injury/hypoperfusion/ SIRS.   - Lactic acid now normal   -Will likely benefit from PT once he starts feeling better.  - check forced VC. Currently reaches 2 L.  ( If this is below 1500 and still needing >3 L O2 consider transfer to tertiary care. )   -chest tube out and pneumo only 0.7 cm apical.  Appears resolving  -still some hypoxia from the splinting.             SIRS:     Elevated WBC, lactic.  This is from trauma itself.  Lactic better.  Follow        Hyperlipidemia LDL goal <100: Continue PTA Lipitor and Fenofibrate.       Type 2 diabetes mellitus  with diabetic neuropathy (H)  -: blood sugar elevated, likely multifactorial- stress, he has not eaten much all day, pain...  -Continue PTA Novolog  -Add SSI for  proper coverage.  -Hold Glimepiride this admission  -Continue PTA gabapentin.       Acquired hypothyroidism: Continue PTA Synthroid.       Dysthymic disorder; Continue PTA Buspar.       Esophageal reflux: Continue PTA PPI as needed        Essential hypertension   : BP stable this admission: Continue PTA medications. Initial potassium low though repeat within normal limits without replacement. ? Initial error, repeat in am and to also monitor slightly low sodium.       Urinary retention: Unsure of exact etiology, no features to suggest urethral injury with recent fall and he was passing urine prior to this afternoon. Straight cath used, to evaluate ongoing needs overnight overnight.     Prophylaxis - Sequential  Code- Full  Disposition - to floor.  May need TCU , in AM.

## 2018-01-09 VITALS
SYSTOLIC BLOOD PRESSURE: 128 MMHG | HEART RATE: 78 BPM | HEIGHT: 71 IN | TEMPERATURE: 98.4 F | OXYGEN SATURATION: 92 % | DIASTOLIC BLOOD PRESSURE: 68 MMHG | RESPIRATION RATE: 18 BRPM | WEIGHT: 231.48 LBS | BODY MASS INDEX: 32.41 KG/M2

## 2018-01-09 LAB
ALBUMIN SERPL-MCNC: 2.5 G/DL (ref 3.4–5)
ALP SERPL-CCNC: 67 U/L (ref 40–150)
ALT SERPL W P-5'-P-CCNC: 19 U/L (ref 0–70)
ANION GAP SERPL CALCULATED.3IONS-SCNC: 8 MMOL/L (ref 3–14)
AST SERPL W P-5'-P-CCNC: 17 U/L (ref 0–45)
BASE EXCESS BLDV CALC-SCNC: 3.6 MMOL/L
BILIRUB SERPL-MCNC: 0.6 MG/DL (ref 0.2–1.3)
BUN SERPL-MCNC: 23 MG/DL (ref 7–30)
CALCIUM SERPL-MCNC: 8.1 MG/DL (ref 8.5–10.1)
CHLORIDE SERPL-SCNC: 98 MMOL/L (ref 94–109)
CO2 SERPL-SCNC: 25 MMOL/L (ref 20–32)
CREAT SERPL-MCNC: 0.74 MG/DL (ref 0.66–1.25)
ERYTHROCYTE [DISTWIDTH] IN BLOOD BY AUTOMATED COUNT: 11.3 % (ref 10–15)
GFR SERPL CREATININE-BSD FRML MDRD: >90 ML/MIN/1.7M2
GLUCOSE BLDC GLUCOMTR-MCNC: 229 MG/DL (ref 70–99)
GLUCOSE SERPL-MCNC: 252 MG/DL (ref 70–99)
HCO3 BLDV-SCNC: 28 MMOL/L (ref 21–28)
HCT VFR BLD AUTO: 34.8 % (ref 40–53)
HGB BLD-MCNC: 11.9 G/DL (ref 13.3–17.7)
MCH RBC QN AUTO: 32.7 PG (ref 26.5–33)
MCHC RBC AUTO-ENTMCNC: 34.2 G/DL (ref 31.5–36.5)
MCV RBC AUTO: 96 FL (ref 78–100)
PCO2 BLDV: 42 MM HG (ref 40–50)
PH BLDV: 7.43 PH (ref 7.32–7.43)
PLATELET # BLD AUTO: 345 10E9/L (ref 150–450)
PO2 BLDV: 37 MM HG (ref 25–47)
POTASSIUM SERPL-SCNC: 3.8 MMOL/L (ref 3.4–5.3)
PROT SERPL-MCNC: 7.5 G/DL (ref 6.8–8.8)
RBC # BLD AUTO: 3.64 10E12/L (ref 4.4–5.9)
SODIUM SERPL-SCNC: 131 MMOL/L (ref 133–144)
WBC # BLD AUTO: 9.7 10E9/L (ref 4–11)

## 2018-01-09 PROCEDURE — 90662 IIV NO PRSV INCREASED AG IM: CPT | Performed by: FAMILY MEDICINE

## 2018-01-09 PROCEDURE — A9270 NON-COVERED ITEM OR SERVICE: HCPCS | Mod: GY | Performed by: FAMILY MEDICINE

## 2018-01-09 PROCEDURE — 99239 HOSP IP/OBS DSCHRG MGMT >30: CPT | Performed by: FAMILY MEDICINE

## 2018-01-09 PROCEDURE — 25000128 H RX IP 250 OP 636: Performed by: FAMILY MEDICINE

## 2018-01-09 PROCEDURE — 25000132 ZZH RX MED GY IP 250 OP 250 PS 637: Mod: GY | Performed by: FAMILY MEDICINE

## 2018-01-09 PROCEDURE — A9270 NON-COVERED ITEM OR SERVICE: HCPCS | Mod: GY | Performed by: SURGERY

## 2018-01-09 PROCEDURE — 25000132 ZZH RX MED GY IP 250 OP 250 PS 637: Mod: GY | Performed by: SURGERY

## 2018-01-09 PROCEDURE — 82803 BLOOD GASES ANY COMBINATION: CPT | Performed by: FAMILY MEDICINE

## 2018-01-09 PROCEDURE — 25000125 ZZHC RX 250: Performed by: FAMILY MEDICINE

## 2018-01-09 PROCEDURE — 80053 COMPREHEN METABOLIC PANEL: CPT | Performed by: FAMILY MEDICINE

## 2018-01-09 PROCEDURE — 36415 COLL VENOUS BLD VENIPUNCTURE: CPT | Performed by: FAMILY MEDICINE

## 2018-01-09 PROCEDURE — 85027 COMPLETE CBC AUTOMATED: CPT | Performed by: FAMILY MEDICINE

## 2018-01-09 PROCEDURE — 00000146 ZZHCL STATISTIC GLUCOSE BY METER IP

## 2018-01-09 RX ORDER — AMOXICILLIN 250 MG
1 CAPSULE ORAL DAILY
Qty: 100 TABLET | DISCHARGE
Start: 2018-01-09 | End: 2018-01-18

## 2018-01-09 RX ORDER — POLYETHYLENE GLYCOL 3350 17 G/17G
17 POWDER, FOR SOLUTION ORAL DAILY
Qty: 7 PACKET | DISCHARGE
Start: 2018-01-09 | End: 2018-01-18

## 2018-01-09 RX ORDER — ACETAMINOPHEN 500 MG
1000 TABLET ORAL 3 TIMES DAILY
Qty: 100 TABLET | Refills: 0 | DISCHARGE
Start: 2018-01-09 | End: 2021-07-28

## 2018-01-09 RX ORDER — FUROSEMIDE 40 MG
40 TABLET ORAL DAILY
Qty: 60 TABLET | Refills: 1 | DISCHARGE
Start: 2018-01-09 | End: 2018-04-26

## 2018-01-09 RX ORDER — OXYCODONE HYDROCHLORIDE 5 MG/1
5-10 TABLET ORAL EVERY 4 HOURS PRN
Qty: 40 TABLET | Refills: 0 | Status: SHIPPED | OUTPATIENT
Start: 2018-01-09 | End: 2018-02-28

## 2018-01-09 RX ADMIN — LISINOPRIL 20 MG: 20 TABLET ORAL at 07:49

## 2018-01-09 RX ADMIN — ATORVASTATIN CALCIUM 40 MG: 20 TABLET, FILM COATED ORAL at 07:50

## 2018-01-09 RX ADMIN — ONDANSETRON 4 MG: 4 TABLET, ORALLY DISINTEGRATING ORAL at 07:46

## 2018-01-09 RX ADMIN — INFLUENZA A VIRUSA/MICHIGAN/45/2015 X-275 (H1N1) ANTIGEN (FORMALDEHYDE INACTIVATED), INFLUENZA A VIRUS A/HONG KONG/4801/2014 X-263B (H3N2) ANTIGEN (FORMALDEHYDE INACTIVATED), AND INFLUENZA B VIRUS B/BRISBANE/60/2008 ANTIGEN (FORMALDEHYDE INACTIVATED) 0.5 ML: 60; 60; 60 INJECTION, SUSPENSION INTRAMUSCULAR at 11:45

## 2018-01-09 RX ADMIN — BISACODYL 5 MG: 5 TABLET, COATED ORAL at 08:53

## 2018-01-09 RX ADMIN — INSULIN ASPART 71 UNITS: 100 INJECTION, SUSPENSION SUBCUTANEOUS at 08:59

## 2018-01-09 RX ADMIN — AMLODIPINE BESYLATE 10 MG: 10 TABLET ORAL at 07:50

## 2018-01-09 RX ADMIN — INSULIN ASPART 2 UNITS: 100 INJECTION, SOLUTION INTRAVENOUS; SUBCUTANEOUS at 08:58

## 2018-01-09 RX ADMIN — ASPIRIN 81 MG 81 MG: 81 TABLET ORAL at 07:49

## 2018-01-09 RX ADMIN — ATENOLOL 50 MG: 50 TABLET ORAL at 07:50

## 2018-01-09 RX ADMIN — FENOFIBRATE 160 MG: 160 TABLET ORAL at 07:52

## 2018-01-09 RX ADMIN — INSULIN ASPART 3 UNITS: 100 INJECTION, SOLUTION INTRAVENOUS; SUBCUTANEOUS at 11:41

## 2018-01-09 RX ADMIN — ONDANSETRON 4 MG: 4 TABLET, ORALLY DISINTEGRATING ORAL at 12:58

## 2018-01-09 RX ADMIN — HYDROCHLOROTHIAZIDE 25 MG: 25 TABLET ORAL at 07:49

## 2018-01-09 RX ADMIN — LEVOTHYROXINE SODIUM 112 MCG: 112 TABLET ORAL at 07:47

## 2018-01-09 RX ADMIN — HYDROCODONE BITARTRATE AND ACETAMINOPHEN 2 TABLET: 5; 325 TABLET ORAL at 07:48

## 2018-01-09 RX ADMIN — HYDROCODONE BITARTRATE AND ACETAMINOPHEN 2 TABLET: 5; 325 TABLET ORAL at 02:02

## 2018-01-09 RX ADMIN — Medication 1000 MCG: at 07:52

## 2018-01-09 RX ADMIN — BUSPIRONE HYDROCHLORIDE 15 MG: 15 TABLET ORAL at 07:52

## 2018-01-09 RX ADMIN — POLYETHYLENE GLYCOL 3350 17 G: 17 POWDER, FOR SOLUTION ORAL at 08:52

## 2018-01-09 RX ADMIN — HYDROCODONE BITARTRATE AND ACETAMINOPHEN 2 TABLET: 5; 325 TABLET ORAL at 12:06

## 2018-01-09 RX ADMIN — SENNOSIDES AND DOCUSATE SODIUM 2 TABLET: 8.6; 5 TABLET ORAL at 08:54

## 2018-01-09 NOTE — PLAN OF CARE
Problem: Patient Care Overview  Goal: Plan of Care/Patient Progress Review  Physical Therapy Discharge Summary    Reason for therapy discharge:    Discharged to transitional care facility.    Progress towards therapy goal(s). See goals on Care Plan in [facility] electronic health record for goal details.  Goals partially met.  Barriers to achieving goals:   discharge from facility.    Therapy recommendation(s):    Continued therapy is recommended.  Rationale/Recommendations:  continued PT at TCU to increase strength, ensure independence w/ mobility.     Rubi Anthony, PT

## 2018-01-09 NOTE — PROGRESS NOTES
[place] NSG DISCHARGE NOTE  IV catheter dc'd with catheter fully intact upon removal, immediate direct pressure applied with 2x2 gauze. No active bleeding noted, 2x2 gauze pressure dressing applied. Patient instructed to remove dressing in 2 hours. No redness, tenderness or swelling noted around site area    Patient discharged to rehabilitation facility at 1:29 PM via wheel chair. Accompanied by sister and staff. Discharge instructions reviewed with patient, opportunity offered to ask questions. Prescriptions sent with patient to fill . All belongings sent with patient.    Kristian Schwarz RN

## 2018-01-09 NOTE — DISCHARGE INSTRUCTIONS
Would recheck basic chem in the next week on lasix  Would use oxygen 1-2 liters as needed and wean as tolerated  Would consider a mealtime insulin if needing SS regularly when not in hospital.   Would recheck with provider in the next week  Would recheck CXR in the next week.    Keep dressing on Right chest tube site till 1/11/18.

## 2018-01-09 NOTE — PROGRESS NOTES
Name: Kvng Velasquez    MRN#: [id number]    Reason for Hospitalization: Hypokalemia [E87.6]  Elevated LDH [R74.0]  Traumatic pneumothorax, initial encounter [S27.0XXA]  Fall, initial encounter [W19.XXXA]  Subcutaneous emphysema, initial encounter (H) [T79.7XXA]  Closed fracture of multiple ribs of right side, initial encounter [S22.41XA]    Discharge Date: 1/9/2018    Patient / Family response to discharge plan: Pt [facility] today at 1300 [facility] [employer] [facility] (Main: [phone] Admissions: [phone] Fax: [phone]). Pt in agreement.     Other Providers (Care Coordinator, County Services, PCA services etc): No    Future Appointments: No future appointments.    Discharge Disposition: transitional care unit    Minerva JOHNSTON, [facility], [facility] [phone]

## 2018-01-09 NOTE — PROGRESS NOTES
Reason for Follow up: DC Planning    Anticipated discharge needs: Pt has been accepted at [facility] (Main: [phone] Admissions: [phone] Fax: [phone]) and will most likely be ready for dc today. Pt will need transport d/t O2.     PAS-RR    Per DHS regulation, CTS team completed and submitted PAS-RR to [place] [employer] Direct [employer] via the [employer]. CTS team advised SNF and they are aware a PAS-RR has been submitted.     CTS team reviewed with pt or health care agent that they may be contacted for a follow up appointment within 10 days of hospital discharge if SNF stay is <30 days. Contact information for [employer] was also provided.     Pt or health care agent verbalized understanding.     PAS-RR # [id number]      Next steps: TCU when stable    Minerva JOHNSTON, [facility], [facility] [phone]  Discharge Planner   Discharge Plans in progress: TCU  Barriers to discharge plan: medical stability  Follow up plan: CTS to follow       Entered by: Minerva White 01/09/2018 8:56 AM

## 2018-01-09 NOTE — PHARMACY - DISCHARGE MEDICATION RECONCILIATION
Discharge medication review for this patient is complete. Pharmacist assisted with medication reconciliation of discharge medications with prior to admission medications.     The following changes were made to the discharge medication list based on pharmacist review:  Added:  none  Discontinued: none  Changed: none      Patient's Discharge Medication List  - medications as listed on After Visit Summary (AVS)     Review of your medicines      START taking       Dose / Directions    acetaminophen 500 MG tablet   Commonly known as:  TYLENOL        Dose:  1000 mg   Take 2 tablets (1,000 mg) by mouth 3 times daily   Quantity:  100 tablet   Refills:  0       furosemide 40 MG tablet   Commonly known as:  LASIX   Used for:  Edema, unspecified type        Dose:  40 mg   Take 1 tablet (40 mg) by mouth daily   Quantity:  60 tablet   Refills:  1       insulin aspart 100 UNIT/ML injection   Commonly known as:  NovoLOG PEN        Dose:  1-7 Units   Inject 1-7 Units Subcutaneous 3 times daily (before meals)  - 189 give 3 unit.    - 239 give 4units.    - 289 give 5 units.    - 339 give 7 units.   - 399 give 9 units.   -449 give 10 units  BG greater than or equal to 450 give 7 units.   Notify provider if glucose greater than or equal to 350 mg/dL after administration of correction dose.   Refills:  0       oxyCODONE IR 5 MG tablet   Commonly known as:  ROXICODONE        Dose:  5-10 mg   Take 1-2 tablets (5-10 mg) by mouth every 4 hours as needed for pain maximum 12 tablet(s) per day   Quantity:  40 tablet   Refills:  0       polyethylene glycol Packet   Commonly known as:  MIRALAX/GLYCOLAX        Dose:  17 g   Take 17 g by mouth daily   Quantity:  7 packet   Refills:  0       senna-docusate 8.6-50 MG per tablet   Commonly known as:  SENOKOT-S;PERICOLACE        Dose:  1 tablet   Take 1 tablet by mouth daily   Quantity:  100 tablet   Refills:  0         CONTINUE these medicines which may have  CHANGED, or have new prescriptions. If we are uncertain of the size of tablets/capsules you have at home, strength may be listed as something that might have changed.       Dose / Directions    esomeprazole 40 MG CR capsule   Commonly known as:  nexIUM   This may have changed:    - when to take this  - reasons to take this  - additional instructions   Used for:  Gastroesophageal reflux disease without esophagitis        Dose:  40 mg   Take 1 capsule (40 mg) by mouth every morning (before breakfast) Take 30-60 minutes before eating.   Quantity:  90 capsule   Refills:  3       nystatin-triamcinolone cream   Commonly known as:  MYCOLOG II   This may have changed:    - how to take this  - when to take this  - reasons to take this  - additional instructions   Used for:  Skin rash        Apply  topically 2 times daily.   Quantity:  60 g   Refills:  3       triamcinolone 0.1 % cream   Commonly known as:  KENALOG   This may have changed:    - how to take this  - when to take this  - reasons to take this  - additional instructions   Used for:  Contact dermatitis due to poison ivy        Apply sparingly to affected area three times daily for 14 days.   Quantity:  30 g   Refills:  0         CONTINUE these medicines which have NOT CHANGED       Dose / Directions    amLODIPine 10 MG tablet   Commonly known as:  NORVASC   Used for:  Essential hypertension with goal blood pressure less than 140/90        TAKE ONE TABLET BY MOUTH EVERY DAY   Quantity:  90 tablet   Refills:  1       aspirin 81 MG tablet        1 TABLET DAILY   Refills:  0       atenolol 50 MG tablet   Commonly known as:  TENORMIN   Used for:  Essential hypertension, Essential hypertension, benign        TAKE 1 AND 1/2 TABLETS BY MOUTH ONCE DAILY   Quantity:  135 tablet   Refills:  1       atorvastatin 40 MG tablet   Commonly known as:  LIPITOR   Used for:  Hyperlipidemia LDL goal <100        Dose:  40 mg   Take 1 tablet (40 mg) by mouth daily   Quantity:  90 tablet    Refills:  3       B-12 1000 MCG Tbcr   Used for:  Vitamin B12 deficiency (non anemic)        Dose:  1000 mcg   Take 1,000 mcg by mouth daily   Quantity:  100 tablet   Refills:  1       blood glucose monitoring meter device kit   Commonly known as:  no brand specified        Use to test blood sugar one times daily or as directed.   Quantity:  1 kit   Refills:  1       blood glucose monitoring test strip   Commonly known as:  no brand specified   Used for:  Type II or unspecified type diabetes mellitus without mention of complication, not stated as uncontrolled        Dose:  1 strip   1 strip by In Vitro route daily   Quantity:  3 Month   Refills:  12       busPIRone 15 MG tablet   Commonly known as:  BUSPAR   Used for:  Dysthymic disorder        Dose:  15 mg   Take 1 tablet (15 mg) by mouth daily   Quantity:  30 tablet   Refills:  5       fenofibrate 145 MG tablet   Used for:  Hyperlipidemia LDL goal <100        Dose:  145 mg   Take 1 tablet (145 mg) by mouth daily Brand name   Quantity:  90 tablet   Refills:  3       gabapentin 300 MG capsule   Commonly known as:  NEURONTIN   Used for:  Diabetes mellitus due to underlying condition with diabetic neuropathy, with long-term current use of insulin (H)        Take 1 tablet (300 mg) every night   Quantity:  30 capsule   Refills:  5       glipiZIDE 10 MG tablet   Commonly known as:  GLUCOTROL        Dose:  10 mg   Take 1 tablet (10 mg) by mouth daily   Quantity:  30 tablet   Refills:  2       indomethacin 50 MG capsule   Commonly known as:  INDOCIN   Used for:  Gouty arthropathy        TAKE ONE CAPSULE BY MOUTH THREE TIMES A DAY AND REDUCE TO AS NEEDED AS GOUT GETS BETTER   Quantity:  60 capsule   Refills:  3       insulin aspart protamine-insulin aspart injection   Commonly known as:  NovoLOG MIX 70/30        71 units before breakfast, 53 units before dinner   Quantity:  9 vial   Refills:  1       * insulin pen needle 31G X 6 MM   Commonly known as:  NOVOFINE 31   Used  for:  Type II or unspecified type diabetes mellitus without mention of complication, not stated as uncontrolled        Dose:  1 Device   1 Device 2 times daily.   Quantity:  100 each   Refills:  11       * NOVOFINE 32G X 6 MM   Used for:  Type II or unspecified type diabetes mellitus without mention of complication, not stated as uncontrolled   Generic drug:  insulin pen needle        Use twcie daily or as directed.   Quantity:  100 each   Refills:  3       levothyroxine 112 MCG tablet   Commonly known as:  SYNTHROID/LEVOTHROID   Used for:  Hypothyroidism, unspecified type        Dose:  112 mcg   Take 1 tablet (112 mcg) by mouth daily   Quantity:  90 tablet   Refills:  3       lisinopril-hydrochlorothiazide 20-25 MG per tablet   Commonly known as:  PRINZIDE/ZESTORETIC   Used for:  HTN, goal below 140/90        TAKE ONE TABLET BY MOUTH EVERY DAY   Quantity:  90 tablet   Refills:  0       order for DME        Equipment being ordered: Diabetic Shoes   Quantity:  2 each   Refills:  0       thin lancets   Commonly known as:  NO BRAND SPECIFIED        Use to test blood sugar one time daily or as directed.   Quantity:  1 Box   Refills:  3       * Notice:  This list has 2 medication(s) that are the same as other medications prescribed for you. Read the directions carefully, and ask your doctor or other care provider to review them with you.      STOP taking          naproxen 375 MG tablet   Commonly known as:  NAPROSYN                Where to get your medicines      Some of these will need a paper prescription and others can be bought over the counter. Ask your nurse if you have questions.     Bring a paper prescription for each of these medications      oxyCODONE IR 5 MG tablet       You don't need a prescription for these medications      acetaminophen 500 MG tablet     furosemide 40 MG tablet     insulin aspart 100 UNIT/ML injection     polyethylene glycol Packet     senna-docusate 8.6-50 MG per tablet           Elisabeth  Timmy PharmD

## 2018-01-09 NOTE — DISCHARGE SUMMARY
[facility] Discharge Summary    Kvng Velasquez MRN# [id number]   Age: 67 year old YOB: 1950     Date of Admission:  1/5/2018  Date of Discharge::  No discharge date for patient encounter.  Admitting Physician:  Ewelina Espinal MD  Discharge Physician:  Vicente Black MD  Primary Physician: Marlen Ma       Home clinic: [facility]               Discharge Diagnosis:   Principle diagnosis: Acute Hypoxemic Respiratory Failure due to acute multiple rib fractures and pneumothorax.     Secondary diagnoses:  Large sub cu emphysema due to rib fractures and pneumothorax.      Discharge Instructions:   Would recheck basic chem in the next week on lasix  Would use O2 and wean as tolerated  Would consider a mealtime insulin if needing SS regularly when not in hospital.   Would recheck with provider in the next week  Would recheck CXR in the next week.        Follow up with primary care provider in 7 days        Procedures:   Chest tube     Results for orders placed or performed during the hospital encounter of 01/05/18   XR Chest Port 1 View    Narrative    CHEST ONE VIEW PORTABLE  1/5/2018 5:06 PM     COMPARISON: Two view chest x-ray 5/21/2013    HISTORY: Short of air.    FINDINGS: There is new massive subcutaneous emphysema over both sides  of the chest (right worse than left) raising the question of  penetrating soft tissue injury. There is also questionable gas beneath  the left hemidiaphragm or within the inferior aspect of the  mediastinum on the left.    The cardiac silhouette, pulmonary vasculature, lungs and pleural  spaces are within normal limits.      Impression    IMPRESSION: Extensive subcutaneous gas throughout the thorax raising  the question of penetrating soft tissue injury. Questionable gas  beneath the left hemidiaphragm or within the left inferior  mediastinum. Clinical correlation recommended. Clear lungs.    WENDI HERNANDEZ MD   Chest CT w/o contrast      Value    Radiologist flags Pneumothorax, pneumomediastinum, and subcutaneous (AA)    Narrative    CT CHEST WITHOUT CONTRAST  1/5/2018 5:28 PM    HISTORY: Subcutaneous emphysema on chest x-ray, fall yesterday with  right anterior inferior chest wall tenderness, evaluate for rib  fractures and pneumothorax.     COMPARISON: A chest radiograph earlier today at 4:58 PM.    TECHNIQUE: Routine transverse CT imaging of the chest was performed  without contrast. Radiation dose for this scan was reduced using  automated exposure control, adjustment of the mA and/or kV according  to patient size, or iterative reconstruction technique.    FINDINGS: The heart size is normal. No enlarged lymph node or other  abnormal mediastinal mass is seen. The thoracic aorta and pulmonary  arteries are unremarkable, allowing for the absence of contrast. There  is atelectasis in both lung bases. The lungs are otherwise clear.  There is a small right pneumothorax. There is prominent  pneumomediastinum and extensive subcutaneous emphysema throughout the  chest bilaterally extending into the visualized neck and right upper  extremity. No pleural effusion is identified. There are mildly  displaced fractures of the right 6th, 7th, 8th, 9th, and 10th ribs.  There are degenerative changes in the spine. No other fracture or  osseous abnormality is noted. Within the visualized upper abdomen,  there are a few gallstones with no evidence of cholecystitis. There is  also a cystic region in the anterior aspect of the right kidney not  entirely included on this study measuring up to 6 cm consistent with a  cyst.      Impression    IMPRESSION:   1. Mildly displaced fractures of the right 6th through 10th ribs.  2. Small right pneumothorax.  3. Extensive pneumomediastinum and subcutaneous emphysema of the chest  wall.    [Critical Result: Pneumothorax, pneumomediastinum, and subcutaneous  emphysema with rib fractures.]    Finding was identified on 1/5/2018  5:47 PM.     Dr. Warner in the emergency department was contacted by me on  1/5/2018 5:49 PM and verbalized understanding of the critical result.    CHHAYA GEE MD   XR Chest Port 1 View    Narrative    PORTABLE CHEST ONE VIEW 1/5/2018 7:18 PM     COMPARISON: Frontal chest x-ray 1/5/2018    HISTORY: Chest tube placement confirmation.      Impression    IMPRESSION: There has been interval placement of a large caliber right  chest tube. No right pneumothorax identified. Persistent  pneumomediastinum again noted. Extensive subcutaneous venous gas over  the chest bilaterally again noted. The lungs are clear. Heart size  remains normal.    WENDI HERNANDEZ MD   CT Chest w/o Contrast    Narrative    Exam: CT CHEST W/O CONTRAST  1/7/2018 11:31 AM    History: Pneumatosis.    Comparison: 1/5/2018    Technique: Volumetric acquisition with reconstruction in the axial and  coronal planes through the chest without contrast. Radiation dose for  this scan was reduced using automated exposure control, adjustment of  the mA and/or kV according to patient size, or iterative  reconstruction technique.    Contrast: None    Findings:  Chest: Extensive subcutaneous emphysema overlying the chest wall  anteriorly and laterally on the right as well as extending over the  right flank. There is pneumomediastinum noted as well is a small  right-sided pneumothorax. The pneumothorax component is decreased  since 1/5/2018 following chest tube placement. However, the  pneumomediastinum and subcutaneous emphysema is unchanged since that  time.    Thyroid gland is unremarkable. No axillary, mediastinal, hilar or  supraclavicular lymphadenopathy by size criteria. Aortic root and  pulmonary artery are of normal size. Normal branching pattern of the  great vessels. Heart is not enlarged.    Upper abdomen: Evaluation of the upper abdomen is limited by lack of  coverage. Scattered foci of free air are seen in the abdomen, this  appears to be new  since 1/5/2018.    Bones:  Multiple right-sided rib fractures are again seen.      Impression    Impression:   1. Small residual right pneumothorax following chest tube placement.  2. Small foci of free air in the upper abdomen are most likely related  to air tracking from the pneumomediastinum into the abdomen.   3. Extensive subcutaneous emphysema and pneumomediastinum again seen,  not significantly changed.  4. Multiple right-sided rib fractures again seen.    SOFÍA VELASCO MD   XR Chest Port 1 View    Narrative    XR CHEST PORT 1 VW 1/8/2018 8:49 AM    HISTORY: Follow-up of pneumatosis.    COMPARISON: 1/5/2018.      Impression    IMPRESSION: A single view the chest shows interval removal a right  sided chest tube.  There is a small right apical pneumothorax that  measures 0.7 cm of lucency. Subcutaneous emphysema shows a modest  increase in the right axillary and right lateral chest wall area.  There is a new small amount of subcutaneous emphysema in the left  axilla and along the left lateral chest wall. Subcutaneous emphysema  elsewhere involving the chest wall and the base of the neck is more  similar to the prior study. A pneumomediastinum is again suggested.  Atelectasis is seen at the left lung base. Several mildly displaced  right-sided rib fractures are present, better seen compared to the  prior study.    JAY INGRAM MD                    Allergies:    No Known Allergies               Discharge Medications:     Current Discharge Medication List      START taking these medications    Details   polyethylene glycol (MIRALAX/GLYCOLAX) Packet Take 17 g by mouth daily  Qty: 7 packet    Associated Diagnoses: Closed fracture of multiple ribs of right side, initial encounter      senna-docusate (SENOKOT-S;PERICOLACE) 8.6-50 MG per tablet Take 1 tablet by mouth daily  Qty: 100 tablet    Associated Diagnoses: Closed fracture of multiple ribs of right side, initial encounter      insulin aspart (NOVOLOG PEN) 100  UNIT/ML injection Inject 1-7 Units Subcutaneous 3 times daily (before meals)  - 189 give 3 unit.     - 239 give 4units.     - 289 give 5 units.     - 339 give 7 units.    - 399 give 9 units.    -449 give 10 units   BG greater than or equal to 450 give 7 units.    Notify provider if glucose greater than or equal to 350 mg/dL after administration of correction dose.    Associated Diagnoses: Type 2 diabetes mellitus without complication, with long-term current use of insulin (H)      oxyCODONE IR (ROXICODONE) 5 MG tablet Take 1-2 tablets (5-10 mg) by mouth every 4 hours as needed for pain maximum 12 tablet(s) per day  Qty: 40 tablet, Refills: 0    Associated Diagnoses: Closed fracture of multiple ribs of right side, initial encounter      acetaminophen (TYLENOL) 500 MG tablet Take 2 tablets (1,000 mg) by mouth 3 times daily  Qty: 100 tablet, Refills: 0    Associated Diagnoses: Closed fracture of multiple ribs of right side, initial encounter      furosemide (LASIX) 40 MG tablet Take 1 tablet (40 mg) by mouth daily  Qty: 60 tablet, Refills: 1    Associated Diagnoses: Edema, unspecified type         CONTINUE these medications which have NOT CHANGED    Details   lisinopril-hydrochlorothiazide (PRINZIDE/ZESTORETIC) 20-25 MG per tablet TAKE ONE TABLET BY MOUTH EVERY DAY  Qty: 90 tablet, Refills: 0    Associated Diagnoses: HTN, goal below 140/90      amLODIPine (NORVASC) 10 MG tablet TAKE ONE TABLET BY MOUTH EVERY DAY  Qty: 90 tablet, Refills: 1    Associated Diagnoses: Essential hypertension with goal blood pressure less than 140/90      atenolol (TENORMIN) 50 MG tablet TAKE 1 AND 1/2 TABLETS BY MOUTH ONCE DAILY  Qty: 135 tablet, Refills: 1    Associated Diagnoses: Essential hypertension; Essential hypertension, benign      indomethacin (INDOCIN) 50 MG capsule TAKE ONE CAPSULE BY MOUTH THREE TIMES A DAY AND REDUCE TO AS NEEDED AS GOUT GETS BETTER  Qty: 60 capsule, Refills: 3    Associated  Diagnoses: Gouty arthropathy      levothyroxine (SYNTHROID/LEVOTHROID) 112 MCG tablet Take 1 tablet (112 mcg) by mouth daily  Qty: 90 tablet, Refills: 3    Associated Diagnoses: Hypothyroidism, unspecified type      glipiZIDE (GLUCOTROL) 10 MG tablet Take 1 tablet (10 mg) by mouth daily  Qty: 30 tablet, Refills: 2    Comments: Profile Rx: patient will contact pharmacy when needed  Associated Diagnoses: Type 2 diabetes mellitus without complication, with long-term current use of insulin (H)      busPIRone (BUSPAR) 15 MG tablet Take 1 tablet (15 mg) by mouth daily  Qty: 30 tablet, Refills: 5    Associated Diagnoses: Dysthymic disorder      insulin aspart protamine-insulin aspart (NOVOLOG MIX 70/30) VIAL 100 UNITS/ML susp 71 units before breakfast, 53 units before dinner  Qty: 9 vial, Refills: 1    Comments: Works with phong Nelson on insulin  Associated Diagnoses: Type 2 diabetes mellitus without complication (H)      esomeprazole (NEXIUM) 40 MG capsule Take 1 capsule (40 mg) by mouth every morning (before breakfast) Take 30-60 minutes before eating.  Qty: 90 capsule, Refills: 3    Associated Diagnoses: Gastroesophageal reflux disease without esophagitis      fenofibrate 145 MG tablet Take 1 tablet (145 mg) by mouth daily Brand name  Qty: 90 tablet, Refills: 3    Associated Diagnoses: Hyperlipidemia LDL goal <100      atorvastatin (LIPITOR) 40 MG tablet Take 1 tablet (40 mg) by mouth daily  Qty: 90 tablet, Refills: 3    Associated Diagnoses: Hyperlipidemia LDL goal <100      nystatin-triamcinolone (MYCOLOG II) cream Apply  topically 2 times daily.  Qty: 60 g, Refills: 3    Associated Diagnoses: Skin rash      blood glucose meter (NO BRAND SPECIFIED) meter device kit Use to test blood sugar one times daily or as directed.  Qty: 1 kit, Refills: 1    Comments: Please use kit that insurance will cover for pt.  Associated Diagnoses: Type 2 diabetes, HbA1C goal < 8% (H)      blood glucose test strip 1 strip by [employer]  route daily  Qty: 3 Month, Refills: 12    Comments: Please use strips to match new glucometer ordered.  Associated Diagnoses: Type II or unspecified type diabetes mellitus without mention of complication, not stated as uncontrolled; Type 2 diabetes, HbA1C goal < 8% (H)      ASPIRIN 81 MG OR TABS 1 TABLET DAILY      gabapentin (NEURONTIN) 300 MG capsule Take 1 tablet (300 mg) every night  Qty: 30 capsule, Refills: 5    Associated Diagnoses: Diabetes mellitus due to underlying condition with diabetic neuropathy, with long-term current use of insulin (H)      Cyanocobalamin (B-12) 1000 MCG TBCR Take 1,000 mcg by mouth daily  Qty: 100 tablet, Refills: 1    Associated Diagnoses: Vitamin B12 deficiency (non anemic)      triamcinolone (KENALOG) 0.1 % cream Apply sparingly to affected area three times daily for 14 days.  Qty: 30 g, Refills: 0    Associated Diagnoses: Contact dermatitis due to poison ivy      ORDER FOR DME, SET TO LOCAL PRINT, Equipment being ordered: Diabetic Shoes  Qty: 2 each, Refills: 0    Associated Diagnoses: Type 2 diabetes, HbA1C goal < 8% (H)      thin (NO BRAND SPECIFIED) lancets Use to test blood sugar one time daily or as directed.  Qty: 1 Box, Refills: 3    Comments: Please use for which kit insurance will cover.  Associated Diagnoses: Type 2 diabetes, HbA1C goal < 8% (H)      !! insulin pen needle (NOVOFINE) 32G X 6 MM Use twcie daily or as directed.  Qty: 100 each, Refills: 3    Associated Diagnoses: Type II or unspecified type diabetes mellitus without mention of complication, not stated as uncontrolled      !! Insulin Pen Needle (NOVOFINE 31) 31G X 6 MM MISC 1 Device 2 times daily.  Qty: 100 each, Refills: 11    Associated Diagnoses: Type II or unspecified type diabetes mellitus without mention of complication, not stated as uncontrolled       !! - Potential duplicate medications found. Please discuss with provider.      STOP taking these medications       naproxen (NAPROSYN) 375 MG tablet  Comments:   Reason for Stopping:                     Consultations:   Surgery           Brief History of Presenting Illness:   Patient with a PMHx as outlined below.  He reports that he feel on ice yesterday afternoon onto the right side of his chest/body. Immediate pain over the region where he landed but he was able to get up and did not loose consciousness. Progressively worsening pain overnight that kept him awake. Denies cough. This afternoon, he however noticed a change in his voice and his face became progressively swollen. No associated lip swelling or troubles breathing. He went to his clinic and was though to have angioedema and given Epinephrine and antihistamine and sent to the ED. Patient reports that he has never had this happen to him before, no history of angioedema, no new medication or food.             Hospital Course:   Pneumothorax/Fall/Right Rib fracture 6-10/Subcutaneous Emphysema:  Patient who developed significant swelling found to be mostly subcutaneous after a fall. Noted to have rib fracture which explains this. No respiratory compromise at this point. Chest tube already placed in ED. Elevated lactic acid on admission, likely related to tissue injury/hypoperfusion/ SIRS.   - Lactic acid now normal   -Will likely benefit from PT once he starts feeling better.  - check forced VC. Currently reaches 2 L.  ( If this is below 1500 and still needing >3 L O2 consider transfer to tertiary care. )   -chest tube out and pneumo only 0.7 cm apical.  Appears resolving  -still some hypoxia from the splinting.                SIRS:     Elevated WBC, lactic.  This is from trauma itself.  Lactic better.  Follow         Hyperlipidemia LDL goal <100: Continue PTA Lipitor and Fenofibrate.        Type 2 diabetes mellitus  with diabetic neuropathy (H)  -: blood sugar elevated, likely multifactorial- stress, he has not eaten much all day, pain...  -Continue PTA Novolog  -Add SSI for proper coverage.  -Hold  "Glimepiride this admission  -Continue PTA gabapentin.        Acquired hypothyroidism: Continue PTA Synthroid.        Dysthymic disorder; Continue PTA Buspar.        Esophageal reflux: Continue PTA PPI as needed         Essential hypertension   : BP stable this admission: Continue PTA medications. Initial potassium low though repeat within normal limits without replacement. ? Initial error, repeat in am and to also monitor slightly low sodium.        Urinary retention: Unsure of exact etiology, no features to suggest urethral injury with recent fall and he was passing urine prior to this afternoon. Straight cath used, to evaluate ongoing needs overnight overnight.      Prophylaxis - Sequential  Code- Full  Disposition - to floor.  May need TCU            Discharge Exam:   OBJECTIVE:   /65  Pulse 75  Temp 98.5  F (36.9  C) (Oral)  Resp 18  Ht 1.803 m (5' 11\")  Wt 105 kg (231 lb 7.7 oz)  SpO2 92%  BMI 32.29 kg/m2    GENERAL APPEARANCE:  slightly  bloated, symmetric face with less crepitus face, neck, right arm and right chest wall      RESP:clear      CV: regular rate and rhythm,  No  murmur , edema: none       Abdomen: soft, nontender, no liver or spleen enlargement, no masses, BSs normal   Skin: no cyanosis, pallor, or jaundice           Pending Tests at Discharge:     Unresulted Labs Ordered in the Past 30 Days of this Admission     No orders found from 11/6/2017 to 1/6/2018.                   Discharge Disposition:   Discharged to rehabilitation facility      Attestation:  Amount of time performed on this discharge : 45 minutes.    Vicente Black MD       "

## 2018-01-09 NOTE — PLAN OF CARE
Problem: Patient Care Overview  Goal: Plan of Care/Patient Progress Review  Occupational Therapy Discharge Summary    Reason for therapy discharge:    Discharged to transitional care facility.    Progress towards therapy goal(s). See goals on Care Plan in [facility] electronic health record for goal details.  Goals partially met.  Barriers to achieving goals:   discharge from facility.    Therapy recommendation(s):    Continued therapy is recommended.  Rationale/Recommendations:  TCU to increase independence with ADLs/IADLs and functional mobility.

## 2018-01-09 NOTE — PROGRESS NOTES
Patient remains on 2lpm O2 with sat's mid 90's. Patient requiring pain medication every 4 hours for side pain related to rib fractures.

## 2018-01-10 ENCOUNTER — NURSING HOME VISIT (OUTPATIENT)
Dept: GERIATRICS | Facility: CLINIC | Age: 68
End: 2018-01-10
Payer: MEDICARE

## 2018-01-10 ENCOUNTER — CARE COORDINATION (OUTPATIENT)
Dept: CARE COORDINATION | Facility: CLINIC | Age: 68
End: 2018-01-10

## 2018-01-10 VITALS
BODY MASS INDEX: 32.08 KG/M2 | DIASTOLIC BLOOD PRESSURE: 60 MMHG | TEMPERATURE: 98.5 F | RESPIRATION RATE: 18 BRPM | OXYGEN SATURATION: 91 % | WEIGHT: 230 LBS | SYSTOLIC BLOOD PRESSURE: 124 MMHG | HEART RATE: 82 BPM

## 2018-01-10 DIAGNOSIS — S27.0XXD TRAUMATIC PNEUMOTHORAX, SUBSEQUENT ENCOUNTER: ICD-10-CM

## 2018-01-10 DIAGNOSIS — J98.2 PNEUMOMEDIASTINUM (H): ICD-10-CM

## 2018-01-10 DIAGNOSIS — F34.1 DYSTHYMIC DISORDER: ICD-10-CM

## 2018-01-10 DIAGNOSIS — E03.9 HYPOTHYROIDISM, UNSPECIFIED TYPE: ICD-10-CM

## 2018-01-10 DIAGNOSIS — I10 ESSENTIAL HYPERTENSION WITH GOAL BLOOD PRESSURE LESS THAN 140/90: ICD-10-CM

## 2018-01-10 DIAGNOSIS — S22.41XS CLOSED FRACTURE OF MULTIPLE RIBS OF RIGHT SIDE, SEQUELA: Primary | ICD-10-CM

## 2018-01-10 DIAGNOSIS — T79.7XXD SUBCUTANEOUS EMPHYSEMA, SUBSEQUENT ENCOUNTER: ICD-10-CM

## 2018-01-10 DIAGNOSIS — E11.9 TYPE 2 DIABETES MELLITUS WITHOUT COMPLICATION, WITH LONG-TERM CURRENT USE OF INSULIN (H): ICD-10-CM

## 2018-01-10 DIAGNOSIS — K21.9 GASTROESOPHAGEAL REFLUX DISEASE WITHOUT ESOPHAGITIS: ICD-10-CM

## 2018-01-10 DIAGNOSIS — Z79.4 TYPE 2 DIABETES MELLITUS WITHOUT COMPLICATION, WITH LONG-TERM CURRENT USE OF INSULIN (H): ICD-10-CM
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
## 2018-01-10 DIAGNOSIS — M10.9 GOUTY ARTHROPATHY: ICD-10-CM

## 2018-01-10 PROCEDURE — 99310 SBSQ NF CARE HIGH MDM 45: CPT | Performed by: NURSE PRACTITIONER

## 2018-01-10 RX ORDER — BISACODYL 10 MG
10 SUPPOSITORY, RECTAL RECTAL DAILY PRN
COMMUNITY
End: 2018-01-18

## 2018-01-10 RX ORDER — MAGNESIUM CARB/ALUMINUM HYDROX 105-160MG
240 TABLET,CHEWABLE ORAL ONCE
COMMUNITY
End: 2018-01-18

## 2018-01-10 NOTE — PROGRESS NOTES
[facility]  PRIMARY CARE PROVIDER AND CLINIC:  Marlen Ma [address] [facility] / [facility] [address]  Chief Complaint   Patient presents with     Hospital F/U       HPI:  1/5/18  Kvng Velasquez is a 67 year old  (1950),admitted to the [facility]  from [facility].  Hospital stay 1/5/18 through 1/8/18.  Admitted to this facility for  rehab, medical management and nursing care.  Current issues are:         Closed fracture of multiple ribs of right side, sequela  Fall, subsequent encounter  Pneumomediastinum (H)  Subcutaneous emphysema, subsequent encounter  Traumatic pneumothorax, subsequent encounter  Type 2 diabetes mellitus without complication, with long-term current use of insulin (H)  Essential hypertension with goal blood pressure less than 140/90     Kvng lives alone and is independent with all cares, working part time. He slipped on the ice at a laundromat parking lot, falling on the R side on 1/4/18. He managed to get himself up and drove home, with some rib pain. During the night, and on in to the next day, he developed increasing swelling in the face, neck, R arm and hand. When he arrived at the clinic, he was emergently send to ED, where he was found to have several broken ribs, a R pneumothorax (sp Chest tube placed), and subcutaneous emphysema. He gradually improved, now at TCU for rehab and strengthening.    Today, he reports significant chest wall/rib pain which he reports is under fair control if he takes the oxycodone 10 mg as close to every 4 hours as he can. He reports he is breathing fairly well, on no O2. He reports he has not had a BM for 6 days, is passing gas today. He is eating fairly well. He is diabetic, reports he has been in good control. He is alert, oriented.     CODE STATUS/ADVANCE DIRECTIVES DISCUSSION:   CPR/Full code   Patient's living condition: lives alone    ALLERGIES:Review of patient's allergies indicates  no known allergies.  PAST MEDICAL HISTORY:  has a past medical history of Acidosis; Backache, unspecified; Cholesteatoma of external ear; Dysthymic disorder; Esophageal reflux; Hemorrhage of rectum and anus; Nonspecific elevation of levels of transaminase or lactic acid dehydrogenase (LDH); Other and unspecified hyperlipidemia; Type II or unspecified type diabetes mellitus without mention of complication, not stated as uncontrolled; Unspecified essential hypertension; and Unspecified hypothyroidism.  PAST SURGICAL HISTORY:  has a past surgical history that includes surgical history of - (9/13/1999); surgical history of - (1958); surgical history of - (12/10/1990); surgical history of -; surgical history of - (3/14/1978); and surgical history of - (1982).  FAMILY HISTORY: family history includes C.A.D. in his brother, brother, and mother; CANCER in his brother; Cardiovascular in his brother and mother; DIABETES in his mother; Depression in his mother; Genitourinary Problems in his mother; Hypertension in his father and mother; Lipids in his father and mother; Prostate Cancer in his father; Thyroid Disease in his father.  SOCIAL HISTORY:  reports that he has never smoked. He has never used smokeless tobacco. He reports that he drinks alcohol. He reports that he does not use illicit drugs.    Post Discharge Medication Reconciliation Status: discharge medications reconciled and changed, per note/orders (see AVS).  Current Outpatient Prescriptions   Medication Sig Dispense Refill     bisacodyl (DULCOLAX) 10 MG Suppository Place 10 mg rectally daily as needed for constipation       magnesium citrate 1.745 GM/30ML solution Take 240 mLs by mouth once       magnesium hydroxide (MILK OF MAGNESIA) 400 MG/5ML suspension Take 30 mLs by mouth daily as needed for constipation or heartburn       Esomeprazole Magnesium (NEXIUM PO) Take 40 mg by mouth every morning (before breakfast)       polyethylene glycol (MIRALAX/GLYCOLAX)  Packet Take 17 g by mouth daily 7 packet      senna-docusate (SENOKOT-S;PERICOLACE) 8.6-50 MG per tablet Take 1 tablet by mouth daily 100 tablet      insulin aspart (NOVOLOG PEN) 100 UNIT/ML injection Inject 1-7 Units Subcutaneous 3 times daily (before meals)  - 189 give 3 unit.     - 239 give 4units.     - 289 give 5 units.     - 339 give 7 units.    - 399 give 9 units.    -449 give 10 units   BG greater than or equal to 450 give 7 units.    Notify provider if glucose greater than or equal to 350 mg/dL after administration of correction dose.       oxyCODONE IR (ROXICODONE) 5 MG tablet Take 1-2 tablets (5-10 mg) by mouth every 4 hours as needed for pain maximum 12 tablet(s) per day 40 tablet 0     acetaminophen (TYLENOL) 500 MG tablet Take 2 tablets (1,000 mg) by mouth 3 times daily 100 tablet 0     furosemide (LASIX) 40 MG tablet Take 1 tablet (40 mg) by mouth daily 60 tablet 1     lisinopril-hydrochlorothiazide (PRINZIDE/ZESTORETIC) 20-25 MG per tablet TAKE ONE TABLET BY MOUTH EVERY DAY 90 tablet 0     amLODIPine (NORVASC) 10 MG tablet TAKE ONE TABLET BY MOUTH EVERY DAY 90 tablet 1     atenolol (TENORMIN) 50 MG tablet TAKE 1 AND 1/2 TABLETS BY MOUTH ONCE DAILY 135 tablet 1     indomethacin (INDOCIN) 50 MG capsule TAKE ONE CAPSULE BY MOUTH THREE TIMES A DAY AND REDUCE TO AS NEEDED AS GOUT GETS BETTER 60 capsule 3     levothyroxine (SYNTHROID/LEVOTHROID) 112 MCG tablet Take 1 tablet (112 mcg) by mouth daily 90 tablet 3     glipiZIDE (GLUCOTROL) 10 MG tablet Take 1 tablet (10 mg) by mouth daily 30 tablet 2     Cyanocobalamin (B-12) 1000 MCG TBCR Take 1,000 mcg by mouth daily 100 tablet 1     busPIRone (BUSPAR) 15 MG tablet Take 1 tablet (15 mg) by mouth daily 30 tablet 5     insulin aspart protamine-insulin aspart (NOVOLOG MIX 70/30) VIAL 100 UNITS/ML susp 71 units before breakfast, 53 units before dinner 9 vial 1     fenofibrate 145 MG tablet Take 1 tablet (145 mg) by mouth daily  Brand name 90 tablet 3     atorvastatin (LIPITOR) 40 MG tablet Take 1 tablet (40 mg) by mouth daily 90 tablet 3     nystatin-triamcinolone (MYCOLOG II) cream Apply  topically 2 times daily. (Patient taking differently: Apply topically daily as needed Apply  topically 2 times daily.) 60 g 3     triamcinolone (KENALOG) 0.1 % cream Apply sparingly to affected area three times daily for 14 days. (Patient taking differently: Apply topically as needed Apply sparingly to affected area three times daily for 14 days.) 30 g 0     ORDER FOR DME, SET TO LOCAL PRINT, Equipment being ordered: Diabetic Shoes 2 each 0     blood glucose meter (NO BRAND SPECIFIED) meter device kit Use to test blood sugar one times daily or as directed. 1 kit 1     blood glucose test strip 1 strip by In Vitro route daily 3 Month 12     thin (NO BRAND SPECIFIED) lancets Use to test blood sugar one time daily or as directed. 1 Box 3     insulin pen needle (NOVOFINE) 32G X 6 MM Use twcie daily or as directed. 100 each 3     Insulin Pen Needle (NOVOFINE 31) 31G X 6 MM MISC 1 Device 2 times daily. 100 each 11     ASPIRIN 81 MG OR TABS 1 TABLET DAILY         ROS:  4 point ROS including Respiratory, CV, GI and , other than that noted in the HPI,  is negative    Exam:  /60  Pulse 82  Temp 98.5  F (36.9  C)  Resp 18  Wt 230 lb (104.3 kg)  SpO2 91%  BMI 32.08 kg/m2  GENERAL APPEARANCE:  Alert, in no acute distress  HEAD:  Normal, normocephalic, atraumatic  EYE EXAM: normal external eye, conjunctiva, lids, FABRICE  NECK EXAM: supple, no JVD  CHEST/RESP:  respiratory effort normal, lung sounds CTA , no respiratory distress  CV:  Rate reg, rhythm reg, no murmur, 1-2+ peripheral edema bilateral LE  GI/ABDOMEN:  normal bowel sounds, soft, nontender, no palpable masses  M/S:   extremities normal, gait normal-using a rolling walker and steady on his feet, normal muscle tone, and range of motion normal   SKIN EXAM: large purple bruise on R flank, CT site  covered with foam dressing.   NEUROLOGIC EXAM: Normal gross motor movement, tone and coordination. No tremor.  Cranial nerves 2-12 are normal tested and grossly at patient's baseline  PSYCH:  Alert and oriented to person-place-time , affect pleasant , judgement appropriate       Lab/Diagnostic data:     CBC RESULTS:   Recent Labs   Lab Test  01/09/18   1045  01/08/18   0530   WBC  9.7  13.3*   RBC  3.64*  3.94*   HGB  11.9*  12.9*   HCT  34.8*  37.8*   MCV  96  96   MCH  32.7  32.7   MCHC  34.2  34.1   RDW  11.3  11.3   PLT  345  282       Last Basic Metabolic Panel:  Recent Labs   Lab Test  01/09/18   1045  01/08/18   0530   NA  131*  131*   POTASSIUM  3.8  4.0   CHLORIDE  98  99   TITI  8.1*  8.6   CO2  25  26   BUN  23  18   CR  0.74  0.62*   GLC  252*  132*       Liver Function Studies -   Recent Labs   Lab Test  01/09/18   1045  01/08/18   0530   PROTTOTAL  7.5  7.4   ALBUMIN  2.5*  2.7*   BILITOTAL  0.6  0.8   ALKPHOS  67  54   AST  17  28   ALT  19  18       TSH   Date Value Ref Range Status   06/19/2017 7.06 (H) 0.40 - 4.00 mU/L Final   12/05/2016 2.61 0.40 - 4.00 mU/L Final   ]    Lab Results   Component Value Date    A1C 6.1 01/06/2018    A1C 5.1 04/27/2017       ASSESSMENT/PLAN:  Closed fracture of multiple ribs of right side, sequela  Fracture ribs 6-10 on R, with pain, bruising. Not adequate pain control , and he is watching the clock to request prn oxycodone q4h. Is also on tylenol scheduled. Will add oxycontin for now, with goal to decrease to discontinue as quickly as possible-hopefully before he returns to home. He is constipated, will increase bowel medications and monitor closely.     Fall, subsequent encounter  Mechanical fall-slipped on ice. No previous history of falls. Bruising on ribs.     Pneumomediastinum (H)  Subcutaneous emphysema, subsequent encounter  Traumatic pneumothorax, subsequent encounter  Good air exchange, no dyspnea. Chest tube removed at hospital and CT site is covered with  occlusive dressing. He reports swelling in his face and neck is improving. Monitor closely. Will get CXR for follow up next week per hospitalist recommendations.     Type 2 diabetes mellitus without complication, with long-term current use of insulin (H)  Lab Results   Component Value Date    A1C 6.1 01/06/2018    A1C 5.1 04/27/2017    A1C 6.2 12/05/2016    A1C 6.2 06/15/2016    A1C 6.5 03/07/2016      good control on current regimen of insulin and glipizide. Current has low dose sliding scale insulin, will continue this for now. Goal to return to PTA/home regimen asap without sliding scale .     Essential hypertension with goal blood pressure less than 140/90  On lasix, lisinopril, hydrochlorothiazide, amlodipine, atenolol. He reports he was not previously on lasix. He does have some LE edema, likely volume overload. Will monitor, discontinue lasix when edema is resolved.    Hypothyroidism, unspecified type  On levothyroxine, will defer to primary care provider, may need re-check of TSH/FT4 after acute illness subsides.   TSH   Date Value Ref Range Status   06/19/2017 7.06 (H) 0.40 - 4.00 mU/L Final   ]     Dysthymic disorder  On buspar, reports he has not always been taking this at home but would like to schedule here for now. May be able to decrease to prn .     Gastroesophageal reflux disease without esophagitis  On nexium on medication list in EPIC, but reports was taking some other samples at home as Nexium was so expensive. No symptoms, will continue current plan    Gout  Rare use of indomethacin, so this will be changed to prn. He has no symptoms now.         Orders:  1. MOM 30 ml today  2. DC Neurontin 300 mg QHS  3. Neurontin 300 mg po QHS prn Dx numbness/tingling of feet  4. DC Senna S 1 QD  5. Senna S 2 tabs po BID constipation  6. DC Vit B12  7. DC scheduled indomethacin  8. Indomethacin 50 mg po TID prn Dx gout pain  9. Oxycontin 20 mg po Q 12 hr Dx pain/pneumothorax  10. Continue current prn oxycodone  orders  11. Chest xray ap/lat veiws 1/16/18 Dx pneumothorax  12. Change of SS insulin to 0-139 = 0 u, 140-189=1 u, 190-239=2 u, 240-289=3u, 290-339=4u, 340-399=5u, 400-449=6u and 450-999 =7u and call provider if glucose > or =to 350 after correction        Information reviewed:  Medications, vital signs, orders, nursing notes, problem list, hospital information. Total time spent with patient visit was 43 minutes including patient visit and review of past records. Greater than 50% of total time spent with counseling and coordinating care, regarding length of TCU stay, goals of care. .    Electronically signed by:  Tatianna Moy CNP   [facility]  [phone] cell

## 2018-01-10 NOTE — LETTER
Health Care Home - Access Care Plan    About Me  Patient Name:  Kvng Bhatt    YOB: 1950  Age:                             67 year old   [place] MRN:            [id number] Telephone Information:     Home Phone [phone]   Mobile Not on file.       Address:    [address] [place] [id number] Email address:  No e-mail address on record      Emergency Contact(s)  Name Relationship Lgl Grd Work Phone Home Phone Mobile Phone   1. VIDHI BHATT Sister   [phone]    2. MIGDALIA COVINGTON* Sister   [phone]    3. MARA BHATT Brother   [phone]              Health Maintenance:      My Access Plan  Medical Emergency 911   Questions or concerns during clinic hours Primary Clinic Line, I will call the clinic directly: Primary Clinic: [facility]- [phone]   24 Hour Appointment Line [phone] or  [phone] [place] ([phone]) (toll free)   24 Hour Nurse Line [phone] (toll free)   Questions or concerns outside clinic hours 24 Hour Appointment Line, I will call the after-hours on-call line:   [facility] [phone] or [phone] ([phone]) (toll-free)   Preferred Urgent Care Preferred Urgent Care: [facility], [phone]   Preferred Hospital Preferred Hospital: [place], [place]  [phone]   Preferred Pharmacy [facility] [address]     Behavioral Health Crisis Line The National Suicide Prevention Lifeline at 1-768.224.1643 or 911     My Care Team Members  Patient Care Team       Relationship Specialty Notifications Start End    Marlen Ma MD PCP - General Family Practice  3/4/15     Phone: [phone] Pager: [phone] Fax: [phone] [address] [address] [facility] [address]        My Medical and Care Information  Problem List   Patient Active Problem List   Diagnosis     Acquired hypothyroidism     Dysthymic disorder     Esophageal  reflux     Nonspecific elevation of levels of transaminase or lactic acid dehydrogenase (LDH)     Cholesteatoma of external ear     ALCOH USE     Gouty arthropathy     Local infection of skin and subcutaneous tissue     Other alopecia     GERD (gastroesophageal reflux disease)     Hyperlipidemia LDL goal <100     Type 2 diabetes mellitus without complication (H)     Health Care Home     Advanced directives, counseling/discussion     Hypothyroidism     Diabetes mellitus due to underlying condition with diabetic neuropathy (H)     Family history of prostate cancer in father     DM type 2 with diabetic peripheral neuropathy (H)     Essential hypertension with goal blood pressure less than 140/90     Pneumothorax     Pneumomediastinum (H)     Urinary retention     Fall     Closed fracture of multiple ribs of right side     Subcutaneous emphysema (H)     Elevated white blood cell count      Current Medications and Allergies:  See printed Medication Report

## 2018-01-10 NOTE — PROGRESS NOTES
Clinic Care Coordination Contact  OUTREACH    Referral Information:  Referral Source: CTS  Reason for Contact: follow up    Clinical Concerns:  Current Medical Concerns: Patient reports he has been sent to TCU and just getting settled in there. Plan to D/C in about 5-6 days according to Patient. Pain is being managed with medications.       Education Provided to patient: Encouraged follow up appointment with PCP.         Plan: 1) Patient will continue to follow treatment plan as directed and follow up with PCP with concerns ongoing.   2) Care Coordination to remain available for future needs. Follow up planned for next week after discharge from TCU.    Armin Montero RN  Clinic Care Coordinator  [phone] or [phone]

## 2018-01-10 NOTE — LETTER
1/10/2018        RE: Kvng Velasquez  [address] [facility]  [address]  [facility] [address]        [facility]  PRIMARY CARE PROVIDER AND CLINIC:  Marlen Ma [address] [address] / [facility] [address]  Chief Complaint   Patient presents with     Hospital F/U       HPI:  1/5/18  Kvng Velasquez is a 67 year old  (1950),admitted to the [facility]  from [facility].  Hospital stay 1/5/18 through 1/8/18.  Admitted to this facility for  rehab, medical management and nursing care.  Current issues are:         Closed fracture of multiple ribs of right side, sequela  Fall, subsequent encounter  Pneumomediastinum (H)  Subcutaneous emphysema, subsequent encounter  Traumatic pneumothorax, subsequent encounter  Type 2 diabetes mellitus without complication, with long-term current use of insulin (H)  Essential hypertension with goal blood pressure less than 140/90     Kvng lives alone and is independent with all cares, working part time. He slipped on the ice at a laundromat parking lot, falling on the R side on 1/4/18. He managed to get himself up and drove home, with some rib pain. During the night, and on in to the next day, he developed increasing swelling in the face, neck, R arm and hand. When he arrived at the clinic, he was emergently send to ED, where he was found to have several broken ribs, a R pneumothorax (sp Chest tube placed), and subcutaneous emphysema. He gradually improved, now at TCU for rehab and strengthening.    Today, he reports significant chest wall/rib pain which he reports is under fair control if he takes the oxycodone 10 mg as close to every 4 hours as he can. He reports he is breathing fairly well, on no O2. He reports he has not had a BM for 6 days, is passing gas today. He is eating fairly well. He is diabetic, reports he has been in good control. He is alert, oriented.     CODE STATUS/ADVANCE DIRECTIVES DISCUSSION:   CPR/Full  code   Patient's living condition: lives alone    ALLERGIES:Review of patient's allergies indicates no known allergies.  PAST MEDICAL HISTORY:  has a past medical history of Acidosis; Backache, unspecified; Cholesteatoma of external ear; Dysthymic disorder; Esophageal reflux; Hemorrhage of rectum and anus; Nonspecific elevation of levels of transaminase or lactic acid dehydrogenase (LDH); Other and unspecified hyperlipidemia; Type II or unspecified type diabetes mellitus without mention of complication, not stated as uncontrolled; Unspecified essential hypertension; and Unspecified hypothyroidism.  PAST SURGICAL HISTORY:  has a past surgical history that includes surgical history of - (9/13/1999); surgical history of - (1958); surgical history of - (12/10/1990); surgical history of -; surgical history of - (3/14/1978); and surgical history of - (1982).  FAMILY HISTORY: family history includes C.A.D. in his brother, brother, and mother; CANCER in his brother; Cardiovascular in his brother and mother; DIABETES in his mother; Depression in his mother; Genitourinary Problems in his mother; Hypertension in his father and mother; Lipids in his father and mother; Prostate Cancer in his father; Thyroid Disease in his father.  SOCIAL HISTORY:  reports that he has never smoked. He has never used smokeless tobacco. He reports that he drinks alcohol. He reports that he does not use illicit drugs.    Post Discharge Medication Reconciliation Status: discharge medications reconciled and changed, per note/orders (see AVS).  Current Outpatient Prescriptions   Medication Sig Dispense Refill     bisacodyl (DULCOLAX) 10 MG Suppository Place 10 mg rectally daily as needed for constipation       magnesium citrate 1.745 GM/30ML solution Take 240 mLs by mouth once       magnesium hydroxide (MILK OF MAGNESIA) 400 MG/5ML suspension Take 30 mLs by mouth daily as needed for constipation or heartburn       Esomeprazole Magnesium (NEXIUM PO)  Take 40 mg by mouth every morning (before breakfast)       polyethylene glycol (MIRALAX/GLYCOLAX) Packet Take 17 g by mouth daily 7 packet      senna-docusate (SENOKOT-S;PERICOLACE) 8.6-50 MG per tablet Take 1 tablet by mouth daily 100 tablet      insulin aspart (NOVOLOG PEN) 100 UNIT/ML injection Inject 1-7 Units Subcutaneous 3 times daily (before meals)  - 189 give 3 unit.     - 239 give 4units.     - 289 give 5 units.     - 339 give 7 units.    - 399 give 9 units.    -449 give 10 units   BG greater than or equal to 450 give 7 units.    Notify provider if glucose greater than or equal to 350 mg/dL after administration of correction dose.       oxyCODONE IR (ROXICODONE) 5 MG tablet Take 1-2 tablets (5-10 mg) by mouth every 4 hours as needed for pain maximum 12 tablet(s) per day 40 tablet 0     acetaminophen (TYLENOL) 500 MG tablet Take 2 tablets (1,000 mg) by mouth 3 times daily 100 tablet 0     furosemide (LASIX) 40 MG tablet Take 1 tablet (40 mg) by mouth daily 60 tablet 1     lisinopril-hydrochlorothiazide (PRINZIDE/ZESTORETIC) 20-25 MG per tablet TAKE ONE TABLET BY MOUTH EVERY DAY 90 tablet 0     amLODIPine (NORVASC) 10 MG tablet TAKE ONE TABLET BY MOUTH EVERY DAY 90 tablet 1     atenolol (TENORMIN) 50 MG tablet TAKE 1 AND 1/2 TABLETS BY MOUTH ONCE DAILY 135 tablet 1     indomethacin (INDOCIN) 50 MG capsule TAKE ONE CAPSULE BY MOUTH THREE TIMES A DAY AND REDUCE TO AS NEEDED AS GOUT GETS BETTER 60 capsule 3     levothyroxine (SYNTHROID/LEVOTHROID) 112 MCG tablet Take 1 tablet (112 mcg) by mouth daily 90 tablet 3     glipiZIDE (GLUCOTROL) 10 MG tablet Take 1 tablet (10 mg) by mouth daily 30 tablet 2     Cyanocobalamin (B-12) 1000 MCG TBCR Take 1,000 mcg by mouth daily 100 tablet 1     busPIRone (BUSPAR) 15 MG tablet Take 1 tablet (15 mg) by mouth daily 30 tablet 5     insulin aspart protamine-insulin aspart (NOVOLOG MIX 70/30) VIAL 100 UNITS/ML susp 71 units before breakfast,  53 units before dinner 9 vial 1     fenofibrate 145 MG tablet Take 1 tablet (145 mg) by mouth daily Brand name 90 tablet 3     atorvastatin (LIPITOR) 40 MG tablet Take 1 tablet (40 mg) by mouth daily 90 tablet 3     nystatin-triamcinolone (MYCOLOG II) cream Apply  topically 2 times daily. (Patient taking differently: Apply topically daily as needed Apply  topically 2 times daily.) 60 g 3     triamcinolone (KENALOG) 0.1 % cream Apply sparingly to affected area three times daily for 14 days. (Patient taking differently: Apply topically as needed Apply sparingly to affected area three times daily for 14 days.) 30 g 0     ORDER FOR DME, SET TO LOCAL PRINT, Equipment being ordered: Diabetic Shoes 2 each 0     blood glucose meter (NO BRAND SPECIFIED) meter device kit Use to test blood sugar one times daily or as directed. 1 kit 1     blood glucose test strip 1 strip by In Vitro route daily 3 Month 12     thin (NO BRAND SPECIFIED) lancets Use to test blood sugar one time daily or as directed. 1 Box 3     insulin pen needle (NOVOFINE) 32G X 6 MM Use twcie daily or as directed. 100 each 3     Insulin Pen Needle (NOVOFINE 31) 31G X 6 MM MISC 1 Device 2 times daily. 100 each 11     ASPIRIN 81 MG OR TABS 1 TABLET DAILY         ROS:  4 point ROS including Respiratory, CV, GI and , other than that noted in the HPI,  is negative    Exam:  /60  Pulse 82  Temp 98.5  F (36.9  C)  Resp 18  Wt 230 lb (104.3 kg)  SpO2 91%  BMI 32.08 kg/m2  GENERAL APPEARANCE:  Alert, in no acute distress  HEAD:  Normal, normocephalic, atraumatic  EYE EXAM: normal external eye, conjunctiva, lids, FABRICE  NECK EXAM: supple, no JVD  CHEST/RESP:  respiratory effort normal, lung sounds CTA , no respiratory distress  CV:  Rate reg, rhythm reg, no murmur, 1-2+ peripheral edema bilateral LE  GI/ABDOMEN:  normal bowel sounds, soft, nontender, no palpable masses  M/S:   extremities normal, gait normal-using a rolling walker and steady on his feet,  normal muscle tone, and range of motion normal   SKIN EXAM: large purple bruise on R flank, CT site covered with foam dressing.   NEUROLOGIC EXAM: Normal gross motor movement, tone and coordination. No tremor.  Cranial nerves 2-12 are normal tested and grossly at patient's baseline  PSYCH:  Alert and oriented to person-place-time , affect pleasant , judgement appropriate       Lab/Diagnostic data:     CBC RESULTS:   Recent Labs   Lab Test  01/09/18   1045  01/08/18   0530   WBC  9.7  13.3*   RBC  3.64*  3.94*   HGB  11.9*  12.9*   HCT  34.8*  37.8*   MCV  96  96   MCH  32.7  32.7   MCHC  34.2  34.1   RDW  11.3  11.3   PLT  345  282       Last Basic Metabolic Panel:  Recent Labs   Lab Test  01/09/18   1045  01/08/18   0530   NA  131*  131*   POTASSIUM  3.8  4.0   CHLORIDE  98  99   TITI  8.1*  8.6   CO2  25  26   BUN  23  18   CR  0.74  0.62*   GLC  252*  132*       Liver Function Studies -   Recent Labs   Lab Test  01/09/18   1045  01/08/18   0530   PROTTOTAL  7.5  7.4   ALBUMIN  2.5*  2.7*   BILITOTAL  0.6  0.8   ALKPHOS  67  54   AST  17  28   ALT  19  18       TSH   Date Value Ref Range Status   06/19/2017 7.06 (H) 0.40 - 4.00 mU/L Final   12/05/2016 2.61 0.40 - 4.00 mU/L Final   ]    Lab Results   Component Value Date    A1C 6.1 01/06/2018    A1C 5.1 04/27/2017       ASSESSMENT/PLAN:  Closed fracture of multiple ribs of right side, sequela  Fracture ribs 6-10 on R, with pain, bruising. Not adequate pain control , and he is watching the clock to request prn oxycodone q4h. Is also on tylenol scheduled. Will add oxycontin for now, with goal to decrease to discontinue as quickly as possible-hopefully before he returns to home. He is constipated, will increase bowel medications and monitor closely.     Fall, subsequent encounter  Mechanical fall-slipped on ice. No previous history of falls. Bruising on ribs.     Pneumomediastinum (H)  Subcutaneous emphysema, subsequent encounter  Traumatic pneumothorax, subsequent  encounter  Good air exchange, no dyspnea. Chest tube removed at hospital and CT site is covered with occlusive dressing. He reports swelling in his face and neck is improving. Monitor closely. Will get CXR for follow up next week per hospitalist recommendations.     Type 2 diabetes mellitus without complication, with long-term current use of insulin (H)  Lab Results   Component Value Date    A1C 6.1 01/06/2018    A1C 5.1 04/27/2017    A1C 6.2 12/05/2016    A1C 6.2 06/15/2016    A1C 6.5 03/07/2016      good control on current regimen of insulin and glipizide. Current has low dose sliding scale insulin, will continue this for now. Goal to return to PTA/home regimen asap without sliding scale .     Essential hypertension with goal blood pressure less than 140/90  On lasix, lisinopril, hydrochlorothiazide, amlodipine, atenolol. He reports he was not previously on lasix. He does have some LE edema, likely volume overload. Will monitor, discontinue lasix when edema is resolved.    Hypothyroidism, unspecified type  On levothyroxine, will defer to primary care provider, may need re-check of TSH/FT4 after acute illness subsides.   TSH   Date Value Ref Range Status   06/19/2017 7.06 (H) 0.40 - 4.00 mU/L Final   ]     Dysthymic disorder  On buspar, reports he has not always been taking this at home but would like to schedule here for now. May be able to decrease to prn .     Gastroesophageal reflux disease without esophagitis  On nexium on medication list in EPIC, but reports was taking some other samples at home as Nexium was so expensive. No symptoms, will continue current plan    Gout  Rare use of indomethacin, so this will be changed to prn. He has no symptoms now.         Orders:  1. MOM 30 ml today  2. DC Neurontin 300 mg QHS  3. Neurontin 300 mg po QHS prn Dx numbness/tingling of feet  4. DC Senna S 1 QD  5. Senna S 2 tabs po BID constipation  6. DC Vit B12  7. DC scheduled indomethacin  8. Indomethacin 50 mg po TID prn  Dx gout pain  9. Oxycontin 20 mg po Q 12 hr Dx pain/pneumothorax  10. Continue current prn oxycodone orders  11. Chest xray ap/lat veiws 1/16/18 Dx pneumothorax  12. Change of SS insulin to 0-139 = 0 u, 140-189=1 u, 190-239=2 u, 240-289=3u, 290-339=4u, 340-399=5u, 400-449=6u and 450-999 =7u and call provider if glucose > or =to 350 after correction        Information reviewed:  Medications, vital signs, orders, nursing notes, problem list, hospital information. Total time spent with patient visit was 43 minutes including patient visit and review of past records. Greater than 50% of total time spent with counseling and coordinating care, regarding length of TCU stay, goals of care. .    Electronically signed by:  Tatianna Moy CNP   [facility]  [phone] cell         Sincerely,        FEDERICO Levy CNP

## 2018-01-10 NOTE — LETTER
[place] CARE COORDINATION  [facility]  [address]  [facility] [address]  Phone: [phone]    January 10, 2018      Kvng Velasquez  [address] [address] [address]      Dear Kvng,    I am a clinic care coordinator who works with Marlen Ma MD at [facility]. I have been trying to reach you recently to introduce Clinic Care Coordination and to see if there was anything I could assist you with.  I wanted to introduce myself and provide you with my contact information so that you can call me with questions or concerns about your health care. Below is a description of clinic care coordination and how I can further assist you.     The clinic care coordinator is a registered nurse and/or  who understand the health care system. The goal of clinic care coordination is to help you manage your health and improve access to the [facility] system in the most efficient manner. The registered nurse can assist you in meeting your health care goals by providing education, coordinating services, and strengthening the communication among your providers. The  can assist you with financial, behavioral, psychosocial, chemical dependency, counseling, and/or psychiatric resources.    Please feel free to contact me at [phone], [phone], with any questions or concerns. We at [facility] are focused on providing you with the highest-quality healthcare experience possible and that all starts with you.     Sincerely,     Armin Montero RN  Clinic Care Coordinator    Enclosed: I have enclosed a copy of a 24 Hour Access Plan. This has helpful phone numbers for you to call when needed. Please keep this in an easy to access place to use as needed.

## 2018-01-10 NOTE — PROGRESS NOTES
Clinic Care Coordination Contact  [facility]/Voicemail    Referral Source: [facility]  Clinical Data: Care Coordinator Outreach  Outreach attempted x 1.  Left message on voicemail with call back information and requested return call.  Plan: Care Coordinator will mail out care coordination introduction letter with care coordinator contact information and explanation of care coordination services. Care Coordinator will try to reach patient again in 1-2 business days.  Armin Montero RN  Clinic Care Coordinator  [phone] or [phone]

## 2018-01-12 ENCOUNTER — NURSING HOME VISIT (OUTPATIENT)
Dept: GERIATRICS | Facility: CLINIC | Age: 68
End: 2018-01-12
Payer: MEDICARE

## 2018-01-12 VITALS
DIASTOLIC BLOOD PRESSURE: 74 MMHG | OXYGEN SATURATION: 92 % | TEMPERATURE: 97 F | WEIGHT: 230 LBS | SYSTOLIC BLOOD PRESSURE: 121 MMHG | BODY MASS INDEX: 32.08 KG/M2 | RESPIRATION RATE: 17 BRPM | HEART RATE: 66 BPM

## 2018-01-12 DIAGNOSIS — Z79.4 TYPE 2 DIABETES MELLITUS WITHOUT COMPLICATION, WITH LONG-TERM CURRENT USE OF INSULIN (H): Primary | ICD-10-CM

## 2018-01-12 DIAGNOSIS — E11.9 TYPE 2 DIABETES MELLITUS WITHOUT COMPLICATION, WITH LONG-TERM CURRENT USE OF INSULIN (H): Primary | ICD-10-CM

## 2018-01-12 LAB
BASOPHILS # BLD AUTO: 0.2 10E9/L (ref 0–0.2)
BASOPHILS NFR BLD AUTO: 1 %
DIFFERENTIAL METHOD BLD: ABNORMAL
EOSINOPHIL # BLD AUTO: 0 10E9/L (ref 0–0.7)
EOSINOPHIL NFR BLD AUTO: 0 %
ERYTHROCYTE [DISTWIDTH] IN BLOOD BY AUTOMATED COUNT: 11.5 % (ref 10–15)
HCT VFR BLD AUTO: 40 % (ref 40–53)
HGB BLD-MCNC: 13.9 G/DL (ref 13.3–17.7)
LYMPHOCYTES # BLD AUTO: 5.1 10E9/L (ref 0.8–5.3)
LYMPHOCYTES NFR BLD AUTO: 25 %
MCH RBC QN AUTO: 32.6 PG (ref 26.5–33)
MCHC RBC AUTO-ENTMCNC: 34.8 G/DL (ref 31.5–36.5)
MCV RBC AUTO: 94 FL (ref 78–100)
METAMYELOCYTES # BLD: 0.2 10E9/L
METAMYELOCYTES NFR BLD MANUAL: 1 %
MONOCYTES # BLD AUTO: 1.2 10E9/L (ref 0–1.3)
MONOCYTES NFR BLD AUTO: 6 %
NEUTROPHILS # BLD AUTO: 13.7 10E9/L (ref 1.6–8.3)
NEUTROPHILS NFR BLD AUTO: 67 %
PLATELET # BLD AUTO: 405 10E9/L (ref 150–450)
RBC # BLD AUTO: 4.27 10E12/L (ref 4.4–5.9)
WBC # BLD AUTO: 20.5 10E9/L (ref 4–11)

## 2018-01-12 PROCEDURE — 99308 SBSQ NF CARE LOW MDM 20: CPT | Performed by: NURSE PRACTITIONER

## 2018-01-12 NOTE — PROGRESS NOTES
"[facility]    Chief Complaint   Patient presents with     Nursing Home Acute       HPI:    Kvng Velasquez is a 67 year old  (1950), who is being seen today for an episodic care visit at [facility].    HPI information obtained from: facility chart records, facility staff, patient report and [facility] chart review. Today's concern is:  Type 2 diabetes mellitus without complication, with long-term current use of insulin (H)  nsg asking me to see pt in regard to on Novolog 70/30, 71 u q am and 53 u before evening meal. Only 11 u on stock at [facility].   No \"house stock\" of 70/30  Noted hx of lantus - pt notes he can't remember why he was off and back to 70/30 - likely cost.   Also on novolog s/s  Will receive new supply in 4 hrs of 70/30       Blood sugars-  1/10 - 126, 125, 126, 61  1/11 - 118, 120, 78, 83    REVIEW OF SYSTEMS:  4 point ROS including Respiratory, CV, GI and , other than that noted in the HPI,  is negative    /74  Pulse 66  Temp 97  F (36.1  C)  Resp 17  Wt 230 lb (104.3 kg)  SpO2 92%  BMI 32.08 kg/m2  GENERAL APPEARANCE:  Alert, in no distress  Up in bed - Ls non labored, good color    ASSESSMENT/PLAN:  Type 2 diabetes mellitus without complication, with long-term current use of insulin (H)  Give 11 u now - recheck pre lunch and use s/s - if greater than 300 will dose higher  Noted next dose of NPH will be here in time for pm meal and will have s/s coverage     F/u: BGM in 200's - ordered to give s/s as ordered - no other new orders.       Electronically signed by:  Gauri Palacios, FEDERICO CNP  "

## 2018-01-15 ENCOUNTER — HOSPITAL LABORATORY (OUTPATIENT)
Facility: OTHER | Age: 68
End: 2018-01-15

## 2018-01-15 ENCOUNTER — TELEPHONE (OUTPATIENT)
Dept: GERIATRICS | Facility: CLINIC | Age: 68
End: 2018-01-15

## 2018-01-15 VITALS
TEMPERATURE: 98.5 F | WEIGHT: 230 LBS | DIASTOLIC BLOOD PRESSURE: 77 MMHG | SYSTOLIC BLOOD PRESSURE: 127 MMHG | HEART RATE: 68 BPM | BODY MASS INDEX: 32.08 KG/M2 | RESPIRATION RATE: 18 BRPM | OXYGEN SATURATION: 93 %

## 2018-01-15 LAB
ANION GAP SERPL CALCULATED.3IONS-SCNC: 7 MMOL/L (ref 3–14)
BUN SERPL-MCNC: 15 MG/DL (ref 7–30)
CALCIUM SERPL-MCNC: 8.8 MG/DL (ref 8.5–10.1)
CHLORIDE SERPL-SCNC: 96 MMOL/L (ref 94–109)
CO2 SERPL-SCNC: 27 MMOL/L (ref 20–32)
CREAT SERPL-MCNC: 0.79 MG/DL (ref 0.66–1.25)
GFR SERPL CREATININE-BSD FRML MDRD: >90 ML/MIN/1.7M2
GLUCOSE SERPL-MCNC: 127 MG/DL (ref 70–99)
POTASSIUM SERPL-SCNC: 4.3 MMOL/L (ref 3.4–5.3)
SODIUM SERPL-SCNC: 130 MMOL/L (ref 133–144)

## 2018-01-15 NOTE — TELEPHONE ENCOUNTER
[facility] TELEPHONE ENCOUNTER    Chief Complaint   Patient presents with     chest x ray     sodium       Kvng Velasquez is a 68 year old  (1950),Nurse called today to report: follow up CXR - improved, no pneumothorax.  BMP Sodium low at 130 - stable    ASSESSMENT/PLAN  Lab and xray results    F/u with Primary NP if condition changes    Anne Marie Perez, FEDERICO CNP

## 2018-01-15 NOTE — PROGRESS NOTES
[place] [facility]  PRIMARY CARE PROVIDER AND CLINIC:  Marlen Ma [address] [facility] / [facility] [address]  Chief Complaint   Patient presents with     Clinic Care Coordination - Initial       HPI:    Kvng Velasquez is a 68 year old  (1950),admitted to the [facility].  Hospital stay 1/5/18 through 1/8/18.  Admitted to this facility for  rehab, medical management and nursing care.  HPI information obtained from: facility chart records, facility staff, patient report and [facility] chart review.      Interval History:  - Pt with significant no significant PMH of the ice, transferred to ED was found to have several broken ribs, right pneumothorax, subcutaneous emphysema, , underwent chest tube placement, gradually improved and transferred here for rehab    Current issues are:      Closed fracture of multiple ribs of right side, sequela  Pneumomediastinum (H)  Traumatic pneumothorax, subsequent encounter  Subcutaneous emphysema, subsequent encounter  -Seen and examined report pain is sharp in nature over the lateral surface of the right side, 4 out of 10 with severe exhibiting some movements alleviating with pain meds.  Denies any shortness of breath or difficulty breathing.  Reports the swelling on the side of the neck is trending down.  Denies any chest pain    -T2D:  - RN reports some readings with low glucose level, last night order was given by on call to give novolog mix 25 units instead of 53 due to hypoglycemia.   -Reports appetite is very good, and knows when his sugar drops down.     ======================================================  CODE STATUS/ADVANCE DIRECTIVES DISCUSSION:   CPR/Full code   Patient's living condition: lives alone    ALLERGIES:Review of patient's allergies indicates no known allergies.  PAST MEDICAL HISTORY:  has a past medical history of Acidosis; Backache, unspecified; Cholesteatoma of external ear; Dysthymic  disorder; Esophageal reflux; Hemorrhage of rectum and anus; Nonspecific elevation of levels of transaminase or lactic acid dehydrogenase (LDH); Other and unspecified hyperlipidemia; Type II or unspecified type diabetes mellitus without mention of complication, not stated as uncontrolled; Unspecified essential hypertension; and Unspecified hypothyroidism.  PAST SURGICAL HISTORY:  has a past surgical history that includes surgical history of - (9/13/1999); surgical history of - (1958); surgical history of - (12/10/1990); surgical history of -; surgical history of - (3/14/1978); and surgical history of - (1982).  FAMILY HISTORY: family history includes C.A.D. in his brother, brother, and mother; CANCER in his brother; Cardiovascular in his brother and mother; DIABETES in his mother; Depression in his mother; Genitourinary Problems in his mother; Hypertension in his father and mother; Lipids in his father and mother; Prostate Cancer in his father; Thyroid Disease in his father.  SOCIAL HISTORY:  reports that he has never smoked. He has never used smokeless tobacco. He reports that he drinks alcohol. He reports that he does not use illicit drugs.    Post Discharge Medication Reconciliation Status: discharge medications reconciled and changed, per note/orders (see AVS).  Current Outpatient Prescriptions   Medication Sig Dispense Refill     bisacodyl (DULCOLAX) 10 MG Suppository Place 10 mg rectally daily as needed for constipation       magnesium citrate 1.745 GM/30ML solution Take 240 mLs by mouth once       magnesium hydroxide (MILK OF MAGNESIA) 400 MG/5ML suspension Take 30 mLs by mouth daily as needed for constipation or heartburn       Esomeprazole Magnesium (NEXIUM PO) Take 40 mg by mouth every morning (before breakfast)       insulin aspart (NOVOLOG FLEXPEN) 100 UNIT/ML injection Inject Subcutaneous See Admin Instructions 0-139 = 0 u, 140-189=1 u, 190-239=2 u, 240-289=3u, 290-339=4u, 340-399=5u, 400-449=6u and  450-999 =7u and call provider if glucose > or =to 350 after correction       polyethylene glycol (MIRALAX/GLYCOLAX) Packet Take 17 g by mouth daily 7 packet      senna-docusate (SENOKOT-S;PERICOLACE) 8.6-50 MG per tablet Take 1 tablet by mouth daily 100 tablet      oxyCODONE IR (ROXICODONE) 5 MG tablet Take 1-2 tablets (5-10 mg) by mouth every 4 hours as needed for pain maximum 12 tablet(s) per day 40 tablet 0     acetaminophen (TYLENOL) 500 MG tablet Take 2 tablets (1,000 mg) by mouth 3 times daily 100 tablet 0     furosemide (LASIX) 40 MG tablet Take 1 tablet (40 mg) by mouth daily 60 tablet 1     lisinopril-hydrochlorothiazide (PRINZIDE/ZESTORETIC) 20-25 MG per tablet TAKE ONE TABLET BY MOUTH EVERY DAY 90 tablet 0     amLODIPine (NORVASC) 10 MG tablet TAKE ONE TABLET BY MOUTH EVERY DAY 90 tablet 1     atenolol (TENORMIN) 50 MG tablet TAKE 1 AND 1/2 TABLETS BY MOUTH ONCE DAILY 135 tablet 1     indomethacin (INDOCIN) 50 MG capsule TAKE ONE CAPSULE BY MOUTH THREE TIMES A DAY AND REDUCE TO AS NEEDED AS GOUT GETS BETTER 60 capsule 3     levothyroxine (SYNTHROID/LEVOTHROID) 112 MCG tablet Take 1 tablet (112 mcg) by mouth daily 90 tablet 3     glipiZIDE (GLUCOTROL) 10 MG tablet Take 1 tablet (10 mg) by mouth daily 30 tablet 2     Cyanocobalamin (B-12) 1000 MCG TBCR Take 1,000 mcg by mouth daily 100 tablet 1     busPIRone (BUSPAR) 15 MG tablet Take 1 tablet (15 mg) by mouth daily 30 tablet 5     insulin aspart protamine-insulin aspart (NOVOLOG MIX 70/30) VIAL 100 UNITS/ML susp 71 units before breakfast, 53 units before dinner 9 vial 1     fenofibrate 145 MG tablet Take 1 tablet (145 mg) by mouth daily Brand name 90 tablet 3     atorvastatin (LIPITOR) 40 MG tablet Take 1 tablet (40 mg) by mouth daily 90 tablet 3     nystatin-triamcinolone (MYCOLOG II) cream Apply  topically 2 times daily. (Patient taking differently: Apply topically daily as needed Apply  topically 2 times daily.) 60 g 3     triamcinolone (KENALOG) 0.1 %  cream Apply sparingly to affected area three times daily for 14 days. (Patient taking differently: Apply topically as needed Apply sparingly to affected area three times daily for 14 days.) 30 g 0     ORDER FOR DME, SET TO LOCAL PRINT, Equipment being ordered: Diabetic Shoes 2 each 0     blood glucose meter (NO BRAND SPECIFIED) meter device kit Use to test blood sugar one times daily or as directed. 1 kit 1     blood glucose test strip 1 strip by In Vitro route daily 3 Month 12     thin (NO BRAND SPECIFIED) lancets Use to test blood sugar one time daily or as directed. 1 Box 3     insulin pen needle (NOVOFINE) 32G X 6 MM Use twcie daily or as directed. 100 each 3     Insulin Pen Needle (NOVOFINE 31) 31G X 6 MM MISC 1 Device 2 times daily. 100 each 11     ASPIRIN 81 MG OR TABS 1 TABLET DAILY         ROS:  10 point ROS of systems including Constitutional, Eyes, Respiratory, Cardiovascular, Gastroenterology, Genitourinary, Integumentary, Muscularskeletal, Psychiatric were all negative except for pertinent positives noted in my HPI.    Exam:  /77  Pulse 68  Temp 98.5  F (36.9  C)  Resp 18  Wt 230 lb (104.3 kg)  SpO2 93%  BMI 32.08 kg/m2  GENERAL APPEARANCE:  Alert, in no distress  ENT:  Dry oral mucosa, no oral lesions noted  EYES:  EOM, conjunctivae, lids, pupils and irises normal  NECK:  No adenopathy,masses or thyromegaly  CHEST WALL:  Tenderness on palpation over the right side, slight crepitation at the base of the neck on the right side.   RESP:  respiratory effort and palpation of chest normal, lungs clear to auscultation , no respiratory distress  CV:  Palpation and auscultation of heart done , regular rate and rhythm, no murmur, rub, or gallop, no edema  ABDOMEN:  normal bowel sounds, soft, nontender, no hepatosplenomegaly or other masses  M/S:   Gait and station normal  SKIN:  Linear scab over the lateral surface of the right chest wall, small blister distal to that.   NEURO:   Cranial nerves 2-12  are normal tested and grossly at patient's baseline, no purposeful movement in upper and lower extremities  PSYCH:  oriented X 3, normal insight, judgement and memory    Lab/Diagnostic data:     CBC RESULTS:   Recent Labs   Lab Test  01/09/18   1045  01/08/18   0530   WBC  9.7  13.3*   RBC  3.64*  3.94*   HGB  11.9*  12.9*   HCT  34.8*  37.8*   MCV  96  96   MCH  32.7  32.7   MCHC  34.2  34.1   RDW  11.3  11.3   PLT  345  282       Last Basic Metabolic Panel:  Recent Labs   Lab Test  01/15/18   0651  01/09/18   1045   NA  130*  131*   POTASSIUM  4.3  3.8   CHLORIDE  96  98   TITI  8.8  8.1*   CO2  27  25   BUN  15  23   CR  0.79  0.74   GLC  127*  252*       Liver Function Studies -   Recent Labs   Lab Test  01/09/18   1045  01/08/18   0530   PROTTOTAL  7.5  7.4   ALBUMIN  2.5*  2.7*   BILITOTAL  0.6  0.8   ALKPHOS  67  54   AST  17  28   ALT  19  18       TSH   Date Value Ref Range Status   06/19/2017 7.06 (H) 0.40 - 4.00 mU/L Final   12/05/2016 2.61 0.40 - 4.00 mU/L Final   ]    Lab Results   Component Value Date    A1C 6.1 01/06/2018    A1C 5.1 04/27/2017     ASSESSMENT/PLAN:  Closed fracture of multiple ribs of right side, sequela  Pneumomediastinum (H)  Traumatic pneumothorax, subsequent encounter  Subcutaneous emphysema, subsequent encounter  - s/p thoracotomy during hospitalization removed.  The orthopedic chest x-ray on January 16 showed resolved pneumothorax, emphysema at the base of the neck and along the chest wall on the right side is ongoing.  -Clinically stable.  Emphysema resolving.    Type 2 diabetes mellitus without complication, with long-term current use of insulin (H)  Lab Results   Component Value Date    A1C 6.1 01/06/2018    A1C 5.1 04/27/2017   -Over controlled this age group.  He was sleeping between 7 and 8  -On glipizide 10 mg daily, Novolog mix 70/30 71 units before breakfast and 53 before dinner, and having some hypoglycemic episodes last night the dose was reduced to 25×1.  -We will DC  sliding scale insulin  -Reduce a.m. dose of Novolog from 71 minutes to 60 minutes.  -Primary care to follow on outpatient basis.    Essential hypertension with goal blood pressure less than 140/90  BP Readings from Last 3 Encounters:   01/15/18 127/77   01/12/18 121/74   01/10/18 124/60   -Multiple medications, likely secondary hypertension  -We will need referral to nephrologist on outpatient basis  -If systolic blood pressure above 130 but less than 140 in diabetic patient to reduce mortality rate.  -Clinically stable    Hypothyroidism, unspecified type  TSH   Date Value Ref Range Status   06/19/2017 7.06 (H) 0.40 - 4.00 mU/L Final   - on LT4.   - in this age group keep TSH around 4.     Hyponatremia  -Mild, on hydrochlorothiazide which could be contributing, also increase free water intake could be a contributing factor. .  -Continue to follow-up on the sodium level and manage accordingly.  -----------------------------------------------------------------------------  Orders:  1. Reduce novolog mix a.m dose from 71 to 60 units Dx DM2  2. See above, otherwise, continue the rest of the current POC.     Total time spent with patient visit was 45 min including patient visit and review of past records. Greater than 50% of total time spent with counseling and coordinating care due to discussing management plan for diabetes, hyponitremia, hypothyroidism. .     Electronically signed by:    Qamar Rodriguez MD

## 2018-01-16 ENCOUNTER — NURSING HOME VISIT (OUTPATIENT)
Dept: GERIATRICS | Facility: CLINIC | Age: 68
End: 2018-01-16
Payer: MEDICARE

## 2018-01-16 DIAGNOSIS — S22.41XS CLOSED FRACTURE OF MULTIPLE RIBS OF RIGHT SIDE, SEQUELA: Primary | ICD-10-CM

## 2018-01-16 DIAGNOSIS — J98.2 PNEUMOMEDIASTINUM (H): ICD-10-CM

## 2018-01-16 DIAGNOSIS — E03.9 HYPOTHYROIDISM, UNSPECIFIED TYPE: ICD-10-CM

## 2018-01-16 DIAGNOSIS — Z79.4 TYPE 2 DIABETES MELLITUS WITHOUT COMPLICATION, WITH LONG-TERM CURRENT USE OF INSULIN (H): ICD-10-CM

## 2018-01-16 DIAGNOSIS — S27.0XXD TRAUMATIC PNEUMOTHORAX, SUBSEQUENT ENCOUNTER: ICD-10-CM

## 2018-01-16 DIAGNOSIS — E11.9 TYPE 2 DIABETES MELLITUS WITHOUT COMPLICATION, WITH LONG-TERM CURRENT USE OF INSULIN (H): ICD-10-CM

## 2018-01-16 DIAGNOSIS — T79.7XXD SUBCUTANEOUS EMPHYSEMA, SUBSEQUENT ENCOUNTER: ICD-10-CM

## 2018-01-16 DIAGNOSIS — I10 ESSENTIAL HYPERTENSION WITH GOAL BLOOD PRESSURE LESS THAN 140/90: ICD-10-CM

## 2018-01-16 DIAGNOSIS — E87.1 HYPONATREMIA: ICD-10-CM

## 2018-01-16 PROCEDURE — 99306 1ST NF CARE HIGH MDM 50: CPT | Performed by: FAMILY MEDICINE

## 2018-01-17 VITALS
DIASTOLIC BLOOD PRESSURE: 72 MMHG | OXYGEN SATURATION: 94 % | SYSTOLIC BLOOD PRESSURE: 124 MMHG | WEIGHT: 230 LBS | HEART RATE: 57 BPM | RESPIRATION RATE: 16 BRPM | TEMPERATURE: 97.9 F | BODY MASS INDEX: 32.08 KG/M2

## 2018-01-17 NOTE — PROGRESS NOTES
[place] [facility] DISCHARGE SUMMARY    PATIENT'S NAME: Kvng Velasquez  YOB: 1950  MEDICAL RECORD NUMBER:  [id number]    PRIMARY CARE PROVIDER AND CLINIC RESPONSIBLE AFTER TRANSFER: Marlen Ma [address] [facility] [address]     CODE STATUS/ADVANCE DIRECTIVES DISCUSSION:   CPR/Full code      No Known Allergies    TRANSFERRING PROVIDERS: Tatianna Moy CNP,   DATE OF SNF ADMISSION:  January / 08 / 2018  DATE OF SNF (anticipated) DISCHARGE: January / 18 / 2018  DISCHARGE DISPOSITION: [facility] Provider   Nursing Facility[facility] stay 1/5/18 to 1/8/18.     Condition on Discharge:  Improving.  Function:  Ambulatory with rolling walker   Cognitive Scores: SLUMS 19/30    Equipment:  Patient walking with SBA and 4WW.    DISCHARGE DIAGNOSIS:   1. Closed fracture of multiple ribs of right side, sequela    2. Pneumomediastinum (H)    3. Traumatic pneumothorax, subsequent encounter    4. Subcutaneous emphysema, subsequent encounter    5. Fall, subsequent encounter    6. Type 2 diabetes mellitus without complication, with long-term current use of insulin (H)    7. Essential hypertension with goal blood pressure less than 140/90        HPI Nursing Facility Course:  HPI information obtained from: facility chart records, facility staff, patient report and [facility] chart review.  Patient slipped on ice in parking lot and suffered fall to R side. Progressively increased dyspnea and swelling to face/neck/chest and was seen in clinic then transferred to ED for evaluation. Found to have traumatic pneumothorax from rib fracture and was hospitalized, then had skilled nursing facility stay from 1/9/18 to 1/18/18 after pneumothorax, Fall, Right Rib fractures, and Subcutaneous Emphysema. Nursing provided medication administration, pain management, diabetic monitoring/management/education, and assistance with activities of daily living. Patient  participated in PT/OT 5-6x per week for strengthening and conditioning. Patient walking with SBA and 4WW. Discharge is patient self-driven; has not met all goals for PT/OT. Will return home with recommended outpatient PT.  Blood sugars-  1/16 - 250, 92. 118, 168  1/17 - 235, 309, 221, 133      Closed fracture of multiple ribs of right side, sequela  Continues to have pain in R ribs area, bruising is starting to resolve. He is currently on Oxycontin, with prn oxycodone. Pain is improving, averaging 4-7/10 per report over the last few days. He is currently on scheduled tylenol, oxycontin, and prn oxycodone. He has been instructed to decrease oxycontin use with goal to discontinue very quickly and he appears to understand this. This should be monitored closely for compliance. He has only #14 tabs left and no refills should be offered. He will be sent home with about #15 oxycodone as well, for which refills should be offered sparingly.     Pneumomediastinum (H)  Traumatic pneumothorax, subsequent encounter  Subcutaneous emphysema, subsequent encounter  Swelling and angioedema is resolving, no dyspnea, no cough. Sites of Chest tubes remain bruised but scabbed and without drainage.     Fall, subsequent encounter  He has been successful in PT/OT, though they would like him to continue therapy to further improve strength and endurance. He is not sure if he will attend outpatient PT or not. He is interested in returning to work, is hopeful he can do that soon.     Type 2 diabetes mellitus without complication, with long-term current use of insulin (H)  Blood sugar trends as noted above. He has been managing his diabetes successfully prior to admission with glipizide, Novolog 70/30 BID. Insulin has been changed a bit as he was trending some low blood sugars in TCU.  Goal A1C at this stage is 7-8% to avoid hypoglycemia.   Lab Results   Component Value Date    A1C 6.1 01/06/2018    A1C 5.1 04/27/2017    A1C 6.2 12/05/2016     A1C 6.2 06/15/2016    A1C 6.5 03/07/2016          Essential hypertension with goal blood pressure less than 140/90  He is currently on lasix, lisinopril, hydrochlorothiazide, amlodipine, atenolol. He states that he was not previously on lasix. BP in excellent range, The current medical regimen is effective;  continue present plan and medications.   BP Readings from Last 6 Encounters:   01/17/18 124/72   01/15/18 127/77   01/12/18 121/74   01/10/18 124/60   01/09/18 128/68   07/12/17 136/61          PAST MEDICAL HISTORY:  has a past medical history of Acidosis; Backache, unspecified; Cholesteatoma of external ear; Dysthymic disorder; Esophageal reflux; Hemorrhage of rectum and anus; Nonspecific elevation of levels of transaminase or lactic acid dehydrogenase (LDH); Other and unspecified hyperlipidemia; Type II or unspecified type diabetes mellitus without mention of complication, not stated as uncontrolled; Unspecified essential hypertension; and Unspecified hypothyroidism.    DISCHARGE MEDICATIONS:  Current Outpatient Prescriptions   Medication Sig Dispense Refill     GABAPENTIN PO Take 300 mg by mouth daily       oxyCODONE (OXYCONTIN) 20 MG 12 hr tablet Take 20 mg by mouth every 12 hours       senna-docusate (SENOKOT-S;PERICOLACE) 8.6-50 MG per tablet Take 2 tablets by mouth 2 times daily       insulin aspart prot & aspart (NOVOLOG MIX 70/30 FLEXPEN) injection Inject Subcutaneous See Admin Instructions 60 units with breakfast and 53 u before dinner       Esomeprazole Magnesium (NEXIUM PO) Take 40 mg by mouth every morning (before breakfast)       oxyCODONE IR (ROXICODONE) 5 MG tablet Take 1-2 tablets (5-10 mg) by mouth every 4 hours as needed for pain maximum 12 tablet(s) per day 40 tablet 0     acetaminophen (TYLENOL) 500 MG tablet Take 2 tablets (1,000 mg) by mouth 3 times daily 100 tablet 0     furosemide (LASIX) 40 MG tablet Take 1 tablet (40 mg) by mouth daily 60 tablet 1     lisinopril-hydrochlorothiazide  (PRINZIDE/ZESTORETIC) 20-25 MG per tablet TAKE ONE TABLET BY MOUTH EVERY DAY 90 tablet 0     amLODIPine (NORVASC) 10 MG tablet TAKE ONE TABLET BY MOUTH EVERY DAY 90 tablet 1     atenolol (TENORMIN) 50 MG tablet TAKE 1 AND 1/2 TABLETS BY MOUTH ONCE DAILY 135 tablet 1     indomethacin (INDOCIN) 50 MG capsule TAKE ONE CAPSULE BY MOUTH THREE TIMES A DAY AND REDUCE TO AS NEEDED AS GOUT GETS BETTER 60 capsule 3     levothyroxine (SYNTHROID/LEVOTHROID) 112 MCG tablet Take 1 tablet (112 mcg) by mouth daily 90 tablet 3     glipiZIDE (GLUCOTROL) 10 MG tablet Take 1 tablet (10 mg) by mouth daily 30 tablet 2     busPIRone (BUSPAR) 15 MG tablet Take 1 tablet (15 mg) by mouth daily 30 tablet 5     fenofibrate 145 MG tablet Take 1 tablet (145 mg) by mouth daily Brand name 90 tablet 3     atorvastatin (LIPITOR) 40 MG tablet Take 1 tablet (40 mg) by mouth daily 90 tablet 3     nystatin-triamcinolone (MYCOLOG II) cream Apply  topically 2 times daily. (Patient taking differently: Apply topically daily as needed Apply  topically 2 times daily.) 60 g 3     triamcinolone (KENALOG) 0.1 % cream Apply sparingly to affected area three times daily for 14 days. (Patient taking differently: Apply topically as needed Apply sparingly to affected area three times daily for 14 days.) 30 g 0     ORDER FOR DME, SET TO LOCAL PRINT, Equipment being ordered: Diabetic Shoes 2 each 0     blood glucose meter (NO BRAND SPECIFIED) meter device kit Use to test blood sugar one times daily or as directed. 1 kit 1     blood glucose test strip 1 strip by In Vitro route daily 3 Month 12     thin (NO BRAND SPECIFIED) lancets Use to test blood sugar one time daily or as directed. 1 Box 3     insulin pen needle (NOVOFINE) 32G X 6 MM Use twcie daily or as directed. 100 each 3     Insulin Pen Needle (NOVOFINE 31) 31G X 6 MM MISC 1 Device 2 times daily. 100 each 11     ASPIRIN 81 MG OR TABS 1 TABLET DAILY         MEDICATION CHANGES/RATIONALE:   Oxycontin started for  better pain control  Oxycodone started for pain control  Tylenol scheduled for pain control  Lasix started for edema management.     Controlled medications sent with patient: Medication: Oxycontin , 14 tabs given to patient at the time of discharge to take home and Medication: oxycodone , 15 tabs given to patient at the time of discharge to take home     Review of Systems:  No CP, SOB, Cough, dizziness, fevers, chills, HA, N/V, dysuria or Bowel Abnormalities. Appetite is good.  Pain controlled.     /72  Pulse 57  Temp 97.9  F (36.6  C)  Resp 16  Wt 230 lb (104.3 kg)  SpO2 94%  BMI 32.08 kg/m2    Physical Exam:  A & O x4, NAD. Lungs CTA, non labored. RRR, S1/S2 w/o murmur,gallop or rub.  Trace edema.  Abdomen soft, nontender. Incision is CDI.      DISCHARGE PLAN:  Outpatient PT  Patient instructed to follow-up with:  PCP in 7 days      [facility] scheduled appointments:  Future Appointments  Date Time Provider Department Center   1/19/2018 10:00 AM Marlen Ma MD [facility]        MTM referral needed and placed by this provider: No    Pending labs: none  SNF labs   Results for orders placed or performed in visit on 01/15/18   Basic metabolic panel   Result Value Ref Range    Sodium 130 (L) 133 - 144 mmol/L    Potassium 4.3 3.4 - 5.3 mmol/L    Chloride 96 94 - 109 mmol/L    Carbon Dioxide 27 20 - 32 mmol/L    Anion Gap 7 3 - 14 mmol/L    Glucose 127 (H) 70 - 99 mg/dL    Urea Nitrogen 15 7 - 30 mg/dL    Creatinine 0.79 0.66 - 1.25 mg/dL    GFR Estimate >90 >60 mL/min/1.7m2    GFR Estimate If Black >90 >60 mL/min/1.7m2    Calcium 8.8 8.5 - 10.1 mg/dL       Discharge Treatments:    1. DC all oxygen related orders.  2. DC prn tylenol - patient on scheduled tylenol  3. DC routine standing house bowel orders- mag citrate, MOM, Dulc supp, sod phos enema.  4. DC sliding scale novolog  5. May DC to home with all current meds, treatments and narcotics  6. F2F for 2WW done  7. Please wean off oxycontin use  gradually. Use only at night for a few days then discontinue, no RX given.  8. Please wean off using oxycodone - home with 15# tabs , no RX given.  9. F/U with PCP in 7-10 days  10. Outpatient PT okay Dx weakness        TOTAL DISCHARGE TIME:   Greater than 30 minutes  Electronically signed by:  Tatianna Moy CNP   [facility]  [phone] cell       MEDICAL NECESSITY STATEMENT FOR DME    Demographic Information on Kvng Velasquez:    Kvng Velasquez  Gender: male  : 1950  [address] [address] [address]  [phone] (home)     Medical Record: [id number]  Social Security Number: [id number]  Primary Care Provider: Marlen Ma  Insurance: Payor: MEDICARE / Plan: MEDICARE / Product Type: Medicare /       Closed fracture of multiple ribs of right side, sequela [S22.41XS]    DME:  WALKER: Yes, 2 WW needs assistance with ambulation due to weakness.      VITAL SIGNS:  Vitals: /72  Pulse 57  Temp 97.9  F (36.6  C)  Resp 16  Wt 230 lb (104.3 kg)  SpO2 94%  BMI 32.08 kg/m2  BMI= Body mass index is 32.08 kg/(m^2).     MEDICAL NECESSITY STATEMENT (describe reason to support DME here including length of need)    Kvng has increased weakness, balance issues that increases his risk of falls. His balance is improved with a rolling walker and he is able to safely use it. His functional mobility deficit is resolved with the use of the rolling walker .       ICD-10-CM    1. Closed fracture of multiple ribs of right side, sequela S22.41XS    2. Pneumomediastinum (H) J98.2    3. Traumatic pneumothorax, subsequent encounter S27.0XXD    4. Subcutaneous emphysema, subsequent encounter T79.7XXD    5. Fall, subsequent encounter W19.XXXD    6. Type 2 diabetes mellitus without complication, with long-term current use of insulin (H) E11.9     Z79.4    7. Essential hypertension with goal blood pressure less than 140/90 I10      Mechanical fall on ice resulting in several rib fractures  with traumatic pneumothorax. He remains weak, with pain in side, and requires use of rolling walker for stability in the home. He is able to manage the walker independently.     ELECTRONICALLY SIGNED BY IVON CERTIFIED PROVIDER:  FEDERICO Levy CNP   NPI: [id number]  [place] [facility]  [address] [place] [address], [address]  [place] [address]

## 2018-01-18 ENCOUNTER — DISCHARGE SUMMARY NURSING HOME (OUTPATIENT)
Dept: GERIATRICS | Facility: CLINIC | Age: 68
End: 2018-01-18
Payer: MEDICARE

## 2018-01-18 DIAGNOSIS — E11.9 TYPE 2 DIABETES MELLITUS WITHOUT COMPLICATION, WITH LONG-TERM CURRENT USE OF INSULIN (H): ICD-10-CM

## 2018-01-18 DIAGNOSIS — I10 ESSENTIAL HYPERTENSION WITH GOAL BLOOD PRESSURE LESS THAN 140/90: ICD-10-CM
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
## 2018-01-18 DIAGNOSIS — S22.41XS CLOSED FRACTURE OF MULTIPLE RIBS OF RIGHT SIDE, SEQUELA: Primary | ICD-10-CM

## 2018-01-18 DIAGNOSIS — S27.0XXD TRAUMATIC PNEUMOTHORAX, SUBSEQUENT ENCOUNTER: ICD-10-CM

## 2018-01-18 DIAGNOSIS — T79.7XXD SUBCUTANEOUS EMPHYSEMA, SUBSEQUENT ENCOUNTER: ICD-10-CM

## 2018-01-18 DIAGNOSIS — J98.2 PNEUMOMEDIASTINUM (H): ICD-10-CM

## 2018-01-18 DIAGNOSIS — Z79.4 TYPE 2 DIABETES MELLITUS WITHOUT COMPLICATION, WITH LONG-TERM CURRENT USE OF INSULIN (H): ICD-10-CM

## 2018-01-18 PROCEDURE — 99316 NF DSCHRG MGMT 30 MIN+: CPT | Performed by: NURSE PRACTITIONER

## 2018-01-18 RX ORDER — OXYCODONE HCL 20 MG/1
20 TABLET, FILM COATED, EXTENDED RELEASE ORAL EVERY 12 HOURS
COMMUNITY
End: 2018-02-28

## 2018-01-18 RX ORDER — AMOXICILLIN 250 MG
2 CAPSULE ORAL 2 TIMES DAILY
COMMUNITY
End: 2018-02-28

## 2018-01-18 NOTE — LETTER
1/18/2018        RE: Kvng Velasquez  [address] [address] [address]        [place] [facility] DISCHARGE SUMMARY    PATIENT'S NAME: Kvng Velasquez  YOB: 1950  MEDICAL RECORD NUMBER:  [id number]    PRIMARY CARE PROVIDER AND CLINIC RESPONSIBLE AFTER TRANSFER: Marlen Ma [id number] HUSAM TO / [facility] [address]     CODE STATUS/ADVANCE DIRECTIVES DISCUSSION:   CPR/Full code      No Known Allergies    TRANSFERRING PROVIDERS: Tatianna Moy CNP,   DATE OF SNF ADMISSION:  January / 08 / 2018  DATE OF SNF (anticipated) DISCHARGE: January / 18 / 2018  DISCHARGE DISPOSITION: [facility] Provider   Nursing Facility: [facility] by the [facility] stay 1/5/18 to 1/8/18.     Condition on Discharge:  Improving.  Function:  Ambulatory with rolling walker   Cognitive Scores: SLUMS 19/30    Equipment:  Patient walking with SBA and 4WW.    DISCHARGE DIAGNOSIS:   1. Closed fracture of multiple ribs of right side, sequela    2. Pneumomediastinum (H)    3. Traumatic pneumothorax, subsequent encounter    4. Subcutaneous emphysema, subsequent encounter    5. Fall, subsequent encounter    6. Type 2 diabetes mellitus without complication, with long-term current use of insulin (H)    7. Essential hypertension with goal blood pressure less than 140/90        HPI Nursing Facility Course:  HPI information obtained from: facility chart records, facility staff, patient report and [facility] chart review.  Patient slipped on ice in parking lot and suffered fall to R side. Progressively increased dyspnea and swelling to face/neck/chest and was seen in clinic then transferred to ED for evaluation. Found to have traumatic pneumothorax from rib fracture and was hospitalized, then had skilled nursing facility stay from 1/9/18 to 1/18/18 after pneumothorax, Fall, Right Rib fractures, and Subcutaneous Emphysema. Nursing provided medication administration, pain management,  diabetic monitoring/management/education, and assistance with activities of daily living. Patient participated in PT/OT 5-6x per week for strengthening and conditioning. Patient walking with SBA and 4WW. Discharge is patient self-driven; has not met all goals for PT/OT. Will return home with recommended outpatient PT.  Blood sugars-  1/16 - 250, 92. 118, 168  1/17 - 235, 309, 221, 133      Closed fracture of multiple ribs of right side, sequela  Continues to have pain in R ribs area, bruising is starting to resolve. He is currently on Oxycontin, with prn oxycodone. Pain is improving, averaging 4-7/10 per report over the last few days. He is currently on scheduled tylenol, oxycontin, and prn oxycodone. He has been instructed to decrease oxycontin use with goal to discontinue very quickly and he appears to understand this. This should be monitored closely for compliance. He has only #14 tabs left and no refills should be offered. He will be sent home with about #15 oxycodone as well, for which refills should be offered sparingly.     Pneumomediastinum (H)  Traumatic pneumothorax, subsequent encounter  Subcutaneous emphysema, subsequent encounter  Swelling and angioedema is resolving, no dyspnea, no cough. Sites of Chest tubes remain bruised but scabbed and without drainage.     Fall, subsequent encounter  He has been successful in PT/OT, though they would like him to continue therapy to further improve strength and endurance. He is not sure if he will attend outpatient PT or not. He is interested in returning to work, is hopeful he can do that soon.     Type 2 diabetes mellitus without complication, with long-term current use of insulin (H)  Blood sugar trends as noted above. He has been managing his diabetes successfully prior to admission with glipizide, Novolog 70/30 BID. Insulin has been changed a bit as he was trending some low blood sugars in TCU.  Goal A1C at this stage is 7-8% to avoid hypoglycemia.   Lab  Results   Component Value Date    A1C 6.1 01/06/2018    A1C 5.1 04/27/2017    A1C 6.2 12/05/2016    A1C 6.2 06/15/2016    A1C 6.5 03/07/2016          Essential hypertension with goal blood pressure less than 140/90  He is currently on lasix, lisinopril, hydrochlorothiazide, amlodipine, atenolol. He states that he was not previously on lasix. BP in excellent range, The current medical regimen is effective;  continue present plan and medications.   BP Readings from Last 6 Encounters:   01/17/18 124/72   01/15/18 127/77   01/12/18 121/74   01/10/18 124/60   01/09/18 128/68   07/12/17 136/61          PAST MEDICAL HISTORY:  has a past medical history of Acidosis; Backache, unspecified; Cholesteatoma of external ear; Dysthymic disorder; Esophageal reflux; Hemorrhage of rectum and anus; Nonspecific elevation of levels of transaminase or lactic acid dehydrogenase (LDH); Other and unspecified hyperlipidemia; Type II or unspecified type diabetes mellitus without mention of complication, not stated as uncontrolled; Unspecified essential hypertension; and Unspecified hypothyroidism.    DISCHARGE MEDICATIONS:  Current Outpatient Prescriptions   Medication Sig Dispense Refill     GABAPENTIN PO Take 300 mg by mouth daily       oxyCODONE (OXYCONTIN) 20 MG 12 hr tablet Take 20 mg by mouth every 12 hours       senna-docusate (SENOKOT-S;PERICOLACE) 8.6-50 MG per tablet Take 2 tablets by mouth 2 times daily       insulin aspart prot & aspart (NOVOLOG MIX 70/30 FLEXPEN) injection Inject Subcutaneous See Admin Instructions 60 units with breakfast and 53 u before dinner       Esomeprazole Magnesium (NEXIUM PO) Take 40 mg by mouth every morning (before breakfast)       oxyCODONE IR (ROXICODONE) 5 MG tablet Take 1-2 tablets (5-10 mg) by mouth every 4 hours as needed for pain maximum 12 tablet(s) per day 40 tablet 0     acetaminophen (TYLENOL) 500 MG tablet Take 2 tablets (1,000 mg) by mouth 3 times daily 100 tablet 0     furosemide (LASIX)  40 MG tablet Take 1 tablet (40 mg) by mouth daily 60 tablet 1     lisinopril-hydrochlorothiazide (PRINZIDE/ZESTORETIC) 20-25 MG per tablet TAKE ONE TABLET BY MOUTH EVERY DAY 90 tablet 0     amLODIPine (NORVASC) 10 MG tablet TAKE ONE TABLET BY MOUTH EVERY DAY 90 tablet 1     atenolol (TENORMIN) 50 MG tablet TAKE 1 AND 1/2 TABLETS BY MOUTH ONCE DAILY 135 tablet 1     indomethacin (INDOCIN) 50 MG capsule TAKE ONE CAPSULE BY MOUTH THREE TIMES A DAY AND REDUCE TO AS NEEDED AS GOUT GETS BETTER 60 capsule 3     levothyroxine (SYNTHROID/LEVOTHROID) 112 MCG tablet Take 1 tablet (112 mcg) by mouth daily 90 tablet 3     glipiZIDE (GLUCOTROL) 10 MG tablet Take 1 tablet (10 mg) by mouth daily 30 tablet 2     busPIRone (BUSPAR) 15 MG tablet Take 1 tablet (15 mg) by mouth daily 30 tablet 5     fenofibrate 145 MG tablet Take 1 tablet (145 mg) by mouth daily Brand name 90 tablet 3     atorvastatin (LIPITOR) 40 MG tablet Take 1 tablet (40 mg) by mouth daily 90 tablet 3     nystatin-triamcinolone (MYCOLOG II) cream Apply  topically 2 times daily. (Patient taking differently: Apply topically daily as needed Apply  topically 2 times daily.) 60 g 3     triamcinolone (KENALOG) 0.1 % cream Apply sparingly to affected area three times daily for 14 days. (Patient taking differently: Apply topically as needed Apply sparingly to affected area three times daily for 14 days.) 30 g 0     ORDER FOR DME, SET TO LOCAL PRINT, Equipment being ordered: Diabetic Shoes 2 each 0     blood glucose meter (NO BRAND SPECIFIED) meter device kit Use to test blood sugar one times daily or as directed. 1 kit 1     blood glucose test strip 1 strip by In Vitro route daily 3 Month 12     thin (NO BRAND SPECIFIED) lancets Use to test blood sugar one time daily or as directed. 1 Box 3     insulin pen needle (NOVOFINE) 32G X 6 MM Use twcie daily or as directed. 100 each 3     Insulin Pen Needle (NOVOFINE 31) 31G X 6 MM MISC 1 Device 2 times daily. 100 each 11      ASPIRIN 81 MG OR TABS 1 TABLET DAILY         MEDICATION CHANGES/RATIONALE:   Oxycontin started for better pain control  Oxycodone started for pain control  Tylenol scheduled for pain control  Lasix started for edema management.     Controlled medications sent with patient: Medication: Oxycontin , 14 tabs given to patient at the time of discharge to take home and Medication: oxycodone , 15 tabs given to patient at the time of discharge to take home     Review of Systems:  No CP, SOB, Cough, dizziness, fevers, chills, HA, N/V, dysuria or Bowel Abnormalities. Appetite is good.  Pain controlled.     /72  Pulse 57  Temp 97.9  F (36.6  C)  Resp 16  Wt 230 lb (104.3 kg)  SpO2 94%  BMI 32.08 kg/m2    Physical Exam:  A & O x4, NAD. Lungs CTA, non labored. RRR, S1/S2 w/o murmur,gallop or rub.  Trace edema.  Abdomen soft, nontender. Incision is CDI.      DISCHARGE PLAN:  Outpatient PT  Patient instructed to follow-up with:  PCP in 7 days      [facility] scheduled appointments:  Future Appointments  Date Time Provider Department Center   1/19/2018 10:00 AM Marlen Ma MD [facility]        MTM referral needed and placed by this provider: No    Pending labs: none  SNF labs   Results for orders placed or performed in visit on 01/15/18   Basic metabolic panel   Result Value Ref Range    Sodium 130 (L) 133 - 144 mmol/L    Potassium 4.3 3.4 - 5.3 mmol/L    Chloride 96 94 - 109 mmol/L    Carbon Dioxide 27 20 - 32 mmol/L    Anion Gap 7 3 - 14 mmol/L    Glucose 127 (H) 70 - 99 mg/dL    Urea Nitrogen 15 7 - 30 mg/dL    Creatinine 0.79 0.66 - 1.25 mg/dL    GFR Estimate >90 >60 mL/min/1.7m2    GFR Estimate If Black >90 >60 mL/min/1.7m2    Calcium 8.8 8.5 - 10.1 mg/dL       Discharge Treatments:    1. DC all oxygen related orders.  2. DC prn tylenol - patient on scheduled tylenol  3. DC routine standing house bowel orders- mag citrate, MOM, Dulc supp, sod phos enema.  4. DC sliding scale novolog  5. May DC to home with  all current meds, treatments and narcotics  6. F2F for 2WW done  7. Please wean off oxycontin use gradually. Use only at night for a few days then discontinue, no RX given.  8. Please wean off using oxycodone - home with 15# tabs , no RX given.  9. F/U with PCP in 7-10 days  10. Outpatient PT okay Dx weakness        TOTAL DISCHARGE TIME:   Greater than 30 minutes  Electronically signed by:  Tatianna Moy CNP   [facility]  [phone] cell       MEDICAL NECESSITY STATEMENT FOR DME    Demographic Information on Kvng Velasquez:    Kvng Velasqeuz  Gender: male  : 1950  [address] [address] [address] [phone] (home)     Medical Record: [id number]  Social Security Number: [id number]  Primary Care Provider: Marlen Ma  Insurance: Payor: MEDICARE / Plan: MEDICARE / Product Type: Medicare /       Closed fracture of multiple ribs of right side, sequela [S22.41XS]    DME:  WALKER: Yes, 2 WW needs assistance with ambulation due to weakness.      VITAL SIGNS:  Vitals: /72  Pulse 57  Temp 97.9  F (36.6  C)  Resp 16  Wt 230 lb (104.3 kg)  SpO2 94%  BMI 32.08 kg/m2  BMI= Body mass index is 32.08 kg/(m^2).     MEDICAL NECESSITY STATEMENT (describe reason to support DME here including length of need)      ICD-10-CM    1. Closed fracture of multiple ribs of right side, sequela S22.41XS    2. Pneumomediastinum (H) J98.2    3. Traumatic pneumothorax, subsequent encounter S27.0XXD    4. Subcutaneous emphysema, subsequent encounter T79.7XXD    5. Fall, subsequent encounter W19.XXXD    6. Type 2 diabetes mellitus without complication, with long-term current use of insulin (H) E11.9     Z79.4    7. Essential hypertension with goal blood pressure less than 140/90 I10      Mechanical fall on ice resulting in several rib fractures with traumatic pneumothorax. He remains weak, with pain in side, and requires use of rolling walker for stability in the home. He is able to manage  the walker independently.     ELECTRONICALLY SIGNED BY [facility] CERTIFIED PROVIDER:  FEDERICO Levy CNP   NPI: [id number]  [place] [facility]  [facility] [address], [address]  [place] [address]              Sincerely,        FEDERICO Levy CNP

## 2018-01-18 NOTE — LETTER
1/18/2018        RE: Kvng Velasquez  [address] [address] [address]        [place] [facility] DISCHARGE SUMMARY    PATIENT'S NAME: Kvng Velasquez  YOB: 1950  MEDICAL RECORD NUMBER:  [id number]    PRIMARY CARE PROVIDER AND CLINIC RESPONSIBLE AFTER TRANSFER: Marlen Ma [id number] HUSAM TO / [facility] [address]     CODE STATUS/ADVANCE DIRECTIVES DISCUSSION:   CPR/Full code      No Known Allergies    TRANSFERRING PROVIDERS: Tatianna Moy CNP,   DATE OF SNF ADMISSION:  January / 08 / 2018  DATE OF SNF (anticipated) DISCHARGE: January / 18 / 2018  DISCHARGE DISPOSITION: FMG Provider   Nursing Facility: [facility] by the [facility] stay 1/5/18 to 1/8/18.     Condition on Discharge:  Improving.  Function:  Ambulatory with rolling walker   Cognitive Scores: SLUMS 19/30    Equipment:  Patient walking with SBA and 4WW.    DISCHARGE DIAGNOSIS:   1. Closed fracture of multiple ribs of right side, sequela    2. Pneumomediastinum (H)    3. Traumatic pneumothorax, subsequent encounter    4. Subcutaneous emphysema, subsequent encounter    5. Fall, subsequent encounter    6. Type 2 diabetes mellitus without complication, with long-term current use of insulin (H)    7. Essential hypertension with goal blood pressure less than 140/90        HPI Nursing Facility Course:  HPI information obtained from: facility chart records, facility staff, patient report and [facility] chart review.  Patient slipped on ice in parking lot and suffered fall to R side. Progressively increased dyspnea and swelling to face/neck/chest and was seen in clinic then transferred to ED for evaluation. Found to have traumatic pneumothorax from rib fracture and was hospitalized, then had skilled nursing facility stay from 1/9/18 to 1/18/18 after pneumothorax, Fall, Right Rib fractures, and Subcutaneous Emphysema. Nursing provided medication administration, pain management,  diabetic monitoring/management/education, and assistance with activities of daily living. Patient participated in PT/OT 5-6x per week for strengthening and conditioning. Patient walking with SBA and 4WW. Discharge is patient self-driven; has not met all goals for PT/OT. Will return home with recommended outpatient PT.  Blood sugars-  1/16 - 250, 92. 118, 168  1/17 - 235, 309, 221, 133      Closed fracture of multiple ribs of right side, sequela  Continues to have pain in R ribs area, bruising is starting to resolve. He is currently on Oxycontin, with prn oxycodone. Pain is improving, averaging 4-7/10 per report over the last few days. He is currently on scheduled tylenol, oxycontin, and prn oxycodone. He has been instructed to decrease oxycontin use with goal to discontinue very quickly and he appears to understand this. This should be monitored closely for compliance. He has only #14 tabs left and no refills should be offered. He will be sent home with about #15 oxycodone as well, for which refills should be offered sparingly.     Pneumomediastinum (H)  Traumatic pneumothorax, subsequent encounter  Subcutaneous emphysema, subsequent encounter  Swelling and angioedema is resolving, no dyspnea, no cough. Sites of Chest tubes remain bruised but scabbed and without drainage.     Fall, subsequent encounter  He has been successful in PT/OT, though they would like him to continue therapy to further improve strength and endurance. He is not sure if he will attend outpatient PT or not. He is interested in returning to work, is hopeful he can do that soon.     Type 2 diabetes mellitus without complication, with long-term current use of insulin (H)  Blood sugar trends as noted above. He has been managing his diabetes successfully prior to admission with glipizide, Novolog 70/30 BID. Insulin has been changed a bit as he was trending some low blood sugars in TCU.  Goal A1C at this stage is 7-8% to avoid hypoglycemia.   Lab  Results   Component Value Date    A1C 6.1 01/06/2018    A1C 5.1 04/27/2017    A1C 6.2 12/05/2016    A1C 6.2 06/15/2016    A1C 6.5 03/07/2016          Essential hypertension with goal blood pressure less than 140/90  He is currently on lasix, lisinopril, hydrochlorothiazide, amlodipine, atenolol. He states that he was not previously on lasix. BP in excellent range, The current medical regimen is effective;  continue present plan and medications.   BP Readings from Last 6 Encounters:   01/17/18 124/72   01/15/18 127/77   01/12/18 121/74   01/10/18 124/60   01/09/18 128/68   07/12/17 136/61          PAST MEDICAL HISTORY:  has a past medical history of Acidosis; Backache, unspecified; Cholesteatoma of external ear; Dysthymic disorder; Esophageal reflux; Hemorrhage of rectum and anus; Nonspecific elevation of levels of transaminase or lactic acid dehydrogenase (LDH); Other and unspecified hyperlipidemia; Type II or unspecified type diabetes mellitus without mention of complication, not stated as uncontrolled; Unspecified essential hypertension; and Unspecified hypothyroidism.    DISCHARGE MEDICATIONS:  Current Outpatient Prescriptions   Medication Sig Dispense Refill     GABAPENTIN PO Take 300 mg by mouth daily       oxyCODONE (OXYCONTIN) 20 MG 12 hr tablet Take 20 mg by mouth every 12 hours       senna-docusate (SENOKOT-S;PERICOLACE) 8.6-50 MG per tablet Take 2 tablets by mouth 2 times daily       insulin aspart prot & aspart (NOVOLOG MIX 70/30 FLEXPEN) injection Inject Subcutaneous See Admin Instructions 60 units with breakfast and 53 u before dinner       Esomeprazole Magnesium (NEXIUM PO) Take 40 mg by mouth every morning (before breakfast)       oxyCODONE IR (ROXICODONE) 5 MG tablet Take 1-2 tablets (5-10 mg) by mouth every 4 hours as needed for pain maximum 12 tablet(s) per day 40 tablet 0     acetaminophen (TYLENOL) 500 MG tablet Take 2 tablets (1,000 mg) by mouth 3 times daily 100 tablet 0     furosemide (LASIX)  40 MG tablet Take 1 tablet (40 mg) by mouth daily 60 tablet 1     lisinopril-hydrochlorothiazide (PRINZIDE/ZESTORETIC) 20-25 MG per tablet TAKE ONE TABLET BY MOUTH EVERY DAY 90 tablet 0     amLODIPine (NORVASC) 10 MG tablet TAKE ONE TABLET BY MOUTH EVERY DAY 90 tablet 1     atenolol (TENORMIN) 50 MG tablet TAKE 1 AND 1/2 TABLETS BY MOUTH ONCE DAILY 135 tablet 1     indomethacin (INDOCIN) 50 MG capsule TAKE ONE CAPSULE BY MOUTH THREE TIMES A DAY AND REDUCE TO AS NEEDED AS GOUT GETS BETTER 60 capsule 3     levothyroxine (SYNTHROID/LEVOTHROID) 112 MCG tablet Take 1 tablet (112 mcg) by mouth daily 90 tablet 3     glipiZIDE (GLUCOTROL) 10 MG tablet Take 1 tablet (10 mg) by mouth daily 30 tablet 2     busPIRone (BUSPAR) 15 MG tablet Take 1 tablet (15 mg) by mouth daily 30 tablet 5     fenofibrate 145 MG tablet Take 1 tablet (145 mg) by mouth daily Brand name 90 tablet 3     atorvastatin (LIPITOR) 40 MG tablet Take 1 tablet (40 mg) by mouth daily 90 tablet 3     nystatin-triamcinolone (MYCOLOG II) cream Apply  topically 2 times daily. (Patient taking differently: Apply topically daily as needed Apply  topically 2 times daily.) 60 g 3     triamcinolone (KENALOG) 0.1 % cream Apply sparingly to affected area three times daily for 14 days. (Patient taking differently: Apply topically as needed Apply sparingly to affected area three times daily for 14 days.) 30 g 0     ORDER FOR DME, SET TO LOCAL PRINT, Equipment being ordered: Diabetic Shoes 2 each 0     blood glucose meter (NO BRAND SPECIFIED) meter device kit Use to test blood sugar one times daily or as directed. 1 kit 1     blood glucose test strip 1 strip by In Vitro route daily 3 Month 12     thin (NO BRAND SPECIFIED) lancets Use to test blood sugar one time daily or as directed. 1 Box 3     insulin pen needle (NOVOFINE) 32G X 6 MM Use twcie daily or as directed. 100 each 3     Insulin Pen Needle (NOVOFINE 31) 31G X 6 MM MISC 1 Device 2 times daily. 100 each 11      ASPIRIN 81 MG OR TABS 1 TABLET DAILY         MEDICATION CHANGES/RATIONALE:   Oxycontin started for better pain control  Oxycodone started for pain control  Tylenol scheduled for pain control  Lasix started for edema management.     Controlled medications sent with patient: Medication: Oxycontin , 14 tabs given to patient at the time of discharge to take home and Medication: oxycodone , 15 tabs given to patient at the time of discharge to take home     Review of Systems:  No CP, SOB, Cough, dizziness, fevers, chills, HA, N/V, dysuria or Bowel Abnormalities. Appetite is good.  Pain controlled.     /72  Pulse 57  Temp 97.9  F (36.6  C)  Resp 16  Wt 230 lb (104.3 kg)  SpO2 94%  BMI 32.08 kg/m2    Physical Exam:  A & O x4, NAD. Lungs CTA, non labored. RRR, S1/S2 w/o murmur,gallop or rub.  Trace edema.  Abdomen soft, nontender. Incision is CDI.      DISCHARGE PLAN:  Outpatient PT  Patient instructed to follow-up with:  PCP in 7 days      [facility] scheduled appointments:  Future Appointments  Date Time Provider Department Center   1/19/2018 10:00 AM Marlen Ma MD [facility]        MTM referral needed and placed by this provider: No    Pending labs: none  SNF labs   Results for orders placed or performed in visit on 01/15/18   Basic metabolic panel   Result Value Ref Range    Sodium 130 (L) 133 - 144 mmol/L    Potassium 4.3 3.4 - 5.3 mmol/L    Chloride 96 94 - 109 mmol/L    Carbon Dioxide 27 20 - 32 mmol/L    Anion Gap 7 3 - 14 mmol/L    Glucose 127 (H) 70 - 99 mg/dL    Urea Nitrogen 15 7 - 30 mg/dL    Creatinine 0.79 0.66 - 1.25 mg/dL    GFR Estimate >90 >60 mL/min/1.7m2    GFR Estimate If Black >90 >60 mL/min/1.7m2    Calcium 8.8 8.5 - 10.1 mg/dL       Discharge Treatments:    1. DC all oxygen related orders.  2. DC prn tylenol - patient on scheduled tylenol  3. DC routine standing house bowel orders- mag citrate, MOM, Dulc supp, sod phos enema.  4. DC sliding scale novolog  5. May DC to home with  all current meds, treatments and narcotics  6. F2F for 2WW done  7. Please wean off oxycontin use gradually. Use only at night for a few days then discontinue, no RX given.  8. Please wean off using oxycodone - home with 15# tabs , no RX given.  9. F/U with PCP in 7-10 days  10. Outpatient PT okay Dx weakness        TOTAL DISCHARGE TIME:   Greater than 30 minutes  Electronically signed by:  Tatianna Moy CNP   [facility]  [phone] cell       MEDICAL NECESSITY STATEMENT FOR DME    Demographic Information on Kvng Velasquez:    Kvng Velasquez  Gender: male  : 1950  [address] [address] [address] [phone] (home)     Medical Record: [id number]  Social Security Number: [id number]  Primary Care Provider: Marlen Ma  Insurance: Payor: MEDICARE / Plan: MEDICARE / Product Type: Medicare /       Closed fracture of multiple ribs of right side, sequela [S22.41XS]    DME:  WALKER: Yes, 2 WW needs assistance with ambulation due to weakness.      VITAL SIGNS:  Vitals: /72  Pulse 57  Temp 97.9  F (36.6  C)  Resp 16  Wt 230 lb (104.3 kg)  SpO2 94%  BMI 32.08 kg/m2  BMI= Body mass index is 32.08 kg/(m^2).     MEDICAL NECESSITY STATEMENT (describe reason to support DME here including length of need)      ICD-10-CM    1. Closed fracture of multiple ribs of right side, sequela S22.41XS    2. Pneumomediastinum (H) J98.2    3. Traumatic pneumothorax, subsequent encounter S27.0XXD    4. Subcutaneous emphysema, subsequent encounter T79.7XXD    5. Fall, subsequent encounter W19.XXXD    6. Type 2 diabetes mellitus without complication, with long-term current use of insulin (H) E11.9     Z79.4    7. Essential hypertension with goal blood pressure less than 140/90 I10      Mechanical fall on ice resulting in several rib fractures with traumatic pneumothorax. He remains weak, with pain in side, and requires use of rolling walker for stability in the home. He is able to manage  the walker independently.     ELECTRONICALLY SIGNED BY [facility] CERTIFIED PROVIDER:  FEDERICO Levy CNP   NPI: [id number]  [place] [facility]  [facility] [address], [address]  [place] [address]              Sincerely,        FEDERICO Levy CNP

## 2018-01-18 NOTE — LETTER
1/18/2018        RE: Kvng Velasquez  [address] [address] [address]        [facility] DISCHARGE SUMMARY    PATIENT'S NAME: Kvng Velasquez  YOB: 1950  MEDICAL RECORD NUMBER:  [id number]    PRIMARY CARE PROVIDER AND CLINIC RESPONSIBLE AFTER TRANSFER: Marlen Ma [id number] [address] TO / [facility] [address]     CODE STATUS/ADVANCE DIRECTIVES DISCUSSION:   CPR/Full code      No Known Allergies    TRANSFERRING PROVIDERS: Tatianna Moy CNP,   DATE OF SNF ADMISSION:  January / 08 / 2018  DATE OF SNF (anticipated) DISCHARGE: January / 18 / 2018  DISCHARGE DISPOSITION: FMG Provider   Nursing Facility: [facility] by the [facility] stay 1/5/18 to 1/8/18.     Condition on Discharge:  Improving.  Function:  Ambulatory with rolling walker   Cognitive Scores: SLUMS 19/30    Equipment:  Patient walking with SBA and 4WW.    DISCHARGE DIAGNOSIS:   1. Closed fracture of multiple ribs of right side, sequela    2. Pneumomediastinum (H)    3. Traumatic pneumothorax, subsequent encounter    4. Subcutaneous emphysema, subsequent encounter    5. Fall, subsequent encounter    6. Type 2 diabetes mellitus without complication, with long-term current use of insulin (H)    7. Essential hypertension with goal blood pressure less than 140/90        HPI Nursing Facility Course:  HPI information obtained from: facility chart records, facility staff, patient report and [facility] chart review.  Patient slipped on ice in parking lot and suffered fall to R side. Progressively increased dyspnea and swelling to face/neck/chest and was seen in clinic then transferred to ED for evaluation. Found to have traumatic pneumothorax from rib fracture and was hospitalized, then had skilled nursing facility stay from 1/9/18 to 1/18/18 after pneumothorax, Fall, Right Rib fractures, and Subcutaneous Emphysema. Nursing provided medication administration, pain management,  diabetic monitoring/management/education, and assistance with activities of daily living. Patient participated in PT/OT 5-6x per week for strengthening and conditioning. Patient walking with SBA and 4WW. Discharge is patient self-driven; has not met all goals for PT/OT. Will return home with recommended outpatient PT.  Blood sugars-  1/16 - 250, 92. 118, 168  1/17 - 235, 309, 221, 133      Closed fracture of multiple ribs of right side, sequela  Continues to have pain in R ribs area, bruising is starting to resolve. He is currently on Oxycontin, with prn oxycodone. Pain is improving, averaging 4-7/10 per report over the last few days. He is currently on scheduled tylenol, oxycontin, and prn oxycodone. He has been instructed to decrease oxycontin use with goal to discontinue very quickly and he appears to understand this. This should be monitored closely for compliance. He has only #14 tabs left and no refills should be offered. He will be sent home with about #15 oxycodone as well, for which refills should be offered sparingly.     Pneumomediastinum (H)  Traumatic pneumothorax, subsequent encounter  Subcutaneous emphysema, subsequent encounter  Swelling and angioedema is resolving, no dyspnea, no cough. Sites of Chest tubes remain bruised but scabbed and without drainage.     Fall, subsequent encounter  He has been successful in PT/OT, though they would like him to continue therapy to further improve strength and endurance. He is not sure if he will attend outpatient PT or not. He is interested in returning to work, is hopeful he can do that soon.     Type 2 diabetes mellitus without complication, with long-term current use of insulin (H)  Blood sugar trends as noted above. He has been managing his diabetes successfully prior to admission with glipizide, Novolog 70/30 BID. Insulin has been changed a bit as he was trending some low blood sugars in TCU.  Goal A1C at this stage is 7-8% to avoid hypoglycemia.   Lab  Results   Component Value Date    A1C 6.1 01/06/2018    A1C 5.1 04/27/2017    A1C 6.2 12/05/2016    A1C 6.2 06/15/2016    A1C 6.5 03/07/2016          Essential hypertension with goal blood pressure less than 140/90  He is currently on lasix, lisinopril, hydrochlorothiazide, amlodipine, atenolol. He states that he was not previously on lasix. BP in excellent range, The current medical regimen is effective;  continue present plan and medications.   BP Readings from Last 6 Encounters:   01/17/18 124/72   01/15/18 127/77   01/12/18 121/74   01/10/18 124/60   01/09/18 128/68   07/12/17 136/61          PAST MEDICAL HISTORY:  has a past medical history of Acidosis; Backache, unspecified; Cholesteatoma of external ear; Dysthymic disorder; Esophageal reflux; Hemorrhage of rectum and anus; Nonspecific elevation of levels of transaminase or lactic acid dehydrogenase (LDH); Other and unspecified hyperlipidemia; Type II or unspecified type diabetes mellitus without mention of complication, not stated as uncontrolled; Unspecified essential hypertension; and Unspecified hypothyroidism.    DISCHARGE MEDICATIONS:  Current Outpatient Prescriptions   Medication Sig Dispense Refill     GABAPENTIN PO Take 300 mg by mouth daily       oxyCODONE (OXYCONTIN) 20 MG 12 hr tablet Take 20 mg by mouth every 12 hours       senna-docusate (SENOKOT-S;PERICOLACE) 8.6-50 MG per tablet Take 2 tablets by mouth 2 times daily       insulin aspart prot & aspart (NOVOLOG MIX 70/30 FLEXPEN) injection Inject Subcutaneous See Admin Instructions 60 units with breakfast and 53 u before dinner       Esomeprazole Magnesium (NEXIUM PO) Take 40 mg by mouth every morning (before breakfast)       oxyCODONE IR (ROXICODONE) 5 MG tablet Take 1-2 tablets (5-10 mg) by mouth every 4 hours as needed for pain maximum 12 tablet(s) per day 40 tablet 0     acetaminophen (TYLENOL) 500 MG tablet Take 2 tablets (1,000 mg) by mouth 3 times daily 100 tablet 0     furosemide (LASIX)  40 MG tablet Take 1 tablet (40 mg) by mouth daily 60 tablet 1     lisinopril-hydrochlorothiazide (PRINZIDE/ZESTORETIC) 20-25 MG per tablet TAKE ONE TABLET BY MOUTH EVERY DAY 90 tablet 0     amLODIPine (NORVASC) 10 MG tablet TAKE ONE TABLET BY MOUTH EVERY DAY 90 tablet 1     atenolol (TENORMIN) 50 MG tablet TAKE 1 AND 1/2 TABLETS BY MOUTH ONCE DAILY 135 tablet 1     indomethacin (INDOCIN) 50 MG capsule TAKE ONE CAPSULE BY MOUTH THREE TIMES A DAY AND REDUCE TO AS NEEDED AS GOUT GETS BETTER 60 capsule 3     levothyroxine (SYNTHROID/LEVOTHROID) 112 MCG tablet Take 1 tablet (112 mcg) by mouth daily 90 tablet 3     glipiZIDE (GLUCOTROL) 10 MG tablet Take 1 tablet (10 mg) by mouth daily 30 tablet 2     busPIRone (BUSPAR) 15 MG tablet Take 1 tablet (15 mg) by mouth daily 30 tablet 5     fenofibrate 145 MG tablet Take 1 tablet (145 mg) by mouth daily Brand name 90 tablet 3     atorvastatin (LIPITOR) 40 MG tablet Take 1 tablet (40 mg) by mouth daily 90 tablet 3     nystatin-triamcinolone (MYCOLOG II) cream Apply  topically 2 times daily. (Patient taking differently: Apply topically daily as needed Apply  topically 2 times daily.) 60 g 3     triamcinolone (KENALOG) 0.1 % cream Apply sparingly to affected area three times daily for 14 days. (Patient taking differently: Apply topically as needed Apply sparingly to affected area three times daily for 14 days.) 30 g 0     ORDER FOR DME, SET TO LOCAL PRINT, Equipment being ordered: Diabetic Shoes 2 each 0     blood glucose meter (NO BRAND SPECIFIED) meter device kit Use to test blood sugar one times daily or as directed. 1 kit 1     blood glucose test strip 1 strip by In Vitro route daily 3 Month 12     thin (NO BRAND SPECIFIED) lancets Use to test blood sugar one time daily or as directed. 1 Box 3     insulin pen needle (NOVOFINE) 32G X 6 MM Use twcie daily or as directed. 100 each 3     Insulin Pen Needle (NOVOFINE 31) 31G X 6 MM MISC 1 Device 2 times daily. 100 each 11      ASPIRIN 81 MG OR TABS 1 TABLET DAILY         MEDICATION CHANGES/RATIONALE:   Oxycontin started for better pain control  Oxycodone started for pain control  Tylenol scheduled for pain control  Lasix started for edema management.     Controlled medications sent with patient: Medication: Oxycontin , 14 tabs given to patient at the time of discharge to take home and Medication: oxycodone , 15 tabs given to patient at the time of discharge to take home     Review of Systems:  No CP, SOB, Cough, dizziness, fevers, chills, HA, N/V, dysuria or Bowel Abnormalities. Appetite is good.  Pain controlled.     /72  Pulse 57  Temp 97.9  F (36.6  C)  Resp 16  Wt 230 lb (104.3 kg)  SpO2 94%  BMI 32.08 kg/m2    Physical Exam:  A & O x4, NAD. Lungs CTA, non labored. RRR, S1/S2 w/o murmur,gallop or rub.  Trace edema.  Abdomen soft, nontender. Incision is CDI.      DISCHARGE PLAN:  Outpatient PT  Patient instructed to follow-up with:  PCP in 7 days      [facility] scheduled appointments:  Future Appointments  Date Time Provider Department Center   1/19/2018 10:00 AM Marlen Ma MD [facility]        MTM referral needed and placed by this provider: No    Pending labs: none  SNF labs   Results for orders placed or performed in visit on 01/15/18   Basic metabolic panel   Result Value Ref Range    Sodium 130 (L) 133 - 144 mmol/L    Potassium 4.3 3.4 - 5.3 mmol/L    Chloride 96 94 - 109 mmol/L    Carbon Dioxide 27 20 - 32 mmol/L    Anion Gap 7 3 - 14 mmol/L    Glucose 127 (H) 70 - 99 mg/dL    Urea Nitrogen 15 7 - 30 mg/dL    Creatinine 0.79 0.66 - 1.25 mg/dL    GFR Estimate >90 >60 mL/min/1.7m2    GFR Estimate If Black >90 >60 mL/min/1.7m2    Calcium 8.8 8.5 - 10.1 mg/dL       Discharge Treatments:    1. DC all oxygen related orders.  2. DC prn tylenol - patient on scheduled tylenol  3. DC routine standing house bowel orders- mag citrate, MOM, Dulc supp, sod phos enema.  4. DC sliding scale novolog  5. May DC to home with  all current meds, treatments and narcotics  6. F2F for 2WW done  7. Please wean off oxycontin use gradually. Use only at night for a few days then discontinue, no RX given.  8. Please wean off using oxycodone - home with 15# tabs , no RX given.  9. F/U with PCP in 7-10 days  10. Outpatient PT okay Dx weakness        TOTAL DISCHARGE TIME:   Greater than 30 minutes  Electronically signed by:  Tatianna Moy CNP   [facility]  [phone] cell       MEDICAL NECESSITY STATEMENT FOR DME    Demographic Information on Kvng Velasquez:    Kvng Velasquez  Gender: male  : 1950  [address] [address] [address] [phone] (home)     Medical Record: [id number]  Social Security Number: [id number]  Primary Care Provider: Marlen Ma  Insurance: Payor: MEDICARE / Plan: MEDICARE / Product Type: Medicare /       Closed fracture of multiple ribs of right side, sequela [S22.41XS]    DME:  WALKER: Yes, 2 WW needs assistance with ambulation due to weakness.      VITAL SIGNS:  Vitals: /72  Pulse 57  Temp 97.9  F (36.6  C)  Resp 16  Wt 230 lb (104.3 kg)  SpO2 94%  BMI 32.08 kg/m2  BMI= Body mass index is 32.08 kg/(m^2).     MEDICAL NECESSITY STATEMENT (describe reason to support DME here including length of need)    Kvng has increased weakness, balance issues that increases his risk of falls. His balance is improved with a rolling walker and he is able to safely use it. His functional mobility deficit is resolved with the use of the rolling walker .       ICD-10-CM    1. Closed fracture of multiple ribs of right side, sequela S22.41XS    2. Pneumomediastinum (H) J98.2    3. Traumatic pneumothorax, subsequent encounter S27.0XXD    4. Subcutaneous emphysema, subsequent encounter T79.7XXD    5. Fall, subsequent encounter W19.XXXD    6. Type 2 diabetes mellitus without complication, with long-term current use of insulin (H) E11.9     Z79.4    7. Essential hypertension with goal  blood pressure less than 140/90 I10      Mechanical fall on ice resulting in several rib fractures with traumatic pneumothorax. He remains weak, with pain in side, and requires use of rolling walker for stability in the home. He is able to manage the walker independently.     ELECTRONICALLY SIGNED BY IVON CERTIFIED PROVIDER:  FEDERICO Levy CNP   NPI: [id number]  [facility]  [address] [place] [address], [address]  [place] [address]              Sincerely,        FEDERICO Levy CNP

## 2018-01-19 ENCOUNTER — CARE COORDINATION (OUTPATIENT)
Dept: CARE COORDINATION | Facility: CLINIC | Age: 68
End: 2018-01-19

## 2018-01-19 ENCOUNTER — OFFICE VISIT (OUTPATIENT)
Dept: FAMILY MEDICINE | Facility: CLINIC | Age: 68
End: 2018-01-19
Payer: MEDICARE

## 2018-01-19 VITALS
WEIGHT: 218 LBS | HEIGHT: 71 IN | DIASTOLIC BLOOD PRESSURE: 75 MMHG | SYSTOLIC BLOOD PRESSURE: 119 MMHG | OXYGEN SATURATION: 96 % | HEART RATE: 61 BPM | BODY MASS INDEX: 30.52 KG/M2 | RESPIRATION RATE: 18 BRPM | TEMPERATURE: 98.4 F

## 2018-01-19 DIAGNOSIS — E78.5 HYPERLIPIDEMIA LDL GOAL <100: ICD-10-CM

## 2018-01-19 DIAGNOSIS — F34.1 DYSTHYMIC DISORDER: ICD-10-CM

## 2018-01-19 DIAGNOSIS — S22.41XS CLOSED FRACTURE OF MULTIPLE RIBS OF RIGHT SIDE, SEQUELA: Primary | ICD-10-CM

## 2018-01-19 PROBLEM — J93.9 PNEUMOTHORAX: Status: RESOLVED | Noted: 2018-01-05 | Resolved: 2018-01-19

## 2018-01-19 PROBLEM — J98.2 PNEUMOMEDIASTINUM (H): Status: RESOLVED | Noted: 2018-01-06 | Resolved: 2018-01-19

## 2018-01-19 PROCEDURE — 99495 TRANSJ CARE MGMT MOD F2F 14D: CPT | Performed by: FAMILY MEDICINE

## 2018-01-19 RX ORDER — BUSPIRONE HYDROCHLORIDE 15 MG/1
15 TABLET ORAL DAILY
Qty: 30 TABLET | Refills: 5 | Status: SHIPPED | OUTPATIENT
Start: 2018-01-19 | End: 2018-11-05

## 2018-01-19 RX ORDER — FENOFIBRATE 145 MG/1
145 TABLET, COATED ORAL DAILY
Qty: 90 TABLET | Refills: 3 | Status: SHIPPED | OUTPATIENT
Start: 2018-01-19 | End: 2018-11-05

## 2018-01-19 RX ORDER — ATORVASTATIN CALCIUM 40 MG/1
40 TABLET, FILM COATED ORAL DAILY
Qty: 90 TABLET | Refills: 3 | Status: SHIPPED | OUTPATIENT
Start: 2018-01-19 | End: 2018-11-05

## 2018-01-19 ASSESSMENT — PAIN SCALES - GENERAL: PAINLEVEL: MILD PAIN (3)

## 2018-01-19 ASSESSMENT — PATIENT HEALTH QUESTIONNAIRE - PHQ9: SUM OF ALL RESPONSES TO PHQ QUESTIONS 1-9: 2

## 2018-01-19 NOTE — PROGRESS NOTES
Clinic Care Coordination Contact  Care Team Conversations    The patient was discharged from the TCU.  The covering Care Coordination RN will forward the information to the assigned Care Coordination RN for follow up.     Feliciano Ling, MSN, RN, PHN  [facility] Clinic Care Coordinator  [facility]  Phone: [phone]  [email or web address]

## 2018-01-19 NOTE — PATIENT INSTRUCTIONS
Thank you for choosing [facility].  You may be receiving a survey in the mail from [facility] regarding your visit today.  Please take a few minutes to complete and return the survey to let us know how we are doing.      Our Clinic hours are:  Mondays    7:20 am - 7 pm  Tues -  Fri  7:20 am - 5 pm    Clinic Phone: [phone]    The clinic lab opens at 7:30 am Mon - Fri and appointments are required.    [facility]. [phone]  Monday-Thursday 8 am - 7pm  Tues/Wed/Fri 8 am - 5:30 pm

## 2018-01-19 NOTE — MR AVS SNAPSHOT
After Visit Summary   1/19/2018    Kvng Velasquez    MRN: [id number]           Patient Information     Date Of Birth          1950        Visit Information        Provider Department      1/19/2018 10:00 AM Marlen Ma MD [facility]        Today's Diagnoses     Hyperlipidemia LDL goal <100        Dysthymic disorder          Care Instructions          Thank you for choosing [facility].  You may be receiving a survey in the mail from [facility] regarding your visit today.  Please take a few minutes to complete and return the survey to let us know how we are doing.      Our Clinic hours are:  Mondays    7:20 am - 7 pm  Tues -  Fri  7:20 am - 5 pm    Clinic Phone: [phone]    The clinic lab opens at 7:30 am Mon - Fri and appointments are required.    [facility]  Ph. [phone]  Monday-Thursday 8 am - 7pm  Tues/Wed/Fri 8 am - 5:30 pm                 Follow-ups after your visit        Who to contact     If you have questions or need follow up information about today's clinic visit or your schedule please contact [facility] directly at [phone].  Normal or non-critical lab and imaging results will be communicated to you by MyChart, letter or phone within 4 business days after the clinic has received the results. If you do not hear from us within 7 days, please contact the clinic through MyChart or phone. If you have a critical or abnormal lab result, we will notify you by phone as soon as possible.  Submit refill requests through [employer] or call your pharmacy and they will forward the refill request to us. Please allow 3 business days for your refill to be completed.          Additional Information About Your Visit        MyChart Information     [employer] lets you send messages to your doctor, view your test results, renew your prescriptions, schedule appointments and more. To sign up, go to [email or web address] . Click on  "\"Log in\" on the left side of the screen, which will take you to the Welcome page. Then click on \"Sign up Now\" on the right side of the page.     You will be asked to enter the access code listed below, as well as some personal information. Please follow the directions to create your username and password.     Your access code is: [email or web address]  Expires: 2018  4:32 PM     Your access code will  in 90 days. If you need help or a new code, please call your [place] clinic or [phone].        Care EveryWhere ID     This is your Care EveryWhere ID. This could be used by other organizations to access your [place] medical records  [id number]        Your Vitals Were     Pulse Temperature Respirations Height Pulse Oximetry BMI (Body Mass Index)    61 98.4  F (36.9  C) (Tympanic) 18 5' 11\" (1.803 m) 96% 30.4 kg/m2       Blood Pressure from Last 3 Encounters:   18 119/75   18 124/72   01/15/18 127/77    Weight from Last 3 Encounters:   18 218 lb (98.9 kg)   18 230 lb (104.3 kg)   01/15/18 230 lb (104.3 kg)              We Performed the Following     DEPRESSION ACTION PLAN (DAP)          Today's Medication Changes          These changes are accurate as of: 18 10:33 AM.  If you have any questions, ask your nurse or doctor.               These medicines have changed or have updated prescriptions.        Dose/Directions    nystatin-triamcinolone cream   Commonly known as:  MYCOLOG II   This may have changed:    - how to take this  - when to take this  - reasons to take this  - additional instructions   Used for:  Skin rash        Apply  topically 2 times daily.   Quantity:  60 g   Refills:  3       triamcinolone 0.1 % cream   Commonly known as:  KENALOG   This may have changed:    - how to take this  - when to take this  - reasons to take this  - additional instructions   Used for:  Contact dermatitis due to poison ivy        Apply sparingly to affected area three times daily for 14 " days.   Quantity:  30 g   Refills:  0            Where to get your medicines      These medications were sent to [facility] [place], [place] - [address] [facility]  [address] [facility] [address]     Phone:  [phone]     atorvastatin 40 MG tablet    busPIRone 15 MG tablet    fenofibrate 145 MG tablet                Primary Care Provider Office Phone # Fax #    Marlen Ma [phone] [address] [address]  [facility] [address]        Equal Access to Services     TAISHA [facility] Hospital[facility]Y : Hadii aad ku hadasho Soomaali, waaxda luqadaha, qaybta kaalmada adeegyada, waxay idiin hayaan adeeg kharash lakimi . So [facility] [phone].    ATENCIÓN: Si habla español, tiene a matt disposición servicios gratuitos de asistencia lingüística. [facility] [phone].    We comply with applicable federal civil rights laws and [place] laws. We do not discriminate on the basis of race, color, national origin, age, disability, sex, sexual orientation, or gender identity.            Thank you!     Thank you for choosing [facility]  for your care. Our goal is always to provide you with excellent care. Hearing back from our patients is one way we can continue to improve our services. Please take a few minutes to complete the written survey that you may receive in the mail after your visit with us. Thank you!             Your Updated Medication List - Protect others around you: Learn how to safely use, store and throw away your medicines at www.disposemymeds.org.          This list is accurate as of: 1/19/18 10:33 AM.  Always use your most recent med list.                   Brand Name Dispense Instructions for use Diagnosis    acetaminophen 500 MG tablet    TYLENOL    100 tablet    Take 2 tablets (1,000 mg) by mouth 3 times daily    Closed fracture of multiple ribs of right side, initial encounter       amLODIPine 10 MG tablet    NORVASC    90 tablet    TAKE ONE TABLET BY MOUTH EVERY DAY     Essential hypertension with goal blood pressure less than 140/90       aspirin 81 MG tablet      1 TABLET DAILY        atenolol 50 MG tablet    TENORMIN    135 tablet    TAKE 1 AND 1/2 TABLETS BY MOUTH ONCE DAILY    Essential hypertension, Essential hypertension, benign       atorvastatin 40 MG tablet    LIPITOR    90 tablet    Take 1 tablet (40 mg) by mouth daily    Hyperlipidemia LDL goal <100       blood glucose monitoring meter device kit    no brand specified    1 kit    Use to test blood sugar one times daily or as directed.    Type 2 diabetes, HbA1C goal < 8% (H)       blood glucose monitoring test strip    no brand specified    3 Month    1 strip by In Vitro route daily    Type II or unspecified type diabetes mellitus without mention of complication, not stated as uncontrolled, Type 2 diabetes, HbA1C goal < 8% (H)       busPIRone 15 MG tablet    BUSPAR    30 tablet    Take 1 tablet (15 mg) by mouth daily    Dysthymic disorder       fenofibrate 145 MG tablet     90 tablet    Take 1 tablet (145 mg) by mouth daily Brand name    Hyperlipidemia LDL goal <100       furosemide 40 MG tablet    LASIX    60 tablet    Take 1 tablet (40 mg) by mouth daily    Edema, unspecified type       GABAPENTIN PO      Take 300 mg by mouth daily        glipiZIDE 10 MG tablet    GLUCOTROL    30 tablet    Take 1 tablet (10 mg) by mouth daily    Type 2 diabetes mellitus without complication, with long-term current use of insulin (H)       indomethacin 50 MG capsule    INDOCIN    60 capsule    TAKE ONE CAPSULE BY MOUTH THREE TIMES A DAY AND REDUCE TO AS NEEDED AS GOUT GETS BETTER    Gouty arthropathy       * insulin pen needle 31G X 6 MM    NOVOFINE 31    100 each    1 Device 2 times daily.    Type II or unspecified type diabetes mellitus without mention of complication, not stated as uncontrolled       * NOVOFINE 32G X 6 MM   Generic drug:  insulin pen needle     100 each    Use twcie daily or as directed.    Type II or unspecified  type diabetes mellitus without mention of complication, not stated as uncontrolled       levothyroxine 112 MCG tablet    SYNTHROID/LEVOTHROID    90 tablet    Take 1 tablet (112 mcg) by mouth daily    Hypothyroidism, unspecified type       lisinopril-hydrochlorothiazide 20-25 MG per tablet    PRINZIDE/ZESTORETIC    90 tablet    TAKE ONE TABLET BY MOUTH EVERY DAY    HTN, goal below 140/90       NovoLOG MIX 70/30 FLEXPEN injection   Generic drug:  insulin aspart prot & aspart      Inject Subcutaneous See Admin Instructions 60 units with breakfast and 53 u before dinner        nystatin-triamcinolone cream    MYCOLOG II    60 g    Apply  topically 2 times daily.    Skin rash       order for DME     2 each    Equipment being ordered: Diabetic Shoes    Type 2 diabetes, HbA1C goal < 8% (H)       * oxyCODONE 20 MG 12 hr tablet    OxyCONTIN     Take 20 mg by mouth every 12 hours        * oxyCODONE IR 5 MG tablet    ROXICODONE    40 tablet    Take 1-2 tablets (5-10 mg) by mouth every 4 hours as needed for pain maximum 12 tablet(s) per day    Closed fracture of multiple ribs of right side, initial encounter       senna-docusate 8.6-50 MG per tablet    SENOKOT-S;PERICOLACE     Take 2 tablets by mouth 2 times daily        thin lancets    NO BRAND SPECIFIED    1 Box    Use to test blood sugar one time daily or as directed.    Type 2 diabetes, HbA1C goal < 8% (H)       triamcinolone 0.1 % cream    KENALOG    30 g    Apply sparingly to affected area three times daily for 14 days.    Contact dermatitis due to poison ivy       * Notice:  This list has 4 medication(s) that are the same as other medications prescribed for you. Read the directions carefully, and ask your doctor or other care provider to review them with you.

## 2018-01-19 NOTE — NURSING NOTE
"Chief Complaint   Patient presents with     Hospital F/U       Initial /75  Pulse 61  Temp 98.4  F (36.9  C) (Tympanic)  Resp 18  Ht 5' 11\" (1.803 m)  Wt 218 lb (98.9 kg)  SpO2 96%  BMI 30.4 kg/m2 Estimated body mass index is 30.4 kg/(m^2) as calculated from the following:    Height as of this encounter: 5' 11\" (1.803 m).    Weight as of this encounter: 218 lb (98.9 kg).  Medication Reconciliation: complete    "

## 2018-01-19 NOTE — LETTER
[facility]  [address]  [facility] [address]  Phone: [phone]    January 19, 2018        Kvng Velasquez  [address] [address] [address]          To whom it may concern:    RE: Kvng Velasquez         Recommend light duty for two weeks - limited lifting to allow ribs to heal.     Please contact me for questions or concerns.      Sincerely,        Marlen Ma MD

## 2018-01-23 ENCOUNTER — CARE COORDINATION (OUTPATIENT)
Dept: CARE COORDINATION | Facility: CLINIC | Age: 68
End: 2018-01-23

## 2018-01-23 NOTE — PROGRESS NOTES
Clinic Care Coordination Contact  [facility]/Voicemail    Referral Source: [facility]  Clinical Data: Care Coordinator Outreach  Outreach attempted x 1.  Left message on voicemail with call back information and requested return call.  Plan: Care Coordinator will try to reach patient again in 1-2 business days.  Armin Montero RN  Clinic Care Coordinator  [phone] or [phone]

## 2018-01-31 ENCOUNTER — TELEPHONE (OUTPATIENT)
Dept: FAMILY MEDICINE | Facility: CLINIC | Age: 68
End: 2018-01-31

## 2018-01-31 NOTE — TELEPHONE ENCOUNTER
Please review message below.  Letter pended, please edit to your preference.  Do you want to see patient in clinic?  Patient was seen in clinic on 1/19/18.    Thank you  Margaret VALENCIA RN

## 2018-01-31 NOTE — LETTER
1/31/18      Kvng Velasquez  [address] [address]          To whom it may concern:    RE: Kvng Velasquez         Recommend light duty for one week - limited lifting to allow ribs to heal.     Please contact me for questions or concerns.      Sincerely,        Marlen Ma MD

## 2018-01-31 NOTE — TELEPHONE ENCOUNTER
Patient would like another week of light duty to heal some more.  Patient would like to  at .  No need to call unless this can not be done.  Letter placed on provider's desk.    Margaret VALENCIA RN

## 2018-01-31 NOTE — TELEPHONE ENCOUNTER
Reason for Call:  Other     Detailed comments: Patient calling wondering if he can go back to full duty at work instead of light duty next week? Patient was seen for broken rib 1-19. Patient states he feels pretty good on the light duty right now, he feels a little sore after 4-5 hours of work. If Dr. Ma wants patient to stay on light duty a week or two longer, he will need a new letter.    If a letter is needed, patient would pick this up from the clinic.    Phone Number Patient can be reached at: Home number on file [phone] (home)    Best Time: any    Can we leave a detailed message on this number? YES    Call taken on 1/31/2018 at 9:43 AM by Mary Toure

## 2018-01-31 NOTE — TELEPHONE ENCOUNTER
Sounds like he is still healing.  Does he feel up to working without restrictions right now, or would he like another week of light duty?    Marlen Ma M.D.

## 2018-02-28 ENCOUNTER — OFFICE VISIT (OUTPATIENT)
Dept: FAMILY MEDICINE | Facility: CLINIC | Age: 68
End: 2018-02-28
Payer: MEDICARE

## 2018-02-28 VITALS
SYSTOLIC BLOOD PRESSURE: 140 MMHG | RESPIRATION RATE: 16 BRPM | HEIGHT: 71 IN | WEIGHT: 228 LBS | BODY MASS INDEX: 31.92 KG/M2 | HEART RATE: 83 BPM | TEMPERATURE: 98.6 F | DIASTOLIC BLOOD PRESSURE: 78 MMHG

## 2018-02-28 DIAGNOSIS — L30.9 ECZEMA, UNSPECIFIED TYPE: ICD-10-CM

## 2018-02-28 DIAGNOSIS — L03.116 CELLULITIS OF LEFT LOWER EXTREMITY: Primary | ICD-10-CM

## 2018-02-28 DIAGNOSIS — L25.9 CONTACT DERMATITIS, UNSPECIFIED CONTACT DERMATITIS TYPE, UNSPECIFIED TRIGGER: ICD-10-CM

## 2018-02-28 PROCEDURE — 99214 OFFICE O/P EST MOD 30 MIN: CPT | Performed by: NURSE PRACTITIONER

## 2018-02-28 RX ORDER — TRIAMCINOLONE ACETONIDE 1 MG/G
OINTMENT TOPICAL
Qty: 30 G | Refills: 0 | Status: SHIPPED | OUTPATIENT
Start: 2018-02-28 | End: 2018-04-11

## 2018-02-28 RX ORDER — DIPHENHYDRAMINE HCL 25 MG
50 TABLET ORAL
Qty: 20 TABLET | Refills: 0 | Status: SHIPPED | OUTPATIENT
Start: 2018-02-28 | End: 2018-04-11

## 2018-02-28 RX ORDER — CEPHALEXIN 500 MG/1
500 CAPSULE ORAL 3 TIMES DAILY
Qty: 21 CAPSULE | Refills: 0 | Status: SHIPPED | OUTPATIENT
Start: 2018-02-28 | End: 2018-03-06

## 2018-02-28 NOTE — MR AVS SNAPSHOT
After Visit Summary   2/28/2018    Kvng Velasquez    MRN: [id number]           Patient Information     Date Of Birth          1950        Visit Information        Provider Department      2/28/2018 9:20 AM Ruby Coyne APRN CNP [facility]        Today's Diagnoses     Cellulitis of left lower extremity    -  1    Contact dermatitis, unspecified contact dermatitis type, unspecified trigger        Eczema, unspecified type          Care Instructions    Watch the line of redness that was marked on your left leg, that should not worsen, if it does, return for recheck. Take antibiotic as prescribed.  Try the benadryl at night to help with the itching. Stop [place] Spring soap, cool showers only. Dove for sensitive skin is a better choice.  Use the topical ointment for the real itchy rash on your right forearm.  Follow up if symptoms persist or worsen and as needed.        Thank you for choosing [facility].  You may be receiving a survey in the mail from [employer] regarding your visit today.  Please take a few minutes to complete and return the survey to let us know how we are doing.      Our Clinic hours are:  Mondays    7:20 am - 7 pm  Tues -  Fri  7:20 am - 5 pm    Clinic Phone: [phone]    The clinic lab opens at 7:30 am Mon - Fri and appointments are required.    [facility]  Ph. [phone]  Monday-Thursday 8 am - 7pm  Tues/Wed/Fri 8 am - 5:30 pm                 Follow-ups after your visit        Who to contact     If you have questions or need follow up information about today's clinic visit or your schedule please contact [facility] directly at [phone].  Normal or non-critical lab and imaging results will be communicated to you by MyChart, letter or phone within 4 business days after the clinic has received the results. If you do not hear from us within 7 days, please contact the clinic through MyChart or phone. If  "you have a critical or abnormal lab result, we will notify you by phone as soon as possible.  Submit refill requests through [employer] or call your pharmacy and they will forward the refill request to us. Please allow 3 business days for your refill to be completed.          Additional Information About Your Visit        [employer] Information     [employer] lets you send messages to your doctor, view your test results, renew your prescriptions, schedule appointments and more. To sign up, go to [email or web address] . Click on \"Log in\" on the left side of the screen, which will take you to the Welcome page. Then click on \"Sign up Now\" on the right side of the page.     You will be asked to enter the access code listed below, as well as some personal information. Please follow the directions to create your username and password.     Your access code is: [email or web address]  Expires: 2018  4:32 PM     Your access code will  in 90 days. If you need help or a new code, please call your [facility] clinic or [phone].        Care EveryWhere ID     This is your Care EveryWhere ID. This could be used by other organizations to access your [facility] medical records  [id number]        Your Vitals Were     Pulse Temperature Respirations Height BMI (Body Mass Index)       83 98.6  F (37  C) (Oral) 16 5' 11\" (1.803 m) 31.8 kg/m2        Blood Pressure from Last 3 Encounters:   18 140/78   18 119/75   18 124/72    Weight from Last 3 Encounters:   18 228 lb (103.4 kg)   18 218 lb (98.9 kg)   18 230 lb (104.3 kg)              Today, you had the following     No orders found for display         Today's Medication Changes          These changes are accurate as of 18  9:40 AM.  If you have any questions, ask your nurse or doctor.               Start taking these medicines.        Dose/Directions    cephALEXin 500 MG capsule   Commonly known as:  KEFLEX   Used for:  Cellulitis of left lower " extremity   Started by:  Ruby Coyne APRN CNP        Dose:  500 mg   Take 1 capsule (500 mg) by mouth 3 times daily for 7 days   Quantity:  21 capsule   Refills:  0       diphenhydrAMINE 25 MG tablet   Commonly known as:  BENADRYL   Used for:  Contact dermatitis, unspecified contact dermatitis type, unspecified trigger   Started by:  Ruby Coyne APRN CNP        Dose:  50 mg   Take 2 tablets (50 mg) by mouth nightly as needed for itching   Quantity:  20 tablet   Refills:  0       triamcinolone 0.1 % ointment   Commonly known as:  KENALOG   Used for:  Eczema, unspecified type   Replaces:  triamcinolone 0.1 % cream   Started by:  Ruby Coyne APRN CNP        Apply sparingly to affected area three times daily for 14 days.   Quantity:  30 g   Refills:  0         These medicines have changed or have updated prescriptions.        Dose/Directions    busPIRone 15 MG tablet   Commonly known as:  BUSPAR   This may have changed:    - when to take this  - reasons to take this   Used for:  Dysthymic disorder        Dose:  15 mg   Take 1 tablet (15 mg) by mouth daily   Quantity:  30 tablet   Refills:  5       glipiZIDE 10 MG tablet   Commonly known as:  GLUCOTROL   This may have changed:  how much to take   Used for:  Type 2 diabetes mellitus without complication, with long-term current use of insulin (H)        Dose:  10 mg   Take 1 tablet (10 mg) by mouth daily   Quantity:  30 tablet   Refills:  2       nystatin-triamcinolone cream   Commonly known as:  MYCOLOG II   This may have changed:    - how to take this  - when to take this  - reasons to take this  - additional instructions   Used for:  Skin rash        Apply  topically 2 times daily.   Quantity:  60 g   Refills:  3         Stop taking these medicines if you haven't already. Please contact your care team if you have questions.     triamcinolone 0.1 % cream   Commonly known as:  KENALOG   Replaced by:  triamcinolone 0.1 % ointment   Stopped by:   Ruby Coyne APRN CNP                Where to get your medicines      [place] [facility] [place] [address] [facility]  [address] [facility] [place] [facility] [address]     Phone:  [phone]     cephALEXin 500 MG capsule    diphenhydrAMINE 25 MG tablet    triamcinolone 0.1 % ointment                Primary Care Provider Office Phone # Fax #    Mralen Ma [phone] [address]  [facility] [address]        Equal Access to Services     [facility]: Hadii aad ku hadasho Soomaali, waaxda luqadaha, qaybta kaalmada adeegyada, waxtra robersonin hayaan aderichard yeh . So [facility] [phone].    ATENCIÓN: Si habla español, tiene a matt disposición servicios gratuitos de asistencia lingüística. [facility] [phone].    We comply with applicable federal civil rights laws and [place] laws. We do not discriminate on the basis of race, color, national origin, age, disability, sex, sexual orientation, or gender identity.            Thank you!     Thank you for choosing [facility]  for your care. Our goal is always to provide you with excellent care. Hearing back from our patients is one way we can continue to improve our services. Please take a few minutes to complete the written survey that you may receive in the mail after your visit with us. Thank you!             Your Updated Medication List - Protect others around you: Learn how to safely use, store and throw away your medicines at www.disposemymeds.org.          This list is accurate as of 2/28/18  9:40 AM.  Always use your most recent med list.                   Brand Name Dispense Instructions for use Diagnosis    acetaminophen 500 MG tablet    TYLENOL    100 tablet    Take 2 tablets (1,000 mg) by mouth 3 times daily    Closed fracture of multiple ribs of right side, initial encounter       amLODIPine 10 MG tablet    NORVASC    90 tablet    TAKE  ONE TABLET BY MOUTH EVERY DAY    Essential hypertension with goal blood pressure less than 140/90       aspirin 81 MG tablet      1 TABLET DAILY        atenolol 50 MG tablet    TENORMIN    135 tablet    TAKE 1 AND 1/2 TABLETS BY MOUTH ONCE DAILY    Essential hypertension, Essential hypertension, benign       atorvastatin 40 MG tablet    LIPITOR    90 tablet    Take 1 tablet (40 mg) by mouth daily    Hyperlipidemia LDL goal <100       blood glucose monitoring meter device kit    no brand specified    1 kit    Use to test blood sugar one times daily or as directed.    Type 2 diabetes, HbA1C goal < 8% (H)       blood glucose monitoring test strip    no brand specified    3 Month    1 strip by In Vitro route daily    Type II or unspecified type diabetes mellitus without mention of complication, not stated as uncontrolled, Type 2 diabetes, HbA1C goal < 8% (H)       busPIRone 15 MG tablet    BUSPAR    30 tablet    Take 1 tablet (15 mg) by mouth daily    Dysthymic disorder       cephALEXin 500 MG capsule    KEFLEX    21 capsule    Take 1 capsule (500 mg) by mouth 3 times daily for 7 days    Cellulitis of left lower extremity       diphenhydrAMINE 25 MG tablet    BENADRYL    20 tablet    Take 2 tablets (50 mg) by mouth nightly as needed for itching    Contact dermatitis, unspecified contact dermatitis type, unspecified trigger       fenofibrate 145 MG tablet     90 tablet    Take 1 tablet (145 mg) by mouth daily Brand name    Hyperlipidemia LDL goal <100       furosemide 40 MG tablet    LASIX    60 tablet    Take 1 tablet (40 mg) by mouth daily    Edema, unspecified type       GABAPENTIN PO      Take 300 mg by mouth daily as needed        glipiZIDE 10 MG tablet    GLUCOTROL    30 tablet    Take 1 tablet (10 mg) by mouth daily    Type 2 diabetes mellitus without complication, with long-term current use of insulin (H)       indomethacin 50 MG capsule    INDOCIN    60 capsule    TAKE ONE CAPSULE BY MOUTH THREE TIMES A DAY AND  REDUCE TO AS NEEDED AS GOUT GETS BETTER    Gouty arthropathy       * insulin pen needle 31G X 6 MM    NOVOFINE 31    100 each    1 Device 2 times daily.    Type II or unspecified type diabetes mellitus without mention of complication, not stated as uncontrolled       * NOVOFINE 32G X 6 MM   Generic drug:  insulin pen needle     100 each    Use twcie daily or as directed.    Type II or unspecified type diabetes mellitus without mention of complication, not stated as uncontrolled       levothyroxine 112 MCG tablet    SYNTHROID/LEVOTHROID    90 tablet    Take 1 tablet (112 mcg) by mouth daily    Hypothyroidism, unspecified type       lisinopril-hydrochlorothiazide 20-25 MG per tablet    PRINZIDE/ZESTORETIC    90 tablet    TAKE ONE TABLET BY MOUTH EVERY DAY    HTN, goal below 140/90       NovoLOG MIX 70/30 FLEXPEN injection   Generic drug:  insulin aspart prot & aspart      Inject Subcutaneous See Admin Instructions 60 units with breakfast and 53 u before dinner        nystatin-triamcinolone cream    MYCOLOG II    60 g    Apply  topically 2 times daily.    Skin rash       order for DME     2 each    Equipment being ordered: Diabetic Shoes    Type 2 diabetes, HbA1C goal < 8% (H)       thin lancets    NO BRAND SPECIFIED    1 Box    Use to test blood sugar one time daily or as directed.    Type 2 diabetes, HbA1C goal < 8% (H)       triamcinolone 0.1 % ointment    KENALOG    30 g    Apply sparingly to affected area three times daily for 14 days.    Eczema, unspecified type       * Notice:  This list has 2 medication(s) that are the same as other medications prescribed for you. Read the directions carefully, and ask your doctor or other care provider to review them with you.

## 2018-02-28 NOTE — PATIENT INSTRUCTIONS
Watch the line of redness that was marked on your left leg, that should not worsen, if it does, return for recheck. Take antibiotic as prescribed.  Try the benadryl at night to help with the itching. Stop [place] Spring soap, cool showers only. Dove for sensitive skin is a better choice.  Use the topical ointment for the real itchy rash on your right forearm.  Follow up if symptoms persist or worsen and as needed.        Thank you for choosing [facility].  You may be receiving a survey in the mail from [employer] regarding your visit today.  Please take a few minutes to complete and return the survey to let us know how we are doing.      Our Clinic hours are:  Mondays    7:20 am - 7 pm  Tues -  Fri  7:20 am - 5 pm    Clinic Phone: [phone]    The clinic lab opens at 7:30 am Mon - Fri and appointments are required.    [facility]  Ph. [phone]  Monday-Thursday 8 am - 7pm  Tues/Wed/Fri 8 am - 5:30 pm

## 2018-02-28 NOTE — PROGRESS NOTES
SUBJECTIVE:   Kvng Velasquez is a 68 year old male who presents to clinic today for the following health issues:      Rash      Duration: since before Jan 1,2018    Description  Location: left lower leg and now last week rash on his right arm.  Itching: mild    Intensity:  moderate    Accompanying signs and symptoms: leg is very red.    History (similar episodes/previous evaluation): he was given cream for the leg when he was in the hospital. He does not think the cream helped.    Precipitating or alleviating factors:  New exposures:  None  Recent travel: no      Therapies tried and outcome: nystatin-triamcinolone cream.          Problem list and histories reviewed & adjusted, as indicated.  Additional history: states he's had the redness of his left lower leg for the past month or so, he first noticed it while he was in hospital for a fall with injuries sustained from that.  He feels the redness has worsened.  He also has had a very itchy right forearm. And over the past few days, his back has been itching.  He denies feeling poorly, no fever or malaise.  He has not had any new medications. He uses [place] soap.  Hasn't tried anything other than the lotion they gave him in the hospital for his left lower leg.  No recent travel or other known exposures.      Patient Active Problem List   Diagnosis     Acquired hypothyroidism     Dysthymic disorder     Esophageal reflux     Nonspecific elevation of levels of transaminase or lactic acid dehydrogenase (LDH)     Cholesteatoma of external ear     ALCOH USE     Gouty arthropathy     Local infection of skin and subcutaneous tissue     Other alopecia     GERD (gastroesophageal reflux disease)     Hyperlipidemia LDL goal <100     Type 2 diabetes mellitus without complication (H)     Health Care Home     Advanced directives, counseling/discussion     Hypothyroidism     Diabetes mellitus due to underlying condition with diabetic neuropathy (H)     Family history of  prostate cancer in father     DM type 2 with diabetic peripheral neuropathy (H)     Essential hypertension with goal blood pressure less than 140/90     Urinary retention     Fall     Closed fracture of multiple ribs of right side     Subcutaneous emphysema (H)     Elevated white blood cell count     Past Surgical History:   Procedure Laterality Date     SURGICAL HISTORY OF -   9/13/1999    Left inguinal hernia repair     SURGICAL HISTORY OF -   1958    T&A     SURGICAL HISTORY OF -   12/10/1990    Cyst removal from one of his fingers     SURGICAL HISTORY OF -       Cholesteatoma removed from left ear     SURGICAL HISTORY OF -   3/14/1978    Vasectomy     SURGICAL HISTORY OF -   1982    Mastoidectomy       Social History   Substance Use Topics     Smoking status: Never Smoker     Smokeless tobacco: Never Used     Alcohol use Yes      Comment: occas     Family History   Problem Relation Age of Onset     C.A.D. Mother      Hypertension Mother      DIABETES Mother      Cardiovascular Mother      Lipids Mother      Depression Mother      Genitourinary Problems Mother      Lipids Father      Hypertension Father      Prostate Cancer Father      Thyroid Disease Father      C.A.D. Brother      Cardiovascular Brother      C.A.D. Brother      angioplasty     CANCER Brother          Current Outpatient Prescriptions   Medication Sig Dispense Refill     triamcinolone (KENALOG) 0.1 % ointment Apply sparingly to affected area three times daily for 14 days. 30 g 0     diphenhydrAMINE (BENADRYL) 25 MG tablet Take 2 tablets (50 mg) by mouth nightly as needed for itching 20 tablet 0     cephALEXin (KEFLEX) 500 MG capsule Take 1 capsule (500 mg) by mouth 3 times daily for 7 days 21 capsule 0     fenofibrate 145 MG tablet Take 1 tablet (145 mg) by mouth daily Brand name 90 tablet 3     busPIRone (BUSPAR) 15 MG tablet Take 1 tablet (15 mg) by mouth daily (Patient taking differently: Take 15 mg by mouth daily as needed ) 30 tablet 5      atorvastatin (LIPITOR) 40 MG tablet Take 1 tablet (40 mg) by mouth daily 90 tablet 3     GABAPENTIN PO Take 300 mg by mouth daily as needed        insulin aspart prot & aspart (NOVOLOG MIX 70/30 FLEXPEN) injection Inject Subcutaneous See Admin Instructions 60 units with breakfast and 53 u before dinner       acetaminophen (TYLENOL) 500 MG tablet Take 2 tablets (1,000 mg) by mouth 3 times daily 100 tablet 0     furosemide (LASIX) 40 MG tablet Take 1 tablet (40 mg) by mouth daily 60 tablet 1     lisinopril-hydrochlorothiazide (PRINZIDE/ZESTORETIC) 20-25 MG per tablet TAKE ONE TABLET BY MOUTH EVERY DAY 90 tablet 0     amLODIPine (NORVASC) 10 MG tablet TAKE ONE TABLET BY MOUTH EVERY DAY 90 tablet 1     atenolol (TENORMIN) 50 MG tablet TAKE 1 AND 1/2 TABLETS BY MOUTH ONCE DAILY 135 tablet 1     indomethacin (INDOCIN) 50 MG capsule TAKE ONE CAPSULE BY MOUTH THREE TIMES A DAY AND REDUCE TO AS NEEDED AS GOUT GETS BETTER 60 capsule 3     levothyroxine (SYNTHROID/LEVOTHROID) 112 MCG tablet Take 1 tablet (112 mcg) by mouth daily 90 tablet 3     glipiZIDE (GLUCOTROL) 10 MG tablet Take 1 tablet (10 mg) by mouth daily (Patient taking differently: Take 5 mg by mouth daily ) 30 tablet 2     nystatin-triamcinolone (MYCOLOG II) cream Apply  topically 2 times daily. (Patient taking differently: Apply topically daily as needed Apply  topically 2 times daily.) 60 g 3     ORDER FOR DME, SET TO LOCAL PRINT, Equipment being ordered: Diabetic Shoes 2 each 0     blood glucose meter (NO BRAND SPECIFIED) meter device kit Use to test blood sugar one times daily or as directed. 1 kit 1     blood glucose test strip 1 strip by In Vitro route daily 3 Month 12     insulin pen needle (NOVOFINE) 32G X 6 MM Use twcie daily or as directed. 100 each 3     Insulin Pen Needle (NOVOFINE 31) 31G X 6 MM MISC 1 Device 2 times daily. 100 each 11     ASPIRIN 81 MG OR TABS 1 TABLET DAILY       thin (NO BRAND SPECIFIED) lancets Use to test blood sugar one time  "daily or as directed. 1 Box 3     No Known Allergies  BP Readings from Last 3 Encounters:   02/28/18 140/78   01/19/18 119/75   01/17/18 124/72    Wt Readings from Last 3 Encounters:   02/28/18 228 lb (103.4 kg)   01/19/18 218 lb (98.9 kg)   01/17/18 230 lb (104.3 kg)                    Reviewed and updated as needed this visit by clinical staff  Tobacco  Allergies  Meds  Med Hx  Surg Hx  Fam Hx  Soc Hx      Reviewed and updated as needed this visit by Provider       10 point ROS of systems including Constitutional, Eyes, Respiratory, Cardiovascular, Gastroenterology, Genitourinary, Integumentary, Muscularskeletal, Psychiatric were all negative except for pertinent positives noted in my HPI.    OBJECTIVE:     /78 (BP Location: Right arm, Patient Position: Chair, Cuff Size: Adult Large)  Pulse 83  Temp 98.6  F (37  C) (Oral)  Resp 16  Ht 5' 11\" (1.803 m)  Wt 228 lb (103.4 kg)  BMI 31.8 kg/m2  Body mass index is 31.8 kg/(m^2).  GENERAL: healthy, alert and no distress  HENT: pharynx without erythema  NECK: no adenopathy, no asymmetry  RESP: lungs clear to auscultation - no rales, rhonchi or wheezes  CV: regular rate and rhythm, normal S1 S2, no S3 or S4, no murmur  ABDOMEN: soft, nontender  MS: no gross musculoskeletal defects noted  SKIN: generally dry skin, red raised rash on trunk and some on cheeks, consistent with contact dermatitis, excoriated rash on right forearm, left lower leg with a flat, red rash, warm to touch, consistent with cellulitis    Diagnostic Test Results:  No results found for this or any previous visit (from the past 24 hour(s)).    ASSESSMENT/PLAN:             1. Cellulitis of left lower extremity    - cephALEXin (KEFLEX) 500 MG capsule; Take 1 capsule (500 mg) by mouth 3 times daily for 7 days  Dispense: 21 capsule; Refill: 0  Discussed how to take the medication(s), expected outcomes, potential side effects.  Area of redness marked and he will keep close eye on it, elevate " extremities. If no improvement or any worsening, follow up.    2. Contact dermatitis, unspecified contact dermatitis type, unspecified trigger    - diphenhydrAMINE (BENADRYL) 25 MG tablet; Take 2 tablets (50 mg) by mouth nightly as needed for itching  Dispense: 20 tablet; Refill: 0  Discussed how to take the medication(s), expected outcomes, potential side effects.  Reviewed avoidance of irritants, no hot showers and general skin hydration.    3. Eczema, unspecified type    - triamcinolone (KENALOG) 0.1 % ointment; Apply sparingly to affected area three times daily for 14 days.  Dispense: 30 g; Refill: 0  Discussed how to take the medication(s), expected outcomes, potential side effects.  Apply to excoriated rash on right forearm.    See Patient Instructions  Patient Instructions   Watch the line of redness that was marked on your left leg, that should not worsen, if it does, return for recheck. Take antibiotic as prescribed.  Try the benadryl at night to help with the itching. Stop [place] soap, cool showers only. Dove for sensitive skin is a better choice.  Use the topical ointment for the real itchy rash on your right forearm.  Follow up if symptoms persist or worsen and as needed.        Thank you for choosing [facility] [place].  You may be receiving a survey in the mail from [employer] regarding your visit today.  Please take a few minutes to complete and return the survey to let us know how we are doing.      Our Clinic hours are:  Mondays    7:20 am - 7 pm  Tues -  Fri  7:20 am - 5 pm    Clinic Phone: [phone]    The clinic lab opens at 7:30 am Mon - Fri and appointments are required.    [facility]  Ph. [phone]  Monday-Thursday 8 am - 7pm  Tues/Wed/Fri 8 am - 5:30 pm             FEDERICO Hoffman CNP  [facility]

## 2018-02-28 NOTE — NURSING NOTE
"Chief Complaint   Patient presents with     Derm Problem       Initial /78 (BP Location: Right arm, Patient Position: Chair, Cuff Size: Adult Large)  Pulse 83  Temp 98.6  F (37  C) (Oral)  Resp 16  Ht 5' 11\" (1.803 m)  Wt 228 lb (103.4 kg)  BMI 31.8 kg/m2 Estimated body mass index is 31.8 kg/(m^2) as calculated from the following:    Height as of this encounter: 5' 11\" (1.803 m).    Weight as of this encounter: 228 lb (103.4 kg).  Medication Reconciliation: complete  "

## 2018-03-06 DIAGNOSIS — L03.116 CELLULITIS OF LEFT LOWER EXTREMITY: ICD-10-CM

## 2018-03-06 RX ORDER — CEPHALEXIN 500 MG/1
CAPSULE ORAL
Qty: 21 CAPSULE | Refills: 0 | Status: SHIPPED | OUTPATIENT
Start: 2018-03-06 | End: 2018-04-11

## 2018-03-08 DIAGNOSIS — S22.41XS CLOSED FRACTURE OF MULTIPLE RIBS OF RIGHT SIDE, SEQUELA: Primary | ICD-10-CM

## 2018-03-09 RX ORDER — GABAPENTIN 300 MG/1
CAPSULE ORAL
Qty: 90 CAPSULE | Refills: 1 | Status: SHIPPED | OUTPATIENT
Start: 2018-03-09 | End: 2018-11-05

## 2018-03-09 NOTE — TELEPHONE ENCOUNTER
Left message on machine to call back  Is patient currently taking this medication?    Margaret VALENCIA RN      1/10/18 notes from TCU :  Orders:  1. MOM 30 ml today  2. DC Neurontin 300 mg QHS  3. Neurontin 300 mg po QHS prn Dx numbness/tingling of feet  4. DC Senna S 1 QD  5. Senna S 2 tabs po BID constipation  6. DC Vit B12  7. DC scheduled indomethacin  8. Indomethacin 50 mg po TID prn Dx gout pain  9. Oxycontin 20 mg po Q 12 hr Dx pain/pneumothorax  10. Continue current prn oxycodone orders  11. Chest xray ap/lat veiws 1/16/18 Dx pneumothorax  12. Change of SS insulin to 0-139 = 0 u, 140-189=1 u, 190-239=2 u, 240-289=3u, 290-339=4u, 340-399=5u, 400-449=6u and 450-999 =7u and call provider if glucose > or =to 350 after correction

## 2018-03-09 NOTE — TELEPHONE ENCOUNTER
Patient reports he takes this medication intermittent.    Discontinued in TCU on 1/10/18  Routing refill request to provider for review/approval because:  Drug not on the G refill protocol     Thank you  Margaret VALENCIA RN

## 2018-03-14 ENCOUNTER — TELEPHONE (OUTPATIENT)
Dept: FAMILY MEDICINE | Facility: CLINIC | Age: 68
End: 2018-03-14

## 2018-03-14 DIAGNOSIS — L03.116 CELLULITIS OF LEFT LOWER EXTREMITY: ICD-10-CM

## 2018-03-14 RX ORDER — CEPHALEXIN 500 MG/1
500 CAPSULE ORAL 3 TIMES DAILY
Qty: 15 CAPSULE | Refills: 0 | Status: SHIPPED | OUTPATIENT
Start: 2018-03-14 | End: 2018-03-19

## 2018-03-14 NOTE — PROGRESS NOTES
Will repeat another 5 days of antibiotics, if he can take probiotics he should after recent antibiotic use. I sent RX to [facility].  Continue to monitor area closely and if any further concerns with the redness, he should be seen.  LORY Ibarra

## 2018-03-14 NOTE — TELEPHONE ENCOUNTER
S-(situation): Patient still has red area on the left leg patient is wondering if you would like to continue antibiotics or would like to see the area.    B-(background): Patient was seen for cellulitis on the left leg on 2/28/18    A-(assessment): The area is still red but it is smaller than what it was. It warm to the touch. Patient denies any fevers or red streaking.  About one inch by one inch.    R-(recommendations): Will send to the provider for review and advise.    Thank you    Margaret VALENCIA RN

## 2018-03-14 NOTE — TELEPHONE ENCOUNTER
Reason for call:  Patient reporting a symptom    Symptom or request: Pt was seen and treated for a left leg cellulitis 2/28/18 and the infection is better, but still not gone.      Duration (how long have symptoms been present): ongoing    Have you been treated for this before? Yes    Additional comments:     Phone Number patient can be reached at:  Home number on file [phone] (home)    Best Time:  any    Can we leave a detailed message on this number:  YES    Call taken on 3/14/2018 at 8:22 AM by Kathleen Rodarte

## 2018-04-11 ENCOUNTER — OFFICE VISIT (OUTPATIENT)
Dept: FAMILY MEDICINE | Facility: CLINIC | Age: 68
End: 2018-04-11
Payer: COMMERCIAL

## 2018-04-11 ENCOUNTER — TELEPHONE (OUTPATIENT)
Dept: FAMILY MEDICINE | Facility: CLINIC | Age: 68
End: 2018-04-11

## 2018-04-11 VITALS
WEIGHT: 230 LBS | RESPIRATION RATE: 14 BRPM | TEMPERATURE: 99 F | BODY MASS INDEX: 32.2 KG/M2 | HEIGHT: 71 IN | SYSTOLIC BLOOD PRESSURE: 130 MMHG | OXYGEN SATURATION: 96 % | DIASTOLIC BLOOD PRESSURE: 86 MMHG | HEART RATE: 87 BPM

## 2018-04-11 DIAGNOSIS — E11.9 TYPE 2 DIABETES MELLITUS WITHOUT COMPLICATION, WITH LONG-TERM CURRENT USE OF INSULIN (H): Primary | ICD-10-CM

## 2018-04-11 DIAGNOSIS — Z12.11 SCREENING FOR MALIGNANT NEOPLASM OF COLON: ICD-10-CM

## 2018-04-11 DIAGNOSIS — Z79.4 TYPE 2 DIABETES MELLITUS WITHOUT COMPLICATION, WITH LONG-TERM CURRENT USE OF INSULIN (H): Primary | ICD-10-CM

## 2018-04-11 DIAGNOSIS — L29.89 PRURITIC ERYTHEMATOUS RASH: Primary | ICD-10-CM

## 2018-04-11 PROCEDURE — 99213 OFFICE O/P EST LOW 20 MIN: CPT | Performed by: FAMILY MEDICINE

## 2018-04-11 RX ORDER — CETIRIZINE HYDROCHLORIDE 10 MG/1
10 TABLET ORAL EVERY EVENING
Qty: 30 TABLET | Refills: 0 | Status: SHIPPED | OUTPATIENT
Start: 2018-04-11 | End: 2018-11-05

## 2018-04-11 RX ORDER — CETIRIZINE HYDROCHLORIDE 10 MG/1
10 TABLET ORAL EVERY EVENING
Qty: 30 TABLET | Refills: 0 | Status: SHIPPED | OUTPATIENT
Start: 2018-04-11 | End: 2018-04-11

## 2018-04-11 NOTE — PROGRESS NOTES
SUBJECTIVE:   Kvng Velasquez is a 68 year old male who presents to clinic today for the following health issues:  Chief Complaint   Patient presents with     Derm Problem     Rash on Arms, Legs, Back, Lower Left Leg      Health Maintenance     reminded due for diabetes check- he will schedule with PCP , FIT test ordered and given, he would like to defer pneumovax to another day          Rash  Onset: Since December 2017    Description:   Location: Arms, Across Upper Back, Lower Left Leg   Character: raised, red  Itching (Pruritis): YES- Severe     Progression of Symptoms:  Worsening, spreading     Accompanying Signs & Symptoms:  Fever: no   Body aches or joint pain: no   Sore throat symptoms: no   Recent cold symptoms: no     History:   Previous similar rash: no     Precipitating factors:   Exposure to similar rash: no   New exposures: None   Recent travel: no     Alleviating factors:  Cephalexin - was prescribed this 2/28 at office visit he had at [facility]    Therapies Tried and outcome: Cephalexin was given to him 7 weeks ago for suspected cellulitis of the left lower leg - but did not totally clear rash; he said he already had arm rashes at that time but significantly much less in extent compared to today. Triamcinolone 0.1%- very little relief    Verified above history with patient.    Patient denies new drug exposure prior to the onset of the rashes.  Patient states he lives alone, and has not traveled recently.  Patient states he has converted to Drove Body Wash for showering.    Problem list and histories reviewed & adjusted, as indicated.  Additional history: as documented    Patient Active Problem List   Diagnosis     Acquired hypothyroidism     Dysthymic disorder     Esophageal reflux     Nonspecific elevation of levels of transaminase or lactic acid dehydrogenase (LDH)     Cholesteatoma of external ear     ALCOH USE     Gouty arthropathy     Local infection of skin and subcutaneous tissue      Other alopecia     GERD (gastroesophageal reflux disease)     Hyperlipidemia LDL goal <100     Type 2 diabetes mellitus without complication (H)     Health Care Home     Advanced directives, counseling/discussion     Hypothyroidism     Diabetes mellitus due to underlying condition with diabetic neuropathy (H)     Family history of prostate cancer in father     DM type 2 with diabetic peripheral neuropathy (H)     Essential hypertension with goal blood pressure less than 140/90     Urinary retention     Fall     Closed fracture of multiple ribs of right side     Subcutaneous emphysema (H)     Elevated white blood cell count     Past Surgical History:   Procedure Laterality Date     SURGICAL HISTORY OF -   9/13/1999    Left inguinal hernia repair     SURGICAL HISTORY OF -   1958    T&A     SURGICAL HISTORY OF -   12/10/1990    Cyst removal from one of his fingers     SURGICAL HISTORY OF -       Cholesteatoma removed from left ear     SURGICAL HISTORY OF -   3/14/1978    Vasectomy     SURGICAL HISTORY OF -   1982    Mastoidectomy       Social History   Substance Use Topics     Smoking status: Never Smoker     Smokeless tobacco: Never Used     Alcohol use Yes      Comment: occas     Family History   Problem Relation Age of Onset     C.A.D. Mother      Hypertension Mother      DIABETES Mother      Cardiovascular Mother      Lipids Mother      Depression Mother      Genitourinary Problems Mother      Lipids Father      Hypertension Father      Prostate Cancer Father      Thyroid Disease Father      C.A.D. Brother      Cardiovascular Brother      C.A.D. Brother      angioplasty     CANCER Brother          Current Outpatient Prescriptions   Medication Sig Dispense Refill     cetirizine (ZYRTEC) 10 MG tablet Take 1 tablet (10 mg) by mouth every evening 30 tablet 0     fenofibrate 145 MG tablet Take 1 tablet (145 mg) by mouth daily Brand name 90 tablet 3     busPIRone (BUSPAR) 15 MG tablet Take 1 tablet (15 mg) by mouth daily  (Patient taking differently: Take 15 mg by mouth daily as needed ) 30 tablet 5     atorvastatin (LIPITOR) 40 MG tablet Take 1 tablet (40 mg) by mouth daily 90 tablet 3     insulin aspart prot & aspart (NOVOLOG MIX 70/30 FLEXPEN) injection Inject Subcutaneous See Admin Instructions 60 units with breakfast and 53 u before dinner       acetaminophen (TYLENOL) 500 MG tablet Take 2 tablets (1,000 mg) by mouth 3 times daily (Patient taking differently: Take 1,000 mg by mouth every 4 hours as needed ) 100 tablet 0     furosemide (LASIX) 40 MG tablet Take 1 tablet (40 mg) by mouth daily 60 tablet 1     lisinopril-hydrochlorothiazide (PRINZIDE/ZESTORETIC) 20-25 MG per tablet TAKE ONE TABLET BY MOUTH EVERY DAY 90 tablet 0     amLODIPine (NORVASC) 10 MG tablet TAKE ONE TABLET BY MOUTH EVERY DAY 90 tablet 1     atenolol (TENORMIN) 50 MG tablet TAKE 1 AND 1/2 TABLETS BY MOUTH ONCE DAILY 135 tablet 1     levothyroxine (SYNTHROID/LEVOTHROID) 112 MCG tablet Take 1 tablet (112 mcg) by mouth daily 90 tablet 3     glipiZIDE (GLUCOTROL) 10 MG tablet Take 1 tablet (10 mg) by mouth daily (Patient taking differently: Take 5 mg by mouth daily ) 30 tablet 2     nystatin-triamcinolone (MYCOLOG II) cream Apply  topically 2 times daily. (Patient taking differently: Apply topically daily as needed Apply  topically 2 times daily.) 60 g 3     ORDER FOR DME, SET TO LOCAL PRINT, Equipment being ordered: Diabetic Shoes 2 each 0     blood glucose meter (NO BRAND SPECIFIED) meter device kit Use to test blood sugar one times daily or as directed. 1 kit 1     blood glucose test strip 1 strip by In Vitro route daily 3 Month 12     thin (NO BRAND SPECIFIED) lancets Use to test blood sugar one time daily or as directed. 1 Box 3     insulin pen needle (NOVOFINE) 32G X 6 MM Use twcie daily or as directed. 100 each 3     Insulin Pen Needle (NOVOFINE 31) 31G X 6 MM MISC 1 Device 2 times daily. 100 each 11     ASPIRIN 81 MG OR TABS 1 TABLET DAILY        "gabapentin (NEURONTIN) 300 MG capsule TAKE ONE CAPSULE BY MOUTH EVERY DAY AT BEDTIME (Patient not taking: Reported on 4/11/2018) 90 capsule 1     [DISCONTINUED] GABAPENTIN PO Take 300 mg by mouth daily as needed        indomethacin (INDOCIN) 50 MG capsule TAKE ONE CAPSULE BY MOUTH THREE TIMES A DAY AND REDUCE TO AS NEEDED AS GOUT GETS BETTER (Patient not taking: Reported on 4/11/2018) 60 capsule 3     No Known Allergies    Reviewed and updated as needed this visit by clinical staff  Tobacco  Allergies  Meds  Med Hx  Surg Hx  Fam Hx  Soc Hx      Reviewed and updated as needed this visit by Provider         ROS:  C: NEGATIVE for fever, chills, or change in weight  INTEGUMENTARY/SKIN: see above  MUSCULOSKELETAL: see above  H: NEGATIVE for bleeding problems    OBJECTIVE:                                                    /86  Pulse 87  Temp 99  F (37.2  C) (Tympanic)  Resp 14  Ht 5' 11\" (1.803 m)  Wt 230 lb (104.3 kg)  SpO2 96%  BMI 32.08 kg/m2  Body mass index is 32.08 kg/(m^2).  GEN: alert, oriented x 3, NAD  SKIN: good turgor; extensive erythematous maculopapular lesions some with scaling and some with excoriation from scratching, located on torso, bilateral upper extremities sparing hands, bilateral lower extremities sparing plantar aspect of feet, and only very few on neck; rash spares face, gluteal and genital areas; no pustule/vesicle/bullae  MOUTH: no ulcers/desquamation/vesicles  CV: normal rate, regular rhythm, no murmur  LUNGS: clear to auscultation bilaterally, no wheezing  EXT: no pitting edema either lower extremity    Diagnostic test results:  Diagnostic Test Results:  none      ASSESSMENT/PLAN:                                                        ICD-10-CM    1. Pruritic erythematous rash L29.8 cetirizine (ZYRTEC) 10 MG tablet     No distsress; no sign of cellulitis/folliculitis.  DDx: urticaria, drug eruption, EM, atopic dermatitis, scabies  Will treat for the earlier for now with " antihistamine.  Observe for gradual resolution - disucssed may wax/wane for several days.  Consider empiric treatment with permethrin if more pruritus at night, or  Not resolved after a week.  Derm consult if worsening.  Reasons to go to ER discussed in detail with patient.  General skin care discussed with patient in detail.     2. Screening for malignant neoplasm of colon Z12.11 Fecal colorectal cancer screen (FIT)     Patient states he will schedule DM follow up with PCP in next 1-2 weeks.    Follow up with Provider - 1 week if not improved; sooner if with rapid worsening or new symptoms.   Patient Instructions   Start cetirizine 10 mg orally once a night until the rash is completely resolved.    If no improvement after 1 week, contact clinic again.    Avoid extremes in temperature.    Use Dove Body wash sensitive skin for showering.    If you develop fever, mouth sores, eye swelling/redness, or painful rash, go to ER promptly.      Thank you for choosing [facility].  You may be receiving a survey in the mail from [employer] regarding your visit today.  Please take a few minutes to complete and return the survey to let us know how we are doing.      If you have questions or concerns, please contact us via [employer] or you can contact your care team at [phone].    Our Clinic hours are:  Monday 6:40 am  to 7:00 pm  Tuesday -Friday 6:40 am to 5:00 pm    The [place] outpatient lab hours are:  Monday - Friday 6:10 am to 4:45 pm  Saturdays 7:00 am to 11:00 am  Appointments are required, call [phone]    If you have clinical questions after hours or would like to schedule an appointment,  call the clinic at [phone].        Moustapha Delaney MD  [facility]

## 2018-04-11 NOTE — PATIENT INSTRUCTIONS
Start cetirizine 10 mg orally once a night until the rash is completely resolved.    If no improvement after 1 week, contact clinic again.    Avoid extremes in temperature.    Use Dove Body wash sensitive skin for showering.    If you develop fever, mouth sores, eye swelling/redness, or painful rash, go to ER promptly.      Thank you for choosing [facility].  You may be receiving a survey in the mail from [employer] regarding your visit today.  Please take a few minutes to complete and return the survey to let us know how we are doing.      If you have questions or concerns, please contact us via [employer] or you can contact your care team at [phone].    Our Clinic hours are:  Monday 6:40 am  to 7:00 pm  Tuesday -Friday 6:40 am to 5:00 pm    The [place] outpatient lab hours are:  Monday - Friday 6:10 am to 4:45 pm  Saturdays 7:00 am to 11:00 am  Appointments are required, call [phone]    If you have clinical questions after hours or would like to schedule an appointment,  call the clinic at [phone].

## 2018-04-11 NOTE — NURSING NOTE
"Chief Complaint   Patient presents with     Derm Problem     Rash on Arms, Legs, Back, Lower Left Leg      Health Maintenance     reminded due for diabetes check- he will schedule with PCP , FIT test ordered and given, he would like to defer pneumovax to another day        Initial /79 (BP Location: Left arm, Patient Position: Chair, Cuff Size: Adult Large)  Pulse 87  Temp 99  F (37.2  C) (Tympanic)  Ht 5' 11\" (1.803 m)  Wt 230 lb (104.3 kg)  SpO2 96%  BMI 32.08 kg/m2 Estimated body mass index is 32.08 kg/(m^2) as calculated from the following:    Height as of this encounter: 5' 11\" (1.803 m).    Weight as of this encounter: 230 lb (104.3 kg).  Medication Reconciliation: complete      "

## 2018-04-11 NOTE — TELEPHONE ENCOUNTER
Reason for Call:  Other Lab Orders    Detailed comments: Pt is making an appt for labs to have his A1C, lipids and any thing else that he needs done for his diabetic check.  Please advise.    Phone Number Patient can be reached at: Home number on file [phone] (home)    Best Time: any    Can we leave a detailed message on this number? YES    Call taken on 4/11/2018 at 9:42 AM by Ana Maria Diego

## 2018-04-11 NOTE — MR AVS SNAPSHOT
After Visit Summary   4/11/2018    Kvng Velasquez    MRN: [id number]           Patient Information     Date Of Birth          1950        Visit Information        Provider Department      4/11/2018 8:20 AM Moustapha Delaney MD [facility]        Today's Diagnoses     Pruritic erythematous rash    -  1    Screening for malignant neoplasm of colon          Care Instructions    Start cetirizine 10 mg orally once a night until the rash is completely resolved.    If no improvement after 1 week, contact clinic again.    Avoid extremes in temperature.    Use Dove Body wash sensitive skin for showering.    If you develop fever, mouth sores, eye swelling/redness, or painful rash, go to ER promptly.      Thank you for choosing [facility].  You may be receiving a survey in the mail from Amina Garcia regarding your visit today.  Please take a few minutes to complete and return the survey to let us know how we are doing.      If you have questions or concerns, please contact us via UNY[employer] or you can contact your care team at [phone].    Our Clinic hours are:  Monday 6:40 am  to 7:00 pm  Tuesday -Friday 6:40 am to 5:00 pm    The [place] outpatient lab hours are:  Monday - Friday 6:10 am to 4:45 pm  Saturdays 7:00 am to 11:00 am  Appointments are required, call [phone]    If you have clinical questions after hours or would like to schedule an appointment,  call the clinic at [phone].            Follow-ups after your visit        Future tests that were ordered for you today     Open Future Orders        Priority Expected Expires Ordered    Fecal colorectal cancer screen (FIT) Routine 5/2/2018 7/4/2018 4/11/2018            Who to contact     If you have questions or need follow up information about today's clinic visit or your schedule please contact [facility] directly at [phone].  Normal or non-critical lab and imaging results will be communicated to  "you by MyChart, letter or phone within 4 business days after the clinic has received the results. If you do not hear from us within 7 days, please contact the clinic through [employer] or phone. If you have a critical or abnormal lab result, we will notify you by phone as soon as possible.  Submit refill requests through [employer] or call your pharmacy and they will forward the refill request to us. Please allow 3 business days for your refill to be completed.          Additional Information About Your Visit        [employer] Information     [employer] lets you send messages to your doctor, view your test results, renew your prescriptions, schedule appointments and more. To sign up, go to [email or web address] . Click on \"Log in\" on the left side of the screen, which will take you to the Welcome page. Then click on \"Sign up Now\" on the right side of the page.     You will be asked to enter the access code listed below, as well as some personal information. Please follow the directions to create your username and password.     Your access code is: [email or web address]  Expires: 7/10/2018  9:01 AM     Your access code will  in 90 days. If you need help or a new code, please call your Center clinic or [phone].        Care EveryWhere ID     This is your Care EveryWhere ID. This could be used by other organizations to access your Center medical records  [id number]        Your Vitals Were     Pulse Temperature Respirations Height Pulse Oximetry BMI (Body Mass Index)    87 99  F (37.2  C) (Tympanic) 14 5' 11\" (1.803 m) 96% 32.08 kg/m2       Blood Pressure from Last 3 Encounters:   18 130/86   18 140/78   18 119/75    Weight from Last 3 Encounters:   18 230 lb (104.3 kg)   18 228 lb (103.4 kg)   18 218 lb (98.9 kg)                 Today's Medication Changes          These changes are accurate as of 18  9:02 AM.  If you have any questions, ask your nurse or doctor.               Start " taking these medicines.        Dose/Directions    cetirizine 10 MG tablet   Commonly known as:  zyrTEC   Used for:  Pruritic erythematous rash   Started by:  Moustapha Delaney MD        Dose:  10 mg   Take 1 tablet (10 mg) by mouth every evening   Quantity:  30 tablet   Refills:  0         These medicines have changed or have updated prescriptions.        Dose/Directions    acetaminophen 500 MG tablet   Commonly known as:  TYLENOL   This may have changed:    - when to take this  - reasons to take this   Used for:  Closed fracture of multiple ribs of right side, initial encounter        Dose:  1000 mg   Take 2 tablets (1,000 mg) by mouth 3 times daily   Quantity:  100 tablet   Refills:  0       busPIRone 15 MG tablet   Commonly known as:  BUSPAR   This may have changed:    - when to take this  - reasons to take this   Used for:  Dysthymic disorder        Dose:  15 mg   Take 1 tablet (15 mg) by mouth daily   Quantity:  30 tablet   Refills:  5       glipiZIDE 10 MG tablet   Commonly known as:  GLUCOTROL   This may have changed:  how much to take   Used for:  Type 2 diabetes mellitus without complication, with long-term current use of insulin (H)        Dose:  10 mg   Take 1 tablet (10 mg) by mouth daily   Quantity:  30 tablet   Refills:  2       nystatin-triamcinolone cream   Commonly known as:  MYCOLOG II   This may have changed:    - how to take this  - when to take this  - reasons to take this  - additional instructions   Used for:  Skin rash        Apply  topically 2 times daily.   Quantity:  60 g   Refills:  3            Where to get your medicines      These medications were sent to [place], [place] - [address]  [address] [address] [facility] [address]     Phone:  [phone]     cetirizine 10 MG tablet                Primary Care Provider Office Phone # Fax #    Marlen Ma [phone] [address]  [facility]  [id number]        Equal Access to Services     [facility] HospitalDANDY : Hadii genesis strickland braedentiffany Alvinali, waabelda luqadaha, qaybta daphneyjudithsoham rob. So [facility] [phone].    ATENCIÓN: Si habla español, tiene a matt disposición servicios gratuitos de asistencia lingüística. Yarelisame al [phone].    We comply with applicable federal civil rights laws and [place] laws. We do not discriminate on the basis of race, color, national origin, age, disability, sex, sexual orientation, or gender identity.            Thank you!     Thank you for choosing [facility]  for your care. Our goal is always to provide you with excellent care. Hearing back from our patients is one way we can continue to improve our services. Please take a few minutes to complete the written survey that you may receive in the mail after your visit with us. Thank you!             Your Updated Medication List - Protect others around you: Learn how to safely use, store and throw away your medicines at www.disposemymeds.org.          This list is accurate as of 4/11/18  9:02 AM.  Always use your most recent med list.                   Brand Name Dispense Instructions for use Diagnosis    acetaminophen 500 MG tablet    TYLENOL    100 tablet    Take 2 tablets (1,000 mg) by mouth 3 times daily    Closed fracture of multiple ribs of right side, initial encounter       amLODIPine 10 MG tablet    NORVASC    90 tablet    TAKE ONE TABLET BY MOUTH EVERY DAY    Essential hypertension with goal blood pressure less than 140/90       aspirin 81 MG tablet      1 TABLET DAILY        atenolol 50 MG tablet    TENORMIN    135 tablet    TAKE 1 AND 1/2 TABLETS BY MOUTH ONCE DAILY    Essential hypertension, Essential hypertension, benign       atorvastatin 40 MG tablet    LIPITOR    90 tablet    Take 1 tablet (40 mg) by mouth daily    Hyperlipidemia LDL goal <100       blood glucose monitoring meter device kit    no brand specified    1  kit    Use to test blood sugar one times daily or as directed.    Type 2 diabetes, HbA1C goal < 8% (H)       blood glucose monitoring test strip    no brand specified    3 Month    1 strip by In Vitro route daily    Type II or unspecified type diabetes mellitus without mention of complication, not stated as uncontrolled, Type 2 diabetes, HbA1C goal < 8% (H)       busPIRone 15 MG tablet    BUSPAR    30 tablet    Take 1 tablet (15 mg) by mouth daily    Dysthymic disorder       cetirizine 10 MG tablet    zyrTEC    30 tablet    Take 1 tablet (10 mg) by mouth every evening    Pruritic erythematous rash       fenofibrate 145 MG tablet     90 tablet    Take 1 tablet (145 mg) by mouth daily Brand name    Hyperlipidemia LDL goal <100       furosemide 40 MG tablet    LASIX    60 tablet    Take 1 tablet (40 mg) by mouth daily    Edema, unspecified type       gabapentin 300 MG capsule    NEURONTIN    90 capsule    TAKE ONE CAPSULE BY MOUTH EVERY DAY AT BEDTIME    Closed fracture of multiple ribs of right side, sequela       glipiZIDE 10 MG tablet    GLUCOTROL    30 tablet    Take 1 tablet (10 mg) by mouth daily    Type 2 diabetes mellitus without complication, with long-term current use of insulin (H)       indomethacin 50 MG capsule    INDOCIN    60 capsule    TAKE ONE CAPSULE BY MOUTH THREE TIMES A DAY AND REDUCE TO AS NEEDED AS GOUT GETS BETTER    Gouty arthropathy       * insulin pen needle 31G X 6 MM    NOVOFINE 31    100 each    1 Device 2 times daily.    Type II or unspecified type diabetes mellitus without mention of complication, not stated as uncontrolled       * NOVOFINE 32G X 6 MM   Generic drug:  insulin pen needle     100 each    Use twcie daily or as directed.    Type II or unspecified type diabetes mellitus without mention of complication, not stated as uncontrolled       levothyroxine 112 MCG tablet    SYNTHROID/LEVOTHROID    90 tablet    Take 1 tablet (112 mcg) by mouth daily    Hypothyroidism, unspecified  type       lisinopril-hydrochlorothiazide 20-25 MG per tablet    PRINZIDE/ZESTORETIC    90 tablet    TAKE ONE TABLET BY MOUTH EVERY DAY    HTN, goal below 140/90       NovoLOG MIX 70/30 FLEXPEN injection   Generic drug:  insulin aspart prot & aspart      Inject Subcutaneous See Admin Instructions 60 units with breakfast and 53 u before dinner        nystatin-triamcinolone cream    MYCOLOG II    60 g    Apply  topically 2 times daily.    Skin rash       order for DME     2 each    Equipment being ordered: Diabetic Shoes    Type 2 diabetes, HbA1C goal < 8% (H)       thin lancets    NO BRAND SPECIFIED    1 Box    Use to test blood sugar one time daily or as directed.    Type 2 diabetes, HbA1C goal < 8% (H)       * Notice:  This list has 2 medication(s) that are the same as other medications prescribed for you. Read the directions carefully, and ask your doctor or other care provider to review them with you.

## 2018-04-11 NOTE — TELEPHONE ENCOUNTER
Left message for patient to return call to clinic.  He is due for DM check now - no appt scheduled.  Is going to schedule one?  UTD on lipids, TSH, BMP  Melanie CRAWFORD RN

## 2018-04-12 NOTE — TELEPHONE ENCOUNTER
Patient is calling back. He wants to have labs done for A1C before he sees Dr. Ma. Can orders be put in for this? Once he schedules his A1C lab (and any additional labs that are needed) he will make an appointment to see Dr. Ma.    Sarah ARAUZ  Central Scheduler

## 2018-04-13 NOTE — TELEPHONE ENCOUNTER
Patient would like labs before appt.  Labs pended.  Please review and advise.    Thank you  Margaret VALENCIA RN

## 2018-04-13 NOTE — TELEPHONE ENCOUNTER
Patient called back. He is aware he is due for an appt with Dr. Ma. Patient would like to have labs drawn first, then schedule an appointment with Dr. Ma. Please call patient to let him know when lab orders are entered so that he can schedule both appointments.    Mary Toure   Clinic Station

## 2018-04-14 PROCEDURE — 82274 ASSAY TEST FOR BLOOD FECAL: CPT | Performed by: FAMILY MEDICINE

## 2018-04-18 DIAGNOSIS — E11.9 TYPE 2 DIABETES MELLITUS WITHOUT COMPLICATION, WITH LONG-TERM CURRENT USE OF INSULIN (H): ICD-10-CM

## 2018-04-18 DIAGNOSIS — Z79.4 TYPE 2 DIABETES MELLITUS WITHOUT COMPLICATION, WITH LONG-TERM CURRENT USE OF INSULIN (H): ICD-10-CM

## 2018-04-18 LAB
ANION GAP SERPL CALCULATED.3IONS-SCNC: 7 MMOL/L (ref 3–14)
BUN SERPL-MCNC: 16 MG/DL (ref 7–30)
CALCIUM SERPL-MCNC: 8.6 MG/DL (ref 8.5–10.1)
CHLORIDE SERPL-SCNC: 103 MMOL/L (ref 94–109)
CHOLEST SERPL-MCNC: 159 MG/DL
CO2 SERPL-SCNC: 25 MMOL/L (ref 20–32)
CREAT SERPL-MCNC: 0.79 MG/DL (ref 0.66–1.25)
CREAT UR-MCNC: 272 MG/DL
GFR SERPL CREATININE-BSD FRML MDRD: >90 ML/MIN/1.7M2
GLUCOSE SERPL-MCNC: 122 MG/DL (ref 70–99)
HBA1C MFR BLD: 5.6 % (ref 0–5.6)
HDLC SERPL-MCNC: 51 MG/DL
LDLC SERPL CALC-MCNC: 83 MG/DL
MICROALBUMIN UR-MCNC: 36 MG/L
MICROALBUMIN/CREAT UR: 13.2 MG/G CR (ref 0–17)
NONHDLC SERPL-MCNC: 108 MG/DL
POTASSIUM SERPL-SCNC: 3.6 MMOL/L (ref 3.4–5.3)
SODIUM SERPL-SCNC: 135 MMOL/L (ref 133–144)
TRIGL SERPL-MCNC: 127 MG/DL

## 2018-04-18 PROCEDURE — 83036 HEMOGLOBIN GLYCOSYLATED A1C: CPT | Performed by: FAMILY MEDICINE

## 2018-04-18 PROCEDURE — 82043 UR ALBUMIN QUANTITATIVE: CPT | Performed by: FAMILY MEDICINE

## 2018-04-18 PROCEDURE — 80048 BASIC METABOLIC PNL TOTAL CA: CPT | Performed by: FAMILY MEDICINE

## 2018-04-18 PROCEDURE — 36415 COLL VENOUS BLD VENIPUNCTURE: CPT | Performed by: FAMILY MEDICINE

## 2018-04-18 PROCEDURE — 80061 LIPID PANEL: CPT | Performed by: FAMILY MEDICINE

## 2018-04-18 NOTE — LETTER
[facility]  [address]  [facility] [address]  Phone: [phone]      4/19/2018     Kvng Velasquez  [address] [address]      Dear Kvng:    Thank you for allowing me to participate in your care. Your recent test results were reviewed and listed below.      Your lab results from 4/18 are acceptable.   Results for orders placed or performed in visit on 04/18/18   Basic metabolic panel   Result Value Ref Range    Sodium 135 133 - 144 mmol/L    Potassium 3.6 3.4 - 5.3 mmol/L    Chloride 103 94 - 109 mmol/L    Carbon Dioxide 25 20 - 32 mmol/L    Anion Gap 7 3 - 14 mmol/L    Glucose 122 (H) 70 - 99 mg/dL    Urea Nitrogen 16 7 - 30 mg/dL    Creatinine 0.79 0.66 - 1.25 mg/dL    GFR Estimate >90 >60 mL/min/1.7m2    GFR Estimate If Black >90 >60 mL/min/1.7m2    Calcium 8.6 8.5 - 10.1 mg/dL   Lipid panel reflex to direct LDL Fasting   Result Value Ref Range    Cholesterol 159 <200 mg/dL    Triglycerides 127 <150 mg/dL    HDL Cholesterol 51 >39 mg/dL    LDL Cholesterol Calculated 83 <100 mg/dL    Non HDL Cholesterol 108 <130 mg/dL   Hemoglobin A1c   Result Value Ref Range    Hemoglobin A1C 5.6 0 - 5.6 %   Albumin Random Urine Quantitative with Creat Ratio   Result Value Ref Range    Creatinine Urine 272 mg/dL    Albumin Urine mg/L 36 mg/L    Albumin Urine mg/g Cr 13.20 0 - 17 mg/g Cr     Thank you for choosing [facility]. As a result, please continue with the treatment plan discussed in the office. Return as discussed or sooner if symptoms worsen or fail to improve. If you have any further questions or concerns, please do not hesitate to contact us.      Sincerely,        Dr. Marlen Ma/Bonny Goodwin MA

## 2018-04-19 DIAGNOSIS — Z12.11 SCREENING FOR MALIGNANT NEOPLASM OF COLON: ICD-10-CM

## 2018-04-19 LAB — HEMOCCULT STL QL IA: NEGATIVE

## 2018-04-19 NOTE — PROGRESS NOTES
Please notify pt of normal/acceptable results.  Will discuss further next week.      Marlen Ma M.D.

## 2018-04-26 ENCOUNTER — OFFICE VISIT (OUTPATIENT)
Dept: FAMILY MEDICINE | Facility: CLINIC | Age: 68
End: 2018-04-26
Payer: COMMERCIAL

## 2018-04-26 ENCOUNTER — TELEPHONE (OUTPATIENT)
Dept: PEDIATRICS | Facility: CLINIC | Age: 68
End: 2018-04-26

## 2018-04-26 VITALS
RESPIRATION RATE: 18 BRPM | BODY MASS INDEX: 31.08 KG/M2 | HEIGHT: 71 IN | HEART RATE: 77 BPM | TEMPERATURE: 100.3 F | SYSTOLIC BLOOD PRESSURE: 129 MMHG | DIASTOLIC BLOOD PRESSURE: 77 MMHG | WEIGHT: 222 LBS

## 2018-04-26 DIAGNOSIS — Z79.4 TYPE 2 DIABETES MELLITUS WITHOUT COMPLICATION, WITH LONG-TERM CURRENT USE OF INSULIN (H): ICD-10-CM

## 2018-04-26 DIAGNOSIS — E03.9 HYPOTHYROIDISM, UNSPECIFIED TYPE: ICD-10-CM

## 2018-04-26 DIAGNOSIS — L29.89 PRURITIC ERYTHEMATOUS RASH: ICD-10-CM

## 2018-04-26 DIAGNOSIS — I10 ESSENTIAL HYPERTENSION WITH GOAL BLOOD PRESSURE LESS THAN 140/90: Primary | Chronic | ICD-10-CM

## 2018-04-26 DIAGNOSIS — E11.9 TYPE 2 DIABETES MELLITUS WITHOUT COMPLICATION, WITH LONG-TERM CURRENT USE OF INSULIN (H): ICD-10-CM

## 2018-04-26 PROCEDURE — 99214 OFFICE O/P EST MOD 30 MIN: CPT | Performed by: FAMILY MEDICINE

## 2018-04-26 RX ORDER — LEVOTHYROXINE SODIUM 112 UG/1
112 TABLET ORAL DAILY
Qty: 90 TABLET | Refills: 3 | Status: SHIPPED | OUTPATIENT
Start: 2018-04-26 | End: 2018-04-26

## 2018-04-26 RX ORDER — GLIPIZIDE 10 MG/1
10 TABLET ORAL DAILY
Qty: 30 TABLET | Refills: 11 | Status: CANCELLED | OUTPATIENT
Start: 2018-04-26

## 2018-04-26 RX ORDER — LISINOPRIL AND HYDROCHLOROTHIAZIDE 20; 25 MG/1; MG/1
1 TABLET ORAL DAILY
Qty: 90 TABLET | Refills: 3 | Status: SHIPPED | OUTPATIENT
Start: 2018-04-26 | End: 2019-05-01

## 2018-04-26 RX ORDER — TRIAMCINOLONE ACETONIDE 1 MG/G
CREAM TOPICAL
Qty: 80 G | Refills: 0 | Status: SHIPPED | OUTPATIENT
Start: 2018-04-26 | End: 2018-05-16

## 2018-04-26 RX ORDER — GLIPIZIDE 5 MG/1
2.5 TABLET ORAL
Qty: 45 TABLET | Refills: 1 | Status: SHIPPED | OUTPATIENT
Start: 2018-04-26 | End: 2018-09-26

## 2018-04-26 RX ORDER — AMLODIPINE BESYLATE 10 MG/1
10 TABLET ORAL DAILY
Qty: 90 TABLET | Refills: 3 | Status: SHIPPED | OUTPATIENT
Start: 2018-04-26 | End: 2019-05-01

## 2018-04-26 RX ORDER — ATENOLOL 50 MG/1
75 TABLET ORAL DAILY
Qty: 135 TABLET | Refills: 1 | Status: SHIPPED | OUTPATIENT
Start: 2018-04-26 | End: 2018-11-05

## 2018-04-26 RX ORDER — LEVOTHYROXINE SODIUM 112 UG/1
112 TABLET ORAL DAILY
Qty: 90 TABLET | Refills: 3 | Status: SHIPPED | OUTPATIENT
Start: 2018-04-26 | End: 2019-04-27

## 2018-04-26 ASSESSMENT — PAIN SCALES - GENERAL: PAINLEVEL: NO PAIN (0)

## 2018-04-26 NOTE — PATIENT INSTRUCTIONS
See Dermatologist    Use the traimcinolone 0.1% cream three times daily.      Cut back on the glipizide to 2.5 mg daily - this will be 1/2 of a 5 mg pill.           Thank you for choosing [facility].  You may be receiving a survey in the mail from Amina Garcia regarding your visit today.  Please take a few minutes to complete and return the survey to let us know how we are doing.      Our Clinic hours are:  Mondays    7:20 am - 7 pm  Tues -  Fri  7:20 am - 5 pm    Clinic Phone: [phone]    The clinic lab opens at 7:30 am Mon - Fri and appointments are required.    [facility]. [phone]  Monday-Thursday 8 am - 7pm  Tues/Wed/Fri 8 am - 5:30 pm

## 2018-04-26 NOTE — TELEPHONE ENCOUNTER
Patient called stating he has appt with derm on 5/23; just wanted to let MD know.     Chichi ARAUZ  Station

## 2018-04-26 NOTE — NURSING NOTE
"Chief Complaint   Patient presents with     Diabetes     Derm Problem     Patient has a rash that travels up both arms, back and abdomen. Patient has noticed some itching. Patient has used OTC anti-itch. Patient has seen 2 other providers regarding this issue and it hasn't cleared,        Initial /77  Pulse 77  Temp 100.3  F (37.9  C) (Tympanic)  Resp 18  Ht 5' 11\" (1.803 m)  Wt 222 lb (100.7 kg)  BMI 30.96 kg/m2 Estimated body mass index is 30.96 kg/(m^2) as calculated from the following:    Height as of this encounter: 5' 11\" (1.803 m).    Weight as of this encounter: 222 lb (100.7 kg).  Medication Reconciliation: complete    "

## 2018-04-26 NOTE — MR AVS SNAPSHOT
After Visit Summary   4/26/2018    Kvng Velasquez    MRN: [id number]           Patient Information     Date Of Birth          1950        Visit Information        Provider Department      4/26/2018 11:20 AM Marlen Ma MD [facility]        Today's Diagnoses     Essential hypertension with goal blood pressure less than 140/90    -  1    Essential hypertension        Essential hypertension, benign        Pruritic erythematous rash        Type 2 diabetes mellitus without complication, with long-term current use of insulin (H)        Hypothyroidism, unspecified type        HTN, goal below 140/90          Care Instructions    See Dermatologist    Use the traimcinolone 0.1% cream three times daily.      Cut back on the glipizide to 2.5 mg daily - this will be 1/2 of a 5 mg pill.           Thank you for choosing [facility].  You may be receiving a survey in the mail from [employer] regarding your visit today.  Please take a few minutes to complete and return the survey to let us know how we are doing.      Our Clinic hours are:  Mondays    7:20 am - 7 pm  Tues -  Fri  7:20 am - 5 pm    Clinic Phone: [phone]    The clinic lab opens at 7:30 am Mon - Fri and appointments are required.    [facility]. [phone]  Monday-Thursday 8 am - 7pm  Tues/Wed/Fri 8 am - 5:30 pm                 Follow-ups after your visit        Additional Services     DERMATOLOGY REFERRAL       Your provider has referred you to: [facility]: [facility] [phone]   [email or web address]    Please be aware that coverage of these services is subject to the terms and limitations of your health insurance plan.  Call member services at your health plan with any benefit or coverage questions.      Please bring the following with you to your appointment:    (1) Any X-Rays, CTs or MRIs which have been performed.  Contact the facility  "where they were done to arrange for  prior to your scheduled appointment.    (2) List of current medications  (3) This referral request   (4) Any documents/labs given to you for this referral                  Who to contact     If you have questions or need follow up information about today's clinic visit or your schedule please contact [facility] directly at [phone].  Normal or non-critical lab and imaging results will be communicated to you by MyChart, letter or phone within 4 business days after the clinic has received the results. If you do not hear from us within 7 days, please contact the clinic through Fanmodehart or phone. If you have a critical or abnormal lab result, we will notify you by phone as soon as possible.  Submit refill requests through beqom or call your pharmacy and they will forward the refill request to us. Please allow 3 business days for your refill to be completed.          Additional Information About Your Visit        FanmodeharFoldees Information     beqom lets you send messages to your doctor, view your test results, renew your prescriptions, schedule appointments and more. To sign up, go to [email or web address] . Click on \"Log in\" on the left side of the screen, which will take you to the Welcome page. Then click on \"Sign up Now\" on the right side of the page.     You will be asked to enter the access code listed below, as well as some personal information. Please follow the directions to create your username and password.     Your access code is: [email or web address]  Expires: 7/10/2018  9:01 AM     Your access code will  in 90 days. If you need help or a new code, please call your [facility] or [phone].        Care EveryWhere ID     This is your Care EveryWhere ID. This could be used by other organizations to access your [place] medical records  [id number]        Your Vitals Were     Pulse Temperature Respirations Height BMI (Body Mass Index)       " "77 100.3  F (37.9  C) (Tympanic) 18 5' 11\" (1.803 m) 30.96 kg/m2        Blood Pressure from Last 3 Encounters:   04/26/18 129/77   04/11/18 130/86   02/28/18 140/78    Weight from Last 3 Encounters:   04/26/18 222 lb (100.7 kg)   04/11/18 230 lb (104.3 kg)   02/28/18 228 lb (103.4 kg)              We Performed the Following     DERMATOLOGY REFERRAL          Today's Medication Changes          These changes are accurate as of 4/26/18 11:39 AM.  If you have any questions, ask your nurse or doctor.               Start taking these medicines.        Dose/Directions    levothyroxine 112 MCG tablet   Commonly known as:  SYNTHROID/LEVOTHROID   Used for:  Hypothyroidism, unspecified type   Started by:  Marlen Ma MD        Dose:  112 mcg   Take 1 tablet (112 mcg) by mouth daily   Quantity:  90 tablet   Refills:  3       triamcinolone 0.1 % cream   Commonly known as:  KENALOG   Used for:  Pruritic erythematous rash   Started by:  Marlen Ma MD        Apply sparingly to affected area three times daily as needed   Quantity:  80 g   Refills:  0         These medicines have changed or have updated prescriptions.        Dose/Directions    acetaminophen 500 MG tablet   Commonly known as:  TYLENOL   This may have changed:    - when to take this  - reasons to take this   Used for:  Closed fracture of multiple ribs of right side, initial encounter        Dose:  1000 mg   Take 2 tablets (1,000 mg) by mouth 3 times daily   Quantity:  100 tablet   Refills:  0       amLODIPine 10 MG tablet   Commonly known as:  NORVASC   This may have changed:  See the new instructions.   Used for:  Essential hypertension with goal blood pressure less than 140/90   Changed by:  Marlen Ma MD        Dose:  10 mg   Take 1 tablet (10 mg) by mouth daily   Quantity:  90 tablet   Refills:  3       atenolol 50 MG tablet   Commonly known as:  TENORMIN   This may have changed:  See the new instructions.   Used for:  Essential hypertension, " Essential hypertension, benign   Changed by:  Marlen Ma MD        Dose:  75 mg   Take 1.5 tablets (75 mg) by mouth daily   Quantity:  135 tablet   Refills:  1       busPIRone 15 MG tablet   Commonly known as:  BUSPAR   This may have changed:    - when to take this  - reasons to take this   Used for:  Dysthymic disorder        Dose:  15 mg   Take 1 tablet (15 mg) by mouth daily   Quantity:  30 tablet   Refills:  5       glipiZIDE 5 MG tablet   Commonly known as:  GLUCOTROL   This may have changed:    - medication strength  - how much to take  - when to take this   Used for:  Type 2 diabetes mellitus without complication, with long-term current use of insulin (H)   Changed by:  Marlen Ma MD        Dose:  2.5 mg   Take 0.5 tablets (2.5 mg) by mouth every morning (before breakfast)   Quantity:  45 tablet   Refills:  1       lisinopril-hydrochlorothiazide 20-25 MG per tablet   Commonly known as:  PRINZIDE/ZESTORETIC   This may have changed:  See the new instructions.   Used for:  HTN, goal below 140/90   Changed by:  Marlen Ma MD        Dose:  1 tablet   Take 1 tablet by mouth daily   Quantity:  90 tablet   Refills:  3         Stop taking these medicines if you haven't already. Please contact your care team if you have questions.     nystatin-triamcinolone cream   Commonly known as:  MYCOLOG II   Stopped by:  Marlen Ma MD                Where to get your medicines      These medications were sent to [place], [place] - [address]  [address] [facility] [address]     Phone:  [phone]     triamcinolone 0.1 % cream         These medications were sent to [place], [place] - [address] [facility]  [address] [facility] [address]     Phone:  [phone]     amLODIPine 10 MG tablet    atenolol 50 MG tablet    glipiZIDE 5 MG tablet    lisinopril-hydrochlorothiazide 20-25 MG per tablet         These medications  were sent to [facility] - [place], [place] - [address]  [address], [facility] [address]     Phone:  [phone]     levothyroxine 112 MCG tablet                Primary Care Provider Office Phone # Fax #    Marlen Ma [phone] [address]  [facility] [address]        Equal Access to Services     LUIS ANTONIO MIJARES : Hadii aad ku hadasho Soomaali, waaxda luqadaha, qaybta kaalmada adeegyada, waxay idiin hayaan adeeg khmasonsh la'trinityn ah. So [facility] [phone].    ATENCIÓN: Si habla español, tiene a matt disposición servicios gratuitos de asistencia lingüística. Llame al [phone].    We comply with applicable federal civil rights laws and [place] laws. We do not discriminate on the basis of race, color, national origin, age, disability, sex, sexual orientation, or gender identity.            Thank you!     Thank you for choosing [facility]  for your care. Our goal is always to provide you with excellent care. Hearing back from our patients is one way we can continue to improve our services. Please take a few minutes to complete the written survey that you may receive in the mail after your visit with us. Thank you!             Your Updated Medication List - Protect others around you: Learn how to safely use, store and throw away your medicines at www.disposemymeds.org.          This list is accurate as of 4/26/18 11:39 AM.  Always use your most recent med list.                   Brand Name Dispense Instructions for use Diagnosis    acetaminophen 500 MG tablet    TYLENOL    100 tablet    Take 2 tablets (1,000 mg) by mouth 3 times daily    Closed fracture of multiple ribs of right side, initial encounter       amLODIPine 10 MG tablet    NORVASC    90 tablet    Take 1 tablet (10 mg) by mouth daily    Essential hypertension with goal blood pressure less than 140/90       aspirin 81 MG tablet      1 TABLET DAILY        atenolol 50 MG tablet     TENORMIN    135 tablet    Take 1.5 tablets (75 mg) by mouth daily    Essential hypertension, Essential hypertension, benign       atorvastatin 40 MG tablet    LIPITOR    90 tablet    Take 1 tablet (40 mg) by mouth daily    Hyperlipidemia LDL goal <100       blood glucose monitoring meter device kit    no brand specified    1 kit    Use to test blood sugar one times daily or as directed.    Type 2 diabetes, HbA1C goal < 8% (H)       blood glucose monitoring test strip    no brand specified    3 Month    1 strip by In Vitro route daily    Type II or unspecified type diabetes mellitus without mention of complication, not stated as uncontrolled, Type 2 diabetes, HbA1C goal < 8% (H)       busPIRone 15 MG tablet    BUSPAR    30 tablet    Take 1 tablet (15 mg) by mouth daily    Dysthymic disorder       cetirizine 10 MG tablet    zyrTEC    30 tablet    Take 1 tablet (10 mg) by mouth every evening    Pruritic erythematous rash       fenofibrate 145 MG tablet     90 tablet    Take 1 tablet (145 mg) by mouth daily Brand name    Hyperlipidemia LDL goal <100       gabapentin 300 MG capsule    NEURONTIN    90 capsule    TAKE ONE CAPSULE BY MOUTH EVERY DAY AT BEDTIME    Closed fracture of multiple ribs of right side, sequela       glipiZIDE 5 MG tablet    GLUCOTROL    45 tablet    Take 0.5 tablets (2.5 mg) by mouth every morning (before breakfast)    Type 2 diabetes mellitus without complication, with long-term current use of insulin (H)       indomethacin 50 MG capsule    INDOCIN    60 capsule    TAKE ONE CAPSULE BY MOUTH THREE TIMES A DAY AND REDUCE TO AS NEEDED AS GOUT GETS BETTER    Gouty arthropathy       * insulin pen needle 31G X 6 MM    NOVOFINE 31    100 each    1 Device 2 times daily.    Type II or unspecified type diabetes mellitus without mention of complication, not stated as uncontrolled       * NOVOFINE 32G X 6 MM   Generic drug:  insulin pen needle     100 each    Use twcie daily or as directed.    Type II or  unspecified type diabetes mellitus without mention of complication, not stated as uncontrolled       levothyroxine 112 MCG tablet    SYNTHROID/LEVOTHROID    90 tablet    Take 1 tablet (112 mcg) by mouth daily    Hypothyroidism, unspecified type       lisinopril-hydrochlorothiazide 20-25 MG per tablet    PRINZIDE/ZESTORETIC    90 tablet    Take 1 tablet by mouth daily    HTN, goal below 140/90       NovoLOG MIX 70/30 FLEXPEN injection   Generic drug:  insulin aspart prot & aspart      Inject Subcutaneous See Admin Instructions 60 units with breakfast and 53 u before dinner        order for DME     2 each    Equipment being ordered: Diabetic Shoes    Type 2 diabetes, HbA1C goal < 8% (H)       thin lancets    NO BRAND SPECIFIED    1 Box    Use to test blood sugar one time daily or as directed.    Type 2 diabetes, HbA1C goal < 8% (H)       triamcinolone 0.1 % cream    KENALOG    80 g    Apply sparingly to affected area three times daily as needed    Pruritic erythematous rash       * Notice:  This list has 2 medication(s) that are the same as other medications prescribed for you. Read the directions carefully, and ask your doctor or other care provider to review them with you.

## 2018-05-11 ENCOUNTER — TELEPHONE (OUTPATIENT)
Dept: FAMILY MEDICINE | Facility: CLINIC | Age: 68
End: 2018-05-11

## 2018-05-11 NOTE — TELEPHONE ENCOUNTER
SHANTHI~ A refill request has been made to the [employer] assistance program for NovoLog 70/30 pens and 32g pen tip needles.    A 120 day supply of NOVOLOG 70/30 PENS AND 32G PEN TIP NEEDLES will be delivered to the [facility] within 7-10 business days.  Please contact Kvng on arrival to .    Thanks so much for your help!    Shreya Nelson  Prescription

## 2018-05-16 ENCOUNTER — TELEPHONE (OUTPATIENT)
Dept: FAMILY MEDICINE | Facility: CLINIC | Age: 68
End: 2018-05-16

## 2018-05-16 DIAGNOSIS — L29.89 PRURITIC ERYTHEMATOUS RASH: ICD-10-CM

## 2018-05-16 RX ORDER — TRIAMCINOLONE ACETONIDE 1 MG/G
CREAM TOPICAL
Qty: 80 G | Refills: 0 | Status: SHIPPED | OUTPATIENT
Start: 2018-05-16 | End: 2018-11-05

## 2018-05-16 NOTE — TELEPHONE ENCOUNTER
Pt seen on 4/26/18 and given Kenalog 80 g + 0 refills, for Pruritic rash.  Was advised to make Derm appt, appt was made and cx'd.  Unable to reach pt for update on rash.  Advise.  Yasmani

## 2018-05-16 NOTE — TELEPHONE ENCOUNTER
Patient called and states the rash on his arms and back did go away. Patient still has rash on his left leg. Patient did have appt with derm but the patient canceled the appt because it was improving.  Will send to provider for review and advise.     Thank you    Margaret VALENCIA RN

## 2018-05-16 NOTE — TELEPHONE ENCOUNTER
Refilled, if the rash is not gone in 2-3 weeks on the triamcinolone, should see me for a biopsy or see derm as previously recommended.    Marlen Ma M.D.

## 2018-05-16 NOTE — TELEPHONE ENCOUNTER
"Requested Prescriptions   Pending Prescriptions Disp Refills     triamcinolone (KENALOG) 0.1 % cream [Pharmacy Med Name: TRIAMCINOLONE ACETONIDE 0.1% CREA]  Last Written Prescription Date:  04/26/2018  Last Fill Quantity: 80 g,  # refills: 0   Last office visit: 4/26/2018 with prescribing provider:  all   Future Office Visit:     80 g 0     Sig: TOPICALLY APPLY TO AFFECTED AREA(S) SPARINGLY THREE TIMES A DAY AS NEEDED    Topical Steroid Protocol Passed    5/16/2018  6:30 AM       Passed - Patient is age 6 or older       Passed - Authorizing prescriber's most recent note related to this medication read.       Passed - High potency steroid not ordered       Passed - Recent (12 mo) or future (30 days) visit within the authorizing provider's specialty    Patient had office visit in the last 12 months or has a visit in the next 30 days with authorizing provider or within the authorizing provider's specialty.  See \"Patient Info\" tab in inbasket, or \"Choose Columns\" in Meds & Orders section of the refill encounter.            Jake Dubon RT (R)    "

## 2018-05-17 ENCOUNTER — TELEPHONE (OUTPATIENT)
Dept: FAMILY MEDICINE | Facility: CLINIC | Age: 68
End: 2018-05-17

## 2018-05-17 NOTE — TELEPHONE ENCOUNTER
LM to call clinic/RN.  Pt Novolog Mix 70/30 arrived.  Patient Assistance Program.  Med is in the front clinic frig.  Yasmani

## 2018-05-18 ENCOUNTER — ALLIED HEALTH/NURSE VISIT (OUTPATIENT)
Dept: FAMILY MEDICINE | Facility: CLINIC | Age: 68
End: 2018-05-18
Payer: COMMERCIAL

## 2018-05-18 DIAGNOSIS — E11.9 TYPE 2 DIABETES MELLITUS WITHOUT COMPLICATION, WITH LONG-TERM CURRENT USE OF INSULIN (H): Primary | ICD-10-CM

## 2018-05-18 DIAGNOSIS — Z79.4 TYPE 2 DIABETES MELLITUS WITHOUT COMPLICATION, WITH LONG-TERM CURRENT USE OF INSULIN (H): Primary | ICD-10-CM

## 2018-05-18 PROCEDURE — 99207 ZZC NO CHARGE NURSE ONLY: CPT

## 2018-05-18 NOTE — PROGRESS NOTES
Patient came in to clinic to  his meds.  Patient was given them and signed the packing slip.    Margaret VALENCIA RN

## 2018-05-18 NOTE — MR AVS SNAPSHOT
"              After Visit Summary   2018    Kvng Velasquez    MRN: [id number]           Patient Information     Date Of Birth          1950        Visit Information        Provider Department      2018 3:30 PM [place] CL RN [facility]        Today's Diagnoses     Type 2 diabetes mellitus without complication, with long-term current use of insulin (H)    -  1       Follow-ups after your visit        Who to contact     If you have questions or need follow up information about today's clinic visit or your schedule please contact [facility] directly at [phone].  Normal or non-critical lab and imaging results will be communicated to you by LC Style.comhart, letter or phone within 4 business days after the clinic has received the results. If you do not hear from us within 7 days, please contact the clinic through Keterat or phone. If you have a critical or abnormal lab result, we will notify you by phone as soon as possible.  Submit refill requests through [employer] or call your pharmacy and they will forward the refill request to us. Please allow 3 business days for your refill to be completed.          Additional Information About Your Visit        MyChart Information     [employer] lets you send messages to your doctor, view your test results, renew your prescriptions, schedule appointments and more. To sign up, go to [email or web address] . Click on \"Log in\" on the left side of the screen, which will take you to the Welcome page. Then click on \"Sign up Now\" on the right side of the page.     You will be asked to enter the access code listed below, as well as some personal information. Please follow the directions to create your username and password.     Your access code is: [email or web address]  Expires: 7/10/2018  9:01 AM     Your access code will  in 90 days. If you need help or a new code, please call your [facility] or [phone].        Care EveryWhere ID     This " is your Care EveryWhere ID. This could be used by other organizations to access your [place] medical records  [id number]         Blood Pressure from Last 3 Encounters:   04/26/18 129/77   04/11/18 130/86   02/28/18 140/78    Weight from Last 3 Encounters:   04/26/18 222 lb (100.7 kg)   04/11/18 230 lb (104.3 kg)   02/28/18 228 lb (103.4 kg)              Today, you had the following     No orders found for display         Today's Medication Changes          These changes are accurate as of 5/18/18  4:09 PM.  If you have any questions, ask your nurse or doctor.               These medicines have changed or have updated prescriptions.        Dose/Directions    acetaminophen 500 MG tablet   Commonly known as:  TYLENOL   This may have changed:    - when to take this  - reasons to take this   Used for:  Closed fracture of multiple ribs of right side, initial encounter        Dose:  1000 mg   Take 2 tablets (1,000 mg) by mouth 3 times daily   Quantity:  100 tablet   Refills:  0       busPIRone 15 MG tablet   Commonly known as:  BUSPAR   This may have changed:    - when to take this  - reasons to take this   Used for:  Dysthymic disorder        Dose:  15 mg   Take 1 tablet (15 mg) by mouth daily   Quantity:  30 tablet   Refills:  5                Primary Care Provider Office Phone # Fax #    Marlen Ma [phone] [address] [facility] [address]        Equal Access to Services     [facility] HospitalDANDY AH: Hadii genesis ortez Sochanel, waaxda luqadaha, qaybta kaalmada matheus, soham yeh . So [facility] [phone].    ATENCIÓN: Si habla español, tiene a matt disposición servicios gratuitos de asistencia lingüística. Llame al [phone].    We comply with applicable federal civil rights laws and [place] laws. We do not discriminate on the basis of race, color, national origin, age, disability, sex, sexual orientation, or gender identity.            Thank you!      Thank you for choosing [facility]  for your care. Our goal is always to provide you with excellent care. Hearing back from our patients is one way we can continue to improve our services. Please take a few minutes to complete the written survey that you may receive in the mail after your visit with us. Thank you!             Your Updated Medication List - Protect others around you: Learn how to safely use, store and throw away your medicines at www.disposemymeds.org.          This list is accurate as of 5/18/18  4:09 PM.  Always use your most recent med list.                   Brand Name Dispense Instructions for use Diagnosis    acetaminophen 500 MG tablet    TYLENOL    100 tablet    Take 2 tablets (1,000 mg) by mouth 3 times daily    Closed fracture of multiple ribs of right side, initial encounter       amLODIPine 10 MG tablet    NORVASC    90 tablet    Take 1 tablet (10 mg) by mouth daily    Essential hypertension with goal blood pressure less than 140/90       aspirin 81 MG tablet      1 TABLET DAILY        atenolol 50 MG tablet    TENORMIN    135 tablet    Take 1.5 tablets (75 mg) by mouth daily    Essential hypertension with goal blood pressure less than 140/90       atorvastatin 40 MG tablet    LIPITOR    90 tablet    Take 1 tablet (40 mg) by mouth daily    Hyperlipidemia LDL goal <100       blood glucose monitoring meter device kit    no brand specified    1 kit    Use to test blood sugar one times daily or as directed.    Type 2 diabetes, HbA1C goal < 8% (H)       blood glucose monitoring test strip    no brand specified    3 Month    1 strip by In Vitro route daily    Type II or unspecified type diabetes mellitus without mention of complication, not stated as uncontrolled, Type 2 diabetes, HbA1C goal < 8% (H)       busPIRone 15 MG tablet    BUSPAR    30 tablet    Take 1 tablet (15 mg) by mouth daily    Dysthymic disorder       cetirizine 10 MG tablet    zyrTEC    30 tablet    Take 1  tablet (10 mg) by mouth every evening    Pruritic erythematous rash       fenofibrate 145 MG tablet     90 tablet    Take 1 tablet (145 mg) by mouth daily Brand name    Hyperlipidemia LDL goal <100       gabapentin 300 MG capsule    NEURONTIN    90 capsule    TAKE ONE CAPSULE BY MOUTH EVERY DAY AT BEDTIME    Closed fracture of multiple ribs of right side, sequela       glipiZIDE 5 MG tablet    GLUCOTROL    45 tablet    Take 0.5 tablets (2.5 mg) by mouth every morning (before breakfast)    Type 2 diabetes mellitus without complication, with long-term current use of insulin (H)       indomethacin 50 MG capsule    INDOCIN    60 capsule    TAKE ONE CAPSULE BY MOUTH THREE TIMES A DAY AND REDUCE TO AS NEEDED AS GOUT GETS BETTER    Gouty arthropathy       * insulin pen needle 31G X 6 MM    NOVOFINE 31    100 each    1 Device 2 times daily.    Type II or unspecified type diabetes mellitus without mention of complication, not stated as uncontrolled       * NOVOFINE 32G X 6 MM   Generic drug:  insulin pen needle     100 each    Use twcie daily or as directed.    Type II or unspecified type diabetes mellitus without mention of complication, not stated as uncontrolled       levothyroxine 112 MCG tablet    SYNTHROID/LEVOTHROID    90 tablet    Take 1 tablet (112 mcg) by mouth daily    Hypothyroidism, unspecified type       lisinopril-hydrochlorothiazide 20-25 MG per tablet    PRINZIDE/ZESTORETIC    90 tablet    Take 1 tablet by mouth daily    Essential hypertension with goal blood pressure less than 140/90       NovoLOG MIX 70/30 FLEXPEN injection   Generic drug:  insulin aspart prot & aspart      Inject Subcutaneous See Admin Instructions 60 units with breakfast and 53 u before dinner        order for DME     2 each    Equipment being ordered: Diabetic Shoes    Type 2 diabetes, HbA1C goal < 8% (H)       thin lancets    NO BRAND SPECIFIED    1 Box    Use to test blood sugar one time daily or as directed.    Type 2 diabetes, HbA1C  goal < 8% (H)       triamcinolone 0.1 % cream    KENALOG    80 g    TOPICALLY APPLY TO AFFECTED AREA(S) SPARINGLY THREE TIMES A DAY AS NEEDED    Pruritic erythematous rash       * Notice:  This list has 2 medication(s) that are the same as other medications prescribed for you. Read the directions carefully, and ask your doctor or other care provider to review them with you.

## 2018-07-02 ENCOUNTER — TELEPHONE (OUTPATIENT)
Dept: FAMILY MEDICINE | Facility: CLINIC | Age: 68
End: 2018-07-02

## 2018-07-02 DIAGNOSIS — Z79.4 TYPE 2 DIABETES MELLITUS WITH DIABETIC NEUROPATHY, WITH LONG-TERM CURRENT USE OF INSULIN (H): Primary | ICD-10-CM

## 2018-07-02 DIAGNOSIS — E11.40 TYPE 2 DIABETES MELLITUS WITH DIABETIC NEUROPATHY, WITH LONG-TERM CURRENT USE OF INSULIN (H): Primary | ICD-10-CM

## 2018-07-02 NOTE — TELEPHONE ENCOUNTER
Reason for Call: Request for an order or referral:    Order or referral being requested: Pt is asking for that an order for diabetic shoes be faxed to [facility] @ [phone]    Date needed: at your convenience    Has the patient been seen by the PCP for this problem? YES    Additional comments:     Phone number Patient can be reached at:  Home number on file [phone] (home)    Best Time:  any    Can we leave a detailed message on this number?  YES    Call taken on 7/2/2018 at 8:16 AM by Kathleen Rodarte

## 2018-08-20 ENCOUNTER — TELEPHONE (OUTPATIENT)
Dept: FAMILY MEDICINE | Facility: CLINIC | Age: 68
End: 2018-08-20

## 2018-08-20 NOTE — TELEPHONE ENCOUNTER
FYI~ A refill has been made to the [employer] assistance program for Novolog Mix Flexpen 70/30 and NovoFine pen tip needles 32g.     A 4 month supply of NOVOLOG MIX FLEXPEN 70/30 AND NOVOFINE PEN TIP NEEDLES 32g will be delivered to the [facility] within 7-10 business days.    Please contact Kvng on arrival to .    Thank you,    Ana Kitchen  Prescription Assistance

## 2018-08-23 ENCOUNTER — TELEPHONE (OUTPATIENT)
Dept: FAMILY MEDICINE | Facility: CLINIC | Age: 68
End: 2018-08-23

## 2018-08-23 NOTE — TELEPHONE ENCOUNTER
Patients medication Novolog and the tips came in from the patient asst program [employer]in refrigerator)  Patient is left a message with the medication being here for him to .  Blue form for him to sign for the medication is on Peg the RN's desk. Natalee GODINEZ RN

## 2018-08-23 NOTE — TELEPHONE ENCOUNTER
Patient returns our call and he is scheduled for tomorrow. Form is on Peg's desk for him to sign. Natalee GODINEZ RN

## 2018-08-24 ENCOUNTER — ALLIED HEALTH/NURSE VISIT (OUTPATIENT)
Dept: FAMILY MEDICINE | Facility: CLINIC | Age: 68
End: 2018-08-24
Payer: COMMERCIAL

## 2018-08-24 DIAGNOSIS — E11.9 TYPE 2 DIABETES MELLITUS WITHOUT COMPLICATION (H): Primary | ICD-10-CM

## 2018-08-24 PROCEDURE — 99207 ZZC NO CHARGE NURSE ONLY: CPT

## 2018-08-24 NOTE — MR AVS SNAPSHOT
"              After Visit Summary   2018    Kvng Velasquez    MRN: [id number]           Patient Information     Date Of Birth          1950        Visit Information        Provider Department      2018 8:30 AM [place] LILLY DONOHUE [facility] [place]        Today's Diagnoses     Type 2 diabetes mellitus without complication (H)    -  1       Follow-ups after your visit        Who to contact     If you have questions or need follow up information about today's clinic visit or your schedule please contact [facility] directly at [phone].  Normal or non-critical lab and imaging results will be communicated to you by Like[employer][email or web address], letter or phone within 4 business days after the clinic has received the results. If you do not hear from us within 7 days, please contact the clinic through [employer] or phone. If you have a critical or abnormal lab result, we will notify you by phone as soon as possible.  Submit refill requests through [employer] or call your pharmacy and they will forward the refill request to us. Please allow 3 business days for your refill to be completed.          Additional Information About Your Visit        MyChart Information     [employer] lets you send messages to your doctor, view your test results, renew your prescriptions, schedule appointments and more. To sign up, go to [email or web address] . Click on \"Log in\" on the left side of the screen, which will take you to the Welcome page. Then click on \"Sign up Now\" on the right side of the page.     You will be asked to enter the access code listed below, as well as some personal information. Please follow the directions to create your username and password.     Your access code is: [email or web address]  Expires: 2018  8:35 AM     Your access code will  in 90 days. If you need help or a new code, please call your [facility] or [phone].        Care EveryWhere ID     This is your Care EveryWhere ID. This could " be used by other organizations to access your [place] medical records  [id number]         Blood Pressure from Last 3 Encounters:   04/26/18 129/77   04/11/18 130/86   02/28/18 140/78    Weight from Last 3 Encounters:   04/26/18 222 lb (100.7 kg)   04/11/18 230 lb (104.3 kg)   02/28/18 228 lb (103.4 kg)              Today, you had the following     No orders found for display         Today's Medication Changes          These changes are accurate as of 8/24/18  8:35 AM.  If you have any questions, ask your nurse or doctor.               These medicines have changed or have updated prescriptions.        Dose/Directions    acetaminophen 500 MG tablet   Commonly known as:  TYLENOL   This may have changed:    - when to take this  - reasons to take this   Used for:  Closed fracture of multiple ribs of right side, initial encounter        Dose:  1000 mg   Take 2 tablets (1,000 mg) by mouth 3 times daily   Quantity:  100 tablet   Refills:  0       busPIRone 15 MG tablet   Commonly known as:  BUSPAR   This may have changed:    - when to take this  - reasons to take this   Used for:  Dysthymic disorder        Dose:  15 mg   Take 1 tablet (15 mg) by mouth daily   Quantity:  30 tablet   Refills:  5                Primary Care Provider Office Phone # Fax #    Marlen Ma [phone] [address] [facility] [address]        Equal Access to Services     TAISHA MIJARES AH: Hadii genesis ortez Sochanel, waaxda luqadaha, qaybta kaalmasoham rob. So [facility] [phone].    ATENCIÓN: Si habla español, tiene a matt disposición servicios gratuitos de asistencia lingüística. Bridget al [phone].    We comply with applicable federal civil rights laws and [place] laws. We do not discriminate on the basis of race, color, national origin, age, disability, sex, sexual orientation, or gender identity.            Thank you!     Thank you for choosing [facility]  [place]  for your care. Our goal is always to provide you with excellent care. Hearing back from our patients is one way we can continue to improve our services. Please take a few minutes to complete the written survey that you may receive in the mail after your visit with us. Thank you!             Your Updated Medication List - Protect others around you: Learn how to safely use, store and throw away your medicines at www.disposemymeds.org.          This list is accurate as of 8/24/18  8:35 AM.  Always use your most recent med list.                   Brand Name Dispense Instructions for use Diagnosis    acetaminophen 500 MG tablet    TYLENOL    100 tablet    Take 2 tablets (1,000 mg) by mouth 3 times daily    Closed fracture of multiple ribs of right side, initial encounter       amLODIPine 10 MG tablet    NORVASC    90 tablet    Take 1 tablet (10 mg) by mouth daily    Essential hypertension with goal blood pressure less than 140/90       aspirin 81 MG tablet      1 TABLET DAILY        atenolol 50 MG tablet    TENORMIN    135 tablet    Take 1.5 tablets (75 mg) by mouth daily    Essential hypertension with goal blood pressure less than 140/90       atorvastatin 40 MG tablet    LIPITOR    90 tablet    Take 1 tablet (40 mg) by mouth daily    Hyperlipidemia LDL goal <100       blood glucose monitoring meter device kit    no brand specified    1 kit    Use to test blood sugar one times daily or as directed.    Type 2 diabetes, HbA1C goal < 8% (H)       blood glucose monitoring test strip    no brand specified    3 Month    1 strip by In Vitro route daily    Type II or unspecified type diabetes mellitus without mention of complication, not stated as uncontrolled, Type 2 diabetes, HbA1C goal < 8% (H)       busPIRone 15 MG tablet    BUSPAR    30 tablet    Take 1 tablet (15 mg) by mouth daily    Dysthymic disorder       cetirizine 10 MG tablet    zyrTEC    30 tablet    Take 1 tablet (10 mg) by mouth every evening     Pruritic erythematous rash       fenofibrate 145 MG tablet     90 tablet    Take 1 tablet (145 mg) by mouth daily Brand name    Hyperlipidemia LDL goal <100       gabapentin 300 MG capsule    NEURONTIN    90 capsule    TAKE ONE CAPSULE BY MOUTH EVERY DAY AT BEDTIME    Closed fracture of multiple ribs of right side, sequela       glipiZIDE 5 MG tablet    GLUCOTROL    45 tablet    Take 0.5 tablets (2.5 mg) by mouth every morning (before breakfast)    Type 2 diabetes mellitus without complication, with long-term current use of insulin (H)       indomethacin 50 MG capsule    INDOCIN    60 capsule    TAKE ONE CAPSULE BY MOUTH THREE TIMES A DAY AND REDUCE TO AS NEEDED AS GOUT GETS BETTER    Gouty arthropathy       * insulin pen needle 31G X 6 MM    NOVOFINE 31    100 each    1 Device 2 times daily.    Type II or unspecified type diabetes mellitus without mention of complication, not stated as uncontrolled       * NOVOFINE 32G X 6 MM   Generic drug:  insulin pen needle     100 each    Use twcie daily or as directed.    Type II or unspecified type diabetes mellitus without mention of complication, not stated as uncontrolled       levothyroxine 112 MCG tablet    SYNTHROID/LEVOTHROID    90 tablet    Take 1 tablet (112 mcg) by mouth daily    Hypothyroidism, unspecified type       lisinopril-hydrochlorothiazide 20-25 MG per tablet    PRINZIDE/ZESTORETIC    90 tablet    Take 1 tablet by mouth daily    Essential hypertension with goal blood pressure less than 140/90       NovoLOG MIX 70/30 FLEXPEN injection   Generic drug:  insulin aspart prot & aspart      Inject Subcutaneous See Admin Instructions 60 units with breakfast and 53 u before dinner        order for DME     2 each    Equipment being ordered: Diabetic Shoes  Please fax to [facility] [phone]    Type 2 diabetes mellitus with diabetic neuropathy, with long-term current use of insulin (H)       thin lancets    NO BRAND SPECIFIED    1 Box    Use to test blood sugar  one time daily or as directed.    Type 2 diabetes, HbA1C goal < 8% (H)       triamcinolone 0.1 % cream    KENALOG    80 g    TOPICALLY APPLY TO AFFECTED AREA(S) SPARINGLY THREE TIMES A DAY AS NEEDED    Pruritic erythematous rash       * Notice:  This list has 2 medication(s) that are the same as other medications prescribed for you. Read the directions carefully, and ask your doctor or other care provider to review them with you.

## 2018-08-24 NOTE — NURSING NOTE
Patient in clinic to pick  Up his insulin.  He was given 4 boxes of needles and 11 boxes of insulin.    Melanie CRAWFORD RN

## 2018-09-26 DIAGNOSIS — Z79.4 TYPE 2 DIABETES MELLITUS WITHOUT COMPLICATION, WITH LONG-TERM CURRENT USE OF INSULIN (H): ICD-10-CM

## 2018-09-26 DIAGNOSIS — E11.9 TYPE 2 DIABETES MELLITUS WITHOUT COMPLICATION, WITH LONG-TERM CURRENT USE OF INSULIN (H): ICD-10-CM

## 2018-09-27 RX ORDER — GLIPIZIDE 5 MG/1
2.5 TABLET ORAL
Qty: 45 TABLET | Refills: 1 | Status: SHIPPED | OUTPATIENT
Start: 2018-09-27 | End: 2018-11-05

## 2018-10-12 ENCOUNTER — TELEPHONE (OUTPATIENT)
Dept: FAMILY MEDICINE | Facility: CLINIC | Age: 68
End: 2018-10-12

## 2018-10-12 DIAGNOSIS — M10.9 GOUTY ARTHROPATHY: Primary | ICD-10-CM

## 2018-10-12 DIAGNOSIS — Z79.4 TYPE 2 DIABETES MELLITUS WITHOUT COMPLICATION, WITH LONG-TERM CURRENT USE OF INSULIN (H): ICD-10-CM

## 2018-10-12 DIAGNOSIS — E11.9 TYPE 2 DIABETES MELLITUS WITHOUT COMPLICATION, WITH LONG-TERM CURRENT USE OF INSULIN (H): ICD-10-CM

## 2018-10-12 RX ORDER — INDOMETHACIN 50 MG/1
CAPSULE ORAL
Qty: 30 CAPSULE | Refills: 0 | Status: SHIPPED | OUTPATIENT
Start: 2018-10-12 | End: 2018-11-05

## 2018-10-12 NOTE — TELEPHONE ENCOUNTER
Routing refill request to provider for review/approval because:  Drug interaction warning indomethacin and lisinopril/HCTZ    Patient is due for DM recheck - appt scheduled 11-1-18.  He is also due for A1c and uric acid - both these ordered.  He is requesting 15 tablets indomethacin to make it to his appt.    Melanie CRAWFORD RN

## 2018-10-12 NOTE — TELEPHONE ENCOUNTER
"Requested Prescriptions   Pending Prescriptions Disp Refills     indomethacin (INDOCIN) 50 MG capsule [Pharmacy Med Name: INDOMETHACIN 50MG CAPS]  Last Written Prescription Date:  7/17/2017  Last Fill Quantity: 60,  # refills: 3   Last office visit: 4/26/2018 with prescribing provider:  Anil    Future Office Visit:     60 capsule 3     Sig: TAKE ONE CAPSULE BY MOUTH THREE TIMES A DAY AND REDUCE TO AS NEEDED AS GOUT GETS BETTER    Gout Agents Protocol Failed    10/12/2018  8:25 AM       Failed - Has Uric Acid on file in past 12 months and value is less than 6    No lab results found.  If level is 6mg/dL or greater, ok to refill one time and refer to provider.          Passed - CBC on file in past 12 months    Recent Labs   Lab Test  01/09/18   1045   WBC  9.7   RBC  3.64*   HGB  11.9*   HCT  34.8*   PLT  345       For [facility] ONLY: [id number] = WBC, THTE915 = RBC         Passed - ALT on file in past 12 months    Recent Labs   Lab Test  01/09/18   1045   ALT  19            Passed - Recent (12 mo) or future (30 days) visit within the authorizing provider's specialty    Patient had office visit in the last 12 months or has a visit in the next 30 days with authorizing provider or within the authorizing provider's specialty.  See \"Patient Info\" tab in inbasket, or \"Choose Columns\" in Meds & Orders section of the refill encounter.           Passed - Patient is age 18 or older       Passed - Normal serum creatinine on file in the past 12 months    Recent Labs   Lab Test  04/18/18   0714   CR  0.79               "

## 2018-10-17 DIAGNOSIS — M10.9 GOUTY ARTHROPATHY: ICD-10-CM

## 2018-10-17 DIAGNOSIS — E11.9 TYPE 2 DIABETES MELLITUS WITHOUT COMPLICATION, WITH LONG-TERM CURRENT USE OF INSULIN (H): ICD-10-CM

## 2018-10-17 DIAGNOSIS — Z79.4 TYPE 2 DIABETES MELLITUS WITHOUT COMPLICATION, WITH LONG-TERM CURRENT USE OF INSULIN (H): ICD-10-CM

## 2018-10-17 LAB
HBA1C MFR BLD: 5.6 % (ref 0–5.6)
TSH SERPL DL<=0.005 MIU/L-ACNC: 2.12 MU/L (ref 0.4–4)
URATE SERPL-MCNC: 8.7 MG/DL (ref 3.5–7.2)

## 2018-10-17 PROCEDURE — 36415 COLL VENOUS BLD VENIPUNCTURE: CPT | Performed by: FAMILY MEDICINE

## 2018-10-17 PROCEDURE — 84443 ASSAY THYROID STIM HORMONE: CPT | Performed by: FAMILY MEDICINE

## 2018-10-17 PROCEDURE — 84550 ASSAY OF BLOOD/URIC ACID: CPT | Performed by: FAMILY MEDICINE

## 2018-10-17 PROCEDURE — 83036 HEMOGLOBIN GLYCOSYLATED A1C: CPT | Performed by: FAMILY MEDICINE

## 2018-11-01 ENCOUNTER — TELEPHONE (OUTPATIENT)
Dept: FAMILY MEDICINE | Facility: CLINIC | Age: 68
End: 2018-11-01

## 2018-11-01 NOTE — TELEPHONE ENCOUNTER
FYI~ A refill has been made to the [employer] assistance program for NovoLog 70/30 pens & NovoFine 32g pen tip needles.    A 4 month supply of NOVOLOG 70/30 Pens & NOVOFINE 32G PEN TIP NEEDLES will be delivered to the [facility] within 7-10 business days.    Please contact Kvng on arrival to .    Thank you,    Ana Kitchen  Prescription Assistance

## 2018-11-05 ENCOUNTER — OFFICE VISIT (OUTPATIENT)
Dept: FAMILY MEDICINE | Facility: CLINIC | Age: 68
End: 2018-11-05
Payer: COMMERCIAL

## 2018-11-05 VITALS
SYSTOLIC BLOOD PRESSURE: 138 MMHG | HEIGHT: 71 IN | TEMPERATURE: 99 F | RESPIRATION RATE: 18 BRPM | BODY MASS INDEX: 30.1 KG/M2 | HEART RATE: 71 BPM | DIASTOLIC BLOOD PRESSURE: 72 MMHG | WEIGHT: 215 LBS | OXYGEN SATURATION: 97 %

## 2018-11-05 DIAGNOSIS — F41.9 ANXIETY: ICD-10-CM

## 2018-11-05 DIAGNOSIS — Z79.4 TYPE 2 DIABETES MELLITUS WITHOUT COMPLICATION, WITH LONG-TERM CURRENT USE OF INSULIN (H): Primary | ICD-10-CM

## 2018-11-05 DIAGNOSIS — M10.9 GOUTY ARTHROPATHY: ICD-10-CM

## 2018-11-05 DIAGNOSIS — E78.5 HYPERLIPIDEMIA LDL GOAL <100: ICD-10-CM

## 2018-11-05 DIAGNOSIS — E11.9 TYPE 2 DIABETES MELLITUS WITHOUT COMPLICATION, WITH LONG-TERM CURRENT USE OF INSULIN (H): Primary | ICD-10-CM

## 2018-11-05 DIAGNOSIS — I10 ESSENTIAL HYPERTENSION WITH GOAL BLOOD PRESSURE LESS THAN 140/90: Chronic | ICD-10-CM

## 2018-11-05 DIAGNOSIS — F34.1 DYSTHYMIC DISORDER: ICD-10-CM

## 2018-11-05 PROCEDURE — 99214 OFFICE O/P EST MOD 30 MIN: CPT | Performed by: FAMILY MEDICINE

## 2018-11-05 RX ORDER — ALLOPURINOL 100 MG/1
100 TABLET ORAL DAILY
Qty: 90 TABLET | Refills: 1 | Status: SHIPPED | OUTPATIENT
Start: 2018-11-05 | End: 2019-05-06

## 2018-11-05 RX ORDER — HYDROXYZINE HYDROCHLORIDE 25 MG/1
25-50 TABLET, FILM COATED ORAL EVERY 6 HOURS PRN
Qty: 60 TABLET | Refills: 11 | Status: SHIPPED | OUTPATIENT
Start: 2018-11-05 | End: 2019-07-08 | Stop reason: DRUGHIGH

## 2018-11-05 RX ORDER — BUSPIRONE HYDROCHLORIDE 15 MG/1
15 TABLET ORAL DAILY PRN
Status: CANCELLED | OUTPATIENT
Start: 2018-11-05

## 2018-11-05 RX ORDER — ATENOLOL 50 MG/1
75 TABLET ORAL DAILY
Qty: 135 TABLET | Refills: 1 | Status: SHIPPED | OUTPATIENT
Start: 2018-11-05 | End: 2019-05-06

## 2018-11-05 RX ORDER — ATORVASTATIN CALCIUM 40 MG/1
40 TABLET, FILM COATED ORAL DAILY
Qty: 90 TABLET | Refills: 3 | Status: SHIPPED | OUTPATIENT
Start: 2018-11-05 | End: 2020-03-04

## 2018-11-05 RX ORDER — INDOMETHACIN 50 MG/1
CAPSULE ORAL
Qty: 30 CAPSULE | Refills: 3 | Status: SHIPPED | OUTPATIENT
Start: 2018-11-05 | End: 2019-05-06

## 2018-11-05 ASSESSMENT — PATIENT HEALTH QUESTIONNAIRE - PHQ9: SUM OF ALL RESPONSES TO PHQ QUESTIONS 1-9: 0

## 2018-11-05 ASSESSMENT — PAIN SCALES - GENERAL: PAINLEVEL: NO PAIN (0)

## 2018-11-05 NOTE — MR AVS SNAPSHOT
After Visit Summary   11/5/2018    Kvng Velasquez    MRN: [id number]           Patient Information     Date Of Birth          1950        Visit Information        Provider Department      11/5/2018 8:20 AM Marlen Ma MD [facility]        Today's Diagnoses     Type 2 diabetes mellitus without complication, with long-term current use of insulin (H)    -  1    Essential hypertension with goal blood pressure less than 140/90        Gouty arthropathy        Hyperlipidemia LDL goal <100        Dysthymic disorder        Anxiety          Care Instructions    Discontinue the Glipizide    Discontinue the Buspar      Start Allopurinol 100 mg  - this is to decrease uric acid - recheck (lab only) in 4 weeks.     Use Hydroxyzine (vistaril) 25-50 mg as needed for anxiety - don't drive or operate heavy machinery after using this until you know how it affects you.        Thank you for choosing [facility].  You may be receiving a survey in the mail from [employer] regarding your visit today.  Please take a few minutes to complete and return the survey to let us know how we are doing.      Our Clinic hours are:  Mondays    7:20 am - 7 pm  Tues -  Fri  7:20 am - 5 pm    Clinic Phone: [phone]    The clinic lab opens at 7:30 am Mon - Fri and appointments are required.    [facility]  Ph. [phone]  Monday  8 am - 7pm  Tues - Fri 8 am - 5:30 pm                 Follow-ups after your visit        Future tests that were ordered for you today     Open Future Orders        Priority Expected Expires Ordered    **Uric acid FUTURE 2mo Routine 1/4/2019 3/5/2019 11/5/2018            Who to contact     If you have questions or need follow up information about today's clinic visit or your schedule please contact [facility] directly at [phone].  Normal or non-critical lab and imaging results will be communicated to you by MyChart, letter or phone  "within 4 business days after the clinic has received the results. If you do not hear from us within 7 days, please contact the clinic through [employer] or phone. If you have a critical or abnormal lab result, we will notify you by phone as soon as possible.  Submit refill requests through [employer] or call your pharmacy and they will forward the refill request to us. Please allow 3 business days for your refill to be completed.          Additional Information About Your Visit        Care EveryWhere ID     This is your Care EveryWhere ID. This could be used by other organizations to access your [place] medical records  [id number]        Your Vitals Were     Pulse Temperature Respirations Height Pulse Oximetry BMI (Body Mass Index)    71 99  F (37.2  C) (Tympanic) 18 5' 11\" (1.803 m) 97% 29.99 kg/m2       Blood Pressure from Last 3 Encounters:   11/05/18 138/72   04/26/18 129/77   04/11/18 130/86    Weight from Last 3 Encounters:   11/05/18 215 lb (97.5 kg)   04/26/18 222 lb (100.7 kg)   04/11/18 230 lb (104.3 kg)              We Performed the Following     DEPRESSION ACTION PLAN (DAP)          Today's Medication Changes          These changes are accurate as of 11/5/18  8:48 AM.  If you have any questions, ask your nurse or doctor.               Start taking these medicines.        Dose/Directions    allopurinol 100 MG tablet   Commonly known as:  ZYLOPRIM   Used for:  Gouty arthropathy   Started by:  Marlen Ma MD        Dose:  100 mg   Take 1 tablet (100 mg) by mouth daily   Quantity:  90 tablet   Refills:  1       hydrOXYzine 25 MG tablet   Commonly known as:  ATARAX   Used for:  Anxiety   Started by:  Marlen Ma MD        Dose:  25-50 mg   Take 1-2 tablets (25-50 mg) by mouth every 6 hours as needed for anxiety   Quantity:  60 tablet   Refills:  11         These medicines have changed or have updated prescriptions.        Dose/Directions    acetaminophen 500 MG tablet   Commonly known as:  TYLENOL "   This may have changed:    - when to take this  - reasons to take this   Used for:  Closed fracture of multiple ribs of right side, initial encounter        Dose:  1000 mg   Take 2 tablets (1,000 mg) by mouth 3 times daily   Quantity:  100 tablet   Refills:  0       busPIRone 15 MG tablet   Commonly known as:  BUSPAR   This may have changed:    - when to take this  - reasons to take this   Used for:  Dysthymic disorder        Dose:  15 mg   Take 1 tablet (15 mg) by mouth daily   Quantity:  30 tablet   Refills:  5         Stop taking these medicines if you haven't already. Please contact your care team if you have questions.     glipiZIDE 5 MG tablet   Commonly known as:  GLUCOTROL   Stopped by:  Marlen Ma MD                Where to get your medicines      These medications were sent to [facility] [address]  [address] [facility] [address]     Phone:  [phone]     allopurinol 100 MG tablet    hydrOXYzine 25 MG tablet         These medications were sent to [place], [place] - [address]  [address] [facility] [address]     Phone:  [phone]     atenolol 50 MG tablet    atorvastatin 40 MG tablet    indomethacin 50 MG capsule                Primary Care Provider Office Phone # Fax #    Marlen Ma [phone] [address] [facility] [address]        Equal Access to Services     [facility] AH: Hadii aad ku hadasho Soomaali, waaxda luqadaha, qaybta kaalmada adeegyada, soham morales. So [facility] [phone].    ATENCIÓN: Si habla español, tiene a matt disposición servicios gratuitos de asistencia lingüística. Llame al [phone].    We comply with applicable federal civil rights laws and [place] laws. We do not discriminate on the basis of race, color, national origin, age, disability, sex, sexual orientation, or gender identity.             Thank you!     Thank you for choosing [facility]  for your care. Our goal is always to provide you with excellent care. Hearing back from our patients is one way we can continue to improve our services. Please take a few minutes to complete the written survey that you may receive in the mail after your visit with us. Thank you!             Your Updated Medication List - Protect others around you: Learn how to safely use, store and throw away your medicines at www.disposemymeds.org.          This list is accurate as of 11/5/18  8:48 AM.  Always use your most recent med list.                   Brand Name Dispense Instructions for use Diagnosis    acetaminophen 500 MG tablet    TYLENOL    100 tablet    Take 2 tablets (1,000 mg) by mouth 3 times daily    Closed fracture of multiple ribs of right side, initial encounter       allopurinol 100 MG tablet    ZYLOPRIM    90 tablet    Take 1 tablet (100 mg) by mouth daily    Gouty arthropathy       amLODIPine 10 MG tablet    NORVASC    90 tablet    Take 1 tablet (10 mg) by mouth daily    Essential hypertension with goal blood pressure less than 140/90       aspirin 81 MG tablet      1 TABLET DAILY        atenolol 50 MG tablet    TENORMIN    135 tablet    Take 1.5 tablets (75 mg) by mouth daily    Essential hypertension with goal blood pressure less than 140/90       atorvastatin 40 MG tablet    LIPITOR    90 tablet    Take 1 tablet (40 mg) by mouth daily    Hyperlipidemia LDL goal <100       blood glucose monitoring meter device kit    no brand specified    1 kit    Use to test blood sugar one times daily or as directed.    Type 2 diabetes, HbA1C goal < 8% (H)       blood glucose monitoring test strip    no brand specified    3 Month    1 strip by In Vitro route daily    Type II or unspecified type diabetes mellitus without mention of complication, not stated as uncontrolled, Type 2 diabetes, HbA1C goal < 8% (H)       busPIRone 15 MG tablet    BUSPAR    30  tablet    Take 1 tablet (15 mg) by mouth daily    Dysthymic disorder       hydrOXYzine 25 MG tablet    ATARAX    60 tablet    Take 1-2 tablets (25-50 mg) by mouth every 6 hours as needed for anxiety    Anxiety       indomethacin 50 MG capsule    INDOCIN    30 capsule    TAKE ONE CAPSULE BY MOUTH THREE TIMES A DAY AND REDUCE TO AS NEEDED AS GOUT GETS BETTER    Gouty arthropathy       * insulin pen needle 31G X 6 MM    NOVOFINE 31    100 each    1 Device 2 times daily.    Type II or unspecified type diabetes mellitus without mention of complication, not stated as uncontrolled       * NOVOFINE 32G X 6 MM   Generic drug:  insulin pen needle     100 each    Use twcie daily or as directed.    Type II or unspecified type diabetes mellitus without mention of complication, not stated as uncontrolled       levothyroxine 112 MCG tablet    SYNTHROID/LEVOTHROID    90 tablet    Take 1 tablet (112 mcg) by mouth daily    Hypothyroidism, unspecified type       lisinopril-hydrochlorothiazide 20-25 MG per tablet    PRINZIDE/ZESTORETIC    90 tablet    Take 1 tablet by mouth daily    Essential hypertension with goal blood pressure less than 140/90       NovoLOG MIX 70/30 FLEXPEN injection   Generic drug:  insulin aspart prot & aspart      Inject Subcutaneous See Admin Instructions 60 units with breakfast and 53 u before dinner        order for DME     2 each    Equipment being ordered: Diabetic Shoes  Please fax to [employer] [facility] [phone]    Type 2 diabetes mellitus with diabetic neuropathy, with long-term current use of insulin (H)       thin lancets    NO BRAND SPECIFIED    1 Box    Use to test blood sugar one time daily or as directed.    Type 2 diabetes, HbA1C goal < 8% (H)       * Notice:  This list has 2 medication(s) that are the same as other medications prescribed for you. Read the directions carefully, and ask your doctor or other care provider to review them with you.

## 2018-11-05 NOTE — PROGRESS NOTES
"  SUBJECTIVE:   Kvng Velasquez is a 68 year old male who presents to clinic today for the following health issues:      Diabetes Follow-up    Patient is checking blood sugars: once daily.  Results are as follows:         am - 110    Diabetic concerns: low blood sugar several less than 70 in the past few weeks     Symptoms of hypoglycemia (low blood sugar): shaky, dizzy, blurred vision     Paresthesias (numbness or burning in feet) or sores: Yes both feet      Date of last diabetic eye exam: DUE    Diabetes Management Resources    Hyperlipidemia Follow-Up      Rate your low fat/cholesterol diet?: good    Taking statin?  Yes, no muscle aches from statin    Other lipid medications/supplements?:  none    Hypertension Follow-up      Outpatient blood pressures are not being checked.    Low Salt Diet: low salt    BP Readings from Last 2 Encounters:   04/26/18 129/77   04/11/18 130/86     Hemoglobin A1C (%)   Date Value   10/17/2018 5.6   04/18/2018 5.6     LDL Cholesterol Calculated (mg/dL)   Date Value   04/18/2018 83   06/19/2017 34       Amount of exercise or physical activity: 6-7 days/week for an average of 15-30 minutes    Problems taking medications regularly: No    Medication side effects: none    Diet: regular (no restrictions) and low salt      Uric acid was high with labs recently.  Gets a gouty attack 3-4 times a month and uses indomethacin for that.       Hypothyroidism Follow-up      Since last visit, patient describes the following symptoms: Weight stable, no hair loss, no skin changes, no constipation, no loose stools      Problem list and histories reviewed & adjusted, as indicated.  Additional history: as documented      /72  Pulse 71  Temp 99  F (37.2  C) (Tympanic)  Resp 18  Ht 5' 11\" (1.803 m)  Wt 215 lb (97.5 kg)  SpO2 97%  BMI 29.99 kg/m2  EXAM: GENERAL APPEARANCE: Alert, no acute distress  RESP: lungs clear to auscultation   CV: normal rate, regular rhythm, no murmur or " "gallop  ABDOMEN: soft, no organomegaly, masses or tenderness  MS: extremities normal, no peripheral edema  SKIN: no suspicious lesions or rashes  PSYCH: mentation appears normal., affect and mood normal    ASSESSMENT/PLAN:      ICD-10-CM    1. Type 2 diabetes mellitus without complication, with long-term current use of insulin (H) E11.9     Z79.4    2. Essential hypertension with goal blood pressure less than 140/90 I10 atenolol (TENORMIN) 50 MG tablet   3. Gouty arthropathy M10.9 indomethacin (INDOCIN) 50 MG capsule     allopurinol (ZYLOPRIM) 100 MG tablet     **Uric acid FUTURE 2mo   4. Hyperlipidemia LDL goal <100 E78.5 atorvastatin (LIPITOR) 40 MG tablet   5. Dysthymic disorder F34.1    6. Anxiety F41.9 hydrOXYzine (ATARAX) 25 MG tablet     With the hypoglycemia- will discontinue his Glipizide and if still running low he will notify me and we'll start to back off on his insulin.     Patient Instructions   Discontinue the Glipizide    Discontinue the Buspar (was only using this \"as needed\" for anxiety a few times a week.  Would rather he try something designed to work that way - will prescription Vistaril.       Start Allopurinol 100 mg  - this is to decrease uric acid - recheck (lab only) in 4 weeks.     Use Hydroxyzine (vistaril) 25-50 mg as needed for anxiety - don't drive or operate heavy machinery after using this until you know how it affects you.      Marlen Ma M.D.        Thank you for choosing [facility].  You may be receiving a survey in the mail from [employer] regarding your visit today.  Please take a few minutes to complete and return the survey to let us know how we are doing.      Our Clinic hours are:  Mondays    7:20 am - 7 pm  Tues -  Fri  7:20 am - 5 pm    Clinic Phone: [phone]    The clinic lab opens at 7:30 am Mon - Fri and appointments are required.    [facility]. [phone]  Monday  8 am - 7pm  Tues - Fri 8 am - 5:30 pm                   "

## 2018-11-07 DIAGNOSIS — E08.42 DIABETES MELLITUS DUE TO UNDERLYING CONDITION WITH DIABETIC POLYNEUROPATHY, WITHOUT LONG-TERM CURRENT USE OF INSULIN (H): Primary | ICD-10-CM

## 2018-11-08 ENCOUNTER — TELEPHONE (OUTPATIENT)
Dept: FAMILY MEDICINE | Facility: CLINIC | Age: 68
End: 2018-11-08

## 2018-11-08 NOTE — TELEPHONE ENCOUNTER
Received Novolog Mix 70/30 Flexpen and Novofine needles.  LM with pt that this was received.  Meds in front triage frig.  Yasmani

## 2018-11-12 PROBLEM — F41.9 ANXIETY: Status: ACTIVE | Noted: 2018-11-12

## 2018-11-21 ENCOUNTER — MEDICAL CORRESPONDENCE (OUTPATIENT)
Dept: HEALTH INFORMATION MANAGEMENT | Facility: CLINIC | Age: 68
End: 2018-11-21

## 2018-11-21 DIAGNOSIS — E11.9 TYPE 2 DIABETES, HBA1C GOAL < 8% (H): ICD-10-CM

## 2018-11-23 RX ORDER — BLOOD-GLUCOSE METER
1 EACH MISCELLANEOUS DAILY
Qty: 1 KIT | Refills: 0 | Status: SHIPPED | OUTPATIENT
Start: 2018-11-23

## 2018-11-23 NOTE — TELEPHONE ENCOUNTER
"Requested Prescriptions   Pending Prescriptions Disp Refills     blood glucose monitoring (NO BRAND SPECIFIED) test strip       Si strip by In Vitro route daily    Diabetic Supplies Protocol Passed    2018  5:36 PM       Passed - Patient is 18 years of age or older       Passed - Recent (6 mo) or future (30 days) visit within the authorizing provider's specialty    Patient had office visit in the last 6 months or has a visit in the next 30 days with authorizing provider.  See \"Patient Info\" tab in inbasket, or \"Choose Columns\" in Meds & Orders section of the refill encounter.            Blood Glucose Monitoring Suppl ([employer] GLUCOSE MONITORING) w/Device KIT       Sig: Use to test blood sugar one times daily or as directed.    Diabetic Supplies Protocol Passed    2018  5:36 PM       Passed - Patient is 18 years of age or older       Passed - Recent (6 mo) or future (30 days) visit within the authorizing provider's specialty    Patient had office visit in the last 6 months or has a visit in the next 30 days with authorizing provider.  See \"Patient Info\" tab in inbasket, or \"Choose Columns\" in Meds & Orders section of the refill encounter.            Last Written Prescription Date:  5/4/15  Last Fill Quantity: 3,  # refills: 12   Last office visit: 2018 with prescribing provider:  FLAKO   Future Office Visit:      "

## 2018-11-27 ENCOUNTER — TELEPHONE (OUTPATIENT)
Dept: FAMILY MEDICINE | Facility: CLINIC | Age: 68
End: 2018-11-27

## 2018-11-27 DIAGNOSIS — E11.9 TYPE 2 DIABETES MELLITUS WITHOUT COMPLICATION (H): Primary | ICD-10-CM

## 2018-11-27 NOTE — TELEPHONE ENCOUNTER
Can we get an order for the lancets for patient's meter please?  Thanks,   Fern Sunshine  Certified Pharmacy Technician  [facility]  [phone]

## 2018-12-03 DIAGNOSIS — M10.9 GOUTY ARTHROPATHY: ICD-10-CM

## 2018-12-03 LAB — URATE SERPL-MCNC: 4.8 MG/DL (ref 3.5–7.2)

## 2018-12-03 PROCEDURE — 36415 COLL VENOUS BLD VENIPUNCTURE: CPT | Performed by: FAMILY MEDICINE

## 2018-12-03 PROCEDURE — 84550 ASSAY OF BLOOD/URIC ACID: CPT | Performed by: FAMILY MEDICINE

## 2019-01-09 ENCOUNTER — OFFICE VISIT (OUTPATIENT)
Dept: FAMILY MEDICINE | Facility: CLINIC | Age: 69
End: 2019-01-09
Payer: COMMERCIAL

## 2019-01-09 VITALS
BODY MASS INDEX: 30.24 KG/M2 | TEMPERATURE: 98.1 F | DIASTOLIC BLOOD PRESSURE: 78 MMHG | WEIGHT: 216 LBS | HEART RATE: 80 BPM | SYSTOLIC BLOOD PRESSURE: 130 MMHG | HEIGHT: 71 IN | OXYGEN SATURATION: 96 % | RESPIRATION RATE: 12 BRPM

## 2019-01-09 DIAGNOSIS — L03.116 CELLULITIS OF LEFT LOWER EXTREMITY: Primary | ICD-10-CM

## 2019-01-09 DIAGNOSIS — L29.9 PRURITIC DISORDER: ICD-10-CM

## 2019-01-09 PROCEDURE — 99213 OFFICE O/P EST LOW 20 MIN: CPT | Performed by: NURSE PRACTITIONER

## 2019-01-09 RX ORDER — TRIAMCINOLONE ACETONIDE 1 MG/G
CREAM TOPICAL 2 TIMES DAILY
Qty: 60 G | Refills: 1 | Status: SHIPPED | OUTPATIENT
Start: 2019-01-09 | End: 2019-05-01

## 2019-01-09 RX ORDER — HYDROXYZINE HYDROCHLORIDE 10 MG/1
10 TABLET, FILM COATED ORAL 3 TIMES DAILY PRN
Qty: 30 TABLET | Refills: 0 | Status: SHIPPED | OUTPATIENT
Start: 2019-01-09 | End: 2019-06-19

## 2019-01-09 RX ORDER — CEPHALEXIN 500 MG/1
500 CAPSULE ORAL 3 TIMES DAILY
Qty: 30 CAPSULE | Refills: 0 | Status: SHIPPED | OUTPATIENT
Start: 2019-01-09 | End: 2019-05-06

## 2019-01-09 ASSESSMENT — PAIN SCALES - GENERAL: PAINLEVEL: NO PAIN (0)

## 2019-01-09 ASSESSMENT — MIFFLIN-ST. JEOR: SCORE: 1766.9

## 2019-01-09 NOTE — PROGRESS NOTES
SUBJECTIVE:   Kvng Velasquez is a 68 year old male who presents to clinic today for the following health issues: pt had this problem in 2017, 2018      Rash  Onset: 1 week    Description:   Location: left lower leg  Character: raised, red, warm to touch  Itching (Pruritis): YES    Progression of Symptoms:  worsening and constant    Accompanying Signs & Symptoms:  Fever: no   Body aches or joint pain: no   Sore throat symptoms: no   Recent cold symptoms: no     History:   Previous similar rash: YES- 1 year ago    Precipitating factors:   Exposure to similar rash: no   New exposures: None   Recent travel: no     Alleviating factors:  Anti itch spray    Therapies Tried and outcome: anti itch spray helping with itch        Problem list and histories reviewed & adjusted, as indicated.  Further history obtained, clarified or corrected by provider:  Over the past week, patient has developed a red raised rash on his left lower leg.    Similar to cellulitis in the past.  He otherwise feels well.  No fever or malaise.  No exposure to new soaps or chemicals  Pruritus.  Has been using an anti-itch spray.  Using Dove soap, changed last year when he had cellulitis.    Patient Active Problem List   Diagnosis     Acquired hypothyroidism     Dysthymic disorder     Esophageal reflux     Nonspecific elevation of levels of transaminase or lactic acid dehydrogenase (LDH)     Cholesteatoma of external ear     ALCOH USE     Gouty arthropathy     Local infection of skin and subcutaneous tissue     Other alopecia     GERD (gastroesophageal reflux disease)     Hyperlipidemia LDL goal <100     Type 2 diabetes mellitus without complication (H)     Health Care Home     Advanced directives, counseling/discussion     Hypothyroidism     Diabetes mellitus due to underlying condition with diabetic neuropathy (H)     Family history of prostate cancer in father     DM type 2 with diabetic peripheral neuropathy (H)     Essential hypertension with  goal blood pressure less than 140/90     Urinary retention     Fall     Closed fracture of multiple ribs of right side     Subcutaneous emphysema (H)     Elevated white blood cell count     Anxiety     Past Surgical History:   Procedure Laterality Date     SURGICAL HISTORY OF -   9/13/1999    Left inguinal hernia repair     SURGICAL HISTORY OF -   1958    T&A     SURGICAL HISTORY OF -   12/10/1990    Cyst removal from one of his fingers     SURGICAL HISTORY OF -       Cholesteatoma removed from left ear     SURGICAL HISTORY OF -   3/14/1978    Vasectomy     SURGICAL HISTORY OF -   1982    Mastoidectomy       Social History     Tobacco Use     Smoking status: Never Smoker     Smokeless tobacco: Never Used   Substance Use Topics     Alcohol use: Yes     Comment: occas     Family History   Problem Relation Age of Onset     C.A.D. Mother      Hypertension Mother      Diabetes Mother      Cardiovascular Mother      Lipids Mother      Depression Mother      Genitourinary Problems Mother      Lipids Father      Hypertension Father      Prostate Cancer Father      Thyroid Disease Father      C.A.D. Brother      Cardiovascular Brother      C.A.D. Brother         angioplasty     Cancer Brother          Current Outpatient Medications   Medication Sig Dispense Refill     acetaminophen (TYLENOL) 500 MG tablet Take 2 tablets (1,000 mg) by mouth 3 times daily (Patient taking differently: Take 1,000 mg by mouth every 4 hours as needed ) 100 tablet 0     allopurinol (ZYLOPRIM) 100 MG tablet Take 1 tablet (100 mg) by mouth daily 90 tablet 1     amLODIPine (NORVASC) 10 MG tablet Take 1 tablet (10 mg) by mouth daily 90 tablet 3     ASPIRIN 81 MG OR TABS 1 TABLET DAILY       atenolol (TENORMIN) 50 MG tablet Take 1.5 tablets (75 mg) by mouth daily 135 tablet 1     atorvastatin (LIPITOR) 40 MG tablet Take 1 tablet (40 mg) by mouth daily 90 tablet 3     blood glucose (NO BRAND SPECIFIED) lancets standard Use to test blood sugar one time  daily or as directed. 100 each 1     blood glucose monitoring (NO BRAND SPECIFIED) test strip 1 strip by In Vitro route daily 1 Box 1     Blood Glucose Monitoring Suppl ([employer] GLUCOSE MONITORING) w/Device KIT 1 kit by In Vitro route daily Use to test blood sugar one times daily or as directed.Use to test blood sugar one times daily or as directed. 1 kit 0     cephALEXin (KEFLEX) 500 MG capsule Take 1 capsule (500 mg) by mouth 3 times daily for 10 days 30 capsule 0     hydrOXYzine (ATARAX) 10 MG tablet Take 1 tablet (10 mg) by mouth 3 times daily as needed for itching 30 tablet 0     hydrOXYzine (ATARAX) 25 MG tablet Take 1-2 tablets (25-50 mg) by mouth every 6 hours as needed for anxiety 60 tablet 11     indomethacin (INDOCIN) 50 MG capsule TAKE ONE CAPSULE BY MOUTH THREE TIMES A DAY AND REDUCE TO AS NEEDED AS GOUT GETS BETTER 30 capsule 3     insulin aspart prot & aspart (NOVOLOG MIX 70/30 FLEXPEN) injection Inject Subcutaneous See Admin Instructions 60 units with breakfast and 53 u before dinner       Insulin Pen Needle (NOVOFINE 31) 31G X 6 MM MISC 1 Device 2 times daily. 100 each 11     insulin pen needle (NOVOFINE) 32G X 6 MM Use twcie daily or as directed. 100 each 3     levothyroxine (SYNTHROID/LEVOTHROID) 112 MCG tablet Take 1 tablet (112 mcg) by mouth daily 90 tablet 3     lisinopril-hydrochlorothiazide (PRINZIDE/ZESTORETIC) 20-25 MG per tablet Take 1 tablet by mouth daily 90 tablet 3     order for DME Equipment being ordered: Diabetic Shoes    Please fax to [employer] [phone] 2 each 0     triamcinolone (KENALOG) 0.1 % external cream Apply topically 2 times daily 60 g 1       Reviewed and updated as needed this visit by clinical staff  Tobacco  Allergies  Meds  Problems  Med Hx  Surg Hx  Fam Hx  Soc Hx        Reviewed and updated as needed this visit by Provider  Tobacco  Allergies  Meds  Problems  Med Hx  Surg Hx  Fam Hx         ROS:  Constitutional, HEENT, cardiovascular,  "pulmonary, gi and gu systems are negative, except as otherwise noted.    OBJECTIVE:     /78 (BP Location: Right arm, Patient Position: Chair, Cuff Size: Adult Large)   Pulse 80   Temp 98.1  F (36.7  C) (Tympanic)   Resp 12   Ht 1.803 m (5' 11\")   Wt 98 kg (216 lb)   SpO2 96%   BMI 30.13 kg/m    Body mass index is 30.13 kg/m .  GENERAL: healthy, alert and no distress  NECK: no adenopathy, no asymmetry, masses, or scars and thyroid normal to palpation  RESP: lungs clear to auscultation - no rales, rhonchi or wheezes  CV: regular rate and rhythm, normal S1 S2, no S3 or S4, no murmur, click or rub, no peripheral edema and peripheral pulses strong  MS: no gross musculoskeletal defects noted, no edema  SKIN:  flat, red rash, warm to touch, consistent with cellulitis on left lower extremity.  The area of the rash is mid tibia extending around to the calf area.  Borders of rash are marked.      Diagnostic Test Results:  none     ASSESSMENT/PLAN:     ASSESSMENT:  1. Cellulitis of left lower extremity    2. Pruritic disorder        PLAN:  Orders Placed This Encounter     cephALEXin (KEFLEX) 500 MG capsule     triamcinolone (KENALOG) 0.1 % external cream     hydrOXYzine (ATARAX) 10 MG tablet       Patient Instructions   Complete full course of antibiotics even if appearance impmroves    Watch the line of redness that was marked on your left leg, that should not worsen, if it does, return for recheck. Take antibiotic as prescribed.    Use the topical ointment for the real itchy areas.  Patient agrees with plan of care as outlined. Call or return to the clinic with any worsening of symptoms or no resolution. Medication side effects reviewed.      Trinidad Garber, NP, APRN [facility]  "

## 2019-01-09 NOTE — PATIENT INSTRUCTIONS
Complete full course of antibiotics even if appearance impmroves    Watch the line of redness that was marked on your left leg, that should not worsen, if it does, return for recheck. Take antibiotic as prescribed.    Use the topical ointment for the real itchy areas.

## 2019-01-25 ENCOUNTER — TELEPHONE (OUTPATIENT)
Dept: FAMILY MEDICINE | Facility: CLINIC | Age: 69
End: 2019-01-25

## 2019-01-25 NOTE — TELEPHONE ENCOUNTER
FYI~ A refill has been made to the [employer] assistance program for Novolog Mix Flexpen 70/30 and NovoFine pen tip needles 32g.    A 4 month supply of Novolog Mix Flexpen 70/30 and NovoFine pen tip needles 32g. will be delivered to the [facility] within 7-10 business days.    Please contact Kvng on arrival to .    Thank you,    Ana Kitchen  Prescription Assistance

## 2019-01-30 NOTE — TELEPHONE ENCOUNTER
Patient's medication came into clinic.  Patient was notified and will come into clinic on 1/31/19.  Medication and form is in triage room - fridge.    Thank you    Margaret VALENCIA RN

## 2019-02-05 ENCOUNTER — TELEPHONE (OUTPATIENT)
Dept: FAMILY MEDICINE | Facility: CLINIC | Age: 69
End: 2019-02-05

## 2019-02-05 NOTE — TELEPHONE ENCOUNTER
Reason for Call:  Other call back    Detailed comments: Pt calling stating Dr Ma has helped him in the past get medication that is more affordable, I do see some past encounters that say Rx Assist, pt is looking to get Synthroid that is more affordable.     Phone Number Patient can be reached at: Home number on file [phone] (home)    Best Time: any    Can we leave a detailed message on this number? YES    Call taken on 2/5/2019 at 4:29 PM by Louann Calvin

## 2019-02-05 NOTE — TELEPHONE ENCOUNTER
Pt requesting Financial Assistance for his Synthroid.  [facility] Prescription Assistance Program  #[phone]?  If so would need to call or fax them the info on pt.  Advise.  Talat

## 2019-02-06 NOTE — TELEPHONE ENCOUNTER
Pt states his income is to high for the Assistance Program.  Pt states he pays 25.00 for #90.  Checked with [facility] and #90 would be 59.99.  Pt will continue on as is.  Rosa

## 2019-02-06 NOTE — TELEPHONE ENCOUNTER
Is he getting Synthroid or the generic levothyroxine?  How expensive is this?  I agree - going through Patient assistance program is the only way I know to get this cheaper.    Marlen Ma M.D.

## 2019-03-02 DIAGNOSIS — I10 ESSENTIAL HYPERTENSION WITH GOAL BLOOD PRESSURE LESS THAN 140/90: Chronic | ICD-10-CM

## 2019-03-04 RX ORDER — ATENOLOL 50 MG/1
TABLET ORAL
Qty: 135 TABLET | Refills: 1 | Status: SHIPPED | OUTPATIENT
Start: 2019-03-04 | End: 2019-07-08

## 2019-03-07 DIAGNOSIS — M10.9 GOUTY ARTHROPATHY: ICD-10-CM

## 2019-03-07 RX ORDER — INDOMETHACIN 50 MG/1
CAPSULE ORAL
Qty: 30 CAPSULE | Refills: 0 | Status: SHIPPED | OUTPATIENT
Start: 2019-03-07 | End: 2019-05-06

## 2019-03-07 NOTE — TELEPHONE ENCOUNTER
"Requested Prescriptions   Pending Prescriptions Disp Refills     indomethacin (INDOCIN) 50 MG capsule [Pharmacy Med Name: INDOMETHACIN 50MG CAPS]  Last Written Prescription Date:  11/05/18  Last Fill Quantity: 30,  # refills: 3   Last office visit: 1/9/2019 with prescribing provider:  Marlen Ma   Future Office Visit:     30 capsule 0     Sig: TAKE ONE CAPSULE BY MOUTH THREE TIMES A DAY AND REDUCE TO AS NEEDED AS GOUT GETS BETTER    Gout Agents Protocol Failed - 3/7/2019  3:02 AM       Failed - CBC on file in past 12 months    Recent Labs   Lab Test 01/09/18  1045   WBC 9.7   RBC 3.64*   HGB 11.9*   HCT 34.8*             Failed - ALT on file in past 12 months    Recent Labs   Lab Test 01/09/18  1045   ALT 19          Passed - Has Uric Acid on file in past 12 months and value is less than 6    Recent Labs   Lab Test 12/03/18  0725   URIC 4.8     If level is 6mg/dL or greater, ok to refill one time and refer to provider.          Passed - Recent (12 mo) or future (30 days) visit within the authorizing provider's specialty    Patient had office visit in the last 12 months or has a visit in the next 30 days with authorizing provider or within the authorizing provider's specialty.  See \"Patient Info\" tab in inbasket, or \"Choose Columns\" in Meds & Orders section of the refill encounter.             Passed - Medication is active on med list       Passed - Patient is age 18 or older       Passed - Normal serum creatinine on file in the past 12 months    Recent Labs   Lab Test 04/18/18  0714   CR 0.79               "

## 2019-04-27 ENCOUNTER — TELEPHONE (OUTPATIENT)
Dept: FAMILY MEDICINE | Facility: CLINIC | Age: 69
End: 2019-04-27

## 2019-04-27 DIAGNOSIS — E03.9 HYPOTHYROIDISM, UNSPECIFIED TYPE: ICD-10-CM

## 2019-04-29 DIAGNOSIS — L29.9 PRURITIC DISORDER: ICD-10-CM

## 2019-04-29 RX ORDER — LEVOTHYROXINE SODIUM 112 UG/1
TABLET ORAL
Qty: 30 TABLET | Refills: 0 | Status: SHIPPED | OUTPATIENT
Start: 2019-04-29 | End: 2019-05-06

## 2019-04-29 NOTE — TELEPHONE ENCOUNTER
Prescription approved per [facility] Refill Protocol.  Pt has appt in 2 weeks.  KpavelRN

## 2019-04-30 ENCOUNTER — TELEPHONE (OUTPATIENT)
Dept: FAMILY MEDICINE | Facility: CLINIC | Age: 69
End: 2019-04-30

## 2019-04-30 NOTE — TELEPHONE ENCOUNTER
Pt notified that his Novolog was received from the Patient Assistance Program.  Pt will  tomorrow.  Medication is in the RN Triage room fridge.  Pt to sign for it when received. KpavelRN

## 2019-04-30 NOTE — TELEPHONE ENCOUNTER
"Requested Prescriptions   Pending Prescriptions Disp Refills     triamcinolone (KENALOG) 0.1 % external cream 60 g 1     Sig: Apply topically 2 times daily  Last Written Prescription Date:  1/9/2019  Last Fill Quantity: 60g,  # refills: 1   Last office visit: 11/5/2018 with prescribing provider:  Anil   1/9/2019 with Shirlene  Future Office Visit:   Next 5 appointments (look out 90 days)    May 06, 2019  8:20 AM CDT  SHORT with Marlen Ma MD  [facility] ([facility]) [address] [facility] [address]  [phone]                Topical Steroids and Nonsteroidals Protocol Passed - 4/29/2019 11:34 AM        Passed - Patient is age 6 or older        Passed - Authorizing prescriber's most recent note related to this medication read.     If refill request is for ophthalmic use, please forward request to provider for approval.          Passed - High potency steroid not ordered        Passed - Recent (12 mo) or future (30 days) visit within the authorizing provider's specialty     Patient had office visit in the last 12 months or has a visit in the next 30 days with authorizing provider or within the authorizing provider's specialty.  See \"Patient Info\" tab in inbasket, or \"Choose Columns\" in Meds & Orders section of the refill encounter.              Passed - Medication is active on med list          "

## 2019-05-01 DIAGNOSIS — I10 ESSENTIAL HYPERTENSION WITH GOAL BLOOD PRESSURE LESS THAN 140/90: Chronic | ICD-10-CM

## 2019-05-01 RX ORDER — TRIAMCINOLONE ACETONIDE 1 MG/G
CREAM TOPICAL 2 TIMES DAILY
Qty: 60 G | Refills: 0 | Status: SHIPPED | OUTPATIENT
Start: 2019-05-01 | End: 2020-02-07

## 2019-05-01 NOTE — TELEPHONE ENCOUNTER
Prescription approved per [facility] Refill Protocol.    Thank you    Margaret VALENCIA RN

## 2019-05-01 NOTE — TELEPHONE ENCOUNTER
"Requested Prescriptions   Pending Prescriptions Disp Refills     lisinopril-hydrochlorothiazide (PRINZIDE/ZESTORETIC) 20-25 MG tablet [Pharmacy Med Name: LISINOPRIL-HYDROCHLOROTH 20-25 TABS] 90 tablet 3     Sig: TAKE ONE TABLET BY MOUTH EVERY DAY       Diuretics (Including Combos) Protocol Failed - 5/1/2019  8:57 AM        Failed - Normal serum creatinine on file in past 12 months     Recent Labs   Lab Test 04/18/18  0714   CR 0.79              Failed - Normal serum potassium on file in past 12 months     Recent Labs   Lab Test 04/18/18  0714   POTASSIUM 3.6                    Failed - Normal serum sodium on file in past 12 months     Recent Labs   Lab Test 04/18/18  0714                 Passed - Blood pressure under 140/90 in past 12 months     BP Readings from Last 3 Encounters:   01/09/19 130/78   11/05/18 138/72   04/26/18 129/77                 Passed - Recent (12 mo) or future (30 days) visit within the authorizing provider's specialty     Patient had office visit in the last 12 months or has a visit in the next 30 days with authorizing provider or within the authorizing provider's specialty.  See \"Patient Info\" tab in inbasket, or \"Choose Columns\" in Meds & Orders section of the refill encounter.              Passed - Medication is active on med list        Passed - Patient is age 18 or older   Last Written Prescription Date:  4/26/18  Last Fill Quantity: 90,  # refills: 3   Last office visit: 1/9/2019 with prescribing provider:  Anil   Future Office Visit:   Next 5 appointments (look out 90 days)    May 06, 2019  8:20 AM CDT  SHORT with Marlen Ma MD  [facility] ([facility]) [address] [facility] [address]  [phone]              amLODIPine (NORVASC) 10 MG tablet [Pharmacy Med Name: AMLODIPINE BESYLATE 10MG TABS] 90 tablet 3     Sig: TAKE ONE TABLET BY MOUTH EVERY DAY       Calcium Channel Blockers Protocol  Failed - 5/1/2019  8:57 AM    " "    Failed - Normal serum creatinine on file in past 12 months     Recent Labs   Lab Test 04/18/18  0714   CR 0.79             Passed - Blood pressure under 140/90 in past 12 months     BP Readings from Last 3 Encounters:   01/09/19 130/78   11/05/18 138/72   04/26/18 129/77                 Passed - Recent (12 mo) or future (30 days) visit within the authorizing provider's specialty     Patient had office visit in the last 12 months or has a visit in the next 30 days with authorizing provider or within the authorizing provider's specialty.  See \"Patient Info\" tab in inbasket, or \"Choose Columns\" in Meds & Orders section of the refill encounter.              Passed - Medication is active on med list        Passed - Patient is age 18 or older        Last Written Prescription Date:  4/26/18  Last Fill Quantity: 90,  # refills: 3   Last office visit: 1/9/2019 with prescribing provider:  Anil   Future Office Visit:   Next 5 appointments (look out 90 days)    May 06, 2019  8:20 AM CDT  SHORT with Marlen Ma MD  [facility] ([facility]) [address] [facility] [address]  [phone]           "

## 2019-05-02 RX ORDER — LISINOPRIL AND HYDROCHLOROTHIAZIDE 20; 25 MG/1; MG/1
TABLET ORAL
Qty: 30 TABLET | Refills: 0 | Status: SHIPPED | OUTPATIENT
Start: 2019-05-02 | End: 2019-05-06

## 2019-05-02 RX ORDER — AMLODIPINE BESYLATE 10 MG/1
TABLET ORAL
Qty: 30 TABLET | Refills: 0 | Status: SHIPPED | OUTPATIENT
Start: 2019-05-02 | End: 2019-05-06

## 2019-05-06 ENCOUNTER — OFFICE VISIT (OUTPATIENT)
Dept: FAMILY MEDICINE | Facility: CLINIC | Age: 69
End: 2019-05-06
Payer: COMMERCIAL

## 2019-05-06 VITALS
OXYGEN SATURATION: 95 % | WEIGHT: 227 LBS | HEART RATE: 71 BPM | TEMPERATURE: 98.6 F | SYSTOLIC BLOOD PRESSURE: 132 MMHG | BODY MASS INDEX: 31.66 KG/M2 | RESPIRATION RATE: 16 BRPM | DIASTOLIC BLOOD PRESSURE: 72 MMHG

## 2019-05-06 DIAGNOSIS — E11.42 DM TYPE 2 WITH DIABETIC PERIPHERAL NEUROPATHY (H): Primary | ICD-10-CM

## 2019-05-06 DIAGNOSIS — I10 ESSENTIAL HYPERTENSION WITH GOAL BLOOD PRESSURE LESS THAN 140/90: Chronic | ICD-10-CM

## 2019-05-06 DIAGNOSIS — Z12.11 SPECIAL SCREENING FOR MALIGNANT NEOPLASMS, COLON: ICD-10-CM

## 2019-05-06 DIAGNOSIS — Z23 NEED FOR VACCINATION: ICD-10-CM

## 2019-05-06 DIAGNOSIS — E78.5 HYPERLIPIDEMIA LDL GOAL <100: ICD-10-CM

## 2019-05-06 DIAGNOSIS — E03.9 HYPOTHYROIDISM, UNSPECIFIED TYPE: ICD-10-CM

## 2019-05-06 DIAGNOSIS — M10.9 GOUTY ARTHROPATHY: ICD-10-CM

## 2019-05-06 LAB
ALBUMIN SERPL-MCNC: 3.8 G/DL (ref 3.4–5)
ALP SERPL-CCNC: 88 U/L (ref 40–150)
ALT SERPL W P-5'-P-CCNC: 30 U/L (ref 0–70)
ANION GAP SERPL CALCULATED.3IONS-SCNC: 4 MMOL/L (ref 3–14)
AST SERPL W P-5'-P-CCNC: 23 U/L (ref 0–45)
BILIRUB SERPL-MCNC: 0.3 MG/DL (ref 0.2–1.3)
BUN SERPL-MCNC: 15 MG/DL (ref 7–30)
CALCIUM SERPL-MCNC: 9.2 MG/DL (ref 8.5–10.1)
CHLORIDE SERPL-SCNC: 103 MMOL/L (ref 94–109)
CHOLEST SERPL-MCNC: 175 MG/DL
CO2 SERPL-SCNC: 27 MMOL/L (ref 20–32)
CREAT SERPL-MCNC: 0.8 MG/DL (ref 0.66–1.25)
CREAT UR-MCNC: 67 MG/DL
GFR SERPL CREATININE-BSD FRML MDRD: >90 ML/MIN/{1.73_M2}
GLUCOSE SERPL-MCNC: 159 MG/DL (ref 70–99)
HBA1C MFR BLD: 7.2 % (ref 0–5.6)
HDLC SERPL-MCNC: 36 MG/DL
LDLC SERPL CALC-MCNC: 71 MG/DL
MICROALBUMIN UR-MCNC: <5 MG/L
MICROALBUMIN/CREAT UR: NORMAL MG/G CR (ref 0–17)
NONHDLC SERPL-MCNC: 139 MG/DL
POTASSIUM SERPL-SCNC: 4.1 MMOL/L (ref 3.4–5.3)
PROT SERPL-MCNC: 8.3 G/DL (ref 6.8–8.8)
SODIUM SERPL-SCNC: 134 MMOL/L (ref 133–144)
TRIGL SERPL-MCNC: 342 MG/DL
TSH SERPL DL<=0.005 MIU/L-ACNC: 3.04 MU/L (ref 0.4–4)
URATE SERPL-MCNC: 5.6 MG/DL (ref 3.5–7.2)

## 2019-05-06 PROCEDURE — G0009 ADMIN PNEUMOCOCCAL VACCINE: HCPCS | Performed by: FAMILY MEDICINE

## 2019-05-06 PROCEDURE — 80053 COMPREHEN METABOLIC PANEL: CPT | Performed by: FAMILY MEDICINE

## 2019-05-06 PROCEDURE — 36415 COLL VENOUS BLD VENIPUNCTURE: CPT | Performed by: FAMILY MEDICINE

## 2019-05-06 PROCEDURE — 83036 HEMOGLOBIN GLYCOSYLATED A1C: CPT | Performed by: FAMILY MEDICINE

## 2019-05-06 PROCEDURE — 99214 OFFICE O/P EST MOD 30 MIN: CPT | Mod: 25 | Performed by: FAMILY MEDICINE

## 2019-05-06 PROCEDURE — 82043 UR ALBUMIN QUANTITATIVE: CPT | Performed by: FAMILY MEDICINE

## 2019-05-06 PROCEDURE — 90732 PPSV23 VACC 2 YRS+ SUBQ/IM: CPT | Performed by: FAMILY MEDICINE

## 2019-05-06 PROCEDURE — 84550 ASSAY OF BLOOD/URIC ACID: CPT | Performed by: FAMILY MEDICINE

## 2019-05-06 PROCEDURE — 80061 LIPID PANEL: CPT | Performed by: FAMILY MEDICINE

## 2019-05-06 PROCEDURE — 84443 ASSAY THYROID STIM HORMONE: CPT | Performed by: FAMILY MEDICINE

## 2019-05-06 PROCEDURE — 99207 ZZC FOOT EXAM  NO CHARGE: CPT | Performed by: FAMILY MEDICINE

## 2019-05-06 RX ORDER — ATENOLOL 50 MG/1
75 TABLET ORAL DAILY
Qty: 135 TABLET | Refills: 1 | Status: SHIPPED | OUTPATIENT
Start: 2019-05-06 | End: 2019-12-20

## 2019-05-06 RX ORDER — LEVOTHYROXINE SODIUM 112 UG/1
112 TABLET ORAL DAILY
Qty: 90 TABLET | Refills: 3 | Status: SHIPPED | OUTPATIENT
Start: 2019-05-06 | End: 2020-03-04

## 2019-05-06 RX ORDER — AMLODIPINE BESYLATE 10 MG/1
10 TABLET ORAL DAILY
Qty: 90 TABLET | Refills: 3 | Status: SHIPPED | OUTPATIENT
Start: 2019-05-06 | End: 2020-03-04

## 2019-05-06 RX ORDER — LISINOPRIL AND HYDROCHLOROTHIAZIDE 20; 25 MG/1; MG/1
1 TABLET ORAL DAILY
Qty: 90 TABLET | Refills: 3 | Status: SHIPPED | OUTPATIENT
Start: 2019-05-06 | End: 2020-03-04

## 2019-05-06 RX ORDER — INDOMETHACIN 50 MG/1
CAPSULE ORAL
Qty: 30 CAPSULE | Refills: 3 | Status: SHIPPED | OUTPATIENT
Start: 2019-05-06 | End: 2020-02-27

## 2019-05-06 RX ORDER — ALLOPURINOL 100 MG/1
100 TABLET ORAL DAILY
Qty: 90 TABLET | Refills: 1 | Status: SHIPPED | OUTPATIENT
Start: 2019-05-06 | End: 2020-03-04

## 2019-05-06 ASSESSMENT — PAIN SCALES - GENERAL: PAINLEVEL: NO PAIN (0)

## 2019-05-06 NOTE — PATIENT INSTRUCTIONS
Health Maintenance reviewed and plan for update discussed.  schedule diabetic eye exam - have them send us the report    Novolog 70/30 - cut back to 50 units in the evening.      If you have a low blood sugar in the morning, rather than skipping your insulin - just take 50 units in the morning.         Our Clinic hours are:  Mondays    7:20 am - 7 pm  Tues -  Fri  7:20 am - 5 pm    Clinic Phone: [phone]    The clinic lab opens at 7:30 am Mon - Fri and appointments are required.    [facility] [place]  Ph. [phone]  Monday  8 am - 7pm  Tues - Fri 8 am - 5:30 pm

## 2019-05-06 NOTE — PROGRESS NOTES
SUBJECTIVE:   Kvng Velasquez is a 69 year old male who presents to clinic today for the following health issues:      HPI  Diabetes Follow-up    Patient is checking blood sugars: twice daily.    Blood sugar testing frequency justification: Adjustment of medication(s)  Results are as follows:         am - 120-130         bedtime - 140-150    Diabetic concerns: None     Symptoms of hypoglycemia (low blood sugar): one morning was in the 60's and hands were a little shakey     Paresthesias (numbness or burning in feet) or sores: Yes toes burning     Date of last diabetic eye exam: 2017    Once a week will get a blood sugar in the 60s. When this happens, he will skip his morning 70/30 altogether.  We discussed this - will have him drop his evening dose to 50 units and IF he is low in the morning, will have him drop his morning dose to 50 units but not skip it altogether.     BP Readings from Last 2 Encounters:   05/06/19 132/72   01/09/19 130/78     Hemoglobin A1C (%)   Date Value   10/17/2018 5.6   04/18/2018 5.6     LDL Cholesterol Calculated (mg/dL)   Date Value   04/18/2018 83   06/19/2017 34       Diabetes Management Resources  Hyperlipidemia Follow-Up      Rate your low fat/cholesterol diet?: good    Taking statin?  Yes, no muscle aches from statin    Other lipid medications/supplements?:  none    Additional history: as documented    Reviewed and updated as needed this visit by clinical staff  Tobacco  Allergies  Meds  Med Hx  Surg Hx  Fam Hx  Soc Hx        Reviewed and updated as needed this visit by Provider       Hypothyroidism Follow-up      Since last visit, patient describes the following symptoms: Weight stable, no hair loss, no skin changes, no constipation, no loose stools          /72 (Cuff Size: Adult Large)   Pulse 71   Temp 98.6  F (37  C) (Tympanic)   Resp 16   Wt 103 kg (227 lb)   SpO2 95%   BMI 31.66 kg/m    EXAM: GENERAL APPEARANCE: Alert, no acute distress  RESP: lungs clear  to auscultation   CV: normal rate, regular rhythm, no murmur or gallop  ABDOMEN: soft, no organomegaly, masses or tenderness  MS: extremities normal, no peripheral edema  Diabetic Foot Screen:  Any complaints of increased pain or numbness ?  YES numbness/tingling  Is there a foot ulcer now or a history of foot ulcer? No  Does the foot have an abnormal shape?  YES - some hammertoes  Are the nails thick, too long or ingrown? No  Are there any redness or open areas? No         Sensation Testing done at all points on the diagram with monofilament     Right Foot: Sensation Normal at all points  Left Foot: Sensation Normal at all points     Risk Category: 0- No loss of protective sensation  Performed by Marlen Ma MD        ASSESSMENT/PLAN:      ICD-10-CM    1. DM type 2 with diabetic peripheral neuropathy (H) E11.42 Comprehensive metabolic panel     Hemoglobin A1c     FOOT EXAM     Albumin Random Urine Quantitative with Creat Ratio   2. Essential hypertension with goal blood pressure less than 140/90 I10 lisinopril-hydrochlorothiazide (PRINZIDE/ZESTORETIC) 20-25 MG tablet     atenolol (TENORMIN) 50 MG tablet     amLODIPine (NORVASC) 10 MG tablet     Comprehensive metabolic panel   3. Hypothyroidism, unspecified type E03.9 levothyroxine (SYNTHROID/LEVOTHROID) 112 MCG tablet     TSH with free T4 reflex   4. Gouty arthropathy M10.9 allopurinol (ZYLOPRIM) 100 MG tablet     indomethacin (INDOCIN) 50 MG capsule     Uric acid   5. Hyperlipidemia LDL goal <100 E78.5 Comprehensive metabolic panel     Lipid panel reflex to direct LDL Fasting   6. Special screening for malignant neoplasms, colon Z12.11 Fecal colorectal cancer screen (FIT)       Patient Instructions   Health Maintenance reviewed and plan for update discussed.  schedule diabetic eye exam - have them send us the report    Novolog 70/30 - cut back to 50 units in the evening.      If you have a low blood sugar in the morning, rather than skipping your insulin -  just take 50 units in the morning.         Our Clinic hours are:  Mondays    7:20 am - 7 pm  Tues -  Fri  7:20 am - 5 pm    Clinic Phone: [phone]    The clinic lab opens at 7:30 am Mon - Fri and appointments are required.    [facility]. [phone]  Monday  8 am - 7pm  Tues - Fri 8 am - 5:30 pm

## 2019-05-06 NOTE — LETTER
[facility]  [address]  [facility] [address]  Phone: [phone]      5/7/2019     Kvng Velasquez  [address] [address] [address]      Dear Kvng:    Thank you for allowing me to participate in your care. Your recent test results were reviewed and listed below.      HgbA1c is higher at 7.2%, but I suspect some of this is due to over-correcting and not taking your insulin on the mornings when your blood sugar is low.  We discussed a plan for this (cutting back just slightly on the 70/30 when morning blood sugar is low.       Otherwise labs looks pretty good/stable.   Results for orders placed or performed in visit on 05/06/19   Comprehensive metabolic panel   Result Value Ref Range    Sodium 134 133 - 144 mmol/L    Potassium 4.1 3.4 - 5.3 mmol/L    Chloride 103 94 - 109 mmol/L    Carbon Dioxide 27 20 - 32 mmol/L    Anion Gap 4 3 - 14 mmol/L    Glucose 159 (H) 70 - 99 mg/dL    Urea Nitrogen 15 7 - 30 mg/dL    Creatinine 0.80 0.66 - 1.25 mg/dL    GFR Estimate >90 >60 mL/min/[1.73_m2]    GFR Estimate If Black >90 >60 mL/min/[1.73_m2]    Calcium 9.2 8.5 - 10.1 mg/dL    Bilirubin Total 0.3 0.2 - 1.3 mg/dL    Albumin 3.8 3.4 - 5.0 g/dL    Protein Total 8.3 6.8 - 8.8 g/dL    Alkaline Phosphatase 88 40 - 150 U/L    ALT 30 0 - 70 U/L    AST 23 0 - 45 U/L   Lipid panel reflex to direct LDL Fasting   Result Value Ref Range    Cholesterol 175 <200 mg/dL    Triglycerides 342 (H) <150 mg/dL    HDL Cholesterol 36 (L) >39 mg/dL    LDL Cholesterol Calculated 71 <100 mg/dL    Non HDL Cholesterol 139 (H) <130 mg/dL   Hemoglobin A1c   Result Value Ref Range    Hemoglobin A1C 7.2 (H) 0 - 5.6 %   TSH with free T4 reflex   Result Value Ref Range    TSH 3.04 0.40 - 4.00 mU/L   Albumin Random Urine Quantitative with Creat Ratio   Result Value Ref Range    Creatinine Urine 67 mg/dL    Albumin Urine mg/L <5 mg/L    Albumin Urine mg/g Cr Unable to calculate due to low value 0 - 17 mg/g Cr   Uric  acid   Result Value Ref Range    Uric Acid 5.6 3.5 - 7.2 mg/dL     Thank you for choosing [facility]. Continue with plan of care as discussed during your visit. If you have any further questions or concerns, please do not hesitate to contact us.      Sincerely,        Dr. Marlen Ma/Bonny Goodwin MA

## 2019-05-07 PROCEDURE — 82274 ASSAY TEST FOR BLOOD FECAL: CPT | Performed by: FAMILY MEDICINE

## 2019-05-07 NOTE — RESULT ENCOUNTER NOTE
HgbA1c is higher at 7.2%, but I suspect some of this is due to over-correcting and not taking his insulin on the mornings when his blood sugar is low.  We discussed a plan for this (cutting back just slightly on the 70/30 when morning blood sugar is low.      Otherwise labs looks pretty good/stable.    Marlen Ma M.D.

## 2019-05-08 DIAGNOSIS — Z12.11 SPECIAL SCREENING FOR MALIGNANT NEOPLASMS, COLON: ICD-10-CM

## 2019-05-11 LAB — HEMOCCULT STL QL IA: NEGATIVE

## 2019-06-17 DIAGNOSIS — F41.9 ANXIETY: Primary | ICD-10-CM

## 2019-06-17 DIAGNOSIS — L03.116 CELLULITIS OF LEFT LOWER EXTREMITY: ICD-10-CM

## 2019-06-18 NOTE — TELEPHONE ENCOUNTER
LM to call clinic/RN concerning Atarax refill.  Clarify dosage and need.  Given Atarax 10 mg #30 on 1/9/19.  Given Atarax 25 mg #60 + 11 on 11/5/18.  Yasmani

## 2019-06-18 NOTE — TELEPHONE ENCOUNTER
There are two doses listed: 10 mg and 25 mg. Which is she taking? Is she taking daily?    Left message for patient to return call to clinic.     Kateryna Ba, VALERIN, RN

## 2019-06-19 RX ORDER — HYDROXYZINE HYDROCHLORIDE 10 MG/1
TABLET, FILM COATED ORAL
Qty: 30 TABLET | Refills: 0 | Status: SHIPPED | OUTPATIENT
Start: 2019-06-19 | End: 2019-07-08

## 2019-06-19 NOTE — TELEPHONE ENCOUNTER
"S:  Patient requesting refill of Atarax 10mg as clarified in previous note    B:  LOV 5/6/19  Hydroxyzine last written 1/19/19 for 30 tablets prn supply for itching    A:  Requested Prescriptions   Pending Prescriptions Disp Refills     hydrOXYzine (ATARAX) 10 MG tablet 30 tablet 0     Sig: Atarax 10 mg tab po take 1 tab as needed for anxiety.       Antihistamines Protocol Passed - 6/19/2019  1:26 PM        Passed - Recent (12 mo) or future (30 days) visit within the authorizing provider's specialty     Patient had office visit in the last 12 months or has a visit in the next 30 days with authorizing provider or within the authorizing provider's specialty.  See \"Patient Info\" tab in inbasket, or \"Choose Columns\" in Meds & Orders section of the refill encounter.              Passed - Patient is age 3 or older     Apply age and/or weight-based dosing for peds patients age 3 and older.    Forward request to provider for patients under the age of 3.          Passed - Medication is active on med list        Passes [facility] refill protocol but only a 30 day supply was given  Patient states he uses this medication prn for anxiety    From 1/9/19 OV:  \"ASSESSMENT:  1. Cellulitis of left lower extremity    2. Pruritic disorder          PLAN:      Orders Placed This Encounter     cephALEXin (KEFLEX) 500 MG capsule     triamcinolone (KENALOG) 0.1 % external cream     hydrOXYzine (ATARAX) 10 MG tablet     \"  R:  Routed to provider:  Only 30 tabs given on 1/9/19  Patient is taking for reason other than associated diagnosis  Please review medication refill request for hydroxyzine    Ralph Carolina RN    "

## 2019-06-19 NOTE — TELEPHONE ENCOUNTER
Pt requesting Atarax 10 mg refill.  Last written 1/9/19 #30 by JAHAIRA Torres, takes PRN for Anxiety.  Last seen on 5/6/19 by Dr. Donis.  Pt states he takes periodically for his anxiety.  Please review and advise.  Yasmani

## 2019-07-08 ENCOUNTER — OFFICE VISIT (OUTPATIENT)
Dept: FAMILY MEDICINE | Facility: CLINIC | Age: 69
End: 2019-07-08
Payer: COMMERCIAL

## 2019-07-08 VITALS
RESPIRATION RATE: 18 BRPM | BODY MASS INDEX: 31.78 KG/M2 | HEIGHT: 71 IN | TEMPERATURE: 97.5 F | WEIGHT: 227 LBS | SYSTOLIC BLOOD PRESSURE: 132 MMHG | DIASTOLIC BLOOD PRESSURE: 76 MMHG | HEART RATE: 76 BPM | OXYGEN SATURATION: 95 %

## 2019-07-08 DIAGNOSIS — F41.9 ANXIETY: ICD-10-CM

## 2019-07-08 DIAGNOSIS — G63 POLYNEUROPATHY ASSOCIATED WITH UNDERLYING DISEASE (H): ICD-10-CM

## 2019-07-08 DIAGNOSIS — Z79.4 DIABETES MELLITUS DUE TO UNDERLYING CONDITION WITH DIABETIC NEUROPATHY, WITH LONG-TERM CURRENT USE OF INSULIN (H): Primary | ICD-10-CM

## 2019-07-08 DIAGNOSIS — E08.40 DIABETES MELLITUS DUE TO UNDERLYING CONDITION WITH DIABETIC NEUROPATHY, WITH LONG-TERM CURRENT USE OF INSULIN (H): Primary | ICD-10-CM

## 2019-07-08 PROCEDURE — 99213 OFFICE O/P EST LOW 20 MIN: CPT | Performed by: FAMILY MEDICINE

## 2019-07-08 RX ORDER — HYDROXYZINE HYDROCHLORIDE 10 MG/1
TABLET, FILM COATED ORAL
Qty: 90 TABLET | Refills: 1 | Status: SHIPPED | OUTPATIENT
Start: 2019-07-08 | End: 2022-07-15

## 2019-07-08 ASSESSMENT — PAIN SCALES - GENERAL: PAINLEVEL: NO PAIN (0)

## 2019-07-08 ASSESSMENT — MIFFLIN-ST. JEOR: SCORE: 1811.8

## 2019-07-08 ASSESSMENT — PATIENT HEALTH QUESTIONNAIRE - PHQ9: SUM OF ALL RESPONSES TO PHQ QUESTIONS 1-9: 0

## 2019-07-08 NOTE — PROGRESS NOTES
Subjective     Kvng Velasquez is a 69 year old male who presents to clinic today for the following health issues:    HPI   Hypertension Follow-up      Do you check your blood pressure regularly outside of the clinic? Yes     Are you following a low salt diet? Yes    Are your blood pressures ever more than 140 on the top number (systolic) OR more   than 90 on the bottom number (diastolic), for example 140/90? No    Amount of exercise or physical activity: 2-3 days/week for an average of on his feet 6 hrs X 3 days a week    Problems taking medications regularly: No    Medication side effects: none    Diet: low salt      Diabetes Follow-up      How often are you checking your blood sugar? Two times daily    What time of day are you checking your blood sugars (select all that apply)?  Before meals    Have you had any blood sugars above 200?  No    Have you had any blood sugars below 70?  No - that has improved since we decreased his insulin in May    What symptoms do you notice when your blood sugar is low?  Dizzy    What concerns do you have today about your diabetes? None     Do you have any of these symptoms? (Select all that apply)  Numbness in feet and Burning in feet     Have you had a diabetic eye exam in the last 12 months? No    Needs his diabetic form completed and faxed to [facility]    BP Readings from Last 2 Encounters:   07/08/19 132/76   05/06/19 132/72     Hemoglobin A1C (%)   Date Value   05/06/2019 7.2 (H)   10/17/2018 5.6     LDL Cholesterol Calculated (mg/dL)   Date Value   05/06/2019 71   04/18/2018 83       Diabetes Management Resources      Also needs hydroxyzine refilled.  Takes once a day to help with anxiety.  Doesn't notice fatigue from this.       Reviewed and updated as needed this visit by Provider         Review of Systems   ROS COMP: Constitutional, HEENT, cardiovascular, pulmonary, gi and gu systems are negative, except as otherwise noted.      Objective    /76   Pulse 76   Temp 97.5  " F (36.4  C) (Tympanic)   Resp 18   Ht 1.803 m (5' 11\")   Wt 103 kg (227 lb)   SpO2 95%   BMI 31.66 kg/m    Body mass index is 31.66 kg/m .  Physical Exam   GENERAL: healthy, alert and no distress  NECK: no adenopathy, no asymmetry, masses, or scars and thyroid normal to palpation  RESP: lungs clear to auscultation - no rales, rhonchi or wheezes  CV: regular rate and rhythm, normal S1 S2, no S3 or S4, no murmur, click or rub, no peripheral edema and peripheral pulses strong  ABDOMEN: soft, nontender, no hepatosplenomegaly, no masses and bowel sounds normal  MS: no gross musculoskeletal defects noted, no edema    Diagnostic Test Results:  Labs reviewed in Epic        Assessment & Plan       ICD-10-CM    1. Diabetes mellitus due to underlying condition with diabetic neuropathy, with long-term current use of insulin (H) E08.40     Z79.4    2. Anxiety F41.9 hydrOXYzine (ATARAX) 10 MG tablet   3. Polyneuropathy associated with underlying disease (H) G63      DMV form completed and faxed.     BMI:   Estimated body mass index is 31.66 kg/m  as calculated from the following:    Height as of this encounter: 1.803 m (5' 11\").    Weight as of this encounter: 103 kg (227 lb).               No follow-ups on file.    Marlen Ma MD  [facility]        "

## 2019-07-08 NOTE — PATIENT INSTRUCTIONS
Our Clinic hours are:  Mondays    7:20 am - 7 pm  Tues -  Fri  7:20 am - 5 pm    Clinic Phone: [phone]    The clinic lab opens at 7:30 am Mon - Fri and appointments are required.    [facility]. [phone]  Monday  8 am - 7pm  Tues - Fri 8 am - 5:30 pm

## 2019-09-06 ENCOUNTER — TELEPHONE (OUTPATIENT)
Dept: FAMILY MEDICINE | Facility: CLINIC | Age: 69
End: 2019-09-06

## 2019-09-06 NOTE — TELEPHONE ENCOUNTER
Reason for Call:  Form, our goal is to have forms completed with 72 hours, however, some forms may require a visit or additional information.    Type of letter, form or note:  [employer] Patient Assistance Program Application    Who is the form from?: [facility]    Where did the form come from: form was mailed in    What clinic location was the form placed at?: [facility]    Where the form was placed: Given to physician    What number is listed as a contact on the form?: [phone]           Call taken on 9/6/2019 at 2:17 PM by Samantha Valenzuela

## 2019-09-06 NOTE — TELEPHONE ENCOUNTER
[employer] Patient Assistance Program Application signed, sent by interoffice mail and sent to [employer]. Samantha Valenzuela on 9/6/2019 at 3:46 PM

## 2019-11-26 ENCOUNTER — TELEPHONE (OUTPATIENT)
Dept: FAMILY MEDICINE | Facility: CLINIC | Age: 69
End: 2019-11-26

## 2019-11-26 NOTE — TELEPHONE ENCOUNTER
Kvng has been approved to the [employer] assistance program for NovoLog pen 70/30 & pen tip needles until November 2020.  He will receive this medication at no cost through the enrollment period.    A 120 day supply NOVOLOG PEN 70/30 & PEN NEEDLES of will be delivered to the [facility] within 7-10 business days.  Kvng has been informed of this approval.    Kvng will contact my office for refills as we must work directly with the .  I will note EPIC as each refill is requested.    Thanks so much for your help!    Shreya Nelson  Prescription   Pharmacy Assistance  [id number]

## 2019-12-05 ENCOUNTER — TELEPHONE (OUTPATIENT)
Dept: FAMILY MEDICINE | Facility: CLINIC | Age: 69
End: 2019-12-05

## 2019-12-18 DIAGNOSIS — I10 ESSENTIAL HYPERTENSION WITH GOAL BLOOD PRESSURE LESS THAN 140/90: Chronic | ICD-10-CM

## 2019-12-18 NOTE — TELEPHONE ENCOUNTER
"Requested Prescriptions   Pending Prescriptions Disp Refills     atenolol (TENORMIN) 50 MG tablet [Pharmacy Med Name: ATENOLOL 50MG TABS] 135 tablet 1     Sig: TAKE 1 AND 1/2 TABLETS BY MOUTH DAILY   Last Written Prescription Date:  5/6/19  Last Fill Quantity: 135 tab,  # refills: 1   Last office visit: 7/8/2019 with prescribing provider:  Marlen Ma     Future Office Visit:        Beta-Blockers Protocol Passed - 12/18/2019  6:27 AM        Passed - Blood pressure under 140/90 in past 12 months     BP Readings from Last 3 Encounters:   07/08/19 132/76   05/06/19 132/72   01/09/19 130/78                 Passed - Patient is age 6 or older        Passed - Recent (12 mo) or future (30 days) visit within the authorizing provider's specialty     Patient has had an office visit with the authorizing provider or a provider within the authorizing providers department within the previous 12 mos or has a future within next 30 days. See \"Patient Info\" tab in inbasket, or \"Choose Columns\" in Meds & Orders section of the refill encounter.              Passed - Medication is active on med list          "

## 2019-12-20 RX ORDER — ATENOLOL 50 MG/1
TABLET ORAL
Qty: 135 TABLET | Refills: 1 | Status: SHIPPED | OUTPATIENT
Start: 2019-12-20 | End: 2020-03-04

## 2019-12-20 NOTE — TELEPHONE ENCOUNTER
Prescription approved per [facility] Refill Protocol.  Aidee Yousif RN

## 2020-02-07 ENCOUNTER — TELEPHONE (OUTPATIENT)
Dept: FAMILY MEDICINE | Facility: CLINIC | Age: 70
End: 2020-02-07

## 2020-02-07 DIAGNOSIS — L29.9 PRURITIC DISORDER: ICD-10-CM

## 2020-02-07 NOTE — TELEPHONE ENCOUNTER
"Requested Prescriptions   Pending Prescriptions Disp Refills     triamcinolone (KENALOG) 0.1 % external cream [Pharmacy Med Name: TRIAMCINOLONE ACETONIDE 0.1% CREA] 60 g 0     Sig: APPLY TOPICALLY TO AFFECTED AREA(S) TWO TIMES A DAY       Topical Steroids and Nonsteroidals Protocol Passed - 2/7/2020  2:50 PM        Passed - Patient is age 6 or older        Passed - Authorizing prescriber's most recent note related to this medication read.     If refill request is for ophthalmic use, please forward request to provider for approval.          Passed - High potency steroid not ordered        Passed - Recent (12 mo) or future (30 days) visit within the authorizing provider's specialty     Patient has had an office visit with the authorizing provider or a provider within the authorizing providers department within the previous 12 mos or has a future within next 30 days. See \"Patient Info\" tab in inbasket, or \"Choose Columns\" in Meds & Orders section of the refill encounter.              Passed - Medication is active on med list        Last Written Prescription Date:  5/1/2019  Last Fill Quantity: 60g,  # refills: 0   Last office visit: 7/8/2019 with prescribing provider:  Anil   Future Office Visit:      "

## 2020-02-10 RX ORDER — TRIAMCINOLONE ACETONIDE 1 MG/G
CREAM TOPICAL 2 TIMES DAILY
Qty: 60 G | Refills: 0 | Status: SHIPPED | OUTPATIENT
Start: 2020-02-10 | End: 2020-06-15

## 2020-02-10 NOTE — TELEPHONE ENCOUNTER
Prescription approved per [facility] Refill Protocol.  KPavelRN

## 2020-02-10 NOTE — TELEPHONE ENCOUNTER
Reason for Call:  Medication or medication refill:    Do you use a [facility]?  Name of the pharmacy and phone number for the current request:  [facility] [phone]    Name of the medication requested: Pt calling for Rx refill on Kenalog cream.  No need to call patient back, unless there are questions or problems.      Kenalog Cream  Last Written Prescription Date:  5//1/19  Last Fill Quantity: 60,  # refills: 0   Last office visit: 7/8/2019 with prescribing provider:     Future Office Visit:   Next 5 appointments (look out 90 days)    Mar 04, 2020  8:40 AM CST  SHORT with Marlen Ma MD  [facility] [address] [facility] [id number] [phone]         Other request:     Can we leave a detailed message on this number? YES    Phone number patient can be reached at: Home number on file [phone] (home)    Best Time: any    Call taken on 2/10/2020 at 8:05 AM by Kathleen Rodarte

## 2020-02-26 DIAGNOSIS — M10.9 GOUTY ARTHROPATHY: ICD-10-CM

## 2020-02-26 NOTE — TELEPHONE ENCOUNTER
"Requested Prescriptions   Pending Prescriptions Disp Refills     indomethacin (INDOCIN) 50 MG capsule [Pharmacy Med Name: INDOMETHACIN 50MG CAPS] 30 capsule 3     Sig: TAKE ONE CAPSULE BY MOUTH THREE TIMES A DAY AND REDUCE TO AS NEEDED AS GOUT GETS BETTER       Gout Agents Protocol Failed - 2/26/2020  5:59 AM        Failed - CBC on file in past 12 months     Recent Labs   Lab Test 01/09/18  1045   WBC 9.7   RBC 3.64*   HGB 11.9*   HCT 34.8*                    Passed - ALT on file in past 12 months     Recent Labs   Lab Test 05/06/19  0839   ALT 30             Passed - Has Uric Acid on file in past 12 months and value is less than 6     Recent Labs   Lab Test 05/06/19  0839   URIC 5.6     If level is 6mg/dL or greater, ok to refill one time and refer to provider.           Passed - Recent (12 mo) or future (30 days) visit within the authorizing provider's specialty     Patient has had an office visit with the authorizing provider or a provider within the authorizing providers department within the previous 12 mos or has a future within next 30 days. See \"Patient Info\" tab in inbasket, or \"Choose Columns\" in Meds & Orders section of the refill encounter.              Passed - Medication is active on med list        Passed - Patient is age 18 or older        Passed - Normal serum creatinine on file in the past 12 months     Recent Labs   Lab Test 05/06/19  0839   CR 0.80             Last Written Prescription Date:  5/6/2019  Last Fill Quantity: 30,  # refills: 3   Last office visit: 7/8/2019 with prescribing provider:  Anil   Future Office Visit:   Next 5 appointments (look out 90 days)    Mar 04, 2020  8:40 AM CST  SHORT with Marlen Ma MD  [facility] [address] [facility] [address] [phone]           "

## 2020-02-27 RX ORDER — INDOMETHACIN 50 MG/1
CAPSULE ORAL
Qty: 30 CAPSULE | Refills: 0 | Status: SHIPPED | OUTPATIENT
Start: 2020-02-27 | End: 2020-03-04

## 2020-02-27 NOTE — TELEPHONE ENCOUNTER
Prescription approved per [facility] Refill Protocol.  Appt due June 2020.KPavelRN

## 2020-03-04 ENCOUNTER — OFFICE VISIT (OUTPATIENT)
Dept: FAMILY MEDICINE | Facility: CLINIC | Age: 70
End: 2020-03-04
Payer: COMMERCIAL

## 2020-03-04 VITALS
RESPIRATION RATE: 16 BRPM | BODY MASS INDEX: 31.08 KG/M2 | HEART RATE: 76 BPM | DIASTOLIC BLOOD PRESSURE: 70 MMHG | OXYGEN SATURATION: 96 % | SYSTOLIC BLOOD PRESSURE: 128 MMHG | WEIGHT: 222 LBS | TEMPERATURE: 99 F | HEIGHT: 71 IN

## 2020-03-04 DIAGNOSIS — Z79.4 TYPE 2 DIABETES MELLITUS WITHOUT COMPLICATION, WITH LONG-TERM CURRENT USE OF INSULIN (H): Primary | ICD-10-CM

## 2020-03-04 DIAGNOSIS — F41.9 ANXIETY: ICD-10-CM

## 2020-03-04 DIAGNOSIS — E11.9 TYPE 2 DIABETES MELLITUS WITHOUT COMPLICATION, WITH LONG-TERM CURRENT USE OF INSULIN (H): Primary | ICD-10-CM

## 2020-03-04 DIAGNOSIS — E03.9 HYPOTHYROIDISM, UNSPECIFIED TYPE: ICD-10-CM

## 2020-03-04 DIAGNOSIS — M10.9 GOUTY ARTHROPATHY: ICD-10-CM

## 2020-03-04 DIAGNOSIS — I10 ESSENTIAL HYPERTENSION WITH GOAL BLOOD PRESSURE LESS THAN 140/90: Chronic | ICD-10-CM

## 2020-03-04 DIAGNOSIS — E78.5 HYPERLIPIDEMIA LDL GOAL <100: ICD-10-CM

## 2020-03-04 LAB
ALBUMIN SERPL-MCNC: 3.3 G/DL (ref 3.4–5)
ALP SERPL-CCNC: 74 U/L (ref 40–150)
ALT SERPL W P-5'-P-CCNC: 31 U/L (ref 0–70)
ANION GAP SERPL CALCULATED.3IONS-SCNC: 5 MMOL/L (ref 3–14)
AST SERPL W P-5'-P-CCNC: 29 U/L (ref 0–45)
BILIRUB SERPL-MCNC: 0.5 MG/DL (ref 0.2–1.3)
BUN SERPL-MCNC: 13 MG/DL (ref 7–30)
CALCIUM SERPL-MCNC: 9.2 MG/DL (ref 8.5–10.1)
CHLORIDE SERPL-SCNC: 103 MMOL/L (ref 94–109)
CHOLEST SERPL-MCNC: 146 MG/DL
CO2 SERPL-SCNC: 27 MMOL/L (ref 20–32)
CREAT SERPL-MCNC: 0.8 MG/DL (ref 0.66–1.25)
CREAT UR-MCNC: 64 MG/DL
GFR SERPL CREATININE-BSD FRML MDRD: 90 ML/MIN/{1.73_M2}
GLUCOSE SERPL-MCNC: 87 MG/DL (ref 70–99)
HBA1C MFR BLD: 5.8 % (ref 0–5.6)
HDLC SERPL-MCNC: 41 MG/DL
LDLC SERPL CALC-MCNC: 42 MG/DL
MICROALBUMIN UR-MCNC: 14 MG/L
MICROALBUMIN/CREAT UR: 21.7 MG/G CR (ref 0–17)
NONHDLC SERPL-MCNC: 105 MG/DL
POTASSIUM SERPL-SCNC: 3.7 MMOL/L (ref 3.4–5.3)
PROT SERPL-MCNC: 8.1 G/DL (ref 6.8–8.8)
SODIUM SERPL-SCNC: 135 MMOL/L (ref 133–144)
TRIGL SERPL-MCNC: 314 MG/DL
TSH SERPL DL<=0.005 MIU/L-ACNC: 1.9 MU/L (ref 0.4–4)

## 2020-03-04 PROCEDURE — 36415 COLL VENOUS BLD VENIPUNCTURE: CPT | Performed by: FAMILY MEDICINE

## 2020-03-04 PROCEDURE — 83036 HEMOGLOBIN GLYCOSYLATED A1C: CPT | Performed by: FAMILY MEDICINE

## 2020-03-04 PROCEDURE — 80053 COMPREHEN METABOLIC PANEL: CPT | Performed by: FAMILY MEDICINE

## 2020-03-04 PROCEDURE — 99214 OFFICE O/P EST MOD 30 MIN: CPT | Performed by: FAMILY MEDICINE

## 2020-03-04 PROCEDURE — 80061 LIPID PANEL: CPT | Performed by: FAMILY MEDICINE

## 2020-03-04 PROCEDURE — 82043 UR ALBUMIN QUANTITATIVE: CPT | Performed by: FAMILY MEDICINE

## 2020-03-04 PROCEDURE — 84443 ASSAY THYROID STIM HORMONE: CPT | Performed by: FAMILY MEDICINE

## 2020-03-04 RX ORDER — AMLODIPINE BESYLATE 10 MG/1
10 TABLET ORAL DAILY
Qty: 90 TABLET | Refills: 3 | Status: SHIPPED | OUTPATIENT
Start: 2020-03-04 | End: 2021-06-03

## 2020-03-04 RX ORDER — ALLOPURINOL 100 MG/1
100 TABLET ORAL DAILY
Qty: 90 TABLET | Refills: 3 | Status: SHIPPED | OUTPATIENT
Start: 2020-03-04 | End: 2021-03-12

## 2020-03-04 RX ORDER — ATORVASTATIN CALCIUM 40 MG/1
40 TABLET, FILM COATED ORAL DAILY
Qty: 90 TABLET | Refills: 3 | Status: SHIPPED | OUTPATIENT
Start: 2020-03-04 | End: 2021-07-28

## 2020-03-04 RX ORDER — INDOMETHACIN 50 MG/1
CAPSULE ORAL
Qty: 30 CAPSULE | Refills: 0 | Status: SHIPPED | OUTPATIENT
Start: 2020-03-04 | End: 2021-07-28

## 2020-03-04 RX ORDER — LISINOPRIL AND HYDROCHLOROTHIAZIDE 20; 25 MG/1; MG/1
1 TABLET ORAL DAILY
Qty: 90 TABLET | Refills: 3 | Status: SHIPPED | OUTPATIENT
Start: 2020-03-04 | End: 2021-06-02

## 2020-03-04 RX ORDER — ATENOLOL 50 MG/1
TABLET ORAL
Qty: 135 TABLET | Refills: 3 | Status: SHIPPED | OUTPATIENT
Start: 2020-03-04 | End: 2021-03-12

## 2020-03-04 RX ORDER — LEVOTHYROXINE SODIUM 112 UG/1
112 TABLET ORAL DAILY
Qty: 90 TABLET | Refills: 3 | Status: SHIPPED | OUTPATIENT
Start: 2020-03-04 | End: 2022-03-17

## 2020-03-04 ASSESSMENT — ANXIETY QUESTIONNAIRES
2. NOT BEING ABLE TO STOP OR CONTROL WORRYING: SEVERAL DAYS
GAD7 TOTAL SCORE: 4
6. BECOMING EASILY ANNOYED OR IRRITABLE: SEVERAL DAYS
7. FEELING AFRAID AS IF SOMETHING AWFUL MIGHT HAPPEN: NOT AT ALL
3. WORRYING TOO MUCH ABOUT DIFFERENT THINGS: NOT AT ALL
1. FEELING NERVOUS, ANXIOUS, OR ON EDGE: SEVERAL DAYS
5. BEING SO RESTLESS THAT IT IS HARD TO SIT STILL: NOT AT ALL

## 2020-03-04 ASSESSMENT — PATIENT HEALTH QUESTIONNAIRE - PHQ9
5. POOR APPETITE OR OVEREATING: SEVERAL DAYS
SUM OF ALL RESPONSES TO PHQ QUESTIONS 1-9: 1

## 2020-03-04 ASSESSMENT — MIFFLIN-ST. JEOR: SCORE: 1784.12

## 2020-03-04 NOTE — PATIENT INSTRUCTIONS
Change the 70/30 to 53 units twice a day.      Recheck in 6 months with me.      Take the allopurinol every day - this helps reduce the number of gouty attacks that you will get.     You are due for an eye exam - please have them send us the results.      Our Clinic hours are:  Mondays    7:20 am - 7 pm  Tues -  Fri  7:20 am - 5 pm    Clinic Phone: [phone]    The clinic lab opens at 7:30 am Mon - Fri and appointments are required.    [facility]  Ph. [phone]  Monday  8 am - 7pm  Tues - Fri 8 am - 5:30 pm

## 2020-03-04 NOTE — LETTER
[facility]  [address] [address] [facility] [address]  Phone: [phone]      3/5/2020     Kvng Velasquez  [address] [address] [address]      Dear Kvng:    Thank you for allowing me to participate in your care. Your recent test results were reviewed and listed below.  normal/acceptable results.    Your results are provided below for your review  Results for orders placed or performed in visit on 03/04/20   Hemoglobin A1c     Status: Abnormal   Result Value Ref Range    Hemoglobin A1C 5.8 (H) 0 - 5.6 %   Lipid panel reflex to direct LDL Non-fasting     Status: Abnormal   Result Value Ref Range    Cholesterol 146 <200 mg/dL    Triglycerides 314 (H) <150 mg/dL    HDL Cholesterol 41 >39 mg/dL    LDL Cholesterol Calculated 42 <100 mg/dL    Non HDL Cholesterol 105 <130 mg/dL   Comprehensive metabolic panel     Status: Abnormal   Result Value Ref Range    Sodium 135 133 - 144 mmol/L    Potassium 3.7 3.4 - 5.3 mmol/L    Chloride 103 94 - 109 mmol/L    Carbon Dioxide 27 20 - 32 mmol/L    Anion Gap 5 3 - 14 mmol/L    Glucose 87 70 - 99 mg/dL    Urea Nitrogen 13 7 - 30 mg/dL    Creatinine 0.80 0.66 - 1.25 mg/dL    GFR Estimate 90 >60 mL/min/[1.73_m2]    GFR Estimate If Black >90 >60 mL/min/[1.73_m2]    Calcium 9.2 8.5 - 10.1 mg/dL    Bilirubin Total 0.5 0.2 - 1.3 mg/dL    Albumin 3.3 (L) 3.4 - 5.0 g/dL    Protein Total 8.1 6.8 - 8.8 g/dL    Alkaline Phosphatase 74 40 - 150 U/L    ALT 31 0 - 70 U/L    AST 29 0 - 45 U/L   Albumin Random Urine Quantitative with Creat Ratio     Status: Abnormal   Result Value Ref Range    Creatinine Urine 64 mg/dL    Albumin Urine mg/L 14 mg/L    Albumin Urine mg/g Cr 21.70 (H) 0 - 17 mg/g Cr   TSH with free T4 reflex     Status: None   Result Value Ref Range    TSH 1.90 0.40 - 4.00 mU/L                 Thank you for choosing [facility]. As a result, please continue with the treatment plan discussed in the office. Return as discussed or sooner if symptoms  worsen or fail to improve.     If you have any further questions or concerns, please do not hesitate to contact us.      Sincerely,        Dr. Marlen Ma

## 2020-03-04 NOTE — PROGRESS NOTES
Subjective     Kvng Velasquez is a 70 year old male who presents to clinic today for the following health issues:    HPI   Diabetes Follow-up    How often are you checking your blood sugar? One time daily  What time of day are you checking your blood sugars (select all that apply)?  Before and after meals  Have you had any blood sugars above 200?  No  Have you had any blood sugars below 70?  No    What symptoms do you notice when your blood sugar is low?  Shaky and Confusion    What concerns do you have today about your diabetes? None     Do you have any of these symptoms? (Select all that apply)  No numbness or tingling in feet.  No redness, sores or blisters on feet.  No complaints of excessive thirst.  No reports of blurry vision.  No significant changes to weight.    Have you had a diabetic eye exam in the last 12 months? No                Hyperlipidemia Follow-Up      Are you regularly taking any medication or supplement to lower your cholesterol?   No    Are you having muscle aches or other side effects that you think could be caused by your cholesterol lowering medication?  No    Hypertension Follow-up      Do you check your blood pressure regularly outside of the clinic? No     Are you following a low salt diet? Yes    Are your blood pressures ever more than 140 on the top number (systolic) OR more   than 90 on the bottom number (diastolic), for example 140/90? No    BP Readings from Last 2 Encounters:   03/04/20 128/70   07/08/19 132/76     Hemoglobin A1C (%)   Date Value   03/04/2020 5.8 (H)   05/06/2019 7.2 (H)     LDL Cholesterol Calculated (mg/dL)   Date Value   05/06/2019 71   04/18/2018 83         How many servings of fruits and vegetables do you eat daily?  2-3    On average, how many sweetened beverages do you drink each day (Examples: soda, juice, sweet tea, etc.  Do NOT count diet or artificially sweetened beverages)?   0    How many days per week do you exercise enough to make your heart beat  "faster? 3 or less    How many minutes a day do you exercise enough to make your heart beat faster? 30 - 60    How many days per week do you miss taking your medication? 0              Reviewed and updated as needed this visit by Provider         Review of Systems   ROS COMP: Constitutional, HEENT, cardiovascular, pulmonary, gi and gu systems are negative, except as otherwise noted.      Objective    /70 (BP Location: Right arm, Patient Position: Sitting, Cuff Size: Adult Large)   Pulse 76   Temp 99  F (37.2  C) (Tympanic)   Resp 16   Ht 1.803 m (5' 11\")   Wt 100.7 kg (222 lb)   SpO2 96%   BMI 30.96 kg/m    Body mass index is 30.96 kg/m .  Physical Exam   GENERAL: healthy, alert and no distress  NECK: no adenopathy, no asymmetry, masses, or scars and thyroid normal to palpation  RESP: lungs clear to auscultation - no rales, rhonchi or wheezes  CV: regular rate and rhythm, normal S1 S2, no S3 or S4, no murmur, click or rub, no peripheral edema and peripheral pulses strong  ABDOMEN: soft, nontender, no hepatosplenomegaly, no masses and bowel sounds normal  MS: no gross musculoskeletal defects noted, no edema    Diagnostic Test Results:  Labs reviewed in Epic  Results for orders placed or performed in visit on 03/04/20 (from the past 24 hour(s))   Hemoglobin A1c   Result Value Ref Range    Hemoglobin A1C 5.8 (H) 0 - 5.6 %           Assessment & Plan     1. Type 2 diabetes mellitus without complication, with long-term current use of insulin (H)   well controlled, having some hypoglycemia, will cut morning 70/30 to 53 units so he's doing 53 units twice daily.  Most of his low values are in the morning after the 70/30  - Lipid panel reflex to direct LDL Non-fasting  - Comprehensive metabolic panel  - Albumin Random Urine Quantitative with Creat Ratio  - TSH with free T4 reflex    2. Hypothyroidism, unspecified type     - levothyroxine (SYNTHROID/LEVOTHROID) 112 MCG tablet; Take 1 tablet (112 mcg) by mouth " "daily  Dispense: 90 tablet; Refill: 3    3. Anxiety   stable    4. Gouty arthropathy   hasn't been taking his allopurinol, having more gouty flares about twice a month  - allopurinol (ZYLOPRIM) 100 MG tablet; Take 1 tablet (100 mg) by mouth daily  Dispense: 90 tablet; Refill: 3  - indomethacin (INDOCIN) 50 MG capsule; ONE CAPSULE BY MOUTH THREE TIMES A DAY. REDUCE AS GOUT GETS BETTER.  Dispense: 30 capsule; Refill: 0    5. Essential hypertension with goal blood pressure less than 140/90   well controlled  - amLODIPine (NORVASC) 10 MG tablet; Take 1 tablet (10 mg) by mouth daily  Dispense: 90 tablet; Refill: 3  - atenolol (TENORMIN) 50 MG tablet; TAKE 1 AND 1/2 TABLETS BY MOUTH DAILY  Dispense: 135 tablet; Refill: 3  - lisinopril-hydrochlorothiazide (ZESTORETIC) 20-25 MG tablet; Take 1 tablet by mouth daily  Dispense: 90 tablet; Refill: 3    6. Hyperlipidemia LDL goal <100     - Lipid panel reflex to direct LDL Non-fasting  - atorvastatin (LIPITOR) 40 MG tablet; Take 1 tablet (40 mg) by mouth daily  Dispense: 90 tablet; Refill: 3     BMI:   Estimated body mass index is 30.96 kg/m  as calculated from the following:    Height as of this encounter: 1.803 m (5' 11\").    Weight as of this encounter: 100.7 kg (222 lb).   Weight management plan: Discussed healthy diet and exercise guidelines            Return in about 6 months (around 9/4/2020) for diabetes recheck.    Marlen Ma MD  [facility]        "

## 2020-03-05 ASSESSMENT — ANXIETY QUESTIONNAIRES: GAD7 TOTAL SCORE: 4

## 2020-03-30 ENCOUNTER — TELEPHONE (OUTPATIENT)
Dept: FAMILY MEDICINE | Facility: CLINIC | Age: 70
End: 2020-03-30

## 2020-03-30 NOTE — TELEPHONE ENCOUNTER
FYI~ A refill has been made to the  assistance program for Novolog Mix 70/30 & Novofine pen tip needles 32g.    A 4 month supply of Novolog Mix 70/30 & Novofine pen tip needles 32g will be delivered to the [facility] within 7-10 business days.    Please contact Kvng on arrival to .    Thank you,    Ana Kitchen  Prescription Assistance

## 2020-06-15 DIAGNOSIS — L29.9 PRURITIC DISORDER: ICD-10-CM

## 2020-06-15 RX ORDER — TRIAMCINOLONE ACETONIDE 1 MG/G
CREAM TOPICAL
Qty: 60 G | Refills: 0 | Status: SHIPPED | OUTPATIENT
Start: 2020-06-15 | End: 2021-01-14

## 2020-07-03 ENCOUNTER — TELEPHONE (OUTPATIENT)
Dept: FAMILY MEDICINE | Facility: CLINIC | Age: 70
End: 2020-07-03

## 2020-07-03 DIAGNOSIS — E11.9 TYPE 2 DIABETES MELLITUS WITHOUT COMPLICATION, WITH LONG-TERM CURRENT USE OF INSULIN (H): Primary | ICD-10-CM

## 2020-07-03 DIAGNOSIS — Z79.4 TYPE 2 DIABETES MELLITUS WITHOUT COMPLICATION, WITH LONG-TERM CURRENT USE OF INSULIN (H): Primary | ICD-10-CM

## 2020-07-03 NOTE — TELEPHONE ENCOUNTER
Patient is calling asking for a DME order to be sent to [facility] for Diabetic shoes.     Joelle Dominguez on 7/3/2020 at 8:30 AM

## 2020-07-29 ENCOUNTER — TELEPHONE (OUTPATIENT)
Dept: FAMILY MEDICINE | Facility: CLINIC | Age: 70
End: 2020-07-29

## 2020-07-29 DIAGNOSIS — E11.9 TYPE 2 DIABETES MELLITUS WITHOUT COMPLICATION, WITH LONG-TERM CURRENT USE OF INSULIN (H): Primary | ICD-10-CM

## 2020-07-29 DIAGNOSIS — Z79.4 TYPE 2 DIABETES MELLITUS WITHOUT COMPLICATION, WITH LONG-TERM CURRENT USE OF INSULIN (H): Primary | ICD-10-CM

## 2020-07-29 NOTE — TELEPHONE ENCOUNTER
It is time to refill Kvng's Novolog 70/30 flexpen through the [employer] assistance program.  [employer] now requires hand signed, hard copy, brand name scripts for refills.    Please hand sign BRAND name, hard copy scripts for:      NOVOLOG 70/30 FLEX PEN      NOVOFINE 32g PEN TIP NEEDLES    Please send the HARD COPY scripts to me via interoffice mail FPS Shreya Danielle or via US mail  at:      [facility]   Shreya Nelson   [address] [place]  [address]    Thanks so much for your help!    Shreya Nelson  Prescription   Pharmacy Assistance  [phone]

## 2020-07-29 NOTE — TELEPHONE ENCOUNTER
Reason for Call: Request for an order or referral:    Order or referral being requested: Order form for [facility] [employer] placed in covering provider basket for signature.      Date needed: at your convenience    Has the patient been seen by the PCP for this problem? NO    Additional comments:     Call taken on 7/29/2020 at 10:54 AM by Kathleen Rodarte

## 2020-08-12 NOTE — TELEPHONE ENCOUNTER
Sarah from [employer] program is calling for the second time for the Rx to be changed to include 4 unit dose. She spoke with Shreya Nelson  And nothing has happened, yet. I spoke with Shreya Nelson and she sent on Monday via Inter Office Mail a new Rx for Dr. Ma to sign. Please send back to Shreya Nelson.  Samantha Silva  [facility] [place]

## 2020-08-19 ENCOUNTER — TELEPHONE (OUTPATIENT)
Dept: FAMILY MEDICINE | Facility: CLINIC | Age: 70
End: 2020-08-19

## 2020-08-19 NOTE — TELEPHONE ENCOUNTER
FYI~ A refill has been made to the  assistance program for Novolog Mix 70/30 & NovoFine pen tip needles 32g.    A 120 day supply of Novolog Mix 70/30 & NovoFine pen tip needles 32g will be delivered to the [facility] within 7-10 business days.    Please contact Kvng on arrival to .    Thank you,    Ana Kitchen  Prescription Assistance

## 2020-08-21 DIAGNOSIS — I10 ESSENTIAL HYPERTENSION WITH GOAL BLOOD PRESSURE LESS THAN 140/90: Chronic | ICD-10-CM

## 2020-08-21 RX ORDER — ATENOLOL 50 MG/1
TABLET ORAL
Qty: 135 TABLET | Refills: 1 | OUTPATIENT
Start: 2020-08-21

## 2020-08-21 NOTE — TELEPHONE ENCOUNTER
"Requested Prescriptions   Pending Prescriptions Disp Refills     atenolol (TENORMIN) 50 MG tablet [Pharmacy Med Name: ATENOLOL 50MG TABS] 135 tablet 1     Sig: TAKE ONE AND ONE-HALF TABLETS BY MOUTH ONCE DAILY.       Beta-Blockers Protocol Passed - 8/21/2020  5:32 AM        Passed - Blood pressure under 140/90 in past 12 months     BP Readings from Last 3 Encounters:   03/04/20 128/70   07/08/19 132/76   05/06/19 132/72                 Passed - Patient is age 6 or older        Passed - Recent (12 mo) or future (30 days) visit within the authorizing provider's specialty     Patient has had an office visit with the authorizing provider or a provider within the authorizing providers department within the previous 12 mos or has a future within next 30 days. See \"Patient Info\" tab in inbasket, or \"Choose Columns\" in Meds & Orders section of the refill encounter.              Passed - Medication is active on med list             "

## 2020-08-28 NOTE — TELEPHONE ENCOUNTER
Left message for patient that prescription is here (in injection room refrigerator).  Can call back with questions.    Aidee Yousif RN

## 2020-08-31 NOTE — TELEPHONE ENCOUNTER
Spoke with pt and he will  med in lobby today.  Offered drive up, pt wanted to  in lobby.  Yasmani

## 2020-10-22 ENCOUNTER — TELEPHONE (OUTPATIENT)
Dept: FAMILY MEDICINE | Facility: CLINIC | Age: 70
End: 2020-10-22

## 2020-10-22 NOTE — TELEPHONE ENCOUNTER
FYI~ Oct 22, 2020 I spoke with Kvng, he is in need of financial assistance for medication.    We reviewed the Prescription Assistance Program for manfacturer brand name assistance programs, gross income, insurance and Rx list.  Kvng is over income for the Pharmacy Assistance Fund $500.     assistance applications will be completed for Synthroid, Novolog 70/30 pens & pen needles. When approved, Kvng will receive this medication at no cost for 1 year.    Shreya Nelson  Prescription   Pharmacy Assistance  [phone].

## 2020-11-03 ENCOUNTER — TELEPHONE (OUTPATIENT)
Dept: FAMILY MEDICINE | Facility: CLINIC | Age: 70
End: 2020-11-03

## 2020-11-03 NOTE — TELEPHONE ENCOUNTER
Reason for Call:  right sided back, hip and leg pain    Detailed comments: patient is calling and stating for a few days, he has had lower back and leg pain, also hip pain. Sciatica. All on the right side. Should he be seen?    Phone Number Patient can be reached at: Home number on file [phone] (home)    Best Time: any    Can we leave a detailed message on this number? YES   Samantha Silva  [facility]        Call taken on 11/3/2020 at 4:00 PM by Samantha Jj

## 2020-11-03 NOTE — TELEPHONE ENCOUNTER
Scheduled appt for tomorrow for back pain going down into his leg and into his hip.  Pt had this pain many yrs ago.  Pt will take Ibuprofen and try ice @ this time.  Yasmani

## 2020-11-04 ENCOUNTER — OFFICE VISIT (OUTPATIENT)
Dept: FAMILY MEDICINE | Facility: CLINIC | Age: 70
End: 2020-11-04
Payer: COMMERCIAL

## 2020-11-04 VITALS
BODY MASS INDEX: 31.24 KG/M2 | OXYGEN SATURATION: 97 % | DIASTOLIC BLOOD PRESSURE: 78 MMHG | TEMPERATURE: 98.4 F | RESPIRATION RATE: 16 BRPM | WEIGHT: 224 LBS | HEART RATE: 78 BPM | SYSTOLIC BLOOD PRESSURE: 139 MMHG

## 2020-11-04 DIAGNOSIS — E11.9 TYPE 2 DIABETES MELLITUS WITHOUT COMPLICATION, WITH LONG-TERM CURRENT USE OF INSULIN (H): ICD-10-CM

## 2020-11-04 DIAGNOSIS — Z79.4 TYPE 2 DIABETES MELLITUS WITHOUT COMPLICATION, WITH LONG-TERM CURRENT USE OF INSULIN (H): ICD-10-CM

## 2020-11-04 DIAGNOSIS — M54.41 ACUTE RIGHT-SIDED LOW BACK PAIN WITH RIGHT-SIDED SCIATICA: Primary | ICD-10-CM

## 2020-11-04 LAB — HBA1C MFR BLD: 5.4 % (ref 0–5.6)

## 2020-11-04 PROCEDURE — 36415 COLL VENOUS BLD VENIPUNCTURE: CPT | Performed by: FAMILY MEDICINE

## 2020-11-04 PROCEDURE — 83036 HEMOGLOBIN GLYCOSYLATED A1C: CPT | Performed by: FAMILY MEDICINE

## 2020-11-04 PROCEDURE — 99214 OFFICE O/P EST MOD 30 MIN: CPT | Performed by: FAMILY MEDICINE

## 2020-11-04 RX ORDER — METHYLPREDNISOLONE 4 MG
TABLET, DOSE PACK ORAL
Qty: 21 TABLET | Refills: 0 | Status: SHIPPED | OUTPATIENT
Start: 2020-11-04 | End: 2021-07-28

## 2020-11-04 RX ORDER — NAPROXEN 500 MG/1
500 TABLET ORAL 2 TIMES DAILY WITH MEALS
Qty: 30 TABLET | Refills: 0 | Status: SHIPPED | OUTPATIENT
Start: 2020-11-04 | End: 2021-07-28

## 2020-11-04 RX ORDER — HYDROCODONE BITARTRATE AND ACETAMINOPHEN 5; 325 MG/1; MG/1
1 TABLET ORAL EVERY 6 HOURS PRN
Qty: 10 TABLET | Refills: 0 | Status: SHIPPED | OUTPATIENT
Start: 2020-11-04 | End: 2020-11-07

## 2020-11-04 ASSESSMENT — PAIN SCALES - GENERAL: PAINLEVEL: MODERATE PAIN (5)

## 2020-11-04 NOTE — PROGRESS NOTES
Subjective     Kvng Velasquez is a 70 year old male who presents to clinic today for the following health issues:    HPI         Musculoskeletal problem/pain  Onset/Duration: 3 days  Description  Location: right lower back - right  Joint Swelling: no  Redness: no  Pain: YES  Warmth: no  Intensity:  moderate  Progression of Symptoms:  worsening and constant  Accompanying signs and symptoms:   Fevers: no  Numbness/tingling/weakness: YES- down right leg  History  Trauma to the area: YES- with a fall 3 years ago, but not sure why this time it's acting up  Recent illness:  no  Previous similar problem: YES- from fall 3 years ago  Previous evaluation:  YES- with fall 3 years ago  Precipitating or alleviating factors:  Aggravating factors include: sitting  Therapies tried and outcome: nothing and Ibuprofen    Diabetes Follow-up    How often are you checking your blood sugar? One time daily  What time of day are you checking your blood sugars (select all that apply)?  Before meals  Have you had any blood sugars above 200?  No  Have you had any blood sugars below 70?  No    What symptoms do you notice when your blood sugar is low?  Shaky and Confusion    What concerns do you have today about your diabetes? None     Do you have any of these symptoms? (Select all that apply)  No numbness or tingling in feet.  No redness, sores or blisters on feet.  No complaints of excessive thirst.  No reports of blurry vision.  No significant changes to weight.    Have you had a diabetic eye exam in the last 12 months? No        BP Readings from Last 2 Encounters:   11/04/20 139/78   03/04/20 128/70     Hemoglobin A1C (%)   Date Value   11/04/2020 5.4   03/04/2020 5.8 (H)     LDL Cholesterol Calculated (mg/dL)   Date Value   03/04/2020 42   05/06/2019 71                 Review of Systems   Constitutional, HEENT, cardiovascular, pulmonary, gi and gu systems are negative, except as otherwise noted.      Objective    /78   Pulse 78    "Temp 98.4  F (36.9  C) (Tympanic)   Resp 16   Wt 101.6 kg (224 lb)   SpO2 97%   BMI 31.24 kg/m    Body mass index is 31.24 kg/m .  Physical Exam   GENERAL APPEARANCE: healthy, alert and no distress  Comprehensive back pain exam:  Tenderness of right lumbar paraspinal and right piriformis, Range of motion not limited by pain, Lower extremity strength functional and equal on both sides, Lower extremity reflexes within normal limits bilaterally, Lower extremity sensation normal and equal on both sides and Straight leg raise negative bilaterally    Results for orders placed or performed in visit on 11/04/20 (from the past 24 hour(s))   Hemoglobin A1c   Result Value Ref Range    Hemoglobin A1C 5.4 0 - 5.6 %           Assessment & Plan     Acute right-sided low back pain with right-sided sciatica  No red flags right now to indicate a need for imaging.   Start steroid and anti-inflammatory  Short course of norco to help with nighttime pain/sleep.  Start PT.   - PHYSICAL THERAPY REFERRAL; Future  - methylPREDNISolone (MEDROL DOSEPAK) 4 MG tablet therapy pack; Follow Package Directions  - naproxen (NAPROSYN) 500 MG tablet; Take 1 tablet (500 mg) by mouth 2 times daily (with meals)  - HYDROcodone-acetaminophen (NORCO) 5-325 MG tablet; Take 1 tablet by mouth every 6 hours as needed for severe pain    Type 2 diabetes mellitus without complication, with long-term current use of insulin (H)   well controlled.  - Hemoglobin A1c     BMI:   Estimated body mass index is 31.24 kg/m  as calculated from the following:    Height as of 3/4/20: 1.803 m (5' 11\").    Weight as of this encounter: 101.6 kg (224 lb).                No follow-ups on file.    Marlen Ma MD  [facility]    "

## 2020-11-10 DIAGNOSIS — Z79.4 TYPE 2 DIABETES MELLITUS WITHOUT COMPLICATION, WITH LONG-TERM CURRENT USE OF INSULIN (H): ICD-10-CM

## 2020-11-10 DIAGNOSIS — E11.9 TYPE 2 DIABETES MELLITUS WITHOUT COMPLICATION, WITH LONG-TERM CURRENT USE OF INSULIN (H): ICD-10-CM

## 2020-11-10 NOTE — TELEPHONE ENCOUNTER
I am in process of applying to the Novolog 70/30 pen & needles assistance program through [employer]  [employer] requires hand signed, brand name, hard copy scripts be submitted with their application.    Please hand sign a BRAND name, hard copy script for:      NOVOLOG 7/30 FLEX PEN      NOVOFINE 32 g PEN TIP NEEDLES      Please send the hard copy scripts to me via interoffice mail FPS Shreya Danielle or via US mail  at:      [facility]   Shreya Nelson   [address] [place]  [address]    Thanks so much for your help!    Shreya Nelson  Prescription   Pharmacy Assistance  [phone]

## 2020-11-11 ENCOUNTER — TELEPHONE (OUTPATIENT)
Dept: FAMILY MEDICINE | Facility: CLINIC | Age: 70
End: 2020-11-11

## 2020-11-11 DIAGNOSIS — M54.41 ACUTE RIGHT-SIDED LOW BACK PAIN WITH RIGHT-SIDED SCIATICA: Primary | ICD-10-CM

## 2020-11-11 NOTE — TELEPHONE ENCOUNTER
Reason for call:  Patient reporting a symptom    Symptom or request: Pt continues to have back pain, with right sided sciatica.  Please call patient and advise.      Duration (how long have symptoms been present): ongoing    Have you been treated for this before? Yes    Additional comments:     Phone Number patient can be reached at:  Home number on file [phone] (home)    Best Time:  any    Can we leave a detailed message on this number:  YES    Call taken on 11/11/2020 at 7:58 AM by Kathleen Adan

## 2020-11-11 NOTE — TELEPHONE ENCOUNTER
Routed to provider.    Pt called back.  He was seen last week for acute right sided low back pain with sciatica.    Pt asks what is next step?  He updates that he has not slept well due to pain.  The medications that were prescribed have not helped much.  He says that when he walks it almost feels like his right leg will give out.  The leg has not given out, it just feels like it might.    I asked pt if he plans to do PT as recommended.  Pt says that he checked with his insurance and it is too costly; he cannot afford the co-pay.    Odalys Viera RN

## 2020-11-17 ENCOUNTER — ANCILLARY PROCEDURE (OUTPATIENT)
Dept: GENERAL RADIOLOGY | Facility: CLINIC | Age: 70
End: 2020-11-17
Attending: FAMILY MEDICINE
Payer: COMMERCIAL

## 2020-11-17 ENCOUNTER — OFFICE VISIT (OUTPATIENT)
Dept: ORTHOPEDICS | Facility: CLINIC | Age: 70
End: 2020-11-17
Attending: FAMILY MEDICINE
Payer: COMMERCIAL

## 2020-11-17 VITALS
HEIGHT: 71 IN | SYSTOLIC BLOOD PRESSURE: 133 MMHG | BODY MASS INDEX: 31.36 KG/M2 | WEIGHT: 224 LBS | DIASTOLIC BLOOD PRESSURE: 76 MMHG

## 2020-11-17 DIAGNOSIS — M47.816 FACET ARTHRITIS OF LUMBAR REGION: Primary | ICD-10-CM

## 2020-11-17 DIAGNOSIS — M54.41 ACUTE RIGHT-SIDED LOW BACK PAIN WITH RIGHT-SIDED SCIATICA: ICD-10-CM

## 2020-11-17 DIAGNOSIS — M76.01 GLUTEAL TENDINITIS OF RIGHT BUTTOCK: ICD-10-CM

## 2020-11-17 DIAGNOSIS — M25.551 RIGHT HIP PAIN: ICD-10-CM

## 2020-11-17 PROCEDURE — 72100 X-RAY EXAM L-S SPINE 2/3 VWS: CPT | Performed by: RADIOLOGY

## 2020-11-17 PROCEDURE — 73502 X-RAY EXAM HIP UNI 2-3 VIEWS: CPT | Mod: RT | Performed by: RADIOLOGY

## 2020-11-17 PROCEDURE — 99204 OFFICE O/P NEW MOD 45 MIN: CPT | Performed by: FAMILY MEDICINE

## 2020-11-17 ASSESSMENT — MIFFLIN-ST. JEOR: SCORE: 1793.19

## 2020-11-17 NOTE — PROGRESS NOTES
Kvng Velasquez  :  1950  DOS: 2020  MRN: [id number]    Sports Medicine Clinic Visit    PCP: Marlen Ma    Kvng Velasquez is a 70 year old male who is seen in consultation at the request of  Marlen Ma M.D. presenting with radiating right low back and hip pain.    Injury: Insidious onset of right sided radiating low back pain over the past ~ 2 weeks.  Pain located over right lower lumbar spine, SI joint, radiating to right lateral hip and thigh.  Reports constant radiating, pain to right lateral hip.  Additional Features:  Positive: weakness.  Symptoms are better with Rest.  Symptoms are worse with: walking, lying on right hip, bending at waist.  Other evaluation and/or treatments so far consists of: Heat, Tylenol, Other medications: Medrol Dose pack, Norco, and Rest.  Recent imaging completed: No recent imaging completed.  Prior History of related problems: history of intermittent low back pain in past that he has not treated.    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    Past Medical History:   Diagnosis Date     Acidosis     lactic acidosis from metformin     Backache, unspecified      Cholesteatoma of external ear     l ear     Dysthymic disorder     Depression w/anxiety     Esophageal reflux      Hemorrhage of rectum and anus      Nonspecific elevation of levels of transaminase or lactic acid dehydrogenase (LDH)      Other and unspecified hyperlipidemia      Type II or unspecified type diabetes mellitus without mention of complication, not stated as uncontrolled      Unspecified essential hypertension      Unspecified hypothyroidism      Past Surgical History:   Procedure Laterality Date     SURGICAL HISTORY OF -   1999    Left inguinal hernia repair      "SURGICAL HISTORY OF -   1958    T&A     SURGICAL HISTORY OF -   12/10/1990    Cyst removal from one of his fingers     SURGICAL HISTORY OF -       Cholesteatoma removed from left ear     SURGICAL HISTORY OF -   3/14/1978    Vasectomy     SURGICAL HISTORY OF -   1982    Mastoidectomy     Family History   Problem Relation Age of Onset     C.A.D. Mother      Hypertension Mother      Diabetes Mother      Cardiovascular Mother      Lipids Mother      Depression Mother      Genitourinary Problems Mother      Lipids Father      Hypertension Father      Prostate Cancer Father      Thyroid Disease Father      C.A.D. Brother      Cardiovascular Brother      C.A.D. Brother         angioplasty     Cancer Brother        Objective  /76   Ht 1.803 m (5' 11\")   Wt 101.6 kg (224 lb)   BMI 31.24 kg/m        General: healthy, alert and in no distress      HEENT: no scleral icterus or conjunctival erythema     Skin: no suspicious lesions or rash. No jaundice.     CV: regular rhythm by palpation, 2+ distal pulses, no pedal edema      Resp: normal respiratory effort without conversational dyspnea     Psych: normal mood and affect      Gait: nonantalgic, appropriate coordination and balance     Neuro: normal light touch sensory exam of the extremities. Motor strength as noted below     Low back exam:    Inspection:       no visible deformity in the low back       normal skin       normal vascular       normal lymphatic    ROM:       full flexion       limited extension due to pain       asymmetric rotation of the pelvis with flexion    Tender:       Midline mild LS junction and right lower lumbar facets       paraspinal muscles R>L       R SI joint, glute med    Non Tender:       remainder of lumbar spine    Strength:       hip flexion 5/5       knee extension 5/5       ankle dorsiflexion 5/5       ankle plantarflexion 5/5       dorsiflexion of the great toe 5/5    Reflexes:       patellar (L3, L4) symmetric normal       achilles " tendons (S1) symmetric normal    Sensation:      grossly intact throughout lower extremities    Skin:       well perfused       capillary refill brisk    Special tests:       straight leg raise left neg         straight leg raise right neg       positive (+) BRIGHT on the right        slump test neg       Decreased b/l hamstring flexibility    Right hip with decreased terminal flexion to 110 deg, limited IR, neither is painful with passive testing, mildly uncomfortable FADIR, unrelated pain, neg scour       Radiology:  Recent Results (from the past 744 hour(s))   XR Pelvis w Hip Right 1 View    Narrative    XR PELVIS AND HIP RIGHT 1 VIEW 11/17/2020 11:11 AM     HISTORY: Acute right-sided low back pain with right-sided sciatica    COMPARISON: None.      Impression    IMPRESSION: Mild narrowing and hypertrophic change of both hip joints.  Degenerative changes lower lumbar spine. No fracture or osseous  lesion.    COREY SOLIS MD   XR Lumbar Spine 2/3 Views    Narrative    LUMBAR SPINE TWO-THREE  VIEWS  11/17/2020 11:11 AM     HISTORY: Acute right-sided low back pain with right-sided sciatica    COMPARISON: None.    FINDINGS: There is a transitional vertebrae at the lumbosacral  junction with a prominent right transverse process which articulates  with the sacrum. There is loss of disc space height throughout the  lumbar spine. There are degenerative changes in the facet joints..      Impression    IMPRESSION: Multilevel degenerative changes.    KATHLEEN SETH MD         Assessment:  1. Facet arthritis of lumbar region    2. Acute right-sided low back pain with right-sided sciatica    3. Gluteal tendinitis of right buttock    4. Right hip pain        Plan:  Discussed the assessment with the patient.  Follow up: 1 mo prn  Overall pain is improving  Will hope to expand on that improvement with PT, and help with activity progression and preventative core-building  XR images independently visualized and reviewed with  patient today in clinic  DJD and DDD present, no acute findings, could consider MRI of lumbar spine with worsening radiating sx, defer for now, radiating pain consistent more with facet inflammation, could also be evaluated on MRI in the future  XR of hips with signs of ROSA and DJD, reviewed in detail, neither is contributory to his current pain in the hip area  Has significant gluteal pain, multifactorial, some SI joint pain, troch bursa area pain  Reviewed option for trial of lateral hip injection for diagnostic/therapeutic goals in future if pain is labile or worsening  Start PT now ,order placed  Low impact activity strategies reviewed, as well as activity modification  We discussed modified progressive pain-free activity as tolerated  Home handouts provided and supportive care reviewed  All questions were answered today  Contact us with additional questions or concerns  Signs and sx of concern reviewed    Thanks very much for sending this nice gentleman to us, I will keep you updated with his progress      Yoshi Pendleton DO, CABRITTNY  Primary Care Sports Medicine  [facility]             Disclaimer: This note consists of symbols derived from keyboarding, dictation and/or voice recognition software. As a result, there may be errors in the script that have gone undetected. Please consider this when interpreting information found in this chart.

## 2020-11-17 NOTE — LETTER
2020         RE: Kvng Velasquez  [address] [address] [address]        Dear Colleague,    Thank you for referring your patient, Kvng Velasquez, to the [facility] [place]. Please see a copy of my visit note below.    Kvng Velasquez  :  1950  DOS: 2020  MRN: [id number]    Sports Medicine Clinic Visit    PCP: Marlen Ma    Kvng Velasquez is a 70 year old male who is seen in consultation at the request of  Marlen Ma M.D. presenting with radiating right low back and hip pain.    Injury: Insidious onset of right sided radiating low back pain over the past ~ 2 weeks.  Pain located over right lower lumbar spine, SI joint, radiating to right lateral hip and thigh.  Reports constant radiating, pain to right lateral hip.  Additional Features:  Positive: weakness.  Symptoms are better with Rest.  Symptoms are worse with: walking, lying on right hip, bending at waist.  Other evaluation and/or treatments so far consists of: Heat, Tylenol, Other medications: Medrol Dose pack, Norco, and Rest.  Recent imaging completed: No recent imaging completed.  Prior History of related problems: history of intermittent low back pain in past that he has not treated.    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    Past Medical History:   Diagnosis Date     Acidosis     lactic acidosis from metformin     Backache, unspecified      Cholesteatoma of external ear     l ear     Dysthymic disorder     Depression w/anxiety     Esophageal reflux      Hemorrhage of rectum and anus      Nonspecific elevation of levels of transaminase or lactic acid dehydrogenase (LDH)      Other and unspecified hyperlipidemia      Type II or unspecified type  "diabetes mellitus without mention of complication, not stated as uncontrolled      Unspecified essential hypertension      Unspecified hypothyroidism      Past Surgical History:   Procedure Laterality Date     SURGICAL HISTORY OF -   9/13/1999    Left inguinal hernia repair     SURGICAL HISTORY OF -   1958    T&A     SURGICAL HISTORY OF -   12/10/1990    Cyst removal from one of his fingers     SURGICAL HISTORY OF -       Cholesteatoma removed from left ear     SURGICAL HISTORY OF -   3/14/1978    Vasectomy     SURGICAL HISTORY OF -   1982    Mastoidectomy     Family History   Problem Relation Age of Onset     C.A.D. Mother      Hypertension Mother      Diabetes Mother      Cardiovascular Mother      Lipids Mother      Depression Mother      Genitourinary Problems Mother      Lipids Father      Hypertension Father      Prostate Cancer Father      Thyroid Disease Father      C.A.D. Brother      Cardiovascular Brother      C.A.D. Brother         angioplasty     Cancer Brother        Objective  /76   Ht 1.803 m (5' 11\")   Wt 101.6 kg (224 lb)   BMI 31.24 kg/m        General: healthy, alert and in no distress      HEENT: no scleral icterus or conjunctival erythema     Skin: no suspicious lesions or rash. No jaundice.     CV: regular rhythm by palpation, 2+ distal pulses, no pedal edema      Resp: normal respiratory effort without conversational dyspnea     Psych: normal mood and affect      Gait: nonantalgic, appropriate coordination and balance     Neuro: normal light touch sensory exam of the extremities. Motor strength as noted below     Low back exam:    Inspection:       no visible deformity in the low back       normal skin       normal vascular       normal lymphatic    ROM:       full flexion       limited extension due to pain       asymmetric rotation of the pelvis with flexion    Tender:       Midline mild LS junction and right lower lumbar facets       paraspinal muscles R>L       R SI joint, glute " med    Non Tender:       remainder of lumbar spine    Strength:       hip flexion 5/5       knee extension 5/5       ankle dorsiflexion 5/5       ankle plantarflexion 5/5       dorsiflexion of the great toe 5/5    Reflexes:       patellar (L3, L4) symmetric normal       achilles tendons (S1) symmetric normal    Sensation:      grossly intact throughout lower extremities    Skin:       well perfused       capillary refill brisk    Special tests:       straight leg raise left neg         straight leg raise right neg       positive (+) BRIGHT on the right        slump test neg       Decreased b/l hamstring flexibility    Right hip with decreased terminal flexion to 110 deg, limited IR, neither is painful with passive testing, mildly uncomfortable FADIR, unrelated pain, neg scour       Radiology:  Recent Results (from the past 744 hour(s))   XR Pelvis w Hip Right 1 View    Narrative    XR PELVIS AND HIP RIGHT 1 VIEW 11/17/2020 11:11 AM     HISTORY: Acute right-sided low back pain with right-sided sciatica    COMPARISON: None.      Impression    IMPRESSION: Mild narrowing and hypertrophic change of both hip joints.  Degenerative changes lower lumbar spine. No fracture or osseous  lesion.    COREY SOLIS MD   XR Lumbar Spine 2/3 Views    Narrative    LUMBAR SPINE TWO-THREE  VIEWS  11/17/2020 11:11 AM     HISTORY: Acute right-sided low back pain with right-sided sciatica    COMPARISON: None.    FINDINGS: There is a transitional vertebrae at the lumbosacral  junction with a prominent right transverse process which articulates  with the sacrum. There is loss of disc space height throughout the  lumbar spine. There are degenerative changes in the facet joints..      Impression    IMPRESSION: Multilevel degenerative changes.    KATHLEEN SETH MD         Assessment:  1. Facet arthritis of lumbar region    2. Acute right-sided low back pain with right-sided sciatica    3. Gluteal tendinitis of right buttock    4. Right hip pain         Plan:  Discussed the assessment with the patient.  Follow up: 1 mo prn  Overall pain is improving  Will hope to expand on that improvement with PT, and help with activity progression and preventative core-building  XR images independently visualized and reviewed with patient today in clinic  DJD and DDD present, no acute findings, could consider MRI of lumbar spine with worsening radiating sx, defer for now, radiating pain consistent more with facet inflammation, could also be evaluated on MRI in the future  XR of hips with signs of ROSA and DJD, reviewed in detail, neither is contributory to his current pain in the hip area  Has significant gluteal pain, multifactorial, some SI joint pain, troch bursa area pain  Reviewed option for trial of lateral hip injection for diagnostic/therapeutic goals in future if pain is labile or worsening  Start PT now ,order placed  Low impact activity strategies reviewed, as well as activity modification  We discussed modified progressive pain-free activity as tolerated  Home handouts provided and supportive care reviewed  All questions were answered today  Contact us with additional questions or concerns  Signs and sx of concern reviewed    Thanks very much for sending this nice gentleman to us, I will keep you updated with his progress      Yoshi Pendleton DO, ROSSY  Primary Care Sports Medicine  [facility]             Disclaimer: This note consists of symbols derived from keyboarding, dictation and/or voice recognition software. As a result, there may be errors in the script that have gone undetected. Please consider this when interpreting information found in this chart.        Again, thank you for allowing me to participate in the care of your patient.        Sincerely,        Yoshi Pendleton DO

## 2020-11-23 ENCOUNTER — TELEPHONE (OUTPATIENT)
Dept: FAMILY MEDICINE | Facility: CLINIC | Age: 70
End: 2020-11-23

## 2020-11-23 NOTE — TELEPHONE ENCOUNTER
Kvng has been approved to the [employer] assistance program for Novolog 70/30 pens & needles until November 2021.  He will receive this medication at no cost through the enrollment period.    A 120 day supply of NOVOLOG 70/30 PENS & NEEDLES will be delivered to Kvng's HOME within 7-10 business days. Cindy will contact Kvng to schedule delivery. Kvng has been informed of this approval.& delivery    Kvng will contact my office for refills as we must work directly with the .  I will note EPIC as each refill is requested.    Thanks so much for your help!    Shreya Nelson  Prescription   Pharmacy Assistance  [phone]

## 2020-12-22 ENCOUNTER — TELEPHONE (OUTPATIENT)
Dept: FAMILY MEDICINE | Facility: CLINIC | Age: 70
End: 2020-12-22

## 2020-12-22 NOTE — TELEPHONE ENCOUNTER
Kvng has been approved to the [email or web address] assistance program for Synthroid  Through December 20, 2021. He will receive this medication at no cost through the enrollment period.    A 90 day supply of Synthroid will be delivered to the [facility] within 7-10 business days. He has been informed of this approval.    Please contact Kvng to .    He will contact my office for refills as we must work directly with the .  I will note EPIC as each refill is requested.    Thank you,    Ana Kitchen  Prescription Assistance

## 2020-12-30 ENCOUNTER — TELEPHONE (OUTPATIENT)
Dept: FAMILY MEDICINE | Facility: CLINIC | Age: 70
End: 2020-12-30

## 2021-01-11 DIAGNOSIS — L29.9 PRURITIC DISORDER: ICD-10-CM

## 2021-01-11 NOTE — TELEPHONE ENCOUNTER
"Requested Prescriptions   Pending Prescriptions Disp Refills     triamcinolone (KENALOG) 0.1 % external cream [Pharmacy Med Name: TRIAMCINOLONE ACETONIDE 0.1% CREA]  0     Sig: APPLY TOPICALLY TO AFFECTED AREA(S) TWO TIMES A DAY       Topical Steroids and Nonsteroidals Protocol Passed - 1/11/2021  6:30 AM        Passed - Patient is age 6 or older        Passed - Authorizing prescriber's most recent note related to this medication read.     If refill request is for ophthalmic use, please forward request to provider for approval.          Passed - High potency steroid not ordered        Passed - Recent (12 mo) or future (30 days) visit within the authorizing provider's specialty     Patient has had an office visit with the authorizing provider or a provider within the authorizing providers department within the previous 12 mos or has a future within next 30 days. See \"Patient Info\" tab in inbasket, or \"Choose Columns\" in Meds & Orders section of the refill encounter.              Passed - Medication is active on med list             "

## 2021-01-14 RX ORDER — TRIAMCINOLONE ACETONIDE 1 MG/G
CREAM TOPICAL
Qty: 60 G | Refills: 0 | Status: SHIPPED | OUTPATIENT
Start: 2021-01-14 | End: 2021-07-05

## 2021-03-12 DIAGNOSIS — I10 ESSENTIAL HYPERTENSION WITH GOAL BLOOD PRESSURE LESS THAN 140/90: Chronic | ICD-10-CM

## 2021-03-12 DIAGNOSIS — M10.9 GOUTY ARTHROPATHY: ICD-10-CM

## 2021-03-12 RX ORDER — ALLOPURINOL 100 MG/1
TABLET ORAL
Qty: 90 TABLET | Refills: 3 | Status: SHIPPED | OUTPATIENT
Start: 2021-03-12 | End: 2022-03-22

## 2021-03-12 RX ORDER — ATENOLOL 50 MG/1
TABLET ORAL
Qty: 135 TABLET | Refills: 3 | Status: SHIPPED | OUTPATIENT
Start: 2021-03-12 | End: 2022-07-06

## 2021-03-12 NOTE — TELEPHONE ENCOUNTER
Routing refill request to provider for review/approval because:  Labs not current:  Uric acid, alt cbc    Thank you    Margaret VALENCIA RN

## 2021-03-24 ENCOUNTER — TELEPHONE (OUTPATIENT)
Dept: FAMILY MEDICINE | Facility: CLINIC | Age: 71
End: 2021-03-24

## 2021-03-24 NOTE — TELEPHONE ENCOUNTER
FYI~ A refill has been made to the  assistance program for Synthroid.    A 90 day supply of Synthroid will be delivered to the [facility] within 7-10 business days.    Please contact Kvng on arrival to .    Thank you,    Ana Kitchen  Prescription Assistance

## 2021-03-25 ENCOUNTER — TELEPHONE (OUTPATIENT)
Dept: FAMILY MEDICINE | Facility: CLINIC | Age: 71
End: 2021-03-25

## 2021-03-25 DIAGNOSIS — E11.9 TYPE 2 DIABETES MELLITUS WITHOUT COMPLICATION, WITH LONG-TERM CURRENT USE OF INSULIN (H): ICD-10-CM

## 2021-03-25 DIAGNOSIS — Z79.4 TYPE 2 DIABETES MELLITUS WITHOUT COMPLICATION, WITH LONG-TERM CURRENT USE OF INSULIN (H): ICD-10-CM

## 2021-03-25 NOTE — TELEPHONE ENCOUNTER
It is time to refill Kvng's Novolog 70/30 pens and needles with the [employer] assistance program.  Cindy requires a hand signed, hard copy, brand name script for this fill.    Please hand sign BRAND name, hard copy scripts for:      NOVOLOG 70/30 FLEX PEN      NOVOFIEN 32G PEN NEEDLES    Please send the HARD COPY scripts to me     via interoffice mail  FPS Shreya Danielle     or via US mail  at:   [facility]  Shreya Nelson  [address] [place]  [address]    Thanks so much for your help!    Shreya Nelson  Prescription   Pharmacy Assistance  [phone]

## 2021-04-06 ENCOUNTER — TELEPHONE (OUTPATIENT)
Dept: FAMILY MEDICINE | Facility: CLINIC | Age: 71
End: 2021-04-06

## 2021-04-06 NOTE — TELEPHONE ENCOUNTER
FYI~ A refill has been made to the  assistance program for Novolog 70/30 & NovoFine pen tip needles 32g.    A 120 day supply of Novolog 70/30 & NovoFine pen tip needles 32g  will be delivered to Kvng's Home within 7-10 business days.    Thank you,    Ana Kitchen  Prescription Assistance

## 2021-04-08 ENCOUNTER — TELEPHONE (OUTPATIENT)
Dept: FAMILY MEDICINE | Facility: CLINIC | Age: 71
End: 2021-04-08

## 2021-04-08 NOTE — TELEPHONE ENCOUNTER
Left message on answering machine to return call. Direct line provided. Medication for simvastatin arrived at clinic. Medication is placed in an envelop at the RN station desk.  Shreya Elizondo RN

## 2021-06-28 ENCOUNTER — TELEPHONE (OUTPATIENT)
Dept: FAMILY MEDICINE | Facility: CLINIC | Age: 71
End: 2021-06-28

## 2021-06-28 NOTE — TELEPHONE ENCOUNTER
FYI~ A refill has been made to the  assistance program for Synthroid.     A 90 day supply of Synthroid will be delivered to the [facility] within 7-10 business days.     Please contact Kvng on arrival to .     Thank you,     Ana Kitchen  Prescription Assistance

## 2021-07-03 DIAGNOSIS — L29.9 PRURITIC DISORDER: ICD-10-CM

## 2021-07-05 RX ORDER — TRIAMCINOLONE ACETONIDE 1 MG/G
CREAM TOPICAL
Qty: 30 G | Refills: 0 | Status: SHIPPED | OUTPATIENT
Start: 2021-07-05 | End: 2021-11-24

## 2021-07-12 ENCOUNTER — TELEPHONE (OUTPATIENT)
Dept: FAMILY MEDICINE | Facility: CLINIC | Age: 71
End: 2021-07-12

## 2021-07-12 NOTE — TELEPHONE ENCOUNTER
FYI~ A refill has been made to the  assistance program for Novolog 70/30 & Pen tip needles 32g.    A 120 day supply of  Novolog 70/30 & Pen tip needles 32g will be delivered to Kvng's home within 7-10 business days.    Thank you,    Ana Kitchen  Prescription Assistance

## 2021-07-26 ENCOUNTER — TELEPHONE (OUTPATIENT)
Dept: FAMILY MEDICINE | Facility: CLINIC | Age: 71
End: 2021-07-26

## 2021-07-26 NOTE — TELEPHONE ENCOUNTER
Reason for Call:  Form, our goal is to have forms completed with 72 hours, however, some forms may require a visit or additional information.    Type of letter, form or note:  medical - Diab  - Pt needs appt for form completion - Appt made and form placed in form's bin     Who is the form from?: Patient    Where did the form come from: Patient or family brought in       What clinic location was the form placed at?: [facility]    Where the form was placed: Form's Box/Folder    What number is listed as a contact on the form?: [phone]       Additional comments:     Call taken on 7/26/2021 at 8:06 AM by Kathleen Adan

## 2021-07-28 ENCOUNTER — OFFICE VISIT (OUTPATIENT)
Dept: FAMILY MEDICINE | Facility: CLINIC | Age: 71
End: 2021-07-28
Payer: COMMERCIAL

## 2021-07-28 VITALS
TEMPERATURE: 98.2 F | DIASTOLIC BLOOD PRESSURE: 76 MMHG | HEIGHT: 71 IN | WEIGHT: 223 LBS | BODY MASS INDEX: 31.22 KG/M2 | RESPIRATION RATE: 18 BRPM | SYSTOLIC BLOOD PRESSURE: 134 MMHG | OXYGEN SATURATION: 96 % | HEART RATE: 75 BPM

## 2021-07-28 DIAGNOSIS — E78.5 HYPERLIPIDEMIA LDL GOAL <100: ICD-10-CM

## 2021-07-28 DIAGNOSIS — M20.41 HAMMERTOE, BILATERAL: ICD-10-CM

## 2021-07-28 DIAGNOSIS — Z12.11 SPECIAL SCREENING FOR MALIGNANT NEOPLASMS, COLON: ICD-10-CM

## 2021-07-28 DIAGNOSIS — M20.42 HAMMERTOE, BILATERAL: ICD-10-CM

## 2021-07-28 DIAGNOSIS — I10 ESSENTIAL HYPERTENSION WITH GOAL BLOOD PRESSURE LESS THAN 140/90: Chronic | ICD-10-CM

## 2021-07-28 DIAGNOSIS — L30.9 DERMATITIS: ICD-10-CM

## 2021-07-28 DIAGNOSIS — Z79.4 TYPE 2 DIABETES MELLITUS WITH DIABETIC POLYNEUROPATHY, WITH LONG-TERM CURRENT USE OF INSULIN (H): Primary | ICD-10-CM

## 2021-07-28 DIAGNOSIS — E11.42 TYPE 2 DIABETES MELLITUS WITH DIABETIC POLYNEUROPATHY, WITH LONG-TERM CURRENT USE OF INSULIN (H): Primary | ICD-10-CM

## 2021-07-28 DIAGNOSIS — E03.9 ACQUIRED HYPOTHYROIDISM: ICD-10-CM

## 2021-07-28 LAB
ALBUMIN SERPL-MCNC: 3.2 G/DL (ref 3.4–5)
ALP SERPL-CCNC: 54 U/L (ref 40–150)
ALT SERPL W P-5'-P-CCNC: 35 U/L (ref 0–70)
ANION GAP SERPL CALCULATED.3IONS-SCNC: 9 MMOL/L (ref 3–14)
AST SERPL W P-5'-P-CCNC: 35 U/L (ref 0–45)
BILIRUB SERPL-MCNC: 0.6 MG/DL (ref 0.2–1.3)
BUN SERPL-MCNC: 12 MG/DL (ref 7–30)
CALCIUM SERPL-MCNC: 8.2 MG/DL (ref 8.5–10.1)
CHLORIDE BLD-SCNC: 102 MMOL/L (ref 94–109)
CHOLEST SERPL-MCNC: 128 MG/DL
CO2 SERPL-SCNC: 25 MMOL/L (ref 20–32)
CREAT SERPL-MCNC: 0.87 MG/DL (ref 0.66–1.25)
CREAT UR-MCNC: 78 MG/DL
FASTING STATUS PATIENT QL REPORTED: YES
GFR SERPL CREATININE-BSD FRML MDRD: 86 ML/MIN/1.73M2
GLUCOSE BLD-MCNC: 73 MG/DL (ref 70–99)
HBA1C MFR BLD: 5.4 % (ref 0–5.6)
HDLC SERPL-MCNC: 43 MG/DL
LDLC SERPL CALC-MCNC: 30 MG/DL
MICROALBUMIN UR-MCNC: 13 MG/DL
MICROALBUMIN/CREAT UR: 16.67 MG/G CR (ref 0–17)
NONHDLC SERPL-MCNC: 85 MG/DL
POTASSIUM BLD-SCNC: 3.4 MMOL/L (ref 3.4–5.3)
PROT SERPL-MCNC: 7.7 G/DL (ref 6.8–8.8)
SODIUM SERPL-SCNC: 136 MMOL/L (ref 133–144)
TRIGL SERPL-MCNC: 275 MG/DL
TSH SERPL DL<=0.005 MIU/L-ACNC: 1.74 MU/L (ref 0.4–4)

## 2021-07-28 PROCEDURE — 80053 COMPREHEN METABOLIC PANEL: CPT | Performed by: FAMILY MEDICINE

## 2021-07-28 PROCEDURE — 36415 COLL VENOUS BLD VENIPUNCTURE: CPT | Performed by: FAMILY MEDICINE

## 2021-07-28 PROCEDURE — 80061 LIPID PANEL: CPT | Performed by: FAMILY MEDICINE

## 2021-07-28 PROCEDURE — 84443 ASSAY THYROID STIM HORMONE: CPT | Performed by: FAMILY MEDICINE

## 2021-07-28 PROCEDURE — 99207 PR FOOT EXAM NO CHARGE: CPT | Performed by: FAMILY MEDICINE

## 2021-07-28 PROCEDURE — 99214 OFFICE O/P EST MOD 30 MIN: CPT | Performed by: FAMILY MEDICINE

## 2021-07-28 PROCEDURE — 82043 UR ALBUMIN QUANTITATIVE: CPT | Performed by: FAMILY MEDICINE

## 2021-07-28 PROCEDURE — 83036 HEMOGLOBIN GLYCOSYLATED A1C: CPT | Performed by: FAMILY MEDICINE

## 2021-07-28 RX ORDER — ATORVASTATIN CALCIUM 40 MG/1
40 TABLET, FILM COATED ORAL DAILY
Qty: 90 TABLET | Refills: 3 | Status: SHIPPED | OUTPATIENT
Start: 2021-07-28 | End: 2022-07-15

## 2021-07-28 RX ORDER — AMLODIPINE BESYLATE 10 MG/1
10 TABLET ORAL DAILY
Qty: 90 TABLET | Refills: 3 | Status: SHIPPED | OUTPATIENT
Start: 2021-07-28 | End: 2022-07-08

## 2021-07-28 RX ORDER — LISINOPRIL AND HYDROCHLOROTHIAZIDE 20; 25 MG/1; MG/1
1 TABLET ORAL DAILY
Qty: 90 TABLET | Refills: 3 | Status: SHIPPED | OUTPATIENT
Start: 2021-07-28 | End: 2022-07-15

## 2021-07-28 ASSESSMENT — MIFFLIN-ST. JEOR: SCORE: 1783.65

## 2021-07-28 ASSESSMENT — PAIN SCALES - GENERAL: PAINLEVEL: NO PAIN (0)

## 2021-07-28 NOTE — PROGRESS NOTES
"    Assessment & Plan     Type 2 diabetes mellitus with diabetic polyneuropathy, with long-term current use of insulin (H)  HgbA1c only 5.4% - likely having lows that he's unaware of.  Drop 70/30 insulin to 50 units twice daily.     See me in 6 months    - Albumin Random Urine Quantitative with Creat Ratio  - Comprehensive metabolic panel; Future  - Lipid panel reflex to direct LDL Fasting; Future  - Hemoglobin A1c; Future  - TSH with free T4 reflex; Future  - FOOT EXAM  - Comprehensive metabolic panel  - Lipid panel reflex to direct LDL Fasting  - Hemoglobin A1c  - TSH with free T4 reflex  - Miscellaneous Order for DME - ONLY FOR DME    Hyperlipidemia LDL goal <100     - Lipid panel reflex to direct LDL Fasting; Future  - Lipid panel reflex to direct LDL Fasting  - atorvastatin (LIPITOR) 40 MG tablet; Take 1 tablet (40 mg) by mouth daily    Acquired hypothyroidism     - TSH with free T4 reflex; Future  - TSH with free T4 reflex    Special screening for malignant neoplasms, colon     - COLOGUARD([employer] SCI[employer]ES)    Essential hypertension with goal blood pressure less than 140/90  Well controlled  - amLODIPine (NORVASC) 10 MG tablet; Take 1 tablet (10 mg) by mouth daily  - lisinopril-hydrochlorothiazide (ZESTORETIC) 20-25 MG tablet; Take 1 tablet by mouth daily    Hammertoe, bilateral  Patient declined podiatry referral DME order done for shoes    Dermatitis  Rash not resolving, recommend derm  - Adult Dermatology Referral; Future    COVID VACCINE - recommended and declined           BMI:   Estimated body mass index is 31.1 kg/m  as calculated from the following:    Height as of this encounter: 1.803 m (5' 11\").    Weight as of this encounter: 101.2 kg (223 lb).           Return in about 6 months (around 1/28/2022) for diabetes recheck.    Marlen Ma MD  [facility]   Vkng is a 71 year old who presents for the following health issues     HPI     Diabetes Follow-up  Chief " Complaint   Patient presents with     Diabetes     Health Maintenance     [facility]       How often are you checking your blood sugar? One time daily  What time of day are you checking your blood sugars (select all that apply)?  After meals  Have you had any blood sugars above 200?  No  Have you had any blood sugars below 70?  No    What symptoms do you notice when your blood sugar is low?  Dizzy and Other: light-headedness    What concerns do you have today about your diabetes? None     Do you have any of these symptoms? (Select all that apply)  Numbness in feet and Burning in feet    Have you had a diabetic eye exam in the last 12 months? No          Hyperlipidemia Follow-Up      Are you regularly taking any medication or supplement to lower your cholesterol?   Yes- lipitor    Are you having muscle aches or other side effects that you think could be caused by your cholesterol lowering medication?  No    Hypertension Follow-up      Do you check your blood pressure regularly outside of the clinic? No     Are you following a low salt diet? Yes    Are your blood pressures ever more than 140 on the top number (systolic) OR more   than 90 on the bottom number (diastolic), for example 140/90? No    BP Readings from Last 2 Encounters:   07/28/21 134/76   11/17/20 133/76     Hemoglobin A1C (%)   Date Value   11/04/2020 5.4   03/04/2020 5.8 (H)     LDL Cholesterol Calculated (mg/dL)   Date Value   03/04/2020 42   05/06/2019 71         How many servings of fruits and vegetables do you eat daily?  0-1    On average, how many sweetened beverages do you drink each day (Examples: soda, juice, sweet tea, etc.  Do NOT count diet or artificially sweetened beverages)?   0    How many days per week do you exercise enough to make your heart beat faster? 3 or less    How many minutes a day do you exercise enough to make your heart beat faster? 10 - 19    How many days per week do you miss taking your  "medication? 0        Review of Systems   Constitutional, HEENT, cardiovascular, pulmonary, gi and gu systems are negative, except as otherwise noted.      Objective    /76   Pulse 75   Temp 98.2  F (36.8  C) (Tympanic)   Resp 18   Ht 1.803 m (5' 11\")   Wt 101.2 kg (223 lb)   SpO2 96%   BMI 31.10 kg/m    Body mass index is 31.1 kg/m .  Physical Exam   GENERAL: healthy, alert and no distress  NECK: no adenopathy, no asymmetry, masses, or scars and thyroid normal to palpation  RESP: lungs clear to auscultation - no rales, rhonchi or wheezes  CV: regular rate and rhythm, normal S1 S2, no S3 or S4, no murmur, click or rub, no peripheral edema and peripheral pulses strong  ABDOMEN: soft, nontender, no hepatosplenomegaly, no masses and bowel sounds normal  MS: no gross musculoskeletal defects noted, edema is 1-2+  SKIN: bilateral lower extremity erythematous patchy rash on both ankles    Results for orders placed or performed in visit on 07/28/21 (from the past 24 hour(s))   Hemoglobin A1c   Result Value Ref Range    Hemoglobin A1C 5.4 0.0 - 5.6 %               "

## 2021-07-28 NOTE — PATIENT INSTRUCTIONS
Health Maintenance reviewed and plan for update discussed.  complete and mail in cologuard test    Change your 70/30 insulin to 50 units twice a day    See me in 6 months    Our Clinic hours are:  Mondays    7:20 am - 7 pm  Tues -  Fri  7:20 am - 5 pm    Clinic Phone: [phone]    The clinic lab opens at 7:30 am Mon - Fri and appointments are required.    [facility]  Ph. [phone]  Monday  8 am - 7pm  Tues - Fri 8 am - 5:30 pm

## 2021-09-29 ENCOUNTER — TELEPHONE (OUTPATIENT)
Dept: FAMILY MEDICINE | Facility: CLINIC | Age: 71
End: 2021-09-29

## 2021-09-29 NOTE — TELEPHONE ENCOUNTER
FYI~ A refill has been made to the  assistance program for Synthroid.     A 90 day supply of Synthroid will be delivered to the [facility] within 7-10 business days. Please contact Kvng to .    Thank you,    Ana Kitchen  Prescription Assistance

## 2021-10-06 NOTE — TELEPHONE ENCOUNTER
Patient will  med tomorrow morning.  It is located at [facility] nurse's desk    Aidee Yousif RN

## 2021-10-12 ENCOUNTER — TELEPHONE (OUTPATIENT)
Dept: FAMILY MEDICINE | Facility: CLINIC | Age: 71
End: 2021-10-12

## 2021-10-12 NOTE — TELEPHONE ENCOUNTER
FYI~ October 12, 2021 I spoke with Kvng, He is in need of financial assistance for medication.     We reviewed the Pharmacy Assistance Fund $500, the Prescription Assistance Program for manfacturer brand name assistance programs, gross income, no insurance and Rx list.     He is over income for the Pharmacy Assistance Fund.  I will be completing  assistance re-enrollment applications for Novolog 70/30 & Synthroid.     Thank you,    Ana Kitchen  Prescription Assistance

## 2021-11-08 ENCOUNTER — TELEPHONE (OUTPATIENT)
Dept: FAMILY MEDICINE | Facility: CLINIC | Age: 71
End: 2021-11-08
Payer: COMMERCIAL

## 2021-11-08 NOTE — TELEPHONE ENCOUNTER
Kvng has been approved to the [employer] assistance program for Novolog 70/30 & Pen tip needles 32g  Through October 2022 .  He will receive this medication at no cost through the enrollment period.    A 120 day supply of Novolog 70/30 & Pen tip needles 32g will be delivered to Kvng's Home within 7-10 business days. He has been informed of this approval.    He will contact my office for refills as we must work directly with the .  I will note EPIC as each refill is requested.    Thank you,    Ana Kitchen  Prescription Assistance

## 2021-11-30 ENCOUNTER — TRANSFERRED RECORDS (OUTPATIENT)
Dept: HEALTH INFORMATION MANAGEMENT | Facility: CLINIC | Age: 71
End: 2021-11-30
Payer: COMMERCIAL

## 2021-11-30 LAB — RETINOPATHY: NEGATIVE

## 2021-12-15 ENCOUNTER — TRANSFERRED RECORDS (OUTPATIENT)
Dept: HEALTH INFORMATION MANAGEMENT | Facility: CLINIC | Age: 71
End: 2021-12-15
Payer: COMMERCIAL

## 2021-12-15 LAB — HEMOCCULT STL QL IA: POSITIVE

## 2022-01-03 ENCOUNTER — TELEPHONE (OUTPATIENT)
Dept: FAMILY MEDICINE | Facility: CLINIC | Age: 72
End: 2022-01-03
Payer: COMMERCIAL

## 2022-01-03 DIAGNOSIS — R19.5 POSITIVE FIT (FECAL IMMUNOCHEMICAL TEST): Primary | ICD-10-CM

## 2022-01-03 NOTE — PROGRESS NOTES
FIT test was positive - done through insurance company.    This needs follow up - a Colonoscopy has been ordered.    Marlen Ma M.D.

## 2022-01-03 NOTE — TELEPHONE ENCOUNTER
Patient is notified, he will wait for the  to call back today, at first said he would like to wait to have this done as he says he feels fine, told patient that it is important to make sure to complete to rule out causes for bleeding, he agrees and will wait for the  to call him today.    JAYDE Benitez

## 2022-01-14 ENCOUNTER — TELEPHONE (OUTPATIENT)
Dept: FAMILY MEDICINE | Facility: CLINIC | Age: 72
End: 2022-01-14
Payer: COMMERCIAL

## 2022-03-17 ENCOUNTER — TELEPHONE (OUTPATIENT)
Dept: FAMILY MEDICINE | Facility: CLINIC | Age: 72
End: 2022-03-17
Payer: COMMERCIAL

## 2022-03-17 DIAGNOSIS — E03.9 HYPOTHYROIDISM, UNSPECIFIED TYPE: ICD-10-CM

## 2022-03-17 RX ORDER — LEVOTHYROXINE SODIUM 112 UG/1
112 TABLET ORAL DAILY
Qty: 90 TABLET | Refills: 0 | Status: SHIPPED | OUTPATIENT
Start: 2022-03-17 | End: 2022-07-15

## 2022-03-17 NOTE — TELEPHONE ENCOUNTER
Paperwork for Med Asst. for Novolog pens and needles completed and signed by Dr. Ma and mailed back through inter-office mail to Shreya Nelson in envelope provided.

## 2022-03-17 NOTE — TELEPHONE ENCOUNTER
Prescription approved per [facility] Refill Protocol.    Pending Prescriptions:                       Disp   Refills    levothyroxine (SYNTHROID/LEVOTHROID) 112 *90 tab*0            Sig: Take 1 tablet (112 mcg) by mouth daily     Thyroid Protocol Passed 03/17/2022 09:24 AM   Protocol Details  Patient is 12 years or older    Recent (12 mo) or future (30 days) visit within the authorizing provider's specialty    Medication is active on med list    Normal TSH on file in past 12 months        Katelyn Warner RN on 3/17/2022 at 9:26 AM

## 2022-03-22 DIAGNOSIS — M10.9 GOUTY ARTHROPATHY: ICD-10-CM

## 2022-03-23 RX ORDER — ALLOPURINOL 100 MG/1
100 TABLET ORAL DAILY
Qty: 90 TABLET | Refills: 1 | Status: SHIPPED | OUTPATIENT
Start: 2022-03-23 | End: 2022-07-15

## 2022-04-14 ENCOUNTER — TELEPHONE (OUTPATIENT)
Dept: FAMILY MEDICINE | Facility: CLINIC | Age: 72
End: 2022-04-14
Payer: COMMERCIAL

## 2022-04-14 NOTE — TELEPHONE ENCOUNTER
Kvng has been approved to the [employer] assistance program for Synthroid  Through December 2022 He will receive this medication at no cost through the enrollment period.    A 90 day supply of SYNTHROID will be delivered to Kvng's home within 7-10 business days. Kvng has been informed of this approval and delivery    Kvng will contact my office for refills as we must work directly with the .  I will note EPIC as each refill is requested.    Shreya Nelson  Prescription Assistance Supervisor  Pharmacy Assistance  [phone]

## 2022-05-10 DIAGNOSIS — Z79.4 TYPE 2 DIABETES MELLITUS WITHOUT COMPLICATION, WITH LONG-TERM CURRENT USE OF INSULIN (H): ICD-10-CM

## 2022-05-10 DIAGNOSIS — E11.9 TYPE 2 DIABETES MELLITUS WITHOUT COMPLICATION, WITH LONG-TERM CURRENT USE OF INSULIN (H): ICD-10-CM

## 2022-05-11 RX ORDER — INSULIN ASPART 100 [IU]/ML
INJECTION, SUSPENSION SUBCUTANEOUS
Qty: 150 ML | Refills: 0 | Status: SHIPPED | OUTPATIENT
Start: 2022-05-11 | End: 2022-06-15

## 2022-06-14 DIAGNOSIS — Z79.4 TYPE 2 DIABETES MELLITUS WITHOUT COMPLICATION, WITH LONG-TERM CURRENT USE OF INSULIN (H): ICD-10-CM

## 2022-06-14 DIAGNOSIS — E11.9 TYPE 2 DIABETES MELLITUS WITHOUT COMPLICATION, WITH LONG-TERM CURRENT USE OF INSULIN (H): ICD-10-CM

## 2022-06-14 NOTE — TELEPHONE ENCOUNTER
Patient confirms that he is a part of the [employer] program, unsure of what they may have been calling about . Says that he will soon be needing refills, has about a month of the medication remaining at this time. Would like prescription sent to [facility]. Patient was advised that he's also due for a visit with provider, he verbalized understanding and call was transferred to central scheduling.    Meche Antoine RN  [facility]

## 2022-06-14 NOTE — TELEPHONE ENCOUNTER
Reason for Call:  Other     Detailed comments: [employer] Assistance Program is calling on the following medication.   Disp Refills Start End DAVID   insulin aspart prot & aspart (NOVOLOG MIX 70/30 FLEXPEN) (70-30) 100 UNIT/ML pen 150 mL 0 2022  No   Si units with breakfast and 53 u before dinner, max 110u daily allow for prime needs office visit and labs   Sent to pharmacy as: NovoLOG Mix 70/30 FlexPen (70-30) 100 UNIT/ML Suspension Pen-injector (insulin aspart prot & aspart)   Class: E-Prescribe   Order: [id number]   E-Prescribing Status: Receipt confirmed by pharmacy (2022  1:36 PM CDT)         Phone Number Patient can be reached at: Other phone number:  [phone] [employer]    Best Time: any    Can we leave a detailed message on this number? YES    Call taken on 2022 at 1:38 PM by Natalee Gillette

## 2022-06-14 NOTE — TELEPHONE ENCOUNTER
RN tried calling the below number, which is not a direct line, follow all the prompts which took about 4 minutes to be placed on hold for an approx. Wait time of 30 minutes. RN does not have time to stay on the phone that long. - RN unsure of what this place wanted.    RN called Kvng and left a message for patient to give a call back.   Are you apart of the  [employer] program?   Do you need a refill on your prescription?   Which pharmacy do you need this sent to?      VALERI GomesN, RN, PHN

## 2022-06-15 RX ORDER — INSULIN ASPART 100 [IU]/ML
INJECTION, SUSPENSION SUBCUTANEOUS
Qty: 150 ML | Refills: 1 | Status: SHIPPED | OUTPATIENT
Start: 2022-06-15 | End: 2022-07-15

## 2022-06-15 NOTE — TELEPHONE ENCOUNTER
"Has appointment scheduled with you on 22    Requested Prescriptions   Pending Prescriptions Disp Refills    insulin aspart prot & aspart (NOVOLOG MIX 70/30 FLEXPEN) (70-30) 100 UNIT/ML pen 150 mL 0     Si units with breakfast and 53 u before dinner, max 110u daily allow for prime needs office visit and labs        Insulin Mixes Protocol Failed - 6/15/2022  9:25 AM        Failed - HgbA1C in past 3 or 6 months     If HgbA1C is 8 or greater, it needs to be on file within the past 3 months.  If less than 8, must be on file within the past 6 months.     Recent Labs   Lab Test 21  0857   A1C 5.4             Passed - Serum creatinine on file in past 12 months     Recent Labs   Lab Test 21  0857   CR 0.87       Ok to refill medication if creatinine is low          Passed - Medication is active on mded list        Passed - Patient is age 18 or older        Passed - Recent (6 mo) or future (30 days) visit within the authorizing provider's specialty     Patient had office visit in the last 6 months or has a visit in the next 30 days with authorizing provider or within the authorizing provider's specialty.  See \"Patient Info\" tab in inbasket, or \"Choose Columns\" in Meds & Orders section of the refill encounter.                  "

## 2022-06-15 NOTE — TELEPHONE ENCOUNTER
Appointment was made for follow up and will wait for the program to call clinic back, hopefully more detailed notes will be taken and a better call back number will be provided.    Gracie Powell, VALERIN, RN, PHN

## 2022-07-06 ENCOUNTER — TELEPHONE (OUTPATIENT)
Dept: FAMILY MEDICINE | Facility: CLINIC | Age: 72
End: 2022-07-06

## 2022-07-06 DIAGNOSIS — I10 ESSENTIAL HYPERTENSION WITH GOAL BLOOD PRESSURE LESS THAN 140/90: Chronic | ICD-10-CM

## 2022-07-06 RX ORDER — ATENOLOL 50 MG/1
TABLET ORAL
Qty: 45 TABLET | Refills: 0 | Status: SHIPPED | OUTPATIENT
Start: 2022-07-06 | End: 2022-07-15

## 2022-07-06 NOTE — TELEPHONE ENCOUNTER
Last office visit 7/28/21  At that time writer sees no mention of atenolol in provider note    Writer called patient and left message requesting a call back to clinic  When patient calls back:  Ask patient if he has been taking atenolol?    Ralph DONOHUE [facility]

## 2022-07-06 NOTE — TELEPHONE ENCOUNTER
Routing refill request to provider for review/approval because:  A break in medication  See note below. Patient has been taking only 1/2 tablet of Atenolol daily. States he will go back to the correct dose of 1 1/2 tabs daily  if a refill is ordered.  45 tabs pended.   Due for office visit. Patient informed (LVM). LOV 7/28/21    Pending Prescriptions:                       Disp   Refills    atenolol (TENORMIN) 50 MG tablet          135 ta*3            Sig: TAKE ONE AND ONE-HALF TABLETS BY MOUTH EVERY DAY    Requested Prescriptions   Pending Prescriptions Disp Refills     atenolol (TENORMIN) 50 MG tablet 135 tablet 3     Sig: TAKE ONE AND ONE-HALF TABLETS BY MOUTH EVERY DAY       There is no refill protocol information for this order        Katelyn Warner RN on 7/6/2022 at 1:39 PM

## 2022-07-06 NOTE — TELEPHONE ENCOUNTER
Patient is requesting a refill on this medication, and it looks like it hasn't been ordered since march of 2021. Is he still taking this, and if so, please send a new prescription to the pharmacy. If it has been sent to a different pharmacy, please let us know so we can get it transferred.    Thank you,    Ana Maria Rosa  Certified Pharmacy Technician, [facility]  Ph: [phone]  Fx: [phone]

## 2022-07-08 ENCOUNTER — TELEPHONE (OUTPATIENT)
Dept: FAMILY MEDICINE | Facility: CLINIC | Age: 72
End: 2022-07-08

## 2022-07-08 DIAGNOSIS — I10 ESSENTIAL HYPERTENSION WITH GOAL BLOOD PRESSURE LESS THAN 140/90: Chronic | ICD-10-CM

## 2022-07-08 RX ORDER — AMLODIPINE BESYLATE 10 MG/1
10 TABLET ORAL DAILY
Qty: 30 TABLET | Refills: 0 | Status: SHIPPED | OUTPATIENT
Start: 2022-07-08 | End: 2022-07-15

## 2022-07-08 NOTE — TELEPHONE ENCOUNTER
30 day hilda fill of amlodipine given to patient to get him to his August Appointment with Dr Ma.   Amlodipine had been requested for the patient as a short fill per the previous note and the pended medication in this encounter and the medication on the active medication list.    Writer called patient and scheduled him for next Friday to discuss his diabetes and DMV form with Dr Ma.  While speaking with writer patient states that he has not been taking amlodipine for some time but that he just picked up enough lisinopril to get him to his appointment.  Patient is on hydrochlorothiazide lisinopril combo pill.    Writer called [facility] pharmacy and left voice message cancelling the amlodipine Rx writer had sent over previously.    Ralph DONOHUE [facility]

## 2022-07-08 NOTE — TELEPHONE ENCOUNTER
Reason for Call:  Other appointment    Detailed comments: Requesting to be seen sooner for diabetes check. Appointment schedule for 8/22/22, but patient need diabetes form complete for DMV so he can drive. Need appointment before the end of July.    Phone Number Patient can be reached at: Home number on file [phone] (home)    Best Time: anytime    Can we leave a detailed message on this number? YES    Call taken on 7/8/2022 at 8:37 AM by Fran Baez

## 2022-07-08 NOTE — TELEPHONE ENCOUNTER
Patient is asking to do lab work before OV. Also note patient will be out of med before OV; last refill was for only 30 days. Needs an additional 2 weeks.  Next 5 appointments (look out 90 days)    Aug 18, 2022  8:00 AM  (Arrive by 7:40 AM)  Provider Visit with Marlen Ma MD  [facility] ([facility] ) [address] [address]  [phone]               Chichi ARAUZ  Station

## 2022-07-15 ENCOUNTER — OFFICE VISIT (OUTPATIENT)
Dept: FAMILY MEDICINE | Facility: CLINIC | Age: 72
End: 2022-07-15
Payer: COMMERCIAL

## 2022-07-15 VITALS
RESPIRATION RATE: 18 BRPM | WEIGHT: 215.13 LBS | TEMPERATURE: 99 F | HEIGHT: 71 IN | HEART RATE: 73 BPM | DIASTOLIC BLOOD PRESSURE: 70 MMHG | BODY MASS INDEX: 30.12 KG/M2 | SYSTOLIC BLOOD PRESSURE: 130 MMHG | OXYGEN SATURATION: 96 %

## 2022-07-15 DIAGNOSIS — E11.42 TYPE 2 DIABETES MELLITUS WITH DIABETIC POLYNEUROPATHY, WITH LONG-TERM CURRENT USE OF INSULIN (H): Primary | ICD-10-CM

## 2022-07-15 DIAGNOSIS — I10 ESSENTIAL HYPERTENSION WITH GOAL BLOOD PRESSURE LESS THAN 140/90: Chronic | ICD-10-CM

## 2022-07-15 DIAGNOSIS — F10.29 ALCOHOL DEPENDENCE WITH UNSPECIFIED ALCOHOL-INDUCED DISORDER (H): ICD-10-CM

## 2022-07-15 DIAGNOSIS — Z79.4 TYPE 2 DIABETES MELLITUS WITH DIABETIC POLYNEUROPATHY, WITH LONG-TERM CURRENT USE OF INSULIN (H): Primary | ICD-10-CM

## 2022-07-15 DIAGNOSIS — R19.5 POSITIVE FIT (FECAL IMMUNOCHEMICAL TEST): ICD-10-CM

## 2022-07-15 DIAGNOSIS — E03.9 HYPOTHYROIDISM, UNSPECIFIED TYPE: ICD-10-CM

## 2022-07-15 DIAGNOSIS — F41.9 ANXIETY: ICD-10-CM

## 2022-07-15 DIAGNOSIS — G63 POLYNEUROPATHY ASSOCIATED WITH UNDERLYING DISEASE (H): ICD-10-CM

## 2022-07-15 DIAGNOSIS — E78.5 HYPERLIPIDEMIA LDL GOAL <100: ICD-10-CM

## 2022-07-15 DIAGNOSIS — M10.9 GOUTY ARTHROPATHY: ICD-10-CM

## 2022-07-15 DIAGNOSIS — Z28.21 COVID-19 VACCINE DOSE DECLINED: ICD-10-CM

## 2022-07-15 LAB
ALBUMIN SERPL-MCNC: 3.2 G/DL (ref 3.4–5)
ALP SERPL-CCNC: 70 U/L (ref 40–150)
ALT SERPL W P-5'-P-CCNC: 25 U/L (ref 0–70)
ANION GAP SERPL CALCULATED.3IONS-SCNC: 8 MMOL/L (ref 3–14)
AST SERPL W P-5'-P-CCNC: 25 U/L (ref 0–45)
BILIRUB SERPL-MCNC: 0.6 MG/DL (ref 0.2–1.3)
BUN SERPL-MCNC: 33 MG/DL (ref 7–30)
CALCIUM SERPL-MCNC: 8.6 MG/DL (ref 8.5–10.1)
CHLORIDE BLD-SCNC: 100 MMOL/L (ref 94–109)
CHOLEST SERPL-MCNC: 136 MG/DL
CO2 SERPL-SCNC: 24 MMOL/L (ref 20–32)
CREAT SERPL-MCNC: 0.97 MG/DL (ref 0.66–1.25)
CREAT UR-MCNC: 214 MG/DL
FASTING STATUS PATIENT QL REPORTED: NO
GFR SERPL CREATININE-BSD FRML MDRD: 82 ML/MIN/1.73M2
GLUCOSE BLD-MCNC: 185 MG/DL (ref 70–99)
HBA1C MFR BLD: 6.4 % (ref 0–5.6)
HDLC SERPL-MCNC: 45 MG/DL
LDLC SERPL CALC-MCNC: 45 MG/DL
MICROALBUMIN UR-MCNC: 10 MG/L
MICROALBUMIN/CREAT UR: 4.67 MG/G CR (ref 0–17)
NONHDLC SERPL-MCNC: 91 MG/DL
POTASSIUM BLD-SCNC: 4.1 MMOL/L (ref 3.4–5.3)
PROT SERPL-MCNC: 8.6 G/DL (ref 6.8–8.8)
SODIUM SERPL-SCNC: 132 MMOL/L (ref 133–144)
TRIGL SERPL-MCNC: 232 MG/DL
TSH SERPL DL<=0.005 MIU/L-ACNC: 1.61 MU/L (ref 0.4–4)

## 2022-07-15 PROCEDURE — 82043 UR ALBUMIN QUANTITATIVE: CPT | Performed by: FAMILY MEDICINE

## 2022-07-15 PROCEDURE — 99207 PR FOOT EXAM NO CHARGE: CPT | Performed by: FAMILY MEDICINE

## 2022-07-15 PROCEDURE — 36415 COLL VENOUS BLD VENIPUNCTURE: CPT | Performed by: FAMILY MEDICINE

## 2022-07-15 PROCEDURE — 80053 COMPREHEN METABOLIC PANEL: CPT | Performed by: FAMILY MEDICINE

## 2022-07-15 PROCEDURE — 83036 HEMOGLOBIN GLYCOSYLATED A1C: CPT | Performed by: FAMILY MEDICINE

## 2022-07-15 PROCEDURE — 84443 ASSAY THYROID STIM HORMONE: CPT | Performed by: FAMILY MEDICINE

## 2022-07-15 PROCEDURE — 99214 OFFICE O/P EST MOD 30 MIN: CPT | Performed by: FAMILY MEDICINE

## 2022-07-15 PROCEDURE — 80061 LIPID PANEL: CPT | Performed by: FAMILY MEDICINE

## 2022-07-15 RX ORDER — ATENOLOL 50 MG/1
TABLET ORAL
Qty: 135 TABLET | Refills: 3 | Status: SHIPPED | OUTPATIENT
Start: 2022-07-15 | End: 2023-06-14

## 2022-07-15 RX ORDER — HYDROXYZINE HYDROCHLORIDE 10 MG/1
TABLET, FILM COATED ORAL
Qty: 90 TABLET | Refills: 1 | Status: SHIPPED | OUTPATIENT
Start: 2022-07-15 | End: 2023-02-03

## 2022-07-15 RX ORDER — AMLODIPINE BESYLATE 10 MG/1
10 TABLET ORAL DAILY
Qty: 90 TABLET | Refills: 3 | Status: SHIPPED | OUTPATIENT
Start: 2022-07-15 | End: 2023-06-14

## 2022-07-15 RX ORDER — ATORVASTATIN CALCIUM 40 MG/1
40 TABLET, FILM COATED ORAL DAILY
Qty: 90 TABLET | Refills: 3 | Status: SHIPPED | OUTPATIENT
Start: 2022-07-15 | End: 2023-06-14

## 2022-07-15 RX ORDER — ALLOPURINOL 100 MG/1
100 TABLET ORAL DAILY
Qty: 90 TABLET | Refills: 3 | Status: SHIPPED | OUTPATIENT
Start: 2022-07-15 | End: 2023-06-14

## 2022-07-15 RX ORDER — LISINOPRIL AND HYDROCHLOROTHIAZIDE 20; 25 MG/1; MG/1
1 TABLET ORAL DAILY
Qty: 90 TABLET | Refills: 3 | Status: SHIPPED | OUTPATIENT
Start: 2022-07-15 | End: 2023-06-14

## 2022-07-15 RX ORDER — LEVOTHYROXINE SODIUM 112 UG/1
112 TABLET ORAL DAILY
Qty: 90 TABLET | Refills: 3 | Status: SHIPPED | OUTPATIENT
Start: 2022-07-15 | End: 2023-06-14

## 2022-07-15 RX ORDER — INSULIN ASPART 100 [IU]/ML
INJECTION, SUSPENSION SUBCUTANEOUS
Qty: 150 ML | Refills: 1 | Status: SHIPPED | OUTPATIENT
Start: 2022-07-15 | End: 2023-06-14

## 2022-07-15 ASSESSMENT — PATIENT HEALTH QUESTIONNAIRE - PHQ9: SUM OF ALL RESPONSES TO PHQ QUESTIONS 1-9: 2

## 2022-07-15 ASSESSMENT — PAIN SCALES - GENERAL: PAINLEVEL: NO PAIN (0)

## 2022-07-15 NOTE — PROGRESS NOTES
Assessment & Plan     Type 2 diabetes mellitus with diabetic polyneuropathy, with long-term current use of insulin (H)  Controlled - continue current medication  Patient will drop off DMV form when he gets it, hasn't received yet  See me in 6 months  Eye exam is UTD until November      - Albumin Random Urine Quantitative with Creat Ratio; Future  - Comprehensive metabolic panel; Future  - Lipid panel reflex to direct LDL Fasting; Future  - Hemoglobin A1c; Future  - FOOT EXAM  - Comprehensive metabolic panel  - Lipid panel reflex to direct LDL Fasting  - Hemoglobin A1c  - Albumin Random Urine Quantitative with Creat Ratio  - insulin aspart prot & aspart (NOVOLOG MIX 70/30 FLEXPEN) (70-30) 100 UNIT/ML pen; 53 units with breakfast and 53 u before dinner, max 110u daily allow for prime needs office visit and labs    Hyperlipidemia LDL goal <100     - Lipid panel reflex to direct LDL Fasting; Future  - Lipid panel reflex to direct LDL Fasting  - atorvastatin (LIPITOR) 40 MG tablet; Take 1 tablet (40 mg) by mouth daily    Hypothyroidism, unspecified type   clinically euthyroid  - TSH with free T4 reflex; Future  - TSH with free T4 reflex  - levothyroxine (SYNTHROID/LEVOTHROID) 112 MCG tablet; Take 1 tablet (112 mcg) by mouth daily    Positive FIT (fecal immunochemical test)   strongly recommend colonoscopy  Needs to find a   - Adult GI  Referral - Procedure Only; Future    Gouty arthropathy     - allopurinol (ZYLOPRIM) 100 MG tablet; Take 1 tablet (100 mg) by mouth daily    Essential hypertension with goal blood pressure less than 140/90  Well controlled   - amLODIPine (NORVASC) 10 MG tablet; Take 1 tablet (10 mg) by mouth daily  - atenolol (TENORMIN) 50 MG tablet; TAKE ONE AND ONE-HALF TABLETS BY MOUTH EVERY DAY  - lisinopril-hydrochlorothiazide (ZESTORETIC) 20-25 MG tablet; Take 1 tablet by mouth daily    Anxiety   stable  - hydrOXYzine (ATARAX) 10 MG tablet; Atarax 10 mg tab po take 1 tab as needed for  "anxiety.    Polyneuropathy associated with underlying disease (H)  Due to diabetes    COVID-19 vaccine dose declined                  BMI:   Estimated body mass index is 30 kg/m  as calculated from the following:    Height as of this encounter: 1.803 m (5' 11\").    Weight as of this encounter: 97.6 kg (215 lb 2 oz).           Return in about 6 months (around 1/15/2023) for diabetes recheck.    Marlen Ma MD  [facility]    Yovany Calderon is a 72 year old, presenting for the following health issues:  Diabetes      HPI     Diabetes Follow-up  Insulin - Novolog  How often are you checking your blood sugar? A few times a month  What time of day are you checking your blood sugars (select all that apply)?  Before and after meals  Have you had any blood sugars above 200?  No  Have you had any blood sugars below 70?  No    What symptoms do you notice when your blood sugar is low?  Shaky, Dizzy and Weak    What concerns do you have today about your diabetes? None     Do you have any of these symptoms? (Select all that apply)  No numbness or tingling in feet.  No redness, sores or blisters on feet.  No complaints of excessive thirst.  No reports of blurry vision.  No significant changes to weight.        Hyperlipidemia Follow-Up  Atorvastatin 40mg qd    Are you regularly taking any medication or supplement to lower your cholesterol?   Yes- statin    Are you having muscle aches or other side effects that you think could be caused by your cholesterol lowering medication?  No    Hypertension Follow-up  Amlodipine 10mg every day, atenolol 25mg every day, lisinopril-hydrochlorothiazide 20-25mg qd    Do you check your blood pressure regularly outside of the clinic? No     Are you following a low salt diet? Yes    Are your blood pressures ever more than 140 on the top number (systolic) OR more   than 90 on the bottom number (diastolic), for example 140/90? N/A    BP Readings from Last 2 " Encounters:   07/15/22 130/70   07/28/21 134/76     Hemoglobin A1C POCT (%)   Date Value   11/04/2020 5.4   03/04/2020 5.8 (H)     Hemoglobin A1C (%)   Date Value   07/28/2021 5.4     LDL Cholesterol Calculated (mg/dL)   Date Value   07/28/2021 30   03/04/2020 42   05/06/2019 71       Anxiety Follow-Up  Atarax 10mg every day prn    How are you doing with your anxiety since your last visit? No change    Are you having other symptoms that might be associated with anxiety? No    Have you had a significant life event? No     Are you feeling depressed? No    Do you have any concerns with your use of alcohol or other drugs? No    Social History     Tobacco Use     Smoking status: Never Smoker     Smokeless tobacco: Never Used   Vaping Use     Vaping Use: Never used   Substance Use Topics     Alcohol use: Yes     Comment: occas     Drug use: No     TENZIN-7 SCORE 12/5/2016 4/27/2017 3/4/2020   Total Score - - -   Total Score 5 0 4     PHQ 11/5/2018 7/8/2019 3/4/2020   PHQ-9 Total Score 0 0 1   Q9: Thoughts of better off dead/self-harm past 2 weeks Not at all Not at all Not at all     Last PHQ-9 7/15/2022   1.  Little interest or pleasure in doing things 0   2.  Feeling down, depressed, or hopeless 0   3.  Trouble falling or staying asleep, or sleeping too much 0   4.  Feeling tired or having little energy 1   5.  Poor appetite or overeating 0   6.  Feeling bad about yourself 0   7.  Trouble concentrating 1   8.  Moving slowly or restless 0   Q9: Thoughts of better off dead/self-harm past 2 weeks 0   PHQ-9 Total Score 2   Difficulty at work, home, or with people -       Hypothyroidism Follow-up  Levothyroxine 112mcg qd    Since last visit, patient describes the following symptoms: fatigue          Review of Systems   Constitutional, HEENT, cardiovascular, pulmonary, GI, , musculoskeletal, neuro, skin, endocrine and psych systems are negative, except as otherwise noted.      Objective    /70   Pulse 73   Temp 99  F  "(37.2  C) (Tympanic)   Resp 18   Ht 1.803 m (5' 11\")   Wt 97.6 kg (215 lb 2 oz)   SpO2 96%   BMI 30.00 kg/m    Body mass index is 30 kg/m .  Physical Exam   GENERAL: healthy, alert and no distress  NECK: no adenopathy, no asymmetry, masses, or scars and thyroid normal to palpation  RESP: lungs clear to auscultation - no rales, rhonchi or wheezes  CV: regular rate and rhythm, normal S1 S2, no S3 or S4, no murmur, click or rub, no peripheral edema and peripheral pulses strong  ABDOMEN: soft, nontender, no hepatosplenomegaly, no masses and bowel sounds normal  MS: no gross musculoskeletal defects noted, no edema    Diabetic Foot Screen:  Any complaints of increased pain or numbness ?  YES constant in both feet  Is there a foot ulcer now or a history of foot ulcer? No  Does the foot have an abnormal shape?  YES bunion on left  Are the nails thick, too long or ingrown?  YES all nails are thickened  Are there any redness or open areas? No         Sensation Testing done at all points on the diagram with monofilament     Right Foot: Sensation Absent at the following points , 1, 2 and 3  Left Foot: Sensation Absent at the following points , 1, 2 and 3     Risk Category: 2- Loss of protective sensation with weakness, deformity, pre-ulcer or callous but no ulceration  Performed by Marlen Ma MD                  Results for orders placed or performed in visit on 07/15/22 (from the past 24 hour(s))   Hemoglobin A1c   Result Value Ref Range    Hemoglobin A1C 6.4 (H) 0.0 - 5.6 %                   .  ..    "

## 2022-07-15 NOTE — PATIENT INSTRUCTIONS
You need to see me every 6 months for diabetes check up.    Because of the positive FIT test (blood in your stool), I strongly recommend a colonoscopy - someone should call you to schedule this.            Thank you for choosing [facility].      Our Clinic hours are:  Mondays    7:20 am - 7 pm  Tues -  Fri  7:20 am - 5 pm      The clinic lab opens at 7:30 am Mon - Fri and appointments are required.    [facility]  Ph. [phone]  Monday-Thursday 8 am - 7pm  Tues/Wed/Fri 8 am - 5:30 pm

## 2022-07-15 NOTE — LETTER
July 18, 2022      Kvng Velasquez  [address] [address] [address]        Dear ,    We are writing to inform you of your test results.    normal/acceptable results    Resulted Orders   Comprehensive metabolic panel   Result Value Ref Range    Sodium 132 (L) 133 - 144 mmol/L    Potassium 4.1 3.4 - 5.3 mmol/L    Chloride 100 94 - 109 mmol/L    Carbon Dioxide (CO2) 24 20 - 32 mmol/L    Anion Gap 8 3 - 14 mmol/L    Urea Nitrogen 33 (H) 7 - 30 mg/dL    Creatinine 0.97 0.66 - 1.25 mg/dL    Calcium 8.6 8.5 - 10.1 mg/dL    Glucose 185 (H) 70 - 99 mg/dL    Alkaline Phosphatase 70 40 - 150 U/L    AST 25 0 - 45 U/L    ALT 25 0 - 70 U/L    Protein Total 8.6 6.8 - 8.8 g/dL    Albumin 3.2 (L) 3.4 - 5.0 g/dL    Bilirubin Total 0.6 0.2 - 1.3 mg/dL    GFR Estimate 82 >60 mL/min/1.73m2      Comment:      Effective December 21, 2021 eGFRcr in adults is calculated using the 2021 CKD-EPI creatinine equation which includes age and gender (Michael et al., NEJM, DOI: 10.1056/FKJAtc7938590)   Lipid panel reflex to direct LDL Fasting   Result Value Ref Range    Cholesterol 136 <200 mg/dL    Triglycerides 232 (H) <150 mg/dL    Direct Measure HDL 45 >=40 mg/dL    LDL Cholesterol Calculated 45 <=100 mg/dL    Non HDL Cholesterol 91 <130 mg/dL    Patient Fasting > 8hrs? No     Narrative    Cholesterol  Desirable:  <200 mg/dL    Triglycerides  Normal:  Less than 150 mg/dL  Borderline High:  150-199 mg/dL  High:  200-499 mg/dL  Very High:  Greater than or equal to 500 mg/dL    Direct Measure HDL  Female:  Greater than or equal to 50 mg/dL   Male:  Greater than or equal to 40 mg/dL    LDL Cholesterol  Desirable:  <100mg/dL  Above Desirable:  100-129 mg/dL   Borderline High:  130-159 mg/dL   High:  160-189 mg/dL   Very High:  >= 190 mg/dL    Non HDL Cholesterol  Desirable:  130 mg/dL  Above Desirable:  130-159 mg/dL  Borderline High:  160-189 mg/dL  High:  190-219 mg/dL  Very High:  Greater than or equal to 220 mg/dL    Hemoglobin A1c   Result Value Ref Range    Hemoglobin A1C 6.4 (H) 0.0 - 5.6 %      Comment:      Normal <5.7%   Prediabetes 5.7-6.4%    Diabetes 6.5% or higher     Note: Adopted from ADA consensus guidelines.   TSH with free T4 reflex   Result Value Ref Range    TSH 1.61 0.40 - 4.00 mU/L   Albumin Random Urine Quantitative with Creat Ratio   Result Value Ref Range    Creatinine Urine mg/dL 214 mg/dL    Albumin Urine mg/L 10 mg/L    Albumin Urine mg/g Cr 4.67 0.00 - 17.00 mg/g Cr       If you have any questions or concerns, please call the clinic at the number listed above.       Sincerely,      Marlen Ma MD

## 2022-10-05 ENCOUNTER — TELEPHONE (OUTPATIENT)
Dept: FAMILY MEDICINE | Facility: CLINIC | Age: 72
End: 2022-10-05

## 2022-10-05 NOTE — TELEPHONE ENCOUNTER
CARLAI~ Oct 5, 2022 I spoke with Kvng, he is in need of financial assistance for medication.    We reviewed the Prescription Assistance Program for manfacturer assistance programs, gross income, No insurance and Rx list.    Kvng is over income for the Pharmacy Assistance Fund $500.     assistance applications will be completed for Synthroid, Novolog 70/30 pen & pen needles.    When approved, Kvng will receive this medication at no cost for 1 year.    Shreya Nelson  Prescription Assistance Supervisor  Pharmacy Assistance  [phone]

## 2023-01-19 ENCOUNTER — TELEPHONE (OUTPATIENT)
Dept: FAMILY MEDICINE | Facility: CLINIC | Age: 73
End: 2023-01-19
Payer: COMMERCIAL

## 2023-01-26 ENCOUNTER — TELEPHONE (OUTPATIENT)
Dept: FAMILY MEDICINE | Facility: CLINIC | Age: 73
End: 2023-01-26
Payer: COMMERCIAL

## 2023-01-26 NOTE — TELEPHONE ENCOUNTER
FYI~ I have approved Kvng for up to $500 of the Pharmacy Assistance Fund to be filled at the [facility].    Kvng and the Pharmacy have been informed.    Shreya Nelson  Prescription Assistance Supervisor  Pharmacy Assistance  [phone]

## 2023-02-02 ENCOUNTER — TELEPHONE (OUTPATIENT)
Dept: FAMILY MEDICINE | Facility: CLINIC | Age: 73
End: 2023-02-02
Payer: COMMERCIAL

## 2023-02-02 NOTE — LETTER
[facility]  [address]  [facility] [address]  [phone]  February 2, 2023    Kvng Velasquez  [address] [address] [address]    Dear Kvng,    You are due to schedule your annual wellness visit. Please come to your appointment fasting for labs, this means nothing to eat or drink for 8-10 hours prior to the appointment, you can have water and medications. You are also due to update some vaccines, flu, Covid19 and shingles vaccine. Please contact your insurance company prior to receiving the shingles vaccine to ensure this is covered. Feel free to call [phone] to schedule an appointment to get these updated!      Thank you for trusting [facility] and we appreciate the opportunity to serve you.  We look forward to supporting your healthcare needs in the future.    Healthy Regards,      Your Health Care Team  [facility]

## 2023-02-02 NOTE — TELEPHONE ENCOUNTER
Patient Quality Outreach    Patient is due for the following:   Colonoscopy    Next Steps:   - Schedule annual wellness visit with fasting labs.  - Vaccine update  - Complete colonoscopy  - Will complete PHQ and and give colonoscopy information at next visit    Type of outreach:    Sent letter.      Questions for provider review:    None     Temi Morrow, [facility]

## 2023-02-03 DIAGNOSIS — F41.9 ANXIETY: ICD-10-CM

## 2023-02-03 RX ORDER — HYDROXYZINE HYDROCHLORIDE 10 MG/1
TABLET, FILM COATED ORAL
Qty: 90 TABLET | Refills: 1 | Status: SHIPPED | OUTPATIENT
Start: 2023-02-03 | End: 2023-08-21

## 2023-03-29 ENCOUNTER — TELEPHONE (OUTPATIENT)
Dept: FAMILY MEDICINE | Facility: CLINIC | Age: 73
End: 2023-03-29
Payer: COMMERCIAL

## 2023-03-29 NOTE — TELEPHONE ENCOUNTER
Forms rec'd via fax from [employer] for Rx assistance.  Sent forms to Shreya Nelson @ [facility] to process forms please.

## 2023-05-03 ENCOUNTER — TELEPHONE (OUTPATIENT)
Dept: FAMILY MEDICINE | Facility: CLINIC | Age: 73
End: 2023-05-03
Payer: COMMERCIAL

## 2023-05-03 NOTE — TELEPHONE ENCOUNTER
Patient Quality Outreach    Patient is due for the following:   Colonoscopy    Next Steps:   - Schedule annual wellness visit/med check/labs  - Schedule colon screen  - Vaccine update  - PHQ9    Type of outreach:    Sent letter.      Questions for provider review:    None     Temi Morrow, CMA

## 2023-05-03 NOTE — LETTER
May 3, 2023      Kvng Velasquez  [address] [address] [address]        Dear Kvng,     You are due to schedule your annual wellness visit (physical) with non fasting labs. You are also due to update some vaccines, Covid19 booster, shingles and flu shot. Please contact your insurance company prior to receiving the shingles vaccine to ensure this is covered. Feel free to call [phone] to schedule an appointment to get these updated!    I will also include some information on scheduling your colon screen:    -- Colon screen. Colonoscopy or Cologuard test (take home test). One of these tests is recommended at age 50 to screen for colon cancer.  Please call one of the following numbers to schedule a colonoscopy:  [facility] [phone]  [facility] [phone]  [facility] [phone]  [facility] [phone] (multiple sites, call for locations)  OR....  If you prefer to do a screening that is LESS INVASIVE AND LESS EXPENSIVE there is an test for you! It is called the Cologuard test. It is a screening test that is done every 3 years and can be DONE AT HOME! Do the test at home and mail it in (you don't even have to pay for postage). If you are willing to do this test, we can order the kit for you. Please call us at [phone] if you need an order for a Colonoscopy or Cologuard test.      Sincerely,        Marlen Ma MD

## 2023-06-14 ENCOUNTER — OFFICE VISIT (OUTPATIENT)
Dept: FAMILY MEDICINE | Facility: CLINIC | Age: 73
End: 2023-06-14
Payer: COMMERCIAL

## 2023-06-14 ENCOUNTER — LAB (OUTPATIENT)
Dept: FAMILY MEDICINE | Facility: CLINIC | Age: 73
End: 2023-06-14

## 2023-06-14 ENCOUNTER — LAB (OUTPATIENT)
Dept: LAB | Facility: CLINIC | Age: 73
End: 2023-06-14
Payer: COMMERCIAL

## 2023-06-14 VITALS
RESPIRATION RATE: 20 BRPM | HEART RATE: 88 BPM | HEIGHT: 71 IN | OXYGEN SATURATION: 94 % | SYSTOLIC BLOOD PRESSURE: 118 MMHG | WEIGHT: 220.25 LBS | DIASTOLIC BLOOD PRESSURE: 64 MMHG | BODY MASS INDEX: 30.83 KG/M2 | TEMPERATURE: 98.6 F

## 2023-06-14 DIAGNOSIS — R19.5 POSITIVE FIT (FECAL IMMUNOCHEMICAL TEST): ICD-10-CM

## 2023-06-14 DIAGNOSIS — M20.41 HAMMERTOES OF BOTH FEET: ICD-10-CM

## 2023-06-14 DIAGNOSIS — Z79.4 TYPE 2 DIABETES MELLITUS WITH DIABETIC POLYNEUROPATHY, WITH LONG-TERM CURRENT USE OF INSULIN (H): ICD-10-CM

## 2023-06-14 DIAGNOSIS — M10.9 GOUTY ARTHROPATHY: ICD-10-CM

## 2023-06-14 DIAGNOSIS — Z79.4 TYPE 2 DIABETES MELLITUS WITH DIABETIC POLYNEUROPATHY, WITH LONG-TERM CURRENT USE OF INSULIN (H): Primary | ICD-10-CM

## 2023-06-14 DIAGNOSIS — E11.42 DM TYPE 2 WITH DIABETIC PERIPHERAL NEUROPATHY (H): ICD-10-CM

## 2023-06-14 DIAGNOSIS — G63 POLYNEUROPATHY ASSOCIATED WITH UNDERLYING DISEASE (H): ICD-10-CM

## 2023-06-14 DIAGNOSIS — Z12.11 SCREEN FOR COLON CANCER: ICD-10-CM

## 2023-06-14 DIAGNOSIS — E11.42 TYPE 2 DIABETES MELLITUS WITH DIABETIC POLYNEUROPATHY, WITH LONG-TERM CURRENT USE OF INSULIN (H): ICD-10-CM

## 2023-06-14 DIAGNOSIS — E03.9 ACQUIRED HYPOTHYROIDISM: ICD-10-CM

## 2023-06-14 DIAGNOSIS — L29.9 PRURITIC DISORDER: ICD-10-CM

## 2023-06-14 DIAGNOSIS — E11.42 TYPE 2 DIABETES MELLITUS WITH DIABETIC POLYNEUROPATHY, WITH LONG-TERM CURRENT USE OF INSULIN (H): Primary | ICD-10-CM

## 2023-06-14 DIAGNOSIS — F10.29 ALCOHOL DEPENDENCE WITH UNSPECIFIED ALCOHOL-INDUCED DISORDER (H): ICD-10-CM

## 2023-06-14 DIAGNOSIS — M20.42 HAMMERTOES OF BOTH FEET: ICD-10-CM

## 2023-06-14 DIAGNOSIS — E78.5 HYPERLIPIDEMIA LDL GOAL <100: ICD-10-CM

## 2023-06-14 DIAGNOSIS — E11.9 TYPE 2 DIABETES MELLITUS WITHOUT COMPLICATION (H): Primary | ICD-10-CM

## 2023-06-14 DIAGNOSIS — I10 ESSENTIAL HYPERTENSION WITH GOAL BLOOD PRESSURE LESS THAN 140/90: Chronic | ICD-10-CM

## 2023-06-14 LAB
ALBUMIN SERPL BCG-MCNC: 3.9 G/DL (ref 3.5–5.2)
ALP SERPL-CCNC: 82 U/L (ref 40–129)
ALT SERPL W P-5'-P-CCNC: 16 U/L (ref 0–70)
ANION GAP SERPL CALCULATED.3IONS-SCNC: 13 MMOL/L (ref 7–15)
AST SERPL W P-5'-P-CCNC: 23 U/L (ref 0–45)
BILIRUB SERPL-MCNC: 0.5 MG/DL
BUN SERPL-MCNC: 14.4 MG/DL (ref 8–23)
CALCIUM SERPL-MCNC: 9.3 MG/DL (ref 8.8–10.2)
CHLORIDE SERPL-SCNC: 101 MMOL/L (ref 98–107)
CHOLEST SERPL-MCNC: 98 MG/DL
CREAT SERPL-MCNC: 0.92 MG/DL (ref 0.67–1.17)
CREAT UR-MCNC: 55.3 MG/DL
DEPRECATED HCO3 PLAS-SCNC: 25 MMOL/L (ref 22–29)
GFR SERPL CREATININE-BSD FRML MDRD: 87 ML/MIN/1.73M2
GLUCOSE SERPL-MCNC: 104 MG/DL (ref 70–99)
HBA1C MFR BLD: 6.2 % (ref 0–5.6)
HDLC SERPL-MCNC: 42 MG/DL
HOLD SPECIMEN: NORMAL
HOLD SPECIMEN: NORMAL
LDLC SERPL CALC-MCNC: 31 MG/DL
MICROALBUMIN UR-MCNC: <12 MG/L
MICROALBUMIN/CREAT UR: NORMAL MG/G{CREAT}
NONHDLC SERPL-MCNC: 56 MG/DL
POTASSIUM SERPL-SCNC: 3.8 MMOL/L (ref 3.4–5.3)
PROT SERPL-MCNC: 8.1 G/DL (ref 6.4–8.3)
SODIUM SERPL-SCNC: 139 MMOL/L (ref 136–145)
TRIGL SERPL-MCNC: 125 MG/DL
TSH SERPL DL<=0.005 MIU/L-ACNC: 1.75 UIU/ML (ref 0.3–4.2)
URATE SERPL-MCNC: 6.2 MG/DL (ref 3.4–7)

## 2023-06-14 PROCEDURE — 82570 ASSAY OF URINE CREATININE: CPT

## 2023-06-14 PROCEDURE — 83036 HEMOGLOBIN GLYCOSYLATED A1C: CPT

## 2023-06-14 PROCEDURE — 99207 PR FOOT EXAM NO CHARGE: CPT | Performed by: FAMILY MEDICINE

## 2023-06-14 PROCEDURE — 36415 COLL VENOUS BLD VENIPUNCTURE: CPT

## 2023-06-14 PROCEDURE — 84550 ASSAY OF BLOOD/URIC ACID: CPT

## 2023-06-14 PROCEDURE — 82043 UR ALBUMIN QUANTITATIVE: CPT

## 2023-06-14 PROCEDURE — 80061 LIPID PANEL: CPT

## 2023-06-14 PROCEDURE — 80053 COMPREHEN METABOLIC PANEL: CPT

## 2023-06-14 PROCEDURE — 99214 OFFICE O/P EST MOD 30 MIN: CPT | Performed by: FAMILY MEDICINE

## 2023-06-14 PROCEDURE — 84443 ASSAY THYROID STIM HORMONE: CPT

## 2023-06-14 RX ORDER — AMLODIPINE BESYLATE 10 MG/1
10 TABLET ORAL DAILY
Qty: 90 TABLET | Refills: 3 | Status: SHIPPED | OUTPATIENT
Start: 2023-06-14 | End: 2024-07-03

## 2023-06-14 RX ORDER — ATORVASTATIN CALCIUM 40 MG/1
40 TABLET, FILM COATED ORAL DAILY
Qty: 90 TABLET | Refills: 3 | Status: SHIPPED | OUTPATIENT
Start: 2023-06-14 | End: 2024-07-03

## 2023-06-14 RX ORDER — ATENOLOL 50 MG/1
TABLET ORAL
Qty: 135 TABLET | Refills: 3 | Status: SHIPPED | OUTPATIENT
Start: 2023-06-14 | End: 2024-07-03

## 2023-06-14 RX ORDER — LISINOPRIL AND HYDROCHLOROTHIAZIDE 20; 25 MG/1; MG/1
1 TABLET ORAL DAILY
Qty: 90 TABLET | Refills: 3 | Status: SHIPPED | OUTPATIENT
Start: 2023-06-14 | End: 2024-07-03

## 2023-06-14 RX ORDER — ALLOPURINOL 100 MG/1
100 TABLET ORAL DAILY
Qty: 90 TABLET | Refills: 3 | Status: SHIPPED | OUTPATIENT
Start: 2023-06-14 | End: 2024-06-24

## 2023-06-14 RX ORDER — LEVOTHYROXINE SODIUM 112 UG/1
112 TABLET ORAL DAILY
Qty: 90 TABLET | Refills: 3 | Status: SHIPPED | OUTPATIENT
Start: 2023-06-14

## 2023-06-14 RX ORDER — INSULIN ASPART 100 [IU]/ML
INJECTION, SUSPENSION SUBCUTANEOUS
Qty: 150 ML | Refills: 1 | COMMUNITY
Start: 2023-06-14 | End: 2024-07-03

## 2023-06-14 RX ORDER — TRIAMCINOLONE ACETONIDE 1 MG/G
CREAM TOPICAL
Qty: 30 G | Refills: 1 | Status: SHIPPED | OUTPATIENT
Start: 2023-06-14

## 2023-06-14 ASSESSMENT — ANXIETY QUESTIONNAIRES
GAD7 TOTAL SCORE: 1
7. FEELING AFRAID AS IF SOMETHING AWFUL MIGHT HAPPEN: NOT AT ALL
5. BEING SO RESTLESS THAT IT IS HARD TO SIT STILL: NOT AT ALL
3. WORRYING TOO MUCH ABOUT DIFFERENT THINGS: NOT AT ALL
6. BECOMING EASILY ANNOYED OR IRRITABLE: NOT AT ALL
2. NOT BEING ABLE TO STOP OR CONTROL WORRYING: NOT AT ALL
1. FEELING NERVOUS, ANXIOUS, OR ON EDGE: SEVERAL DAYS
GAD7 TOTAL SCORE: 1

## 2023-06-14 ASSESSMENT — PATIENT HEALTH QUESTIONNAIRE - PHQ9
SUM OF ALL RESPONSES TO PHQ QUESTIONS 1-9: 0
5. POOR APPETITE OR OVEREATING: NOT AT ALL

## 2023-06-14 ASSESSMENT — PAIN SCALES - GENERAL: PAINLEVEL: NO PAIN (0)

## 2023-06-14 NOTE — PROGRESS NOTES
Assessment & Plan     Type 2 diabetes mellitus with diabetic polyneuropathy, with long-term current use of insulin (H)  Well controlled - does have some low blood sugars. Will drop his insulin down to 50 units twice daily       - HEMOGLOBIN A1C; Future  - Lipid panel reflex to direct LDL Fasting; Future  - Comprehensive metabolic panel (BMP + Alb, Alk Phos, ALT, AST, Total. Bili, TP); Future  - Albumin Random Urine Quantitative with Creat Ratio; Future  - insulin aspart prot & aspart (NOVOLOG MIX 70/30 FLEXPEN) (70-30) 100 UNIT/ML pen; 50 units with breakfast and 50 u before dinner, max 110u daily allow for prime    Essential hypertension with goal blood pressure less than 140/90  Well controlled  - amLODIPine (NORVASC) 10 MG tablet; Take 1 tablet (10 mg) by mouth daily  - atenolol (TENORMIN) 50 MG tablet; TAKE ONE AND ONE-HALF TABLETS BY MOUTH EVERY DAY  - lisinopril-hydrochlorothiazide (ZESTORETIC) 20-25 MG tablet; Take 1 tablet by mouth daily    Hyperlipidemia LDL goal <100     - atorvastatin (LIPITOR) 40 MG tablet; Take 1 tablet (40 mg) by mouth daily    Acquired hypothyroidism  Clinically euthyroid  - TSH with free T4 reflex; Future  - levothyroxine (SYNTHROID/LEVOTHROID) 112 MCG tablet; Take 1 tablet (112 mcg) by mouth daily    Gouty arthropathy     - Uric acid; Future  - allopurinol (ZYLOPRIM) 100 MG tablet; Take 1 tablet (100 mg) by mouth daily    Positive FIT (fecal immunochemical test)   positive 2021 - he has been resistant to follow up.  Today he is only willing to consider cologuard.  Discussed that if this is positive he would need to have a colonoscopy    Screen for colon cancer  As above  - COLOGUARD([employer]); Future    Polyneuropathy associated with underlying disease (H)  Mild neuropathy - monofilament exam is normal.     Alcohol dependence with unspecified alcohol-induced disorder (H)   stable  Occasional use    Pruritic disorder  Lower legs bilaterally  - triamcinolone (KENALOG) 0.1 %  "external cream; APPLY TOPICALLY TO AFFECTED AREA(S) TWO TIMES A DAY    Hammertoes of both feet  Declined referral to podiatry               BMI:   Estimated body mass index is 30.72 kg/m  as calculated from the following:    Height as of this encounter: 1.803 m (5' 11\").    Weight as of this encounter: 99.9 kg (220 lb 4 oz).           Marlen Ma MD  [facility]    Yovany Calderon is a 73 year old, presenting for the following health issues:  Diabetes      HPI     Diabetes Follow-up  Insulin - Novolog    How often are you checking your blood sugar? Not at all    What concerns do you have today about your diabetes? None     Do you have any of these symptoms? (Select all that apply)  No numbness or tingling in feet.  No redness, sores or blisters on feet.  No complaints of excessive thirst.  No reports of blurry vision.  No significant changes to weight.    Have you had a diabetic eye exam in the last 12 months? No          Hyperlipidemia Follow-Up  Atorvastatin 40mg qd    Are you regularly taking any medication or supplement to lower your cholesterol?   Yes- statin    Are you having muscle aches or other side effects that you think could be caused by your cholesterol lowering medication?  No    Hypertension Follow-up  Amlodipine 10mg every day, atenolol 75mg every day, lisinopril-hydrochlorothiazide 20-25mg qd    Do you check your blood pressure regularly outside of the clinic? No     Are you following a low salt diet? No    Are your blood pressures ever more than 140 on the top number (systolic) OR more   than 90 on the bottom number (diastolic), for example 140/90? N/A    BP Readings from Last 2 Encounters:   06/14/23 118/64   07/15/22 130/70     Hemoglobin A1C (%)   Date Value   07/15/2022 6.4 (H)   07/28/2021 5.4   11/04/2020 5.4   03/04/2020 5.8 (H)     LDL Cholesterol Calculated (mg/dL)   Date Value   07/15/2022 45   07/28/2021 30   03/04/2020 42   05/06/2019 71 "       Depression Followup  Atarax 10mg every day prn    How are you doing with your depression since your last visit? No change    Are you having other symptoms that might be associated with depression? No    Have you had a significant life event?  No     Are you feeling anxious or having panic attacks?   No    Do you have any concerns with your use of alcohol or other drugs? No    Social History     Tobacco Use     Smoking status: Never     Smokeless tobacco: Never   Vaping Use     Vaping status: Never Used   Substance Use Topics     Alcohol use: Yes     Comment: occas     Drug use: No         3/4/2020     8:41 AM 7/15/2022     8:39 AM 6/14/2023     7:03 AM   PHQ   PHQ-9 Total Score 1 2 0   Q9: Thoughts of better off dead/self-harm past 2 weeks Not at all Not at all Not at all         4/27/2017     9:29 AM 3/4/2020     8:41 AM 6/14/2023     7:04 AM   TENZIN-7 SCORE   Total Score 0 4 1         6/14/2023     7:03 AM   Last PHQ-9   1.  Little interest or pleasure in doing things 0   2.  Feeling down, depressed, or hopeless 0   3.  Trouble falling or staying asleep, or sleeping too much 0   4.  Feeling tired or having little energy 0   5.  Poor appetite or overeating 0   6.  Feeling bad about yourself 0   7.  Trouble concentrating 0   8.  Moving slowly or restless 0   Q9: Thoughts of better off dead/self-harm past 2 weeks 0   PHQ-9 Total Score 0         6/14/2023     7:04 AM   TENZIN-7    1. Feeling nervous, anxious, or on edge 1   2. Not being able to stop or control worrying 0   3. Worrying too much about different things 0   4. Trouble relaxing 0   5. Being so restless that it is hard to sit still 0   6. Becoming easily annoyed or irritable 0   7. Feeling afraid, as if something awful might happen 0   TENZIN-7 Total Score 1       Suicide Assessment Five-step Evaluation and Treatment (SAFE-T)    Hypothyroidism Follow-up  Levothyroxine 112mcg qd    Since last visit, patient describes the following symptoms: Weight stable, no  "hair loss, no skin changes, no constipation, no loose stools    TSH   Date Value Ref Range Status   07/15/2022 1.61 0.40 - 4.00 mU/L Final   03/04/2020 1.90 0.40 - 4.00 mU/L Final     T4 Free   Date Value Ref Range Status   06/19/2017 0.90 0.76 - 1.46 ng/dL Final             Review of Systems   Constitutional, HEENT, cardiovascular, pulmonary, gi and gu systems are negative, except as otherwise noted.      Objective    /64   Pulse 88   Temp 98.6  F (37  C) (Tympanic)   Resp 20   Ht 1.803 m (5' 11\")   Wt 99.9 kg (220 lb 4 oz)   SpO2 94%   BMI 30.72 kg/m    Body mass index is 30.72 kg/m .  Physical Exam   GENERAL: healthy, alert and no distress  NECK: no adenopathy, no asymmetry, masses, or scars and thyroid normal to palpation  RESP: lungs clear to auscultation - no rales, rhonchi or wheezes  CV: regular rate and rhythm, normal S1 S2, no S3 or S4, no murmur, click or rub, no peripheral edema and peripheral pulses strong  ABDOMEN: soft, nontender, no hepatosplenomegaly, no masses and bowel sounds normal  MS: no gross musculoskeletal defects noted, no edema  SKIN: venous stasis dermatitis of both distal legs, no cellulitis    Results for orders placed or performed in visit on 06/14/23   HEMOGLOBIN A1C     Status: Abnormal   Result Value Ref Range    Hemoglobin A1C 6.2 (H) 0.0 - 5.6 %         Diabetic Foot Screen:  Any complaints of increased pain or numbness ?  YES tingling at times  Is there a foot ulcer now or a history of foot ulcer? No  Does the foot have an abnormal shape?  YES bilateral hammertoes  Are the nails thick, too long or ingrown?  YES thickened and long  Are there any redness or open areas?  YES callous of 2nd toe on the left         Sensation Testing done at all points on the diagram with monofilament     Right Foot: Sensation Normal at all points  Left Foot: Sensation Normal at all points     Risk Category: 0- No loss of protective sensation  Performed by Marlen Ma MD          "

## 2023-06-14 NOTE — PATIENT INSTRUCTIONS
"You are due for a diabetic eye exam.  Please schedule that and have results sent to me.    Decrease insulin to 50  units twice a day          Thank you for choosing [facility].      When you are out of refills or the refills say \"zero\", it is time to schedule your next appointment in clinic!    Labs are released to you almost immediately and sometimes before I have had a chance to review them.  I review labs regularly and once they are all in, you will be sent a letter with your results and/or if you are signed up for on-line services, you will be e-mailed the results with my interpretation. If there are serious findings, you typically will be called.    If you have any questions about your visit, your symptoms, your medication, your test results or it is not clear what your diagnosis or treatment plan is please contact me (via [employer]) or call the care team at [phone] option #2      Our Clinic hours are:  Mondays    7:20 am - 7 pm  Tues -  Fri  7:20 am - 5 pm      The clinic lab opens at 7:30 am Mon - Fri and appointments are required.    [facility] [place]  Ph. [phone]  Monday-Thursday 8 am - 7pm  Tues/Wed/Fri 8 am - 5:30 pm       "

## 2023-06-14 NOTE — LETTER
Michelle 15, 2023      Kvng Velasquez  [address] [address] [address]        Dear ,    We are writing to inform you of your test results.    Normal/acceptable results.     Resulted Orders   Extra Green Top Tube (LAB USE ONLY)   Result Value Ref Range    Hold Specimen JIC    Extra SST Tube (LAB USE ONLY)   Result Value Ref Range    Hold Specimen JIC    HEMOGLOBIN A1C   Result Value Ref Range    Hemoglobin A1C 6.2 (H) 0.0 - 5.6 %      Comment:      Normal <5.7%   Prediabetes 5.7-6.4%    Diabetes 6.5% or higher     Note: Adopted from ADA consensus guidelines.   Lipid panel reflex to direct LDL Fasting   Result Value Ref Range    Cholesterol 98 <200 mg/dL    Triglycerides 125 <150 mg/dL    Direct Measure HDL 42 >=40 mg/dL    LDL Cholesterol Calculated 31 <=100 mg/dL    Non HDL Cholesterol 56 <130 mg/dL    Narrative    Cholesterol  Desirable:  <200 mg/dL    Triglycerides  Normal:  Less than 150 mg/dL  Borderline High:  150-199 mg/dL  High:  200-499 mg/dL  Very High:  Greater than or equal to 500 mg/dL    Direct Measure HDL  Female:  Greater than or equal to 50 mg/dL   Male:  Greater than or equal to 40 mg/dL    LDL Cholesterol  Desirable:  <100mg/dL  Above Desirable:  100-129 mg/dL   Borderline High:  130-159 mg/dL   High:  160-189 mg/dL   Very High:  >= 190 mg/dL    Non HDL Cholesterol  Desirable:  130 mg/dL  Above Desirable:  130-159 mg/dL  Borderline High:  160-189 mg/dL  High:  190-219 mg/dL  Very High:  Greater than or equal to 220 mg/dL   Comprehensive metabolic panel (BMP + Alb, Alk Phos, ALT, AST, Total. Bili, TP)   Result Value Ref Range    Sodium 139 136 - 145 mmol/L    Potassium 3.8 3.4 - 5.3 mmol/L    Chloride 101 98 - 107 mmol/L    Carbon Dioxide (CO2) 25 22 - 29 mmol/L    Anion Gap 13 7 - 15 mmol/L    Urea Nitrogen 14.4 8.0 - 23.0 mg/dL    Creatinine 0.92 0.67 - 1.17 mg/dL    Calcium 9.3 8.8 - 10.2 mg/dL    Glucose 104 (H) 70 - 99 mg/dL    Alkaline Phosphatase 82 40 - 129 U/L    AST  23 0 - 45 U/L      Comment:      Reference intervals for this test were updated on 6/12/2023 to more accurately reflect our healthy population. There may be differences in the flagging of prior results with similar values performed with this method. Interpretation of those prior results can be made in the context of the updated reference intervals.    ALT 16 0 - 70 U/L      Comment:      Reference intervals for this test were updated on 6/12/2023 to more accurately reflect our healthy population. There may be differences in the flagging of prior results with similar values performed with this method. Interpretation of those prior results can be made in the context of the updated reference intervals.      Protein Total 8.1 6.4 - 8.3 g/dL    Albumin 3.9 3.5 - 5.2 g/dL    Bilirubin Total 0.5 <=1.2 mg/dL    GFR Estimate 87 >60 mL/min/1.73m2      Comment:      eGFR calculated using 2021 CKD-EPI equation.   TSH with free T4 reflex   Result Value Ref Range    TSH 1.75 0.30 - 4.20 uIU/mL   Uric acid   Result Value Ref Range    Uric Acid 6.2 3.4 - 7.0 mg/dL   Albumin Random Urine Quantitative with Creat Ratio   Result Value Ref Range    Creatinine Urine mg/dL 55.3 mg/dL      Comment:      The reference ranges have not been established in urine creatinine. The results should be integrated into the clinical context for interpretation.    Albumin Urine mg/L <12.0 mg/L      Comment:      The reference ranges have not been established in urine albumin. The results should be integrated into the clinical context for interpretation.    Albumin Urine mg/g Cr        Comment:      Unable to calculate, urine albumin and/or urine creatinine is outside detectable limits.  Microalbuminuria is defined as an albumin:creatinine ratio of 17 to 299 for males and 25 to 299 for females. A ratio of albumin:creatinine of 300 or higher is indicative of overt proteinuria.  Due to biologic variability, positive results should be confirmed by a second,  first-morning random or 24-hour timed urine specimen. If there is discrepancy, a third specimen is recommended. When 2 out of 3 results are in the microalbuminuria range, this is evidence for incipient nephropathy and warrants increased efforts at glucose control, blood pressure control, and institution of therapy with an angiotensin-converting-enzyme (ACE) inhibitor (if the patient can tolerate it).         If you have any questions or concerns, please call the clinic at the number listed above.       Sincerely,      Marlen Ma MD

## 2023-07-20 NOTE — PROGRESS NOTES
SUBJECTIVE:   Kvng Velasquez is a 68 year old male who presents to clinic today for the following health issues:      Diabetes Follow-up    Patient is checking blood sugars: once daily.  Results are as follows:         am - 130    Diabetic concerns: None     Symptoms of hypoglycemia (low blood sugar): none     Paresthesias (numbness or burning in feet) or sores: Yes Numbness      Date of last diabetic eye exam: DUE    Hyperlipidemia Follow-Up      Rate your low fat/cholesterol diet?: good    Taking statin?  Yes, no muscle aches from statin    Other lipid medications/supplements?:  none    Hypertension Follow-up      Outpatient blood pressures are not being checked.    Low Salt Diet: no added salt    BP Readings from Last 2 Encounters:   04/26/18 129/77   04/11/18 130/86     Hemoglobin A1C (%)   Date Value   04/18/2018 5.6   01/06/2018 6.1 (H)     LDL Cholesterol Calculated (mg/dL)   Date Value   04/18/2018 83   06/19/2017 34       Amount of exercise or physical activity: 4-5 days/week for an average of 45-60 minutes    Problems taking medications regularly: No    Medication side effects: none    Diet: regular (no restrictions)      Rash  Onset: Started in January    Description:   Location: bilateral arms, back and abdomen. Some redness noticed on left lower leg that started before rash broke out. He noticed while he was in the hospital  Character: round, flakey, red  Itching (Pruritis): YES    Progression of Symptoms:  worsening and constant    Accompanying Signs & Symptoms:  Fever: no   Body aches or joint pain: no   Sore throat symptoms: no   Recent cold symptoms: no     History:   Previous similar rash: no     Precipitating factors:   Exposure to similar rash: no   New exposures: None   Recent travel: no     Alleviating factors:    Initially treated with keflex for suspected cellulitis.  A few weeks ago saw Dr. Delaney and he has him taking an antihistamine.  There has been no improvement. He is not using  Edel is seen today for a hearing evaluation as referred by RICHARD Escalera.  Edel reports that she has been experiencing difficulty hearing and has been wearing hearing aids for almost 2 years that she purchased at [employer]. 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 Marlen denies any ear pain, pressure, drainage or dizziness concerns. She states she will periodically get a steady tinnitus that only lasts a second or two, in either ear.    Otoscopic examination revealed clear canals, bilaterally.      Pure tone testing demonstrated normal hearing sensitivity sloping to a moderately severe sensorineural hearing loss, in both ears.      Speech reception thresholds were consistent with pure tone testing.     Word recognition scores were 84% in the right ear and 84% in the left.    The results of today's testing were reviewed with Edel. We discussed how her current hearing aids are not working for her, but she has tried to return to [facility] and they are not helping her. Marlen would like us to check her insurance to see if there is any coverage for hearing aids. She is hoping to come back in the future to get new technology through us, but it may be a year from now. She will schedule a 1 year hearing evaluation.   "lotion/moisturizer or topical steroid.  He now has a over the counter \"anti itch spray\" he's been using on his arms.      Therapies Tried and outcome: nystatin-triamcinolone cream and OTC anti itch cream. Went back to using Dove soap, Zyrtec      /77  Pulse 77  Temp 100.3  F (37.9  C) (Tympanic)  Resp 18  Ht 5' 11\" (1.803 m)  Wt 222 lb (100.7 kg)  BMI 30.96 kg/m2  EXAM: GENERAL APPEARANCE: Alert, no acute distress  HENT: Ears and TMs normal, oral mucosa and posterior oropharynx normal  RESP: lungs clear to auscultation   CV: normal rate, regular rhythm, no murmur or gallop  ABDOMEN: soft, no organomegaly, masses or tenderness  SKIN: rash present, type:linear streaks and patches, scaling, dry, excoriated, thickened, appearance: red, location: bilateral arms, lower abdomen, left leg.      Results for orders placed or performed in visit on 04/19/18   Fecal colorectal cancer screen (FIT)   Result Value Ref Range    Occult Blood Scn FIT Negative NEG^Negative     ASSESSMENT/PLAN:      ICD-10-CM    1. Essential hypertension with goal blood pressure less than 140/90 I10 amLODIPine (NORVASC) 10 MG tablet     atenolol (TENORMIN) 50 MG tablet     lisinopril-hydrochlorothiazide (PRINZIDE/ZESTORETIC) 20-25 MG per tablet   2. Pruritic erythematous rash L29.8 DERMATOLOGY REFERRAL     triamcinolone (KENALOG) 0.1 % cream   3. Type 2 diabetes mellitus without complication, with long-term current use of insulin (H) E11.9 glipiZIDE (GLUCOTROL) 5 MG tablet    Z79.4    4. Hypothyroidism, unspecified type E03.9 levothyroxine (SYNTHROID/LEVOTHROID) 112 MCG tablet     DISCONTINUED: levothyroxine (SYNTHROID/LEVOTHROID) 112 MCG tablet     Recent HgbA1c was low at 5.6%. Recommend lowering glipizide to 2.5 mg daily.   May need to decrease the 70/30, will reassess in 3 months. Patient denies hypoglycemia.     Blood pressure well controlled.    Recommend derm consult for the rash.    Patient Instructions   See Dermatologist    Use the " traimcinolone 0.1% cream three times daily.      Cut back on the glipizide to 2.5 mg daily - this will be 1/2 of a 5 mg pill.           Thank you for choosing [facility].  You may be receiving a survey in the mail from Amina Garcia regarding your visit today.  Please take a few minutes to complete and return the survey to let us know how we are doing.      Our Clinic hours are:  Mondays    7:20 am - 7 pm  Tues -  Fri  7:20 am - 5 pm    Clinic Phone: [phone]    The clinic lab opens at 7:30 am Mon - Fri and appointments are required.    [facility] [place]  Ph. [phone]  Monday-Thursday 8 am - 7pm  Tues/Wed/Fri 8 am - 5:30 pm

## 2023-08-21 DIAGNOSIS — F41.9 ANXIETY: ICD-10-CM

## 2023-08-21 RX ORDER — HYDROXYZINE HYDROCHLORIDE 10 MG/1
TABLET, FILM COATED ORAL
Qty: 90 TABLET | Refills: 1 | Status: SHIPPED | OUTPATIENT
Start: 2023-08-21 | End: 2024-02-16

## 2023-09-05 ENCOUNTER — TELEPHONE (OUTPATIENT)
Dept: FAMILY MEDICINE | Facility: CLINIC | Age: 73
End: 2023-09-05
Payer: COMMERCIAL

## 2023-09-05 NOTE — TELEPHONE ENCOUNTER
Sept. 5, 2023 I spoke with Kvng, it is nearing time for his re-enrollment with  assistance programs. His enrollment is earlier than most because he does not have prescription insurance.    We reviewed the Pharmacy Assistance Fund $500-he will be eligible in 2024, the Pres[facility] Assistance Program [facility] assistance programs, gross income, insurance and Rx list.     assistance applications will be completed for: Synthroid, Novolog 70/30 pens & pen needles.    When approved, Kvng will receive this medication at no cost through December 2024.    Shreya Nelson  Prescription Assistance Supervisor  Pharmacy Assistance

## 2023-09-19 ENCOUNTER — TRANSFERRED RECORDS (OUTPATIENT)
Dept: HEALTH INFORMATION MANAGEMENT | Facility: CLINIC | Age: 73
End: 2023-09-19
Payer: COMMERCIAL

## 2023-09-19 LAB — RETINOPATHY: NEGATIVE

## 2023-09-27 ENCOUNTER — MEDICAL CORRESPONDENCE (OUTPATIENT)
Dept: HEALTH INFORMATION MANAGEMENT | Facility: CLINIC | Age: 73
End: 2023-09-27
Payer: COMMERCIAL

## 2023-10-30 ENCOUNTER — TELEPHONE (OUTPATIENT)
Dept: FAMILY MEDICINE | Facility: CLINIC | Age: 73
End: 2023-10-30
Payer: COMMERCIAL

## 2023-10-30 NOTE — TELEPHONE ENCOUNTER
Kvng's NovoLog 70/30 pens application has been submitted to the [employer] prescription assistance program.     We will note EPIC when [employer] has made their decision.     Thank you!    Aisha Dang   Prescription Assistance Assistant

## 2023-11-22 ENCOUNTER — TELEPHONE (OUTPATIENT)
Dept: FAMILY MEDICINE | Facility: CLINIC | Age: 73
End: 2023-11-22
Payer: COMMERCIAL

## 2023-11-22 NOTE — TELEPHONE ENCOUNTER
Kvng's [employer]g application has been submitted to the [employer] prescription assistance program.     We will note EPIC when [employer] has made their decision. .    Thank you!    Aisha Dang   Prescription Assistance Assistant

## 2023-11-27 ENCOUNTER — TELEPHONE (OUTPATIENT)
Dept: FAMILY MEDICINE | Facility: CLINIC | Age: 73
End: 2023-11-27
Payer: COMMERCIAL

## 2023-11-27 DIAGNOSIS — E11.42 DM TYPE 2 WITH DIABETIC PERIPHERAL NEUROPATHY (H): Primary | ICD-10-CM

## 2023-11-27 NOTE — TELEPHONE ENCOUNTER
Kvng has been approved to the [employer] assistance program for Novolog 70/30 pens and pen needles through Nov 15, 2024. He will receive this medication at no cost through the enrollment period.    A  120 day supply of  NOVOLOG 70/30 PENS & PEN NEEDLES will be delivered to Kvng's home on November 28, 2023.. He has been informed of this approval and delivery.    Kvng will contact my office for refills as we must work directly with the  .  I will note EPIC as each refill is requested.    Thanks so much for your help!    Shreya Nelson  Prescription Assistance Supervisor  Pharmacy Assistance

## 2023-11-27 NOTE — TELEPHONE ENCOUNTER
Order/Referral Request    Who is requesting: Pt    Orders being requested: Diab shoes    Reason service is needed/diagnosis: Ongoing    When are orders needed by: ASAP    Has this been discussed with Provider: Yes    Does patient have a preference on a Group/Provider/Facility? [facility] - Fax order to [phone]    Does patient have an appointment scheduled?: No    Where to send orders: Fax - [phone]    Okay to leave a detailed message?: Yes at Cell number on file:    Telephone Information:   Mobile [phone]

## 2024-01-22 ENCOUNTER — TELEPHONE (OUTPATIENT)
Dept: FAMILY MEDICINE | Facility: CLINIC | Age: 74
End: 2024-01-22

## 2024-01-22 NOTE — TELEPHONE ENCOUNTER
Received patients Levothyroxine from [employer] 1 bottle  Locked in cabinet at   Patient notified and will  today or tomorrow    Katelyn Warner RN on 1/22/2024 at 4:17 PM

## 2024-02-12 ENCOUNTER — TELEPHONE (OUTPATIENT)
Dept: FAMILY MEDICINE | Facility: CLINIC | Age: 74
End: 2024-02-12

## 2024-02-12 NOTE — TELEPHONE ENCOUNTER
Medication Question or Refill    Contacts         Type Contact Phone/Fax    02/12/2024 11:36 AM CST Phone (Incoming) Kvng Velasquez (Self) [phone] (H)            What medication are you calling about (include dose and sig)?: Insulin 70/30     Preferred Pharmacy:     Controlled Substance Agreement on file:   CSA -- Patient Level:    CSA: None found at the patient level.       Who prescribed the medication?: Dr. Ma    Do you need a refill? Yes    When did you use the medication last? Daily    Patient offered an appointment? No    Do you have any questions or concerns?  Yes: Patient's insulin is coming from [facility], but they need to hear from Dr. Ma. Please call [phone], order was due 2/9/24    Okay to leave a detailed message?: Yes at Home number on file [phone] ([place])

## 2024-02-12 NOTE — TELEPHONE ENCOUNTER
Kvng has been approved to the [employer] assistance program for Novolog 70/30 pens and pen needles through Nov 15, 2024. He will receive this medication at no cost through the enrollment period.     A  120 day supply of  NOVOLOG 70/30 PENS & PEN NEEDLES will be delivered to Kvng's home on November 28, 2023.. He has been informed of this approval and delivery.     Kvng will contact my office for refills as we must work directly with the  .  I will note EPIC as each refill is requested.     Thanks so much for your help!     Shreya Nelson  Prescription Assistance Supervisor  Pharmacy Assistance

## 2024-02-13 ENCOUNTER — TELEPHONE (OUTPATIENT)
Dept: FAMILY MEDICINE | Facility: CLINIC | Age: 74
End: 2024-02-13
Payer: COMMERCIAL

## 2024-02-13 NOTE — TELEPHONE ENCOUNTER
Kvng has contacted us for a refill for his Novolog 70/30 pens & pen needles.    I have submitted this request to [employer]. I will follow up with Cindy to ensure they received this form.    Once processed, a 120 day supply will be delivered to Kvng's home.    Shreya Nelson  Prescription Assistance Supervisor  Pharmacy Assistance

## 2024-02-16 DIAGNOSIS — F41.9 ANXIETY: ICD-10-CM

## 2024-02-16 RX ORDER — HYDROXYZINE HYDROCHLORIDE 10 MG/1
TABLET, FILM COATED ORAL
Qty: 90 TABLET | Refills: 1 | Status: SHIPPED | OUTPATIENT
Start: 2024-02-16 | End: 2024-08-23

## 2024-02-16 NOTE — TELEPHONE ENCOUNTER
Pending Prescriptions:                       Disp   Refills    hydrOXYzine HCl (ATARAX) 10 MG tablet [Pha*90 tab*1        Sig: TAKE 1 TABLET BY MOUTH AS NEEDED FOR ANXIETY    Routing refill request to provider for review/approval because:  Please clarify frequency     Meche Antoine RN  [facility]

## 2024-05-21 ENCOUNTER — TELEPHONE (OUTPATIENT)
Dept: FAMILY MEDICINE | Facility: CLINIC | Age: 74
End: 2024-05-21
Payer: COMMERCIAL

## 2024-05-21 NOTE — TELEPHONE ENCOUNTER
A refill request has been made to the [employer] assistance program for NovoLog and needles.    We will note EPIC when in process.     Thank you!    Aisha Dang   Prescription Assistance Assistant

## 2024-05-22 ENCOUNTER — TELEPHONE (OUTPATIENT)
Dept: FAMILY MEDICINE | Facility: CLINIC | Age: 74
End: 2024-05-22
Payer: COMMERCIAL

## 2024-05-22 NOTE — TELEPHONE ENCOUNTER
Levothyroxine 112 mg bottle received from [employer]  Placed in locked cabinet at  for patient .     Left discreet voice mail for patient to call clinic    Katelyn Warner RN on 5/22/2024 at 8:36 AM

## 2024-05-23 ENCOUNTER — TELEPHONE (OUTPATIENT)
Dept: FAMILY MEDICINE | Facility: CLINIC | Age: 74
End: 2024-05-23
Payer: COMMERCIAL

## 2024-05-23 NOTE — TELEPHONE ENCOUNTER
Patient came into [facility] clinic and picked up 1 bottle of 112 mcg Levothyroxine  Form signed and placed in manager basket    Katelyn Warner RN on 5/23/2024 at 9:33 AM

## 2024-06-13 ENCOUNTER — TELEPHONE (OUTPATIENT)
Dept: FAMILY MEDICINE | Facility: CLINIC | Age: 74
End: 2024-06-13
Payer: COMMERCIAL

## 2024-06-13 NOTE — TELEPHONE ENCOUNTER
Patient Quality Outreach    Patient is due for the following:   Colonoscopy    Next Steps:   - Schedule wellness visit with fasting labs  - Complete colon screen  - Vaccine update    Type of outreach:    Pt has appt scheduled 7/3 for DM visit, will possibly add physical then      Questions for provider review:    None           Temi Morrow, CMA

## 2024-06-24 DIAGNOSIS — M10.9 GOUTY ARTHROPATHY: ICD-10-CM

## 2024-06-24 RX ORDER — ALLOPURINOL 100 MG/1
100 TABLET ORAL DAILY
Qty: 90 TABLET | Refills: 3 | Status: SHIPPED | OUTPATIENT
Start: 2024-06-24

## 2024-06-28 DIAGNOSIS — I10 ESSENTIAL HYPERTENSION WITH GOAL BLOOD PRESSURE LESS THAN 140/90: Chronic | ICD-10-CM

## 2024-06-28 NOTE — TELEPHONE ENCOUNTER
Pending Prescriptions:                       Disp   Refills    lisinopril-hydrochlorothiazide (ZESTORETIC*90 tab*0        Sig: TAKE ONE TABLET BY MOUTH ONCE DAILY    Routing refill request to provider for review/approval because:  Labs not current:  GFR, potassium  No current BP reading    BP Readings from Last 3 Encounters:   06/14/23 118/64   07/15/22 130/70   07/28/21 134/76     GFR Estimate   Date Value Ref Range Status   06/14/2023 87 >60 mL/min/1.73m2 Final     Comment:     eGFR calculated using 2021 CKD-EPI equation.   03/04/2020 90 >60 mL/min/[1.73_m2] Final     Comment:     Non  GFR Calc  Starting 12/18/2018, serum creatinine based estimated GFR (eGFR) will be   calculated using the Chronic Kidney Disease Epidemiology Collaboration   (CKD-EPI) equation.       Potassium   Date Value Ref Range Status   06/14/2023 3.8 3.4 - 5.3 mmol/L Final   07/15/2022 4.1 3.4 - 5.3 mmol/L Final   03/04/2020 3.7 3.4 - 5.3 mmol/L Final     Meche Antoine RN  [facility]

## 2024-07-01 NOTE — TELEPHONE ENCOUNTER
Routing refill request to provider for review/approval because:  Patient needs to be seen because it has been more than 1 year since last office visit.  Has appointment 7/3    Pending Prescriptions:                       Disp   Refills    atenolol (TENORMIN) 50 MG tablet [Pharmac*135 ta*3            Sig: TAKE ONE AND ONE-HALF TABLETS BY MOUTH EVERY DAY    Requested Prescriptions   Pending Prescriptions Disp Refills    atenolol (TENORMIN) 50 MG tablet [Pharmacy Med Name: ATENOLOL 50MG TABS] 135 tablet 3     Sig: TAKE ONE AND ONE-HALF TABLETS BY MOUTH EVERY DAY       Beta-Blockers Protocol Failed - 6/28/2024  3:42 PM        Failed - Blood pressure under 140/90 in past 12 months     BP Readings from Last 3 Encounters:   06/14/23 118/64   07/15/22 130/70   07/28/21 134/76       No data recorded            Failed - Recent (12 mo) or future (90 days) visit within the authorizing provider's specialty     The patient must have completed an in-person or virtual visit within the past 12 months or has a future visit scheduled within the next 90 days with the authorizing provider s specialty.  Urgent care and e-visits do not quality as an office visit for this protocol.          Passed - Patient is age 6 or older        Passed - Medication is active on med list        Passed - Medication indicated for associated diagnosis     Medication is associated with one or more of the following diagnoses:     Hypertension (HTN)   Atrial fibrillation/flutter   Angina   ASCVD   Migraine   Heart Failure   Tremor   Anxiety   Ocular hypertension   Glaucoma             Katelyn Warner RN on 7/1/2024 at 10:52 AM

## 2024-07-03 ENCOUNTER — OFFICE VISIT (OUTPATIENT)
Dept: FAMILY MEDICINE | Facility: CLINIC | Age: 74
End: 2024-07-03
Payer: COMMERCIAL

## 2024-07-03 VITALS
HEART RATE: 94 BPM | RESPIRATION RATE: 20 BRPM | DIASTOLIC BLOOD PRESSURE: 82 MMHG | BODY MASS INDEX: 31.64 KG/M2 | WEIGHT: 226 LBS | SYSTOLIC BLOOD PRESSURE: 130 MMHG | OXYGEN SATURATION: 95 % | TEMPERATURE: 98.6 F | HEIGHT: 71 IN

## 2024-07-03 DIAGNOSIS — E03.9 HYPOTHYROIDISM, UNSPECIFIED TYPE: ICD-10-CM

## 2024-07-03 DIAGNOSIS — G63 POLYNEUROPATHY ASSOCIATED WITH UNDERLYING DISEASE (H): ICD-10-CM

## 2024-07-03 DIAGNOSIS — Z00.00 ENCOUNTER FOR MEDICARE ANNUAL WELLNESS EXAM: Primary | ICD-10-CM

## 2024-07-03 DIAGNOSIS — I10 ESSENTIAL HYPERTENSION WITH GOAL BLOOD PRESSURE LESS THAN 140/90: Chronic | ICD-10-CM

## 2024-07-03 DIAGNOSIS — E78.5 HYPERLIPIDEMIA LDL GOAL <100: ICD-10-CM

## 2024-07-03 DIAGNOSIS — E11.42 TYPE 2 DIABETES MELLITUS WITH DIABETIC POLYNEUROPATHY, WITH LONG-TERM CURRENT USE OF INSULIN (H): ICD-10-CM

## 2024-07-03 DIAGNOSIS — R19.5 POSITIVE FIT (FECAL IMMUNOCHEMICAL TEST): ICD-10-CM

## 2024-07-03 DIAGNOSIS — Z79.4 TYPE 2 DIABETES MELLITUS WITH DIABETIC POLYNEUROPATHY, WITH LONG-TERM CURRENT USE OF INSULIN (H): ICD-10-CM

## 2024-07-03 DIAGNOSIS — E11.42 DM TYPE 2 WITH DIABETIC PERIPHERAL NEUROPATHY (H): ICD-10-CM

## 2024-07-03 DIAGNOSIS — Z12.11 SCREEN FOR COLON CANCER: ICD-10-CM

## 2024-07-03 LAB
ALBUMIN SERPL BCG-MCNC: 4 G/DL (ref 3.5–5.2)
ALP SERPL-CCNC: 70 U/L (ref 40–150)
ALT SERPL W P-5'-P-CCNC: 15 U/L (ref 0–70)
ANION GAP SERPL CALCULATED.3IONS-SCNC: 11 MMOL/L (ref 7–15)
AST SERPL W P-5'-P-CCNC: 27 U/L (ref 0–45)
BILIRUB SERPL-MCNC: 0.6 MG/DL
BUN SERPL-MCNC: 16 MG/DL (ref 8–23)
CALCIUM SERPL-MCNC: 9.7 MG/DL (ref 8.8–10.2)
CHLORIDE SERPL-SCNC: 98 MMOL/L (ref 98–107)
CHOLEST SERPL-MCNC: 166 MG/DL
CREAT SERPL-MCNC: 1 MG/DL (ref 0.67–1.17)
CREAT UR-MCNC: 207.8 MG/DL
DEPRECATED HCO3 PLAS-SCNC: 27 MMOL/L (ref 22–29)
EGFRCR SERPLBLD CKD-EPI 2021: 78 ML/MIN/1.73M2
ERYTHROCYTE [DISTWIDTH] IN BLOOD BY AUTOMATED COUNT: 12.2 % (ref 10–15)
FASTING STATUS PATIENT QL REPORTED: YES
FASTING STATUS PATIENT QL REPORTED: YES
GLUCOSE SERPL-MCNC: 75 MG/DL (ref 70–99)
HBA1C MFR BLD: 6.2 % (ref 0–5.6)
HCT VFR BLD AUTO: 40.6 % (ref 40–53)
HDLC SERPL-MCNC: 52 MG/DL
HGB BLD-MCNC: 14 G/DL (ref 13.3–17.7)
LDLC SERPL CALC-MCNC: 90 MG/DL
MCH RBC QN AUTO: 32.2 PG (ref 26.5–33)
MCHC RBC AUTO-ENTMCNC: 34.5 G/DL (ref 31.5–36.5)
MCV RBC AUTO: 93 FL (ref 78–100)
MICROALBUMIN UR-MCNC: <12 MG/L
MICROALBUMIN/CREAT UR: NORMAL MG/G{CREAT}
NONHDLC SERPL-MCNC: 114 MG/DL
PLATELET # BLD AUTO: 333 10E3/UL (ref 150–450)
POTASSIUM SERPL-SCNC: 3.9 MMOL/L (ref 3.4–5.3)
PROT SERPL-MCNC: 8.4 G/DL (ref 6.4–8.3)
RBC # BLD AUTO: 4.35 10E6/UL (ref 4.4–5.9)
SODIUM SERPL-SCNC: 136 MMOL/L (ref 135–145)
TRIGL SERPL-MCNC: 120 MG/DL
TSH SERPL DL<=0.005 MIU/L-ACNC: 0.95 UIU/ML (ref 0.3–4.2)
WBC # BLD AUTO: 9.1 10E3/UL (ref 4–11)

## 2024-07-03 PROCEDURE — 83036 HEMOGLOBIN GLYCOSYLATED A1C: CPT | Performed by: FAMILY MEDICINE

## 2024-07-03 PROCEDURE — 36415 COLL VENOUS BLD VENIPUNCTURE: CPT | Performed by: FAMILY MEDICINE

## 2024-07-03 PROCEDURE — 80061 LIPID PANEL: CPT | Performed by: FAMILY MEDICINE

## 2024-07-03 PROCEDURE — 84443 ASSAY THYROID STIM HORMONE: CPT | Performed by: FAMILY MEDICINE

## 2024-07-03 PROCEDURE — 82043 UR ALBUMIN QUANTITATIVE: CPT | Performed by: FAMILY MEDICINE

## 2024-07-03 PROCEDURE — 85027 COMPLETE CBC AUTOMATED: CPT | Performed by: FAMILY MEDICINE

## 2024-07-03 PROCEDURE — 80053 COMPREHEN METABOLIC PANEL: CPT | Performed by: FAMILY MEDICINE

## 2024-07-03 PROCEDURE — 82570 ASSAY OF URINE CREATININE: CPT | Performed by: FAMILY MEDICINE

## 2024-07-03 PROCEDURE — 99214 OFFICE O/P EST MOD 30 MIN: CPT | Mod: 25 | Performed by: FAMILY MEDICINE

## 2024-07-03 PROCEDURE — G0438 PPPS, INITIAL VISIT: HCPCS | Performed by: FAMILY MEDICINE

## 2024-07-03 PROCEDURE — 99207 PR FOOT EXAM NO CHARGE: CPT | Performed by: FAMILY MEDICINE

## 2024-07-03 RX ORDER — ATENOLOL 50 MG/1
TABLET ORAL
Qty: 135 TABLET | Refills: 3 | Status: SHIPPED | OUTPATIENT
Start: 2024-07-03

## 2024-07-03 RX ORDER — LISINOPRIL AND HYDROCHLOROTHIAZIDE 20; 25 MG/1; MG/1
1 TABLET ORAL DAILY
Qty: 90 TABLET | Refills: 0 | OUTPATIENT
Start: 2024-07-03

## 2024-07-03 RX ORDER — LISINOPRIL AND HYDROCHLOROTHIAZIDE 20; 25 MG/1; MG/1
1 TABLET ORAL DAILY
Qty: 90 TABLET | Refills: 3 | Status: SHIPPED | OUTPATIENT
Start: 2024-07-03

## 2024-07-03 RX ORDER — LEVOTHYROXINE SODIUM 112 UG/1
112 TABLET ORAL DAILY
Qty: 90 TABLET | Refills: 3 | Status: CANCELLED | OUTPATIENT
Start: 2024-07-03

## 2024-07-03 RX ORDER — INSULIN ASPART 100 [IU]/ML
INJECTION, SUSPENSION SUBCUTANEOUS
COMMUNITY
Start: 2024-07-03

## 2024-07-03 RX ORDER — RESPIRATORY SYNCYTIAL VIRUS VACCINE 120MCG/0.5
0.5 KIT INTRAMUSCULAR ONCE
Qty: 1 EACH | Refills: 0 | Status: CANCELLED | OUTPATIENT
Start: 2024-07-03 | End: 2024-07-03

## 2024-07-03 RX ORDER — ATORVASTATIN CALCIUM 40 MG/1
40 TABLET, FILM COATED ORAL DAILY
Qty: 90 TABLET | Refills: 3 | Status: SHIPPED | OUTPATIENT
Start: 2024-07-03

## 2024-07-03 RX ORDER — ATENOLOL 50 MG/1
TABLET ORAL
Qty: 135 TABLET | Refills: 3 | OUTPATIENT
Start: 2024-07-03

## 2024-07-03 RX ORDER — AMLODIPINE BESYLATE 10 MG/1
10 TABLET ORAL DAILY
Qty: 90 TABLET | Refills: 3 | Status: SHIPPED | OUTPATIENT
Start: 2024-07-03

## 2024-07-03 SDOH — HEALTH STABILITY: PHYSICAL HEALTH: ON AVERAGE, HOW MANY MINUTES DO YOU ENGAGE IN EXERCISE AT THIS LEVEL?: 30 MIN

## 2024-07-03 SDOH — HEALTH STABILITY: PHYSICAL HEALTH: ON AVERAGE, HOW MANY DAYS PER WEEK DO YOU ENGAGE IN MODERATE TO STRENUOUS EXERCISE (LIKE A BRISK WALK)?: 3 DAYS

## 2024-07-03 ASSESSMENT — PATIENT HEALTH QUESTIONNAIRE - PHQ9
10. IF YOU CHECKED OFF ANY PROBLEMS, HOW DIFFICULT HAVE THESE PROBLEMS MADE IT FOR YOU TO DO YOUR WORK, TAKE CARE OF THINGS AT HOME, OR GET ALONG WITH OTHER PEOPLE: NOT DIFFICULT AT ALL
SUM OF ALL RESPONSES TO PHQ QUESTIONS 1-9: 0
SUM OF ALL RESPONSES TO PHQ QUESTIONS 1-9: 0

## 2024-07-03 ASSESSMENT — PAIN SCALES - GENERAL: PAINLEVEL: NO PAIN (0)

## 2024-07-03 ASSESSMENT — SOCIAL DETERMINANTS OF HEALTH (SDOH): HOW OFTEN DO YOU GET TOGETHER WITH FRIENDS OR RELATIVES?: THREE TIMES A WEEK

## 2024-07-03 NOTE — LETTER
July 5, 2024      Kvng Velasquez  [address] [address]        Dear ,    We are writing to inform you of your test results.    Your test results fall within the expected range(s) or remain unchanged from previous results.      Resulted Orders   Hemoglobin A1c   Result Value Ref Range    Hemoglobin A1C 6.2 (H) 0.0 - 5.6 %      Comment:      Normal <5.7%   Prediabetes 5.7-6.4%    Diabetes 6.5% or higher     Note: Adopted from ADA consensus guidelines.   Lipid panel reflex to direct LDL Fasting   Result Value Ref Range    Cholesterol 166 <200 mg/dL    Triglycerides 120 <150 mg/dL    Direct Measure HDL 52 >=40 mg/dL    LDL Cholesterol Calculated 90 <=100 mg/dL    Non HDL Cholesterol 114 <130 mg/dL    Patient Fasting > 8hrs? Yes     Narrative    Cholesterol  Desirable:  <200 mg/dL    Triglycerides  Normal:  Less than 150 mg/dL  Borderline High:  150-199 mg/dL  High:  200-499 mg/dL  Very High:  Greater than or equal to 500 mg/dL    Direct Measure HDL  Female:  Greater than or equal to 50 mg/dL   Male:  Greater than or equal to 40 mg/dL    LDL Cholesterol  Desirable:  <100mg/dL  Above Desirable:  100-129 mg/dL   Borderline High:  130-159 mg/dL   High:  160-189 mg/dL   Very High:  >= 190 mg/dL    Non HDL Cholesterol  Desirable:  130 mg/dL  Above Desirable:  130-159 mg/dL  Borderline High:  160-189 mg/dL  High:  190-219 mg/dL  Very High:  Greater than or equal to 220 mg/dL   Comprehensive metabolic panel (BMP + Alb, Alk Phos, ALT, AST, Total. Bili, TP)   Result Value Ref Range    Sodium 136 135 - 145 mmol/L    Potassium 3.9 3.4 - 5.3 mmol/L    Carbon Dioxide (CO2) 27 22 - 29 mmol/L    Anion Gap 11 7 - 15 mmol/L    Urea Nitrogen 16.0 8.0 - 23.0 mg/dL    Creatinine 1.00 0.67 - 1.17 mg/dL    GFR Estimate 78 >60 mL/min/1.73m2      Comment:      eGFR calculated using 2021 CKD-EPI equation.    Calcium 9.7 8.8 - 10.2 mg/dL    Chloride 98 98 - 107 mmol/L    Glucose 75 70 - 99 mg/dL    Alkaline  Phosphatase 70 40 - 150 U/L    AST 27 0 - 45 U/L      Comment:      Reference intervals for this test were updated on 6/12/2023 to more accurately reflect our healthy population. There may be differences in the flagging of prior results with similar values performed with this method. Interpretation of those prior results can be made in the context of the updated reference intervals.    ALT 15 0 - 70 U/L      Comment:      Reference intervals for this test were updated on 6/12/2023 to more accurately reflect our healthy population. There may be differences in the flagging of prior results with similar values performed with this method. Interpretation of those prior results can be made in the context of the updated reference intervals.      Protein Total 8.4 (H) 6.4 - 8.3 g/dL    Albumin 4.0 3.5 - 5.2 g/dL    Bilirubin Total 0.6 <=1.2 mg/dL    Patient Fasting > 8hrs? Yes    Albumin Random Urine Quantitative with Creat Ratio   Result Value Ref Range    Creatinine Urine mg/dL 207.8 mg/dL      Comment:      The reference ranges have not been established in urine creatinine. The results should be integrated into the clinical context for interpretation.    Albumin Urine mg/L <12.0 mg/L      Comment:      The reference ranges have not been established in urine albumin. The results should be integrated into the clinical context for interpretation.    Albumin Urine mg/g Cr        Comment:      Unable to calculate, urine albumin and/or urine creatinine is outside detectable limits.  Microalbuminuria is defined as an albumin:creatinine ratio of 17 to 299 for males and 25 to 299 for females. A ratio of albumin:creatinine of 300 or higher is indicative of overt proteinuria.  Due to biologic variability, positive results should be confirmed by a second, first-morning random or 24-hour timed urine specimen. If there is discrepancy, a third specimen is recommended. When 2 out of 3 results are in the microalbuminuria range, this is  evidence for incipient nephropathy and warrants increased efforts at glucose control, blood pressure control, and institution of therapy with an angiotensin-converting-enzyme (ACE) inhibitor (if the patient can tolerate it).     CBC with platelets   Result Value Ref Range    WBC Count 9.1 4.0 - 11.0 10e3/uL    RBC Count 4.35 (L) 4.40 - 5.90 10e6/uL    Hemoglobin 14.0 13.3 - 17.7 g/dL    Hematocrit 40.6 40.0 - 53.0 %    MCV 93 78 - 100 fL    MCH 32.2 26.5 - 33.0 pg    MCHC 34.5 31.5 - 36.5 g/dL    RDW 12.2 10.0 - 15.0 %    Platelet Count 333 150 - 450 10e3/uL   TSH WITH FREE T4 REFLEX   Result Value Ref Range    TSH 0.95 0.30 - 4.20 uIU/mL       If you have any questions or concerns, please call the clinic at the number listed above.       Sincerely,      Marlen Ma MD

## 2024-07-03 NOTE — PROGRESS NOTES
DME (Durable Medical Equipment) Orders and Documentation  Orders Placed This Encounter   Procedures     Miscellaneous DME Order        The patient was assessed and it was determined the patient is in need of the following listed DME Supplies/Equipment. Please complete supporting documentation below to demonstrate medical necessity.

## 2024-07-03 NOTE — PROGRESS NOTES
Preventive Care Visit  [facility]  Marlen Ma MD, Family Medicine  Jul 3, 2024      Assessment & Plan     Encounter for Medicare annual wellness exam       DM type 2 with diabetic peripheral neuropathy (H)  Polyneuropathy associated with underlying disease  Diabetic shoes ordered  Recommend he decrease his 70/30 to 46 units twice daily.  He does get hypoglycemic at times if he's late for meals. With HgbA1c of 6.2% and him not checking blood sugars, I worry more about hypoglycemia at this point.    Recheck in 6 months    - Hemoglobin A1c; Future  - Lipid panel reflex to direct LDL Fasting; Future  - Comprehensive metabolic panel (BMP + Alb, Alk Phos, ALT, AST, Total. Bili, TP); Future  - Albumin Random Urine Quantitative with Creat Ratio; Future  - FOOT EXAM  - CBC with platelets; Future  - Hemoglobin A1c  - Lipid panel reflex to direct LDL Fasting  - Comprehensive metabolic panel (BMP + Alb, Alk Phos, ALT, AST, Total. Bili, TP)  - Albumin Random Urine Quantitative with Creat Ratio  - CBC with platelets  - Miscellaneous DME Order    Hypothyroidism, unspecified type   Clinically euthyroid  - TSH WITH FREE T4 REFLEX; Future  - TSH WITH FREE T4 REFLEX    Essential hypertension with goal blood pressure less than 140/90  Well controlled  - amLODIPine (NORVASC) 10 MG tablet; Take 1 tablet (10 mg) by mouth daily  - atenolol (TENORMIN) 50 MG tablet; TAKE ONE AND ONE-HALF TABLETS BY MOUTH EVERY DAY  - lisinopril-hydrochlorothiazide (ZESTORETIC) 20-25 MG tablet; Take 1 tablet by mouth daily    Hyperlipidemia LDL goal <100  Continue statin  - atorvastatin (LIPITOR) 40 MG tablet; Take 1 tablet (40 mg) by mouth daily    Screen for colon cancer   See below    Type 2 diabetes mellitus with diabetic polyneuropathy, with long-term current use of insulin (H)   As above  - insulin aspart prot & aspart (NOVOLOG MIX 70/30 FLEXPEN) (70-30) 100 UNIT/ML pen; 46 units with breakfast and 46 u before dinner,  "max 110u daily allow for prime    Positive FIT  Again stressed the importance of colonoscopy given positive FIT testing. He verbalizes understanding but does not want to have this done or sees barriers he's unable to overcome       Patient has been advised of split billing requirements and indicates understanding: Yes        BMI  Estimated body mass index is 31.52 kg/m  as calculated from the following:    Height as of this encounter: 1.803 m (5' 11\").    Weight as of this encounter: 102.5 kg (226 lb).       Counseling  Appropriate preventive services were discussed with this patient, including applicable screening as appropriate for fall prevention, nutrition, physical activity, Tobacco-use cessation, weight loss and cognition.  Checklist reviewing preventive services available has been given to the patient.  Reviewed patient's diet, addressing concerns and/or questions.   He is at risk for lack of exercise and has been provided with information to increase physical activity for the benefit of his well-being.   He is at risk for psychosocial distress and has been provided with information to reduce risk.           Yovany Calderon is a 74 year old, presenting for the following:  Medicare Visit        7/3/2024     7:18 AM   Additional Questions   Roomed by Temi cruz CMA   Accompanied by Self         Health Care Directive  Patient does not have a Health Care Directive or Living Will: Discussed advance care planning with patient; however, patient declined at this time.    HPI    - DME for diabetic shoes    Diabetes Follow-up  Insulin - Novolog  How often are you checking your blood sugar? A few times a week  What time of day are you checking your blood sugars (select all that apply)?  Before meals  Have you had any blood sugars above 200?  No  Have you had any blood sugars below 70?  No  What symptoms do you notice when your blood sugar is low?  Shaky and Dizzy  What concerns do you have today about your diabetes? " None   Do you have any of these symptoms? (Select all that apply)  Numbness in feet and Burning in feet          Hyperlipidemia Follow-Up  Atorvastatin 40mg qd  Are you regularly taking any medication or supplement to lower your cholesterol?   Yes- statin  Are you having muscle aches or other side effects that you think could be caused by your cholesterol lowering medication?  No    Hypertension Follow-up  Amlodipine 10mg every day, atenolol 75mg every day, lisinopril-hydrochlorothiazide 20-25mg qd - He ran out of these medications yesterday  Do you check your blood pressure regularly outside of the clinic? No   Are you following a low salt diet? No  Are your blood pressures ever more than 140 on the top number (systolic) OR more   than 90 on the bottom number (diastolic), for example 140/90? N/A    BP Readings from Last 2 Encounters:   07/03/24 130/82   06/14/23 118/64     Hemoglobin A1C (%)   Date Value   06/14/2023 6.2 (H)   07/15/2022 6.4 (H)   11/04/2020 5.4   03/04/2020 5.8 (H)     LDL Cholesterol Calculated (mg/dL)   Date Value   06/14/2023 31   07/15/2022 45   03/04/2020 42   05/06/2019 71         Hypothyroidism Follow-up  Levothyroxine 112mcg qd  Since last visit, patient describes the following symptoms: fatigue    TSH   Date Value Ref Range Status   06/14/2023 1.75 0.30 - 4.20 uIU/mL Final   07/15/2022 1.61 0.40 - 4.00 mU/L Final   03/04/2020 1.90 0.40 - 4.00 mU/L Final     T4 Free   Date Value Ref Range Status   06/19/2017 0.90 0.76 - 1.46 ng/dL Final           7/3/2024   General Health   How would you rate your overall physical health? Good   Feel stress (tense, anxious, or unable to sleep) Only a little      (!) STRESS CONCERN      7/3/2024   Nutrition   Diet: I don't know            7/3/2024   Exercise   Days per week of moderate/strenous exercise 3 days   Average minutes spent exercising at this level 30 min            7/3/2024   Social Factors   Frequency of gathering with friends or relatives Three  times a week   Worry food won't last until get money to buy more No   Food not last or not have enough money for food? No   Do you have housing? (Housing is defined as stable permanent housing and does not include staying ouside in a car, in a tent, in an abandoned building, in an overnight shelter, or couch-surfing.) Yes   Are you worried about losing your housing? No   Lack of transportation? No   Unable to get utilities (heat,electricity)? No            7/3/2024   Fall Risk   Fallen 2 or more times in the past year? No   Trouble with walking or balance? No             7/3/2024   Activities of Daily Living- Home Safety   Needs help with the following daily activites None of the above   Safety concerns in the home None of the above            7/3/2024   Dental   Dentist two times every year? Yes            7/3/2024   Hearing Screening   Hearing concerns? None of the above            7/3/2024   Driving Risk Screening   Patient/family members have concerns about driving No            7/3/2024   General Alertness/Fatigue Screening   Have you been more tired than usual lately? No            7/3/2024   Urinary Incontinence Screening   Bothered by leaking urine in past 6 months No            7/3/2024   TB Screening   Were you born outside of the [place]? No          Today's PHQ-9 Score:       7/3/2024     7:15 AM   PHQ-9 SCORE   PHQ-9 Total Score MyChart 0   PHQ-9 Total Score 0         7/3/2024   Substance Use   Alcohol more than 3/day or more than 7/wk No   Do you have a current opioid prescription? No   How severe/bad is pain from 1 to 10? 0/10 (No Pain)   Do you use any other substances recreationally? No        Social History     Tobacco Use    Smoking status: Never    Smokeless tobacco: Never   Vaping Use    Vaping status: Never Used   Substance Use Topics    Alcohol use: Yes     Comment: occas    Drug use: No           7/3/2024   AAA Screening   Family history of Abdominal Aortic Aneurysm (AAA)? No      ASCVD Risk  "  The ASCVD Risk score (To WILSON, et al., 2019) failed to calculate for the following reasons:    The valid total cholesterol range is 130 to 320 mg/dL            Reviewed and updated as needed this visit by Provider                      Current providers sharing in care for this patient include:  Patient Care Team:  Marlen Ma MD as PCP - General (Family Practice)  Marlen Ma MD as Assigned PCP    The following health maintenance items are reviewed in Epic and correct as of today:  Health Maintenance   Topic Date Due    RSV VACCINE (Pregnancy & 60+) (1 - 1-dose 60+ series) Never done    ZOSTER IMMUNIZATION (2 of 3) 07/07/2009    MEDICARE ANNUAL WELLNESS VISIT  01/14/2014    COLORECTAL CANCER SCREENING  12/15/2022    COVID-19 Vaccine (1 - 2023-24 season) Never done    A1C  09/14/2023    BMP  06/14/2024    LIPID  06/14/2024    MICROALBUMIN  06/14/2024    TSH W/FREE T4 REFLEX  06/14/2024    INFLUENZA VACCINE (1) 09/01/2024    EYE EXAM  09/19/2024    PHQ-9  01/03/2025    DIABETIC FOOT EXAM  07/03/2025    ANNUAL REVIEW OF HM ORDERS  07/03/2025    FALL RISK ASSESSMENT  07/03/2025    DTAP/TDAP/TD IMMUNIZATION (2 - Td or Tdap) 07/11/2026    ADVANCE CARE PLANNING  07/03/2029    DEPRESSION ACTION PLAN  Completed    Pneumococcal Vaccine: 65+ Years  Completed    IPV IMMUNIZATION  Aged Out    HPV IMMUNIZATION  Aged Out    MENINGITIS IMMUNIZATION  Aged Out    RSV MONOCLONAL ANTIBODY  Aged Out    HEPATITIS C SCREENING  Discontinued         Review of Systems  Constitutional, HEENT, cardiovascular, pulmonary, gi and gu systems are negative, except as otherwise noted.     Objective    Exam  /82   Pulse 94   Temp 98.6  F (37  C) (Tympanic)   Resp 20   Ht 1.803 m (5' 11\")   Wt 102.5 kg (226 lb)   SpO2 95%   BMI 31.52 kg/m     Estimated body mass index is 31.52 kg/m  as calculated from the following:    Height as of this encounter: 1.803 m (5' 11\").    Weight as of this encounter: 102.5 kg (226 " lb).    Physical Exam  GENERAL: alert and no distress  NECK: no adenopathy, no asymmetry, masses, or scars  RESP: lungs clear to auscultation - no rales, rhonchi or wheezes  CV: regular rate and rhythm, normal S1 S2, no S3 or S4, no murmur, click or rub, no peripheral edema  ABDOMEN: soft, nontender, no hepatosplenomegaly, no masses and bowel sounds normal  MS: no gross musculoskeletal defects noted, no edema    Results for orders placed or performed in visit on 07/03/24   Hemoglobin A1c     Status: Abnormal   Result Value Ref Range    Hemoglobin A1C 6.2 (H) 0.0 - 5.6 %         Diabetic Foot Screen:  Any complaints of increased pain or numbness ?  YES  tingluing  Is there a foot ulcer now or a history of foot ulcer? No  Does the foot have an abnormal shape?  YES hammertoes  Are the nails thick, too long or ingrown?  YES  bilaterally, most toenails  Are there any redness or open areas? No         Sensation Testing done at all points on the diagram with monofilament     Right Foot: Sensation Absent at the following points  and 2  Left Foot: Sensation Absent at the following points  and 2     Risk Category: 1- Loss of protective sensation with normal appearing foot (no weakness, deformity, callous, pre-ulcer or h/o ulceration)  Performed by Marlen Ma MD           7/3/2024   Mini Cog   Clock Draw Score 2 Normal   3 Item Recall 2 objects recalled   Mini Cog Total Score 4                 Signed Electronically by: Marlen Ma MD

## 2024-07-03 NOTE — PATIENT INSTRUCTIONS
"  Decrease insulin to 46 units twice a day        When you are out of refills or the refills say \"zero\", it is time to schedule your next appointment in clinic!    Labs are released to you almost immediately and sometimes before I have had a chance to review them.  I review labs regularly and once they are all in, you will either be sent a letter with your results or if you are signed up for on-line services, you will be notified that results are available to you on [employer]. If there are serious findings, you typically will be called.    If you have any questions about your visit, your symptoms, your medication, your test results or it is not clear what your diagnosis or treatment plan is please contact me (via [employer]) or call the care team at [phone] and say \"Care Team\"          "

## 2024-08-02 ENCOUNTER — TELEPHONE (OUTPATIENT)
Dept: FAMILY MEDICINE | Facility: CLINIC | Age: 74
End: 2024-08-02
Payer: COMMERCIAL

## 2024-08-02 NOTE — TELEPHONE ENCOUNTER
Patient came into clinic and picked up 1 bottle of Levothyroxine 112 mcg  Verified name and     Katelyn Warner RN on 2024 at 2:23 PM

## 2024-08-02 NOTE — TELEPHONE ENCOUNTER
Patient Contact    Attempt # 1    Was call answered?  No.  Left message on voicemail with information to call back.    Received levothyroxine tablets #90 112 mcg    Placed on RN desk, blue team    Katelyn Warner RN on 8/2/2024 at 9:36 AM

## 2024-08-21 NOTE — TELEPHONE ENCOUNTER
Please notify patient that a refill request has been made to the [employer] assistance program for NovoLog 70/30 and needles.  A 120-day supply should arrive to his home within the next 10-14 business days.

## 2024-08-21 NOTE — TELEPHONE ENCOUNTER
A refill request has been made to the [email or web address] assistance program for Novolog 70/30 and needles.    A 120 day supply should arrive to Kvng's home within 10-14 business days.    Thanks so much for your help!    Teressa Vallejo  Prescription Assistance Admin

## 2024-08-23 DIAGNOSIS — F41.9 ANXIETY: ICD-10-CM

## 2024-08-23 RX ORDER — HYDROXYZINE HYDROCHLORIDE 10 MG/1
TABLET, FILM COATED ORAL
Qty: 90 TABLET | Refills: 1 | Status: SHIPPED | OUTPATIENT
Start: 2024-08-23

## 2024-08-23 NOTE — TELEPHONE ENCOUNTER
Pending Prescriptions:                       Disp   Refills    hydrOXYzine HCl (ATARAX) 10 MG tablet [Pha*90 tab*1        Sig: TAKE 1 TABLET BY MOUTH AS NEEDED FOR ANXIETY    Routing refill request to provider for review/approval because:  Routing to PCP for clarification of frequency.    Meche Antoine RN  [facility]

## 2024-10-07 ENCOUNTER — TELEPHONE (OUTPATIENT)
Dept: FAMILY MEDICINE | Facility: CLINIC | Age: 74
End: 2024-10-07
Payer: COMMERCIAL

## 2024-10-07 NOTE — TELEPHONE ENCOUNTER
Synthroid  Rx from [employer] arrived by mail.  LM for pt and Rx locked in Med cabinet @ nurses station until pt picks up.

## 2024-10-07 NOTE — TELEPHONE ENCOUNTER
Patient walked into clinic to  Synthroid RX    Confirmed name and  and gave patient RX    Katelyn Warner RN on 10/7/2024 at 1:29 PM

## 2024-10-24 ENCOUNTER — TELEPHONE (OUTPATIENT)
Dept: FAMILY MEDICINE | Facility: CLINIC | Age: 74
End: 2024-10-24
Payer: COMMERCIAL

## 2024-10-24 NOTE — TELEPHONE ENCOUNTER
Oct 24, 2024 I have interoffice mailed to Dr Ma  assistance applications for Synthroid, Novolog 70/30 pens & pen needles. Please review, complete, sign and return to us in the included addressed interoffice envelope.    Applications have been sent to Kvng for review, signature and required documents.    Thanks so much for your help!    Shreya Nelson  Prescription Assistance Supervisor  Pharmacy Assistance

## 2024-11-01 ENCOUNTER — TELEPHONE (OUTPATIENT)
Dept: FAMILY MEDICINE | Facility: CLINIC | Age: 74
End: 2024-11-01
Payer: COMMERCIAL

## 2024-11-01 NOTE — TELEPHONE ENCOUNTER
I have approved Kvng for up to $500 of the Pharmacy Assistance Fund to be filled at the [facility].    Kvng and the [facility] have been informed.    This camilo is for prescription medications only.    This camilo must be used within 30 days of this approval notice.    This camilo must be used all in one transaction.    Shreya Nelson  Prescription Assistance Supervisor  Pharmacy Assistance   Pool: [facility]

## 2024-11-07 ENCOUNTER — TELEPHONE (OUTPATIENT)
Dept: FAMILY MEDICINE | Facility: CLINIC | Age: 74
End: 2024-11-07
Payer: COMMERCIAL

## 2024-11-07 NOTE — TELEPHONE ENCOUNTER
Kvng's NovoLog application has been submitted to the [employer] prescription assistance program and Synthroid application submitted to the [employer] Assist prescription assistance program.     We will note EPIC when Cindy and [employer] has made their decision.     Thank you!    Aisha Dang   Prescription Assistance Assistant

## 2024-11-11 DIAGNOSIS — L29.9 PRURITIC DISORDER: ICD-10-CM

## 2024-11-11 DIAGNOSIS — E03.9 ACQUIRED HYPOTHYROIDISM: ICD-10-CM

## 2024-11-12 ENCOUNTER — TELEPHONE (OUTPATIENT)
Dept: FAMILY MEDICINE | Facility: CLINIC | Age: 74
End: 2024-11-12
Payer: COMMERCIAL

## 2024-11-12 DIAGNOSIS — Z79.4 TYPE 2 DIABETES MELLITUS WITH DIABETIC POLYNEUROPATHY, WITH LONG-TERM CURRENT USE OF INSULIN (H): ICD-10-CM

## 2024-11-12 DIAGNOSIS — E11.42 TYPE 2 DIABETES MELLITUS WITH DIABETIC POLYNEUROPATHY, WITH LONG-TERM CURRENT USE OF INSULIN (H): ICD-10-CM

## 2024-11-12 RX ORDER — LEVOTHYROXINE SODIUM 112 UG/1
112 TABLET ORAL DAILY
Qty: 90 TABLET | Refills: 2 | Status: SHIPPED | OUTPATIENT
Start: 2024-11-12

## 2024-11-12 RX ORDER — INSULIN ASPART 100 [IU]/ML
INJECTION, SUSPENSION SUBCUTANEOUS
Qty: 90 ML | Refills: 1 | Status: SHIPPED | OUTPATIENT
Start: 2024-11-12

## 2024-11-12 RX ORDER — TRIAMCINOLONE ACETONIDE 1 MG/G
CREAM TOPICAL
Qty: 30 G | Refills: 1 | Status: SHIPPED | OUTPATIENT
Start: 2024-11-12

## 2024-11-12 NOTE — TELEPHONE ENCOUNTER
----- Message from Jonathon Valerio sent at 11/12/2024 11:05 AM CST -----  Regarding: Insulin Prescription  Kvng Love is requesting an insulin prescription to be sent to [facility] since his normal program is behind. Could you please send a prescription over.     Thank you,   Kelechi Valerio, T  [facility]

## 2024-12-03 ENCOUNTER — TELEPHONE (OUTPATIENT)
Dept: FAMILY MEDICINE | Facility: CLINIC | Age: 74
End: 2024-12-03
Payer: COMMERCIAL

## 2024-12-03 NOTE — TELEPHONE ENCOUNTER
December 3, 2024, I interoffice mailed to Dr Ma, the [employer] Provider section of the assistance application.     Allow a few days for this to reach your Clinic.    Please review, complete/correct, sign and return to us in the enclosed, addressed interoffice envelope.    Thanks so much for your help!    Shreya Nelson  Prescription Assistance Supervisor  Pharmacy Assistance   Pool: [facility]

## 2024-12-10 ENCOUNTER — TELEPHONE (OUTPATIENT)
Dept: FAMILY MEDICINE | Facility: CLINIC | Age: 74
End: 2024-12-10
Payer: COMMERCIAL

## 2024-12-10 NOTE — ORAL ONC MGMT
Kvng's Novolog 70/30 pens & needles assistance application has been submitted to the [employer] assistance program.    We will note Epic when we receive a decision.    Thanks so much for your help!    Shreya Nelson  Prescription Assistance Supervisor  Pharmacy Assistance   Pool: [facility]

## 2025-01-16 ENCOUNTER — TELEPHONE (OUTPATIENT)
Dept: FAMILY MEDICINE | Facility: CLINIC | Age: 75
End: 2025-01-16
Payer: COMMERCIAL

## 2025-01-16 NOTE — TELEPHONE ENCOUNTER
Kvng has been approved to the  assistance program for NovoLog and pen needles through December 31,2025. He will receive this medication at no cost through the enrollment period. He has been informed of this approval and delivery.     A 120 day supply of NovoLog and pen needles will be delivered to the patient's home within 10-14 business days.     Please let us know of any dose changes. Patient will need to contact our office for refills.    Thank you!    Aisha Dang   Prescription

## 2025-01-16 NOTE — TELEPHONE ENCOUNTER
Medication Question or Refill    Contacts       Contact Date/Time Type Contact Phone/Fax    01/16/2025 09:43 AM CST Phone (Incoming) [facility] [phone]     ref# [id number]            What medication are you calling about (include dose and sig)?:   Pharmacy calling. Needing to clarify whether patient should be taking Tresiba or Novolog.  Pharmacy received prescription for both of these.  Please call pharmacy back.    Preferred Pharmacy:    [facility]  [phone]    Who prescribed the medication?: Marlen Bethea on 1/16/2025 at 9:51 AM

## 2025-01-16 NOTE — TELEPHONE ENCOUNTER
Novolog 70/30 is the only insulin he is on.    Please notify [facility] [phone]       Marlen Ma M.D.

## 2025-01-17 NOTE — TELEPHONE ENCOUNTER
Pt returned call:  States he will be out of the Novolog 70/30 insulin on Monday afternoon (1/20).   Pt cannot afford the $140.  Pt left msg for Shreya Baeza today to ask for a $500 voucher but has not heard back.    Routing to  clinic to update.    Camila Camilo RN

## 2025-01-17 NOTE — TELEPHONE ENCOUNTER
Please see the other telephone encounter from 1/16/25 for RX assistance Program with NovoLog 70/30. Patient was approved for the no cost program through 12/31/25. Delivery for I20 day supply of Novolog 70/30 insulin pens and needles will arrive within 10-14 business days.      RN called [facility] to see if patient had picked up any NovoLog 70/30 insulin recently.  1/6/25 [facility] dispensed 1 box of NovoLog 70/30 which is a 13 day supply to patient. Patient's medicare part D did not cover the insulin and patient had to pay 100% out of pocket.      RN called [facility]  [phone] and was on hold for 2 minutes then the call was disconnected and attempted to call back and was on hold for 15 min and call was disconnected by the pharmacy.   RN unable to speak to a pharmacist for clarifcation for why the pharmacy is asking about Tresiba or Novolog when patient is on NovoLog 70/30 insulin for his long acting insulin and his short acting insulin.    RN called [facility] to find out the cost for out of pocket cost without insurance for NovoLog 70/30 pens for 1 box is $140 per box.    Leda De Souza RN on 1/17/2025 at 10:37 AM

## 2025-01-17 NOTE — TELEPHONE ENCOUNTER
Routing to Dr. Ma to please advise if there is any other resource for patient to get his medication for a less expensive cost until he receives the supply from [employer].    Leda De Souza RN on 1/17/2025 at 3:57 PM

## 2025-01-17 NOTE — TELEPHONE ENCOUNTER
Patient Contact     Attempt # 1     Was call answered?  No.  Left message on [employer] with information to call back to please call back.    When patient calls back please find out the following:     Will patient be out of NovoLog 70/30 insulin in the next 2-3 weeks while he is waiting to receive NovoLog 70/30 from [facility]?     Please see if patient wants the Novolog 70/30 insulin prescription moved to [facility] where the out of pocket cost will be $140 for 1 box of insulin that will last patient 13 days.       Leda De Souza RN on 1/17/2025 at 10:37 AM     Respiratory distress

## 2025-01-17 NOTE — TELEPHONE ENCOUNTER
Patient Contact     Attempt # 2     Was call answered?  No.  Left message on voicemail with information to call back.     When patient calls back please find out the following:     Will patient be out of NovoLog 70/30 insulin in the next 2-3 weeks while he is waiting to receive NovoLog 70/30 from [facility]?     Please see if patient wants the Novolog 70/30 insulin prescription moved to [facility] where the out of pocket cost will be $140 for 1 box of insulin that will last patient 13 days.    Leda De Souza RN on 1/17/2025 at 12:44 PM

## 2025-01-20 NOTE — TELEPHONE ENCOUNTER
Patient Contact     Attempted to call patient.     Was call answered?  No.  Left message on voicemail with information to call back.   Another option for patient to pay out of pocked is to sign up for GoodRx. If he fills a prescription at [facility]; the cost may be a lot more cost effective.    Leda De Souza RN on 1/20/2025 at 3:03 PM

## 2025-01-20 NOTE — TELEPHONE ENCOUNTER
Contacted our [facility] pharmacy and spoke to Gladys, she says patient has a discount card and with that it cost the 140$, there is no cap on the price and they can not break the box to fill only a week supply.      Left message for the patient to call back, did he hear from Shreya Vasquez? Need to discuss where he is at with this.      JAYDE Benitez

## 2025-01-20 NOTE — TELEPHONE ENCOUNTER
RN to check with pharmacy.  I thought there was a cap on insulin prices.  Can he receive just a weeks/months worth?    Marlen Ma M.D.

## 2025-01-21 NOTE — TELEPHONE ENCOUNTER
Per chart review:       FG    1/16/25 11:51 AM  Aisha Dang routed this conversation to Marlen Ma MD Granderson, Felicia   FG    1/16/25 11:51 AM  Note  Kvng has been approved to the  assistance program for NovoLog and pen needles through December 31,2025. He will receive this medication at no cost through the enrollment period. He has been informed of this approval and delivery.      A 120 day supply of NovoLog and pen needles will be delivered to the patient's home within 10-14 business days.      Please let us know of any dose changes. Patient will need to contact our office for refills.     Thank you!     Aisha Dang   Prescription

## 2025-03-28 DIAGNOSIS — F41.9 ANXIETY: ICD-10-CM

## 2025-03-28 NOTE — TELEPHONE ENCOUNTER
Requested Prescriptions   Pending Prescriptions Disp Refills    hydrOXYzine HCl (ATARAX) 10 MG tablet [Pharmacy Med Name: hydrOXYzine HCL 10MG TAB] 90 tablet 1     Sig: TAKE ONE TABLET BY MOUTH AS NEEDED FOR ANXIETY       Antihistamines Protocol Failed - 3/28/2025  9:43 AM        Failed - Medication is active on med list and the sig matches. RN to manually verify dose and sig if red X/fail.     If the protocol passes (green check), you do not need to verify med dose and sig.    A prescription matches if they are the same clinical intention.    For Example: once daily and every morning are the same.    The protocol can not identify upper and lower case letters as matching and will fail.     For Example: Take 1 tablet (50 mg) by mouth daily     TAKE 1 TABLET (50 MG) BY MOUTH DAILY    For all fails (red x), verify dose and sig.    If the refill does match what is on file, the RN can still proceed to approve the refill request.       If they do not match, route to the appropriate provider.             Passed - Patient is age 3 or older     Apply age and/or weight-based dosing for peds patients age 3 and older.    Forward request to provider for patients under the age of 3.          Passed - Recent (12 month) or future (90 days) visit with authorizing provider s specialty     The patient must have completed an in-person or virtual visit within the past 12 months or has a future visit scheduled within the next 90 days with the authorizing provider s specialty.  Urgent care and e-visits do not qualify as an office visit for this protocol.          Passed - Medication indicated for associated diagnosis     The medication is associated with one or more of the following diagnoses:  Allergies  Rhinitis  Upper respiratory tract allergy  Urticaria  Itching  Cystic Fibrosis  Bronchiectasis

## 2025-03-31 RX ORDER — HYDROXYZINE HYDROCHLORIDE 10 MG/1
TABLET, FILM COATED ORAL
Qty: 90 TABLET | Refills: 1 | Status: SHIPPED | OUTPATIENT
Start: 2025-03-31

## 2025-04-15 ENCOUNTER — TELEPHONE (OUTPATIENT)
Dept: FAMILY MEDICINE | Facility: CLINIC | Age: 75
End: 2025-04-15
Payer: COMMERCIAL

## 2025-04-15 DIAGNOSIS — E03.9 ACQUIRED HYPOTHYROIDISM: ICD-10-CM

## 2025-04-15 NOTE — TELEPHONE ENCOUNTER
I called [facility] pharmacy. A 90 day supply of Levothyroxine is $47.73.     I suspect cost is an issue as note below says pt was working with  prescription assitance program.     I did not find a pharmacy named Pharma company in the computer, either.       Message left for patient to return call. Naheed Fenton RN

## 2025-04-15 NOTE — TELEPHONE ENCOUNTER
New Medication Request    Contacts       Contact Date/Time Type Contact Phone/Fax    04/15/2025 03:53 PM CDT Phone (Incoming) Kvng Velasquez (Self) [phone] (H)          What medication are you requesting?: Synthroid    Reason for medication request: Pt asking that we send Rx for Synthroid to [facility].  It looks  like he usually works with Shreya Anna at [facility].  Pt refusing to call them and wants to speak Dr. Ma's RN.  Please call patient and advise.      Have you taken this medication before?: Yes:     Controlled Substance Agreement on file:   CSA -- Patient Level:    CSA: None found at the patient level.       Patient offered an appointment? No    Preferred Pharmacy: None - Rx Assistance Program     Okay to leave a detailed message?: Yes at Cell number on file:    Telephone Information:   Mobile [phone]

## 2025-04-17 NOTE — TELEPHONE ENCOUNTER
Call placed to patient   No answer; voicemail left requesting call back to Clinic RN     Milo Taylor, Clinic RN  [facility]

## 2025-04-18 NOTE — TELEPHONE ENCOUNTER
Patient Contact    Attempt # 3    Was call answered?  No.  Left message on voicemail with information to call back.    When calling back need pharmacy information. Unable to find in Epic, see note below.     Katelyn Warner RN on 4/18/2025 at 9:57 AM

## 2025-04-21 RX ORDER — LEVOTHYROXINE SODIUM 112 UG/1
112 TABLET ORAL DAILY
Qty: 90 TABLET | Refills: 0 | Status: SHIPPED | OUTPATIENT
Start: 2025-04-21 | End: 2025-04-23

## 2025-04-21 NOTE — TELEPHONE ENCOUNTER
Patient calling back stating he is working with Shreya Nelson for pharmacy assistance and we can disregard this.     DIPTI Russo

## 2025-04-21 NOTE — TELEPHONE ENCOUNTER
Patient Contact    Attempt # 4    Was call answered?  No.  Left detailed message on voicemail with information to call back. Requested call back with pharmacy to send synthroid to.     Refilled [facility] currently.     Katelyn Warner RN on 4/21/2025 at 12:51 PM

## 2025-04-23 DIAGNOSIS — E03.9 ACQUIRED HYPOTHYROIDISM: ICD-10-CM

## 2025-04-23 RX ORDER — LEVOTHYROXINE SODIUM 112 UG/1
112 TABLET ORAL DAILY
Qty: 90 TABLET | Refills: 3 | Status: SHIPPED | OUTPATIENT
Start: 2025-04-23

## 2025-04-23 NOTE — TELEPHONE ENCOUNTER
Kvng is currently enrolled with the [employer] assistance program for Synthroid.    We have received notice from [employer] a new Synthroid script is needed for Kvng's refill for 2025.    Please hand sign a BRAND name, hard copy script for:    SYNTHROID    Please send the HARD COPY script to ME at--   I must submit this script with the assistance application.    via interoffice mail  FPS Shreya Danielle     or via [place] mail  at:   [facility]  Shreya Nelson  [address] [place]  [address]    Thanks so much for your help!    Shreya Nelson  Prescription Assistance Supervisor  Pharmacy Assistance   Pool: [facility]

## 2025-04-24 ENCOUNTER — TELEPHONE (OUTPATIENT)
Dept: FAMILY MEDICINE | Facility: CLINIC | Age: 75
End: 2025-04-24
Payer: COMMERCIAL

## 2025-04-24 NOTE — TELEPHONE ENCOUNTER
April 24, 2025,  I interoffice mailed to Dr. Ma, the [facility] refill/change form for Kvng's Novolog 70/30 pens.     Please allow a few days for this to reach the [facility].    Please review, complete, sign and return to us in the enclosed, addressed interoffice envelope.    Thanks so much for your help!    Shreya Nelson  Prescription Assistance Supervisor  Pharmacy Assistance   Pool: [facility]

## 2025-05-03 DIAGNOSIS — M10.9 GOUTY ARTHROPATHY: ICD-10-CM

## 2025-05-05 RX ORDER — ALLOPURINOL 100 MG/1
100 TABLET ORAL DAILY
Qty: 90 TABLET | Refills: 3 | Status: SHIPPED | OUTPATIENT
Start: 2025-05-05

## 2025-07-14 ENCOUNTER — TRANSFERRED RECORDS (OUTPATIENT)
Dept: HEALTH INFORMATION MANAGEMENT | Facility: CLINIC | Age: 75
End: 2025-07-14

## 2025-07-14 ENCOUNTER — OFFICE VISIT (OUTPATIENT)
Dept: FAMILY MEDICINE | Facility: CLINIC | Age: 75
End: 2025-07-14
Payer: COMMERCIAL

## 2025-07-14 VITALS
DIASTOLIC BLOOD PRESSURE: 80 MMHG | TEMPERATURE: 98.4 F | HEIGHT: 71 IN | BODY MASS INDEX: 31.25 KG/M2 | SYSTOLIC BLOOD PRESSURE: 132 MMHG | RESPIRATION RATE: 16 BRPM | WEIGHT: 223.25 LBS | HEART RATE: 69 BPM | OXYGEN SATURATION: 96 %

## 2025-07-14 DIAGNOSIS — M10.9 GOUTY ARTHROPATHY: ICD-10-CM

## 2025-07-14 DIAGNOSIS — R19.5 POSITIVE FIT (FECAL IMMUNOCHEMICAL TEST): ICD-10-CM

## 2025-07-14 DIAGNOSIS — Z00.00 ENCOUNTER FOR MEDICARE ANNUAL WELLNESS EXAM: Primary | ICD-10-CM

## 2025-07-14 DIAGNOSIS — I10 ESSENTIAL HYPERTENSION WITH GOAL BLOOD PRESSURE LESS THAN 140/90: ICD-10-CM

## 2025-07-14 DIAGNOSIS — E78.5 HYPERLIPIDEMIA LDL GOAL <100: ICD-10-CM

## 2025-07-14 DIAGNOSIS — E03.9 HYPOTHYROIDISM, UNSPECIFIED TYPE: ICD-10-CM

## 2025-07-14 DIAGNOSIS — E11.42 DM TYPE 2 WITH DIABETIC PERIPHERAL NEUROPATHY (H): ICD-10-CM

## 2025-07-14 LAB
ANION GAP SERPL CALCULATED.3IONS-SCNC: 15 MMOL/L (ref 7–15)
BUN SERPL-MCNC: 20.4 MG/DL (ref 8–23)
CALCIUM SERPL-MCNC: 9.1 MG/DL (ref 8.8–10.4)
CHLORIDE SERPL-SCNC: 96 MMOL/L (ref 98–107)
CHOLEST SERPL-MCNC: 145 MG/DL
CREAT SERPL-MCNC: 1.09 MG/DL (ref 0.67–1.17)
CREAT UR-MCNC: 151 MG/DL
EGFRCR SERPLBLD CKD-EPI 2021: 70 ML/MIN/1.73M2
ERYTHROCYTE [DISTWIDTH] IN BLOOD BY AUTOMATED COUNT: 11.8 % (ref 10–15)
EST. AVERAGE GLUCOSE BLD GHB EST-MCNC: 128 MG/DL
FASTING STATUS PATIENT QL REPORTED: NO
FASTING STATUS PATIENT QL REPORTED: NO
GLUCOSE SERPL-MCNC: 165 MG/DL (ref 70–99)
HBA1C MFR BLD: 6.1 % (ref 0–5.6)
HCO3 SERPL-SCNC: 21 MMOL/L (ref 22–29)
HCT VFR BLD AUTO: 39.9 % (ref 40–53)
HDLC SERPL-MCNC: 46 MG/DL
HGB BLD-MCNC: 13.7 G/DL (ref 13.3–17.7)
LDLC SERPL CALC-MCNC: 65 MG/DL
MCH RBC QN AUTO: 32.2 PG (ref 26.5–33)
MCHC RBC AUTO-ENTMCNC: 34.3 G/DL (ref 31.5–36.5)
MCV RBC AUTO: 94 FL (ref 78–100)
MICROALBUMIN UR-MCNC: 15.1 MG/L
MICROALBUMIN/CREAT UR: 10 MG/G CR (ref 0–17)
NONHDLC SERPL-MCNC: 99 MG/DL
PLATELET # BLD AUTO: 325 10E3/UL (ref 150–450)
POTASSIUM SERPL-SCNC: 3.9 MMOL/L (ref 3.4–5.3)
RBC # BLD AUTO: 4.25 10E6/UL (ref 4.4–5.9)
RETINOPATHY: NEGATIVE
SODIUM SERPL-SCNC: 132 MMOL/L (ref 135–145)
TRIGL SERPL-MCNC: 170 MG/DL
TSH SERPL DL<=0.005 MIU/L-ACNC: 3.3 UIU/ML (ref 0.3–4.2)
URATE SERPL-MCNC: 5.9 MG/DL (ref 3.4–7)
WBC # BLD AUTO: 8.9 10E3/UL (ref 4–11)

## 2025-07-14 PROCEDURE — 85027 COMPLETE CBC AUTOMATED: CPT | Performed by: FAMILY MEDICINE

## 2025-07-14 PROCEDURE — 36415 COLL VENOUS BLD VENIPUNCTURE: CPT | Performed by: FAMILY MEDICINE

## 2025-07-14 PROCEDURE — 99214 OFFICE O/P EST MOD 30 MIN: CPT | Performed by: FAMILY MEDICINE

## 2025-07-14 PROCEDURE — 1126F AMNT PAIN NOTED NONE PRSNT: CPT | Performed by: FAMILY MEDICINE

## 2025-07-14 PROCEDURE — 82043 UR ALBUMIN QUANTITATIVE: CPT | Performed by: FAMILY MEDICINE

## 2025-07-14 PROCEDURE — 84443 ASSAY THYROID STIM HORMONE: CPT | Performed by: FAMILY MEDICINE

## 2025-07-14 PROCEDURE — 83036 HEMOGLOBIN GLYCOSYLATED A1C: CPT | Performed by: FAMILY MEDICINE

## 2025-07-14 PROCEDURE — 3044F HG A1C LEVEL LT 7.0%: CPT | Performed by: FAMILY MEDICINE

## 2025-07-14 PROCEDURE — 80061 LIPID PANEL: CPT | Performed by: FAMILY MEDICINE

## 2025-07-14 PROCEDURE — G2211 COMPLEX E/M VISIT ADD ON: HCPCS | Performed by: FAMILY MEDICINE

## 2025-07-14 PROCEDURE — 80048 BASIC METABOLIC PNL TOTAL CA: CPT | Performed by: FAMILY MEDICINE

## 2025-07-14 PROCEDURE — 82570 ASSAY OF URINE CREATININE: CPT | Performed by: FAMILY MEDICINE

## 2025-07-14 PROCEDURE — 3079F DIAST BP 80-89 MM HG: CPT | Performed by: FAMILY MEDICINE

## 2025-07-14 PROCEDURE — 84550 ASSAY OF BLOOD/URIC ACID: CPT | Performed by: FAMILY MEDICINE

## 2025-07-14 PROCEDURE — 3075F SYST BP GE 130 - 139MM HG: CPT | Performed by: FAMILY MEDICINE

## 2025-07-14 RX ORDER — ATENOLOL 50 MG/1
TABLET ORAL
Qty: 135 TABLET | Refills: 3 | Status: SHIPPED | OUTPATIENT
Start: 2025-07-14

## 2025-07-14 RX ORDER — AMLODIPINE BESYLATE 10 MG/1
10 TABLET ORAL DAILY
Qty: 90 TABLET | Refills: 3 | Status: SHIPPED | OUTPATIENT
Start: 2025-07-14

## 2025-07-14 RX ORDER — LISINOPRIL AND HYDROCHLOROTHIAZIDE 20; 25 MG/1; MG/1
1 TABLET ORAL DAILY
Qty: 90 TABLET | Refills: 3 | Status: SHIPPED | OUTPATIENT
Start: 2025-07-14

## 2025-07-14 RX ORDER — ATORVASTATIN CALCIUM 40 MG/1
40 TABLET, FILM COATED ORAL DAILY
Qty: 90 TABLET | Refills: 3 | Status: SHIPPED | OUTPATIENT
Start: 2025-07-14

## 2025-07-14 ASSESSMENT — PAIN SCALES - GENERAL: PAINLEVEL_OUTOF10: NO PAIN (0)

## 2025-07-14 ASSESSMENT — PATIENT HEALTH QUESTIONNAIRE - PHQ9
10. IF YOU CHECKED OFF ANY PROBLEMS, HOW DIFFICULT HAVE THESE PROBLEMS MADE IT FOR YOU TO DO YOUR WORK, TAKE CARE OF THINGS AT HOME, OR GET ALONG WITH OTHER PEOPLE: SOMEWHAT DIFFICULT
SUM OF ALL RESPONSES TO PHQ QUESTIONS 1-9: 1
SUM OF ALL RESPONSES TO PHQ QUESTIONS 1-9: 1

## 2025-07-14 NOTE — PROGRESS NOTES
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 Patient reports taking \"55 units\" of insulin 70/30 twice daily  He had a low the other day (doesn't check blood sugar) due to not eating soon enough after taking his insulin  -he did not decrease his insulin dose last year as I had recommended  -written instructions provided today - 46 units of the 70/30 twice daily   -see me in 6 months    -order for diabetic shoes given the neuropathy    - HEMOGLOBIN A1C; Future  - Lipid panel reflex to direct LDL Non-fasting; Future  - Albumin Random Urine Quantitative with Creat Ratio; Future  - FOOT EXAM  - HEMOGLOBIN A1C  - Lipid panel reflex to direct LDL Non-fasting  - Albumin Random Urine Quantitative with Creat Ratio  - Miscellaneous DME Order    Essential hypertension with goal blood pressure less than 140/90  Well controlled  - BASIC METABOLIC PANEL; Future  - BASIC METABOLIC PANEL  - amLODIPine (NORVASC) 10 MG tablet; Take 1 tablet (10 mg) by mouth daily.  - atenolol (TENORMIN) 50 MG tablet; TAKE ONE AND ONE-HALF TABLETS BY MOUTH EVERY DAY  - lisinopril-hydrochlorothiazide (ZESTORETIC) 20-25 MG tablet; Take 1 tablet by mouth daily.    Gouty arthropathy   Continue allopurinol  - Uric acid; Future  - Uric acid    Hypothyroidism  Clinically euthyriod  - TSH WITH FREE T4 REFLEX; Future  - TSH WITH FREE T4 REFLEX    Hyperlipidemia LDL goal <100  Continue statin  - atorvastatin (LIPITOR) 40 MG tablet; Take 1 tablet (40 mg) by mouth daily.    Positive FIT (fecal immunochemical test)  Declines colonoscopy or further colon cancer screening - positive FIT was ain 2021  Check CBC    - CBC with platelets; Future    BMI  Estimated body mass index is 31.14 kg/m  as calculated from the following:    Height as of this encounter: 1.803 m (5' 11\").    Weight as of this encounter: 101.3 kg (223 lb 4 oz).           Yovany Calderon is a 75 year " old, presenting for the following health issues:  Diabetes        7/14/2025     7:38 AM   Additional Questions   Roomed by Temi cruz CMA   Accompanied by Self     Diabetes Follow-up  Insulin - Novolog    Hypertension Follow-up  Amlodipine 10mg every day, aenolol 75mg every day, lisinopril-hydrochlorothiazide 20-25mg qd    History of Present Illness       Diabetes:   He presents for follow up of diabetes.  He is checking home blood glucose a few times a month.   He checks blood glucose after meals.  Blood glucose is never over 200 and sometimes under 70. He is aware of hypoglycemia symptoms including shakiness and weakness.    He has no concerns regarding his diabetes at this time.  He is having numbness in feet.  The patient has not had a diabetic eye exam in the last 12 months.          Hypertension: He presents for follow up of hypertension.  He does not check blood pressure  regularly outside of the clinic. Outside blood pressures have been over 140/90. He does not follow a low salt diet.     He eats 0-1 servings of fruits and vegetables daily.He consumes 2 sweetened beverage(s) daily.He exercises with enough effort to increase his heart rate 10 to 19 minutes per day.  He exercises with enough effort to increase his heart rate 4 days per week.   He is taking medications regularly.                Hyperlipidemia Follow-Up  Atorvastatin 40mg qd  Are you regularly taking any medication or supplement to lower your cholesterol?   Yes- statin  Are you having muscle aches or other side effects that you think could be caused by your cholesterol lowering medication?  No      BP Readings from Last 2 Encounters:   07/14/25 132/80   07/03/24 130/82     Hemoglobin A1C (%)   Date Value   07/14/2025 6.1 (H)   07/03/2024 6.2 (H)   11/04/2020 5.4   03/04/2020 5.8 (H)     LDL Cholesterol Calculated (mg/dL)   Date Value   07/03/2024 90   06/14/2023 31   03/04/2020 42   05/06/2019 71         Depression and Anxiety   Atarax 10mg qh  prn  How are you doing with your depression since your last visit? No change  How are you doing with your anxiety since your last visit?  No change but he notes some stress is behind at work  Are you having other symptoms that might be associated with depression or anxiety? No  Have you had a significant life event? No   Do you have any concerns with your use of alcohol or other drugs? No    Social History     Tobacco Use    Smoking status: Never    Smokeless tobacco: Never   Vaping Use    Vaping status: Never Used   Substance Use Topics    Alcohol use: Yes     Comment: occas    Drug use: No         6/14/2023     7:03 AM 7/3/2024     7:15 AM 7/14/2025     7:15 AM   PHQ   PHQ-9 Total Score 0 0 1    Q9: Thoughts of better off dead/self-harm past 2 weeks Not at all Not at all Not at all       Patient-reported           4/27/2017     9:29 AM 3/4/2020     8:41 AM 6/14/2023     7:04 AM   TENZIN-7 SCORE   Total Score 0  4 1       Data saved with a previous flowsheet row definition           7/14/2025     7:15 AM   Last PHQ-9   1.  Little interest or pleasure in doing things 0   2.  Feeling down, depressed, or hopeless 0   3.  Trouble falling or staying asleep, or sleeping too much 1   4.  Feeling tired or having little energy 0   5.  Poor appetite or overeating 0   6.  Feeling bad about yourself 0   7.  Trouble concentrating 0   8.  Moving slowly or restless 0   Q9: Thoughts of better off dead/self-harm past 2 weeks 0   PHQ-9 Total Score 1        Patient-reported         6/14/2023     7:04 AM   TENZIN-7    1. Feeling nervous, anxious, or on edge 1   2. Not being able to stop or control worrying 0   3. Worrying too much about different things 0   4. Trouble relaxing 0   5. Being so restless that it is hard to sit still 0   6. Becoming easily annoyed or irritable 0   7. Feeling afraid, as if something awful might happen 0   TENZIN-7 Total Score 1       Suicide Assessment Five-step Evaluation and Treatment (SAFE-T)    Hypothyroidism  "Follow-up  Levothyroxine 112mcg qd  Since last visit, patient describes the following symptoms: Weight stable, no hair loss, no skin changes, no constipation, no loose stools    TSH   Date Value Ref Range Status   07/03/2024 0.95 0.30 - 4.20 uIU/mL Final   07/15/2022 1.61 0.40 - 4.00 mU/L Final   03/04/2020 1.90 0.40 - 4.00 mU/L Final     T4 Free   Date Value Ref Range Status   06/19/2017 0.90 0.76 - 1.46 ng/dL Final         Review of Systems  Constitutional, HEENT, cardiovascular, pulmonary, gi and gu systems are negative, except as otherwise noted.      Objective    /80   Pulse 69   Temp 98.4  F (36.9  C) (Tympanic)   Resp 16   Ht 1.803 m (5' 11\")   Wt 101.3 kg (223 lb 4 oz)   SpO2 96%   BMI 31.14 kg/m    Body mass index is 31.14 kg/m .  Physical Exam   GENERAL: alert and no distress  NECK: no adenopathy, no asymmetry, masses, or scars  RESP: lungs clear to auscultation - no rales, rhonchi or wheezes  CV: regular rate and rhythm, normal S1 S2, no S3 or S4, no murmur, click or rub, no peripheral edema  ABDOMEN: soft, nontender, no hepatosplenomegaly, no masses and bowel sounds normal  MS: no gross musculoskeletal defects noted, no edema  NEURO: Normal strength and tone, mentation intact and speech normal    Results for orders placed or performed in visit on 07/14/25   HEMOGLOBIN A1C     Status: Abnormal   Result Value Ref Range    Estimated Average Glucose 128 (H) <117 mg/dL    Hemoglobin A1C 6.1 (H) 0.0 - 5.6 %         Diabetic Foot Screen:  Any complaints of increased pain or numbness ?  YES numbness - great toe and first toe  Is there a foot ulcer now or a history of foot ulcer? No  Does the foot have an abnormal shape? No  Are the nails thick, too long or ingrown?  YES long/thickened  Are there any redness or open areas? No         Sensation Testing done at all points on the diagram with monofilament     Right Foot: Sensation Absent at the following points  and 1  Left Foot: Sensation Absent at the " following points  and 1     Risk Category: 2- Loss of protective sensation with weakness, deformity, pre-ulcer or callous but no ulceration  Performed by Marlen Ma MD          Signed Electronically by: Marlen Ma MD    DME (Durable Medical Equipment) Orders and Documentation  Orders Placed This Encounter   Procedures    Miscellaneous DME Order      The patient was assessed and it was determined the patient is in need of the following listed DME Supplies/Equipment. Please complete supporting documentation below to demonstrate medical necessity.

## 2025-07-14 NOTE — PATIENT INSTRUCTIONS
"Insulin dose should be 46 units twice a day    See me in 6 months      When you are out of refills or the refills say \"zero\", it is time to schedule your next appointment in clinic!    Labs are released to you almost immediately and sometimes before I have had a chance to review them.  I review labs regularly and once they are all in, you will either be sent a letter with your results or if you are signed up for on-line services, you will be notified that results are available to you on [employer]. If there are serious findings, you typically will be called.    If you have any questions about your visit, your symptoms, your medication, your test results or it is not clear what your diagnosis or treatment plan is please contact me (via [employer]) or call the care team at [phone] and say \"Care Team\"          "

## 2025-07-15 ENCOUNTER — RESULTS FOLLOW-UP (OUTPATIENT)
Dept: FAMILY MEDICINE | Facility: CLINIC | Age: 75
End: 2025-07-15
Payer: COMMERCIAL

## 2025-07-15 DIAGNOSIS — E87.1 HYPONATREMIA: Primary | ICD-10-CM

## 2025-08-06 ENCOUNTER — LAB (OUTPATIENT)
Dept: LAB | Facility: CLINIC | Age: 75
End: 2025-08-06
Payer: COMMERCIAL

## 2025-08-06 DIAGNOSIS — E87.1 HYPONATREMIA: ICD-10-CM

## 2025-08-06 LAB — SODIUM SERPL-SCNC: 136 MMOL/L (ref 135–145)

## 2025-08-06 PROCEDURE — 84295 ASSAY OF SERUM SODIUM: CPT

## 2025-08-06 PROCEDURE — 36415 COLL VENOUS BLD VENIPUNCTURE: CPT

## 2025-08-14 ENCOUNTER — TELEPHONE (OUTPATIENT)
Dept: FAMILY MEDICINE | Facility: CLINIC | Age: 75
End: 2025-08-14
Payer: COMMERCIAL

## 2025-08-21 ENCOUNTER — TELEPHONE (OUTPATIENT)
Dept: FAMILY MEDICINE | Facility: CLINIC | Age: 75
End: 2025-08-21
Payer: COMMERCIAL

## (undated) RX ORDER — PROPOFOL 10 MG/ML
INJECTION, EMULSION INTRAVENOUS
Status: DISPENSED
Start: 2018-01-05